# Patient Record
Sex: FEMALE | Race: WHITE | NOT HISPANIC OR LATINO | Employment: OTHER | URBAN - METROPOLITAN AREA
[De-identification: names, ages, dates, MRNs, and addresses within clinical notes are randomized per-mention and may not be internally consistent; named-entity substitution may affect disease eponyms.]

---

## 2018-05-03 PROBLEM — E03.9 ACQUIRED HYPOTHYROIDISM: Chronic | Status: ACTIVE | Noted: 2018-05-03

## 2018-05-03 PROBLEM — F51.01 PRIMARY INSOMNIA: Chronic | Status: ACTIVE | Noted: 2018-05-03

## 2018-05-03 PROBLEM — G89.4 CHRONIC PAIN SYNDROME: Chronic | Status: ACTIVE | Noted: 2018-05-03

## 2018-05-16 PROBLEM — R79.89 ELEVATED PTHRP LEVEL: Chronic | Status: ACTIVE | Noted: 2018-05-16

## 2018-05-16 PROBLEM — E55.9 VITAMIN D DEFICIENCY: Chronic | Status: ACTIVE | Noted: 2018-05-16

## 2018-05-16 PROBLEM — E78.00 HYPERCHOLESTEREMIA: Chronic | Status: ACTIVE | Noted: 2018-05-16

## 2018-05-16 PROBLEM — R73.03 PRE-DIABETES: Chronic | Status: ACTIVE | Noted: 2018-05-16

## 2019-12-03 ENCOUNTER — OFFICE VISIT (OUTPATIENT)
Dept: RHEUMATOLOGY | Facility: CLINIC | Age: 74
End: 2019-12-03
Payer: MEDICARE

## 2019-12-03 VITALS
BODY MASS INDEX: 32.72 KG/M2 | DIASTOLIC BLOOD PRESSURE: 72 MMHG | WEIGHT: 178.88 LBS | SYSTOLIC BLOOD PRESSURE: 163 MMHG

## 2019-12-03 DIAGNOSIS — R76.8 POSITIVE ANA (ANTINUCLEAR ANTIBODY): Primary | ICD-10-CM

## 2019-12-03 DIAGNOSIS — M35.9 UNDIFFERENTIATED CONNECTIVE TISSUE DISEASE: ICD-10-CM

## 2019-12-03 PROCEDURE — 99205 PR OFFICE/OUTPT VISIT, NEW, LEVL V, 60-74 MIN: ICD-10-PCS | Mod: S$GLB,,, | Performed by: INTERNAL MEDICINE

## 2019-12-03 PROCEDURE — 1159F PR MEDICATION LIST DOCUMENTED IN MEDICAL RECORD: ICD-10-PCS | Mod: S$GLB,,, | Performed by: INTERNAL MEDICINE

## 2019-12-03 PROCEDURE — 1101F PT FALLS ASSESS-DOCD LE1/YR: CPT | Mod: S$GLB,,, | Performed by: INTERNAL MEDICINE

## 2019-12-03 PROCEDURE — 1125F PR PAIN SEVERITY QUANTIFIED, PAIN PRESENT: ICD-10-PCS | Mod: S$GLB,,, | Performed by: INTERNAL MEDICINE

## 2019-12-03 PROCEDURE — 1101F PR PT FALLS ASSESS DOC 0-1 FALLS W/OUT INJ PAST YR: ICD-10-PCS | Mod: S$GLB,,, | Performed by: INTERNAL MEDICINE

## 2019-12-03 PROCEDURE — 99205 OFFICE O/P NEW HI 60 MIN: CPT | Mod: S$GLB,,, | Performed by: INTERNAL MEDICINE

## 2019-12-03 PROCEDURE — 1125F AMNT PAIN NOTED PAIN PRSNT: CPT | Mod: S$GLB,,, | Performed by: INTERNAL MEDICINE

## 2019-12-03 PROCEDURE — 1159F MED LIST DOCD IN RCRD: CPT | Mod: S$GLB,,, | Performed by: INTERNAL MEDICINE

## 2019-12-03 RX ORDER — HYDROXYCHLOROQUINE SULFATE 200 MG/1
200 TABLET, FILM COATED ORAL 2 TIMES DAILY
Qty: 180 TABLET | Refills: 1 | Status: SHIPPED | OUTPATIENT
Start: 2019-12-03 | End: 2020-03-25

## 2019-12-03 RX ORDER — PREDNISONE 10 MG/1
10 TABLET ORAL EVERY OTHER DAY
Qty: 45 TABLET | Refills: 1 | Status: SHIPPED | OUTPATIENT
Start: 2019-12-03 | End: 2020-04-28 | Stop reason: SDUPTHER

## 2019-12-03 RX ORDER — LEVOTHYROXINE SODIUM 50 UG/1
50 TABLET ORAL
COMMUNITY
Start: 2019-11-18 | End: 2022-08-15 | Stop reason: SDUPTHER

## 2019-12-03 RX ORDER — PREDNISONE 10 MG/1
10 TABLET ORAL 2 TIMES DAILY
COMMUNITY
End: 2019-12-03 | Stop reason: SDUPTHER

## 2019-12-03 NOTE — PATIENT INSTRUCTIONS
Hydroxychloroquine tablets  What is this medicine?  HYDROXYCHLOROQUINE (jaimie drox ee KLOR oh kwin) is used to treat rheumatoid arthritis and systemic lupus erythematosus. It is also used to treat malaria.  How should I use this medicine?  Take this medicine by mouth with a glass of water. Follow the directions on the prescription label. If this medicine upsets your stomach take it with food or milk. Take your doses at regular intervals. Do not take your medicine more often than directed.  Talk to your pediatrician regarding the use of this medicine in children. Special care may be needed.  What side effects may I notice from receiving this medicine?  Side effects that you should report to your doctor or health care professional as soon as possible:  · allergic reactions like skin rash, itching or hives, swelling of the face, lips, or tongue  · change in vision  · fever, infection  · hearing loss or ringing  · muscle weakness, tremor, or numbness  · redness, blistering, peeling or loosening of the skin, including inside the mouth  · seizures  · unusual bleeding or bruising  · unusually weak or tired  Side effects that usually do not require medical attention (report to your doctor or health care professional if they continue or are bothersome):  · change in coloration of the mouth or skin  · dizziness  · hair loss, lightening  · headache  · irritability, nervousness, nightmares  · loss of appetite  · stomach upset, diarrhea  What may interact with this medicine?  · antacids  · botulinum toxins  · digoxin  · kaolin  · penicillamine  What if I miss a dose?  If you miss a dose, take it as soon as you can. If it is almost time for your next dose, take only that dose. Do not take double or extra doses.  Where should I keep my medicine?  Keep out of the reach of children. In children, this medicine can cause overdose with small doses.  Store at room temperature between 15 and 30 degrees C (59 and 86 degrees F). Protect  from moisture and light. Throw away any unused medicine after the expiration date.  What should I tell my health care provider before I take this medicine?  They need to know if you have any of these conditions:  · alcoholism  · anemia or other blood disorder  · eye disease  · glucose 6-phosphate dehydrogenase (G6PD) deficiency  · liver disease  · porphyria  · psoriasis  · an unusual or allergic reaction to chloroquine, hydroxychloroquine, other medicines, foods, dyes, or preservatives  · pregnant or trying to get pregnant  · breast-feeding  What should I watch for while using this medicine?  Visit your doctor or health care professional for regular check ups. Tell your doctor if your symptoms do not improve. Arthritis symptoms may take several weeks to improve. If you are taking this medicine for a long time, you will need important blood work done. You will also need to have your eyes checked as directed.  This medicine can make you more sensitive to the sun. Keep out of the sun. If you cannot avoid being in the sun, wear protective clothing and use sunscreen. Do not use sun lamps or tanning beds/booths.  Avoid antacids and kaolin containing products for 2 hours before and after taking a dose of this medicine.  NOTE:This sheet is a summary. It may not cover all possible information. If you have questions about this medicine, talk to your doctor, pharmacist, or health care provider. Copyright© 2017 [employer]        Lupus (Systemic Lupus Erythematosus, SLE)  Lupus is a chronic (long-term) disease. It causes inflammation in the body. It mainly affects the joints, skin, and muscles. Lupus can affect almost any part of the body, and other common sites affected by lupus include the kidneys, blood cells, lungs, brain, nerves, intestines, eyes, mouth, and heart. Lupus is an autoimmune disease. This means that immune cells in the body begin attacking normal body cells. The cause of this is not known.  Common symptoms  include:  · A butterfly-shaped rash across the bridge of the nose and cheeks or a disk-shaped rash on the face, neck, or chest  · Sun sensitivity (a short time in the sun may lead to severe sunburn or rash)  · Stiff, painful, or swollen joints (arthritis)  · Fatigue or depression  · Fever  Your healthcare provider may prescribe medicines such as oral steroids or medicines to suppress the immune system. Some people benefit from anti-malarial medicines as well. People with lupus are more likely to have heart disease. So, it is vital to manage other risk factors for heart disease. These include high blood pressure, smoking, and unhealthy cholesterol.   There is no cure for lupus. With good care, though, most people with the condition lead normal, active lives.   Home care  · If you were prescribed a medicine, take it as directed.  · Unless another pain medicine was prescribed, take an over-the-counter pain medicine such as acetaminophen or ibuprofen for pain. Do not take ibuprofen or other NSAID (non-steroidal anti-inflammatory) medicine if you were prescribed prednisone.  · Avoid sun exposure. Cover up with clothing. Wear sunglasses. Use sun screen (at least SPF 15).  · Get enough rest and reduce stress to help your immune system.  · Get some physical activity every day. This will help you feel your best.  · If you have high blood pressure, consider buying an automatic blood pressure machine (available at most pharmacies). Use this to monitor your blood pressure and report to your doctor.  · Limit alcohol intake. Eat a healthy, balanced diet low in fat and cholesterol.  · If you smoke, quit. Smoking increases the risk of lupus-related complications.  Follow-up care  Follow up with your healthcare provider or as advised by our staff.  For more information contact the Lupus Foundation at [phone]  www.lupus.org  When to seek medical advice  Call your healthcare provider for any of the following:  · Increasing  weakness, fainting  · Chest pain or shortness of breath or pain with breathing  · Severe headache with fever  · Seizures  · Leg swelling, redness or tenderness (sign of blood clot)  · Unusual bruising or bleeding anywhere on your body  · Blood in your stool (black or red color)  · Abdominal pain, repeated vomiting  · Blood or development of significant bubbles in the urine  · Swelling in the legs and arms  · Development of ulcers in the mouth  · New rash  · Rashes, discoloration or ulcerations on the finger tips or toes  · You become pregnant or are planning to become pregnant  Date Last Reviewed: 3/1/2017  © 0133-6431 [employer]. [address] [place] [address], [place] [address]. All rights reserved. This information is not intended as a substitute for professional medical care. Always follow your healthcare professional's instructions.      Low-Salt Choices  Eating salt (sodium) can make your body retain too much water. Excess water makes your heart work harder. Canned, packaged, and frozen foods are easy to prepare, but they are often high in sodium. Here are some ideas for low-salt foods you can easily prepare yourself.    For breakfast  · Fruit or 100% fruit juice  · Whole-wheat bread or an English muffin. Compare sodium content on labels.  · Low-fat milk or yogurt  · Unsalted eggs  · Shredded wheat  · Corn tortillas  · Unsalted steamed rice  · Regular (not instant) hot cereal, made without salt  Stay away from:  · Sausage, woodruff, and ham  · Flour tortillas  · Packaged muffins, pancakes, and biscuits  · Instant hot cereals  · Cottage cheese  For lunch and dinner  · Fresh fish, chicken, turkey, or meat--baked, broiled, or roasted without salt  · Dry beans, cooked without salt  · Tofu, stir-fried without salt  · Unsalted fresh fruit and vegetables, or frozen or canned fruit and vegetables with no added salt  Stay away from:  · Lunch or deli meat that is cured or smoked  · Cheese  · Tomato juice and  catsup  · Canned vegetables, soups, and fish not labeled as no-salt-added or reduced sodium  · Packaged gravies and sauces  · Olives, pickles, and relish  · Bottled salad dressings  For snacks and desserts  · Yogurt  · Unsalted, air popped popcorn  · Unsalted nuts or seeds  Stay away from:  · Pies and cakes  · Packaged dessert mixes  · Pizza  · Canned and packaged puddings  · Pretzels, chips, crackers, and nuts--unless the label says unsalted  Date Last Reviewed: 6/17/2015  © 4587-7384 [employer]. [address] [place] [address], [place] [address]. All rights reserved. This information is not intended as a substitute for professional medical care. Always follow your healthcare professional's instructions.

## 2019-12-03 NOTE — PROGRESS NOTES
[facility] RHEUMATOLOGY           New patient visit    Notes dictated to M*Modal. Please forgive any unintentional errors.  Subjective:       Patient ID:   NAME: Jacqueline Goldberg : 1945     73 y.o. female    Referring Doc: No ref. provider found  Other Physicians:    Chief Complaint:  Positive OBINNA    HPI:  This very pleasant lady is referred for the evaluation of a positive OBINNA.  The patient was in her usual state of excellent health until approximately 6 weeks ago when she developed an acute respiratory syndrome that involved shortness of breath, dyspnea on exertion, and swelling of her lower extremities.  She was taken to the local emergency room where it was found that she had pneumonia.  She was admitted to the hospital and treated successfully with antibiotics. [Hospital Course: Patient presented to the hospital with complaints of progressive shortness of breath, chest discomfort, and dyspnea with exertion. She reports that the symptoms had become progressively worse over the past 2 weeks prior to admission. She admitted to having some subjective fevers and chills at home but denied having any sick contacts. Patient was found to be hypoxic and was started on oxygen therapy. She was also found to have pneumonia and was started on antibiotics of azithromycin and Rocephin. During the initial days of her hospitalization the patient adamantly refused to have steroid therapy to help with her symptoms and diagnosis. Myself, the nocturnist, and the pulmonologist all went over the pros and cons of therapy with this patient. She also initially did not want to have IV access for IV antibiotics for therapy. We again explained the pros and cons of this treatment. Patient finally decided to agree with medical advice and steroid therapy was initiated by pulmonology. Patient was also evaluated by Cardiology. The cardiology workup did not show any acute issues that would justify the patient's symptoms. The patient  will be sent home in stable condition on home oxygen. She has failed a 6 min walk test. She will follow with pulmonology, Dr. Brower. She will also follow up with her primary care physician].    That evaluation included extensive blood testing which demonstrated a positive OBINNA (1:640 speckled), a negative OBINNA profile, elevated C3/C4, and an elevated rheumatoid factor of 156 IU/mL.     The rheumatologic review of systems is negative for skin rash, photosensitivity, sicca symptoms, oral ulcerations, dysphagia, reflux, GI bleeding, renal disease or hematuria, significant anemia, Raynaud's, seizures, or arthritis.      ROS:   GEN:      no fever, night sweats or weight loss  SKIN:     no rashes, erythema, bruising, or swelling, no Raynauds, no photosensitivity  HEENT:  no acute changes in vision, no mouth ulcers, no sicca symptoms, no scalp tenderness, jaw claudication.  CV:        no CP, PND, + COTA, + orthopnea, no palpitations  PULM:    + SOB, + cough, - hemoptysis, - sputum, + pleuritic pain  GI:          No dysphagia, GERD, hematemesis; no abdominal pain, nausea, vomiting, constipation, diarrhea, melanotic stools, BRBPR.  :        no hematuria, dysuria  NEURO: no paresthesias, headaches, visual disturbances  MUSCULOSKELETAL:  No red, hot, and/or swollen joints.  No muscle pain or weakness    PSYCH: No insomnia, no anxiety, no depression    Past Medical/Surgical History:  Past Medical History:   Diagnosis Date    Chronic pain     Insomnia     Insomnia     Thyroid disease      Past Surgical History:   Procedure Laterality Date    ANKLE SURGERY      CHOLECYSTECTOMY      EYE SURGERY      cataract surgery; both eyes       Allergies:  Review of patient's allergies indicates:  No Known Allergies    Social/Family History:  Social History     Socioeconomic History    Marital status: Single     Spouse name: Not on file    Number of children: Not on file    Years of education: Not on file    Highest education level:  Not on file   Occupational History    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   Social Needs    Financial resource strain: Not on file    Food insecurity:     Worry: Not on file     Inability: Not on file    Transportation needs:     Medical: Not on file     Non-medical: Not on file   Tobacco Use    Smoking status: Never Smoker    Smokeless tobacco: Never Used   Substance and Sexual Activity    Alcohol use: No    Drug use: No    Sexual activity: Yes     Partners: Male     Birth control/protection: None   Lifestyle    Physical activity:     Days per week: Not on file     Minutes per session: Not on file    Stress: Not on file   Relationships    Social connections:     Talks on phone: Not on file     Gets together: Not on file     Attends Congregation service: Not on file     Active member of club or organization: Not on file     Attends meetings of clubs or organizations: Not on file     Relationship status: Not on file   Other Topics Concern    Not on file   Social History Narrative    Not on file     Family History   Problem Relation Age of Onset    Diabetes Mother     Heart disease Father     Arthritis Sister      FAMILY HISTORY: negative for Connective Tissue Disease      Medications:    Current Outpatient Medications:     levothyroxine (SYNTHROID) 50 MCG tablet, Take 50 mcg by mouth., Disp: , Rfl:     predniSONE (DELTASONE) 10 MG tablet, Take 1 tablet (10 mg total) by mouth every other day., Disp: 45 tablet, Rfl: 1    hydroxychloroquine (PLAQUENIL) 200 mg tablet, Take 1 tablet (200 mg total) by mouth 2 (two) times daily., Disp: 180 tablet, Rfl: 1    simvastatin (ZOCOR) 10 MG tablet, Take 1 tablet (10 mg total) by mouth every evening. (Patient not taking: Reported on 12/3/2019), Disp: 90 tablet, Rfl: 0    traZODone (DESYREL) 50 MG tablet, Take 1 tablet (50 mg total) by mouth nightly. (Patient not taking: Reported on 12/3/2019), Disp: 90 tablet, Rfl: 0  No current  facility-administered medications for this visit.     Objective:     Vitals:  Blood pressure (!) 163/72, weight 81.1 kg (178 lb 14.4 oz).  RR=16, P=76  Physical Examination:   GEN:     wn/wd female in no apparent distress  SKIN:    no rashes, no sclerodactyly, no Raynaud's, no periungual erythema, no digital tip tapering, no nailbed pitting  HEAD:   no alopecia, no scalp tenderness, no temporal artery tenderness or induration.  EYES:   + conjunctival pallor, no icterus  ENT:     no thrush, no mucosal dryness or ulcerations, adequate oral hygiene & dentition.  NECK:  supple x 6, no masses, no thyromegaly, no lymphadenopathy.  CV:        S1 and S2, RRR, II/VI diastolic murmur RUSB, no gallop or rubs  CHEST: Normal respiratory effort; decreased breath sounds at both bases. No signs of consolidation.  ABD:      non-tender and non-distended; soft; normal bowel sounds; no rebound, guarding, or tenderness. No hepatosplenomegaly.  Musculoskeletal:  No evidence of inflammatory arthritis involving the small joints of the hands or feet.  No inflammation, deformity, or instability of the large joints.  Degenerative changes are noted in the shoulders, hips, and knees. Strength is 5-/5 x 4.  Posture is adequate. Range of motion of the back is diminished due to hamstring tightness and lower back pain. Gait is broad-based, somewhat slow but stable  EXTREM: no clubbing, cyanosis, 2+ edema, normal pulses. Torito's - x2  NEURO:  grossly intact; motor/sensory WNL; no tremors  PSYCH:  normal mood, affect and behavior    Labs:   Lab Results   Component Value Date    WBC 10.40 05/03/2018    HGB 12.8 05/03/2018    HCT 40.1 05/03/2018    MCV 73 (L) 05/03/2018     05/03/2018   CMP@  Sodium   Date Value Ref Range Status   05/03/2018 139 136 - 145 mmol/L Final     Potassium   Date Value Ref Range Status   05/03/2018 4.3 3.5 - 5.1 mmol/L Final     Chloride   Date Value Ref Range Status   05/03/2018 101 101 - 111 mmol/L Final     CO2    Date Value Ref Range Status   05/03/2018 29 23 - 29 mmol/L Final     Glucose   Date Value Ref Range Status   05/03/2018 103 74 - 118 mg/dL Final     BUN, Bld   Date Value Ref Range Status   05/03/2018 24 (H) 8 - 23 mg/dL Final     Creatinine   Date Value Ref Range Status   05/03/2018 0.7 0.5 - 1.4 mg/dL Final     Calcium   Date Value Ref Range Status   05/03/2018 9.4 8.6 - 10.0 mg/dL Final     Total Protein   Date Value Ref Range Status   05/03/2018 8.2 6.0 - 8.4 g/dL Final     Albumin   Date Value Ref Range Status   05/03/2018 4.4 3.5 - 5.2 g/dL Final     Total Bilirubin   Date Value Ref Range Status   05/03/2018 0.6 0.3 - 1.2 mg/dL Final     Comment:     For infants and newborns, interpretation of results should be based  on gestational age, weight and in agreement with clinical  observations.  Premature Infant recommended reference ranges:  Up to 24 hours.............<8.0 mg/dL  Up to 48 hours............<12.0 mg/dL  3-5 days..................<15.0 mg/dL  6-29 days.................<15.0 mg/dL       Alkaline Phosphatase   Date Value Ref Range Status   05/03/2018 102 38 - 126 U/L Final     AST   Date Value Ref Range Status   05/03/2018 20 15 - 41 U/L Final     ALT   Date Value Ref Range Status   05/03/2018 16 14 - 54 U/L Final       Radiology/Diagnostic Studies:    None    Assessment/Discussion/Plan:   73 y.o. female with a high titer positive OBINNA, acute onset of pneumonia with an apparent degree of pneumonitis, and anemia    PLAN:  For the time being, it would be safe to call this as undifferentiated connective tissue disease (UCTD), though I think that this is probably lupus. She has done well since discharge on 10-20 mg of prednisone daily, though I suspect her compliance has been imperfect. The problem is how to go forward from here. Progressively tapering her prednisone will very possibly end in a full-blown recurrence.    To make matters more complicated, this rather strong-willed lady is bound determined  to [place] from [place] on the [place] [id number] in January 2020 on a 4-1/2 month, around the world cruise.    I have discussed possible approaches to this with the patient and her sister.  I have suggested and they have accepted starting her on hydroxychloroquine 400 mg daily immediately. She was advised to have a baseline retinal examination within the next month.  Because HCQ takes at least 2 months and often more than 4 months to become effective, she will also have to remain on prednisone. I have adjusted her dose to 10 mg every other day effective immediately. We need to determine right now, before she embarks, whether she can tolerate that sort of dose without relapsing. The risks of the drugs and of a potential relapse were discussed in detail.    I have also noted that she appears to be in a bit of CHF.  She was given a prescription for Lasix at the time of her discharge from the hospital but she admits she has never taken it. She states she was to take it as needed but admits that she has no idea what it might be needed for.  I have therefore instructed her to take 20 mg every other day until her next appointment.    I have ordered some additional blood and urine testing.  This will be performed this week at [facility].    I have provided her with literature on lupus and on steroids.  I also provided her with literature on a low-sodium diet.    RTC:  I will see her back in 2 weeks.            Electronically signed by Refugio Owens MD

## 2019-12-05 LAB — SERVICE CMNT-IMP: NORMAL

## 2019-12-09 NOTE — PROGRESS NOTES
Check on the patient and see how she is doing.  Let's get a repeat CBC, urine, and a urine culture and sensitivity this week.  Thank you

## 2019-12-11 LAB
ALBUMIN SERPL ELPH-MCNC: 3.4 G/DL (ref 3.8–4.8)
ALBUMIN SERPL-MCNC: 3.5 G/DL (ref 3.6–5.1)
ALBUMIN/GLOB SERPL: 1.2 (CALC) (ref 1–2.5)
ALP SERPL-CCNC: 101 U/L (ref 33–130)
ALPHA1 GLOB SERPL ELPH-MCNC: 0.4 G/DL (ref 0.2–0.3)
ALPHA2 GLOB SERPL ELPH-MCNC: 1 G/DL (ref 0.5–0.9)
ALT SERPL-CCNC: 29 U/L (ref 6–29)
ANA PAT SER IF-IMP: ABNORMAL
ANA SER QL IF: POSITIVE
ANA TITR SER IF: ABNORMAL TITER
APPEARANCE UR: ABNORMAL
AST SERPL-CCNC: 16 U/L (ref 10–35)
BACTERIA #/AREA URNS HPF: ABNORMAL /HPF
BASOPHILS # BLD AUTO: 45 CELLS/UL (ref 0–200)
BASOPHILS NFR BLD AUTO: 0.2 %
BETA1 GLOB SERPL ELPH-MCNC: 0.4 G/DL (ref 0.4–0.6)
BETA2 GLOB SERPL ELPH-MCNC: 0.3 G/DL (ref 0.2–0.5)
BILIRUB SERPL-MCNC: 0.5 MG/DL (ref 0.2–1.2)
BILIRUB UR QL STRIP: NEGATIVE
BUN SERPL-MCNC: 28 MG/DL (ref 7–25)
BUN/CREAT SERPL: 29 (CALC) (ref 6–22)
CALCIUM SERPL-MCNC: 9.2 MG/DL (ref 8.6–10.4)
CCP IGG SERPL-ACNC: <16 UNITS
CHLORIDE SERPL-SCNC: 102 MMOL/L (ref 98–110)
CO2 SERPL-SCNC: 28 MMOL/L (ref 20–32)
COLOR UR: YELLOW
CREAT SERPL-MCNC: 0.95 MG/DL (ref 0.6–0.93)
CRP SERPL-MCNC: 30.1 MG/L
DSDNA AB SER-ACNC: <1 IU/ML
ENA SCL70 AB SER IA-ACNC: NORMAL AI
ENA SM AB SER IA-ACNC: NORMAL AI
ENA SM+RNP AB SER IA-ACNC: NORMAL AI
ENA SS-A AB SER IA-ACNC: NORMAL AI
ENA SS-B AB SER IA-ACNC: NORMAL AI
EOSINOPHIL # BLD AUTO: 6712 CELLS/UL (ref 15–500)
EOSINOPHIL NFR BLD AUTO: 30.1 %
ERYTHROCYTE [DISTWIDTH] IN BLOOD BY AUTOMATED COUNT: 16.4 % (ref 11–15)
GAMMA GLOB SERPL ELPH-MCNC: 1 G/DL (ref 0.8–1.7)
GFRSERPLBLD MDRD-ARVRAT: 58 ML/MIN/1.73M2
GLOBULIN SER CALC-MCNC: 3 G/DL (CALC) (ref 1.9–3.7)
GLUCOSE SERPL-MCNC: 103 MG/DL (ref 65–99)
GLUCOSE UR QL STRIP: NEGATIVE
HCT VFR BLD AUTO: 34.5 % (ref 35–45)
HGB BLD-MCNC: 10.7 G/DL (ref 11.7–15.5)
HGB UR QL STRIP: ABNORMAL
HYALINE CASTS #/AREA URNS LPF: ABNORMAL /LPF
KETONES UR QL STRIP: NEGATIVE
LEUKOCYTE ESTERASE UR QL STRIP: NEGATIVE
LYMPHOCYTES # BLD AUTO: 2743 CELLS/UL (ref 850–3900)
LYMPHOCYTES NFR BLD AUTO: 12.3 %
MCH RBC QN AUTO: 24.1 PG (ref 27–33)
MCHC RBC AUTO-ENTMCNC: 31 G/DL (ref 32–36)
MCV RBC AUTO: 77.7 FL (ref 80–100)
MONOCYTES # BLD AUTO: 1516 CELLS/UL (ref 200–950)
MONOCYTES NFR BLD AUTO: 6.8 %
NEUTROPHILS # BLD AUTO: ABNORMAL CELLS/UL (ref 1500–7800)
NEUTROPHILS NFR BLD AUTO: 50.6 %
NITRITE UR QL STRIP: NEGATIVE
PH UR STRIP: ABNORMAL [PH] (ref 5–8)
PLATELET # BLD AUTO: 454 THOUSAND/UL (ref 140–400)
PMV BLD REES-ECKER: 9.2 FL (ref 7.5–12.5)
POTASSIUM SERPL-SCNC: 4.4 MMOL/L (ref 3.5–5.3)
PROT PATTERN SERPL ELPH-IMP: ABNORMAL
PROT SERPL-MCNC: 6.5 G/DL (ref 6.1–8.1)
PROT SERPL-MCNC: 6.6 G/DL (ref 6.1–8.1)
PROT UR QL STRIP: ABNORMAL
RBC # BLD AUTO: 4.44 MILLION/UL (ref 3.8–5.1)
RBC #/AREA URNS HPF: ABNORMAL /HPF
SODIUM SERPL-SCNC: 142 MMOL/L (ref 135–146)
SP GR UR STRIP: 1.01 (ref 1–1.03)
SQUAMOUS #/AREA URNS HPF: ABNORMAL /HPF
WBC # BLD AUTO: 22.3 THOUSAND/UL (ref 3.8–10.8)
WBC #/AREA URNS HPF: ABNORMAL /HPF

## 2019-12-13 LAB
APPEARANCE UR: CLEAR
BACTERIA #/AREA URNS HPF: ABNORMAL /HPF
BACTERIA UR CULT: NORMAL
BACTERIA UR CULT: NORMAL
BASOPHILS # BLD AUTO: 78 CELLS/UL (ref 0–200)
BASOPHILS NFR BLD AUTO: 0.4 %
BILIRUB UR QL STRIP: NEGATIVE
CAOX CRY #/AREA URNS HPF: ABNORMAL /HPF
CENTROMERE B AB SER-ACNC: NORMAL AI
COLOR UR: YELLOW
EOSINOPHIL # BLD AUTO: ABNORMAL CELLS/UL (ref 15–500)
EOSINOPHIL NFR BLD AUTO: 51.5 %
ERYTHROCYTE [DISTWIDTH] IN BLOOD BY AUTOMATED COUNT: 16.5 % (ref 11–15)
GLUCOSE UR QL STRIP: NEGATIVE
HCT VFR BLD AUTO: 35.3 % (ref 35–45)
HGB BLD-MCNC: 10.8 G/DL (ref 11.7–15.5)
HGB UR QL STRIP: ABNORMAL
HYALINE CASTS #/AREA URNS LPF: ABNORMAL /LPF
KETONES UR QL STRIP: NEGATIVE
LEUKOCYTE ESTERASE UR QL STRIP: NEGATIVE
LYMPHOCYTES # BLD AUTO: 1950 CELLS/UL (ref 850–3900)
LYMPHOCYTES NFR BLD AUTO: 10 %
MCH RBC QN AUTO: 23.7 PG (ref 27–33)
MCHC RBC AUTO-ENTMCNC: 30.6 G/DL (ref 32–36)
MCV RBC AUTO: 77.6 FL (ref 80–100)
MONOCYTES # BLD AUTO: 819 CELLS/UL (ref 200–950)
MONOCYTES NFR BLD AUTO: 4.2 %
NEUTROPHILS # BLD AUTO: 6611 CELLS/UL (ref 1500–7800)
NEUTROPHILS NFR BLD AUTO: 33.9 %
NITRITE UR QL STRIP: NEGATIVE
PH UR STRIP: ABNORMAL [PH] (ref 5–8)
PLATELET # BLD AUTO: 436 THOUSAND/UL (ref 140–400)
PMV BLD REES-ECKER: 9.9 FL (ref 7.5–12.5)
PROT UR QL STRIP: ABNORMAL
RBC # BLD AUTO: 4.55 MILLION/UL (ref 3.8–5.1)
RBC #/AREA URNS HPF: ABNORMAL /HPF
SERVICE CMNT-IMP: ABNORMAL
SP GR UR STRIP: 1.02 (ref 1–1.03)
SQUAMOUS #/AREA URNS HPF: ABNORMAL /HPF
WBC # BLD AUTO: 19.5 THOUSAND/UL (ref 3.8–10.8)
WBC #/AREA URNS HPF: ABNORMAL /HPF

## 2019-12-17 ENCOUNTER — OFFICE VISIT (OUTPATIENT)
Dept: RHEUMATOLOGY | Facility: CLINIC | Age: 74
End: 2019-12-17
Payer: MEDICARE

## 2019-12-17 VITALS
DIASTOLIC BLOOD PRESSURE: 86 MMHG | WEIGHT: 173.88 LBS | BODY MASS INDEX: 31.81 KG/M2 | SYSTOLIC BLOOD PRESSURE: 149 MMHG

## 2019-12-17 DIAGNOSIS — D72.19 EOSINOPHILIC LEUKOCYTOSIS: Primary | ICD-10-CM

## 2019-12-17 DIAGNOSIS — M35.9 UNDIFFERENTIATED CONNECTIVE TISSUE DISEASE: ICD-10-CM

## 2019-12-17 DIAGNOSIS — F51.01 PRIMARY INSOMNIA: Chronic | ICD-10-CM

## 2019-12-17 PROCEDURE — 1159F PR MEDICATION LIST DOCUMENTED IN MEDICAL RECORD: ICD-10-PCS | Mod: S$GLB,,, | Performed by: INTERNAL MEDICINE

## 2019-12-17 PROCEDURE — 1159F MED LIST DOCD IN RCRD: CPT | Mod: S$GLB,,, | Performed by: INTERNAL MEDICINE

## 2019-12-17 PROCEDURE — 99215 OFFICE O/P EST HI 40 MIN: CPT | Mod: S$GLB,,, | Performed by: INTERNAL MEDICINE

## 2019-12-17 PROCEDURE — 1101F PR PT FALLS ASSESS DOC 0-1 FALLS W/OUT INJ PAST YR: ICD-10-PCS | Mod: S$GLB,,, | Performed by: INTERNAL MEDICINE

## 2019-12-17 PROCEDURE — 1125F PR PAIN SEVERITY QUANTIFIED, PAIN PRESENT: ICD-10-PCS | Mod: S$GLB,,, | Performed by: INTERNAL MEDICINE

## 2019-12-17 PROCEDURE — 99215 PR OFFICE/OUTPT VISIT, EST, LEVL V, 40-54 MIN: ICD-10-PCS | Mod: S$GLB,,, | Performed by: INTERNAL MEDICINE

## 2019-12-17 PROCEDURE — 1125F AMNT PAIN NOTED PAIN PRSNT: CPT | Mod: S$GLB,,, | Performed by: INTERNAL MEDICINE

## 2019-12-17 PROCEDURE — 1101F PT FALLS ASSESS-DOCD LE1/YR: CPT | Mod: S$GLB,,, | Performed by: INTERNAL MEDICINE

## 2019-12-17 RX ORDER — ALBUTEROL SULFATE 90 UG/1
AEROSOL, METERED RESPIRATORY (INHALATION)
Refills: 0 | COMMUNITY
Start: 2019-11-05 | End: 2020-05-19

## 2019-12-17 RX ORDER — TRAZODONE HYDROCHLORIDE 100 MG/1
100 TABLET ORAL NIGHTLY
Qty: 90 TABLET | Refills: 1 | Status: SHIPPED | OUTPATIENT
Start: 2019-12-17 | End: 2020-05-19

## 2019-12-17 RX ORDER — METHOCARBAMOL 500 MG/1
500 TABLET, FILM COATED ORAL CONTINUOUS PRN
Refills: 0 | COMMUNITY
Start: 2019-10-26 | End: 2020-06-09

## 2019-12-17 RX ORDER — FUROSEMIDE 40 MG/1
40 TABLET ORAL
Refills: 3 | COMMUNITY
Start: 2019-11-25 | End: 2020-06-03 | Stop reason: SDUPTHER

## 2019-12-17 RX ORDER — IBUPROFEN 800 MG/1
TABLET ORAL
Refills: 0 | COMMUNITY
Start: 2019-10-26 | End: 2022-08-15 | Stop reason: SDUPTHER

## 2019-12-17 NOTE — PROGRESS NOTES
[facility] RHEUMATOLOGY           Follow-up visit    Notes dictated to M*Modal. Please forgive any unintended errors.  Subjective:       Patient ID:   NAME: Jacqueline Goldberg : 1945     74 y.o. female    Referring Doc: No ref. provider found  Other Physicians:    Chief Complaint:  Positive OBINNA      HPI/Interval History:  The patient reports feeling better over the last 2 weeks.  She has been on hydroxychloroquine 400 mg daily and prednisone 10 mg every other day.  She has no new complaints.    ROS:   GEN:    no fever, night sweats or weight loss  SKIN:   no rashes, erythema, bruising, or swelling, no Raynauds, no photosensitivity  HEENT: no changes in vision, no mouth ulcers, no sicca symptoms, no scalp tenderness, no jaw claudication.  CV:      no CP, PND, COTA, orthopnea, no palpitations  PULM: no SOB, cough, hemoptysis, sputum or pleuritic pain  GI:       no GERD, no dysphagia, no hematemesis, no abdominal pain, nausea, vomiting, constipation, diarrhea, melanotic stools, BRBPR  :      no hematuria, dysuria  NEURO: no paresthesias, headaches, acute visual disturbances  MUSCULOSKELETAL:  No muscle or joint complaints  PSYCH:   No increased insomnia, no increased anxiety, no increased depression    Past Medical/Surgical History:  Past Medical History:   Diagnosis Date    Chronic pain     Insomnia     Insomnia     Thyroid disease      Past Surgical History:   Procedure Laterality Date    ANKLE SURGERY      CHOLECYSTECTOMY      EYE SURGERY      cataract surgery; both eyes       Allergies:  Review of patient's allergies indicates:  No Known Allergies    Social/Family History:  Social History     Socioeconomic History    Marital status: Single     Spouse name: Not on file    Number of children: Not on file    Years of education: Not on file    Highest education level: Not on file   Occupational History    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   Social Needs    Financial resource  strain: Not on file    Food insecurity:     Worry: Not on file     Inability: Not on file    Transportation needs:     Medical: Not on file     Non-medical: Not on file   Tobacco Use    Smoking status: Never Smoker    Smokeless tobacco: Never Used   Substance and Sexual Activity    Alcohol use: No    Drug use: No    Sexual activity: Yes     Partners: Male     Birth control/protection: None   Lifestyle    Physical activity:     Days per week: Not on file     Minutes per session: Not on file    Stress: Not on file   Relationships    Social connections:     Talks on phone: Not on file     Gets together: Not on file     Attends Restorationism service: Not on file     Active member of club or organization: Not on file     Attends meetings of clubs or organizations: Not on file     Relationship status: Not on file   Other Topics Concern    Not on file   Social History Narrative    Not on file     Family History   Problem Relation Age of Onset    Diabetes Mother     Heart disease Father     Arthritis Sister      FAMILY HISTORY: negative for Connective Tissue Disease      Medications:    Current Outpatient Medications:     albuterol (PROVENTIL/VENTOLIN HFA) 90 mcg/actuation inhaler, INL 2 PFS ITL Q 4 H PRF WHZ OR SOB, Disp: , Rfl: 0    furosemide (LASIX) 40 MG tablet, TK 1 T PO  DAILY PRF FLUID RETENTION, Disp: , Rfl: 3    hydroxychloroquine (PLAQUENIL) 200 mg tablet, Take 1 tablet (200 mg total) by mouth 2 (two) times daily., Disp: 180 tablet, Rfl: 1    ibuprofen (ADVIL,MOTRIN) 800 MG tablet, , Disp: , Rfl: 0    levothyroxine (SYNTHROID) 50 MCG tablet, Take 50 mcg by mouth., Disp: , Rfl:     methocarbamol (ROBAXIN) 500 MG Tab, , Disp: , Rfl: 0    predniSONE (DELTASONE) 10 MG tablet, Take 1 tablet (10 mg total) by mouth every other day., Disp: 45 tablet, Rfl: 1    simvastatin (ZOCOR) 10 MG tablet, Take 1 tablet (10 mg total) by mouth every evening., Disp: 90 tablet, Rfl: 0    traZODone (DESYREL) 100 MG  tablet, Take 1 tablet (100 mg total) by mouth nightly., Disp: 90 tablet, Rfl: 1      Objective:     Vitals:  Blood pressure (!) 149/86, weight 78.9 kg (173 lb 14.4 oz).    Physical Examination:   GEN: wn/wd female in no apparent distress  SKIN: no rashes, no sclerodactyly, no Raynaud's, no periungual erythema, no digital tip ulcerations, no nailbed pitting  HEAD: no alopecia, no scalp tenderness, no temporal artery tenderness or induration.  EYES: no pallor, no icterus, PERRLA  ENT:  no thrush, no mucosal dryness or ulcerations, adequate oral hygiene & dentition.  NECK: supple x 6, no masses, no thyromegaly, no lymphadenopathy.  CV:   S1 and S2 regular, no murmurs, gallop or rubs  CHEST: Normal respiratory effort;  normal breath sounds/no adventitious sounds. No signs of consolidation.  ABD: non-tender and non-distended; soft; normal bowel sounds; no rebound/guarding or tenderness. No hepatosplenomegaly.  Musculoskeletal:  No evidence of active inflammatory disease.  No progressive deformity  EXTREM: no clubbing, cyanosis or edema. normal pulses.  NEURO:  grossly intact; motor/sensory WNL; no tremors  PSYCH:  normal mood, affect and behavior    Labs:   Lab Results   Component Value Date    WBC 19.5 (H) 12/11/2019    HGB 10.8 (L) 12/11/2019    HCT 35.3 12/11/2019    MCV 77.6 (L) 12/11/2019     (H) 12/11/2019   CMP@  Sodium   Date Value Ref Range Status   12/04/2019 142 135 - 146 mmol/L Final     Potassium   Date Value Ref Range Status   12/04/2019 4.4 3.5 - 5.3 mmol/L Final     Chloride   Date Value Ref Range Status   12/04/2019 102 98 - 110 mmol/L Final     CO2   Date Value Ref Range Status   12/04/2019 28 20 - 32 mmol/L Final     Glucose   Date Value Ref Range Status   12/04/2019 103 (H) 65 - 99 mg/dL Final     Comment:                   Fasting reference interval     For someone without known diabetes, a glucose value  between 100 and 125 mg/dL is consistent with  prediabetes and should be confirmed with  a  follow-up test.          BUN, Bld   Date Value Ref Range Status   12/04/2019 28 (H) 7 - 25 mg/dL Final     Creatinine   Date Value Ref Range Status   12/04/2019 0.95 (H) 0.60 - 0.93 mg/dL Corrected     Comment:        => AMENDED: Report has been updated to reflect  additional patient information. Please review results   for possible flagging and/or reference range changes.  For patients >49 years of age, the reference limit  for Creatinine is approximately 13% higher for people  identified as -American.          Calcium   Date Value Ref Range Status   12/04/2019 9.2 8.6 - 10.4 mg/dL Final     Total Protein   Date Value Ref Range Status   12/04/2019 6.6 6.1 - 8.1 g/dL Final   12/04/2019 6.5 6.1 - 8.1 g/dL Final     Albumin   Date Value Ref Range Status   12/04/2019 3.5 (L) 3.6 - 5.1 g/dL Final     Total Bilirubin   Date Value Ref Range Status   12/04/2019 0.5 0.2 - 1.2 mg/dL Final     Alkaline Phosphatase   Date Value Ref Range Status   12/04/2019 101 33 - 130 U/L Final     AST   Date Value Ref Range Status   12/04/2019 16 10 - 35 U/L Final     ALT   Date Value Ref Range Status   12/04/2019 29 6 - 29 U/L Final     CRP   Date Value Ref Range Status   12/04/2019 30.1 (H) <8.0 mg/L Final     OBINNA   Date Value Ref Range Status   12/04/2019 POSITIVE (A) NEGATIVE Final     Comment:     OBINNA IFA is a first line screen for detecting the  presence of up to approximately 150 autoantibodies in  various autoimmune diseases. A positive OBINNA IFA result  is suggestive of autoimmune disease and reflexes to  titer and pattern. Further laboratory testing may be  considered if clinically indicated.     For additional information, please refer to  [email or web address]  (This link is being provided for informational/  educational purposes only.)             Radiology/Diagnostic Studies:    OBINNA 1:160    Assessment/Discussion/Plan:   74 y.o. 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  Part of this is simply having left the hospital environment.  I believe the diuretics were also helpful.  It is too early for Plaquenil to be of any help.  I am gratifying that her current prednisone dose, 10 mg every other day, has not landed her back in the hospital.  This suggests that she may be able to remain on that dose until DMARD therapy becomes effective.    I have been more concerned about her CBC than about her UCTD.  Persistent leukocytosis with predominant eosinophilia may indicate an underlying hematologic malignancy.  I have asked my Hematology colleague, Dr. Huffman, to look at this record.  He shares that concern and has graciously offered to see the patient tomorrow.  At his request, I have ordered flow cytometry.  He will discuss the case with her and most likely recommend further testing including a bone marrow biopsy.  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 At the very least, I would like her to be fully informed before she declines further treatment and takes off on her extended cruise.    RTC:  I will see her back as soon as she gets off the cruise.  She is fully aware that I do not feel that leaving on an extended cruise at this time is wise.    Electronically signed by Refugio Owens MD      I have spent several hours since her last visit researching her case, ordering appropriate follow-up blood testing, arranging a consultation with Hematology, and explaining the need for same with her today.

## 2019-12-18 ENCOUNTER — OFFICE VISIT (OUTPATIENT)
Dept: HEMATOLOGY/ONCOLOGY | Facility: CLINIC | Age: 74
End: 2019-12-18
Payer: MEDICARE

## 2019-12-18 VITALS
HEART RATE: 93 BPM | DIASTOLIC BLOOD PRESSURE: 85 MMHG | SYSTOLIC BLOOD PRESSURE: 159 MMHG | TEMPERATURE: 98 F | WEIGHT: 172.88 LBS | BODY MASS INDEX: 31.62 KG/M2 | RESPIRATION RATE: 18 BRPM

## 2019-12-18 DIAGNOSIS — D53.9 NUTRITIONAL ANEMIA, UNSPECIFIED: ICD-10-CM

## 2019-12-18 DIAGNOSIS — D50.9 MICROCYTIC ANEMIA: ICD-10-CM

## 2019-12-18 DIAGNOSIS — D63.8 ANEMIA, CHRONIC DISEASE: ICD-10-CM

## 2019-12-18 DIAGNOSIS — D64.9 ANEMIA, UNSPECIFIED TYPE: ICD-10-CM

## 2019-12-18 DIAGNOSIS — D72.829 LEUKOCYTOSIS, UNSPECIFIED TYPE: ICD-10-CM

## 2019-12-18 DIAGNOSIS — D72.10 EOSINOPHILIA: ICD-10-CM

## 2019-12-18 PROCEDURE — 1101F PT FALLS ASSESS-DOCD LE1/YR: CPT | Mod: S$GLB,,, | Performed by: INTERNAL MEDICINE

## 2019-12-18 PROCEDURE — 99203 OFFICE O/P NEW LOW 30 MIN: CPT | Mod: S$GLB,,, | Performed by: INTERNAL MEDICINE

## 2019-12-18 PROCEDURE — 99203 PR OFFICE/OUTPT VISIT, NEW, LEVL III, 30-44 MIN: ICD-10-PCS | Mod: S$GLB,,, | Performed by: INTERNAL MEDICINE

## 2019-12-18 PROCEDURE — 1159F MED LIST DOCD IN RCRD: CPT | Mod: S$GLB,,, | Performed by: INTERNAL MEDICINE

## 2019-12-18 PROCEDURE — 1159F PR MEDICATION LIST DOCUMENTED IN MEDICAL RECORD: ICD-10-PCS | Mod: S$GLB,,, | Performed by: INTERNAL MEDICINE

## 2019-12-18 PROCEDURE — 1101F PR PT FALLS ASSESS DOC 0-1 FALLS W/OUT INJ PAST YR: ICD-10-PCS | Mod: S$GLB,,, | Performed by: INTERNAL MEDICINE

## 2019-12-18 NOTE — PROGRESS NOTES
"[facility] Hematolgy/Oncology  History & Physical    Subjective:      Patient ID:   NAME: Jacqueline Goldberg : 1945     74 y.o. female    Referring Doc: MATTEO Owens  Other Physicians:  Karly Rodriguez (Pulm); Fabrice Rodriguez (Card); Quentin Brower (Pulm)        Chief Complaint: leucocytosis      HPI:  74 y.o. female with diagnosis of leucocytosis who has been referred by Dr MATTEO Owens for evaluation by medical hematology/oncology. Patient has history of positive OBINNA with an undifferentiated CTD and has been on plaquenil and daily prednisone. She is here by herself. She reports that she feels "fine". She has some chronic back issues and has scoliosis. She had bout of community acquired pneumonia in Oct 2019 and was admitted at [facility]. She was seen by pulmonary and cardiology at that time.  She had leukemia screen sent off yesterday with flow cytometry which is pending. She has a planned 4-5 month trip on the [facility]. She is adamant that she plans to go on her trip. I discussed bone marrow biopsy but she is disinclined to having any procedures done. She is aware that I can not rule out an underlying eosinophilic leukemia without additional studies. She understands the risks she is undertaking. She is breathing ok. No weight loss, fevers or night sweats. No CP or SOB. She is a .               ROS:   GEN: normal without any fever, night sweats or weight loss  HEENT: normal with no HA's, sore throat, stiff neck, changes in vision  CV: normal with no CP, SOB, PND, COTA or orthopnea  PULM: normal with no SOB, cough, hemoptysis, sputum or pleuritic pain  GI: normal with no abdominal pain, nausea, vomiting, constipation, diarrhea, melanotic stools, BRBPR, or hematemesis  : normal with no hematuria, dysuria  BREAST: normal with no mass, discharge, pain  SKIN: normal with no rash, erythema, bruising, or swelling       Past Medical/Surgical History:  Past Medical History:   Diagnosis Date    Anemia, chronic disease " 12/18/2019    Anemia, unspecified 12/18/2019    Chronic pain     Eosinophilia 12/18/2019    Insomnia     Insomnia     Leucocytosis 12/18/2019    Microcytic anemia 12/18/2019    Thyroid disease      Past Surgical History:   Procedure Laterality Date    ANKLE SURGERY      CHOLECYSTECTOMY      EYE SURGERY      cataract surgery; both eyes         Allergies:  Review of patient's allergies indicates:  No Known Allergies    Social/Family History:  Social History     Socioeconomic History    Marital status: Single     Spouse name: Not on file    Number of children: Not on file    Years of education: Not on file    Highest education level: Not on file   Occupational History    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   Social Needs    Financial resource strain: Not on file    Food insecurity:     Worry: Not on file     Inability: Not on file    Transportation needs:     Medical: Not on file     Non-medical: Not on file   Tobacco Use    Smoking status: Never Smoker    Smokeless tobacco: Never Used   Substance and Sexual Activity    Alcohol use: No    Drug use: No    Sexual activity: Yes     Partners: Male     Birth control/protection: None   Lifestyle    Physical activity:     Days per week: Not on file     Minutes per session: Not on file    Stress: Not on file   Relationships    Social connections:     Talks on phone: Not on file     Gets together: Not on file     Attends Episcopal service: Not on file     Active member of club or organization: Not on file     Attends meetings of clubs or organizations: Not on file     Relationship status: Not on file   Other Topics Concern    Not on file   Social History Narrative    Not on file     Family History   Problem Relation Age of Onset    Diabetes Mother     Heart disease Father     Arthritis Sister          Medications:    Current Outpatient Medications:     albuterol (PROVENTIL/VENTOLIN HFA) 90 mcg/actuation inhaler, INL 2 PFS ITL  Q 4 H PRF WHZ OR SOB, Disp: , Rfl: 0    furosemide (LASIX) 40 MG tablet, TK 1 T PO  DAILY PRF FLUID RETENTION, Disp: , Rfl: 3    hydroxychloroquine (PLAQUENIL) 200 mg tablet, Take 1 tablet (200 mg total) by mouth 2 (two) times daily., Disp: 180 tablet, Rfl: 1    ibuprofen (ADVIL,MOTRIN) 800 MG tablet, , Disp: , Rfl: 0    levothyroxine (SYNTHROID) 50 MCG tablet, Take 50 mcg by mouth., Disp: , Rfl:     methocarbamol (ROBAXIN) 500 MG Tab, , Disp: , Rfl: 0    predniSONE (DELTASONE) 10 MG tablet, Take 1 tablet (10 mg total) by mouth every other day., Disp: 45 tablet, Rfl: 1    traZODone (DESYREL) 100 MG tablet, Take 1 tablet (100 mg total) by mouth nightly., Disp: 90 tablet, Rfl: 1    simvastatin (ZOCOR) 10 MG tablet, Take 1 tablet (10 mg total) by mouth every evening., Disp: 90 tablet, Rfl: 0      Pathology:  Cancer Staging  No matching staging information was found for the patient.      Objective:   Vitals:  Blood pressure (!) 159/85, pulse 93, temperature 97.6 °F (36.4 °C), resp. rate 18, weight 78.4 kg (172 lb 14.4 oz).    Physical Examination:   GEN: no apparent distress, comfortable; AAOx3  HEAD: atraumatic and normocephalic  EYES: no pallor, no icterus, PERRLA  ENT: OMM, no pharyngeal erythema, external ears WNL; no nasal discharge; no thrush  NECK: no masses, thyroid normal, trachea midline, no LAD/LN's, supple  CV: RRR with no murmur; normal pulse; normal S1 and S2; no pedal edema  CHEST: Normal respiratory effort; CTAB; normal breath sounds; no wheeze or crackles  ABDOM: nontender and nondistended; soft; normal bowel sounds; no rebound/guarding  MUSC/Skeletal: ROM normal; no crepitus; joints normal; no deformities or arthropathy  EXTREM: no clubbing, cyanosis, inflammation or swelling  SKIN: no rashes, lesions, ulcers, petechiae or subcutaneous nodules  : no patino  NEURO: grossly intact; motor/sensory WNL; AAOx3; no tremors  PSYCH: normal mood, affect and behavior  LYMPH: normal cervical,  supraclavicular, axillary and groin LN's      Labs:   Lab Results   Component Value Date    WBC 19.5 (H) 12/11/2019    HGB 10.8 (L) 12/11/2019    HCT 35.3 12/11/2019    MCV 77.6 (L) 12/11/2019     (H) 12/11/2019      Neutrophils Relative % 33.9  50.6      Lymph% % 10.0  12.3  22.3 R 27.4 R   Mono% % 4.2  6.8  8.2 R 7.2 R   Eosinophil% % 51.5  30.1  2.5 R 2.2 R   Basophil% % 0.4  0.2  0.7 R 0.4 R   Differential Comment        Comment: Review of peripheral smear confirms   automated results.    Gran # (ANC)    6.9  6.5    Gran%    66.3  62.8          CMP  Sodium   Date Value Ref Range Status   12/04/2019 142 135 - 146 mmol/L Final     Potassium   Date Value Ref Range Status   12/04/2019 4.4 3.5 - 5.3 mmol/L Final     Chloride   Date Value Ref Range Status   12/04/2019 102 98 - 110 mmol/L Final     CO2   Date Value Ref Range Status   12/04/2019 28 20 - 32 mmol/L Final     Glucose   Date Value Ref Range Status   12/04/2019 103 (H) 65 - 99 mg/dL Final     Comment:                   Fasting reference interval     For someone without known diabetes, a glucose value  between 100 and 125 mg/dL is consistent with  prediabetes and should be confirmed with a  follow-up test.          BUN, Bld   Date Value Ref Range Status   12/04/2019 28 (H) 7 - 25 mg/dL Final     Creatinine   Date Value Ref Range Status   12/04/2019 0.95 (H) 0.60 - 0.93 mg/dL Corrected     Comment:        => AMENDED: Report has been updated to reflect  additional patient information. Please review results   for possible flagging and/or reference range changes.  For patients >49 years of age, the reference limit  for Creatinine is approximately 13% higher for people  identified as -American.          Calcium   Date Value Ref Range Status   12/04/2019 9.2 8.6 - 10.4 mg/dL Final     Total Protein   Date Value Ref Range Status   12/04/2019 6.6 6.1 - 8.1 g/dL Final   12/04/2019 6.5 6.1 - 8.1 g/dL Final     Albumin   Date Value Ref Range Status    12/04/2019 3.5 (L) 3.6 - 5.1 g/dL Final     Total Bilirubin   Date Value Ref Range Status   12/04/2019 0.5 0.2 - 1.2 mg/dL Final     Alkaline Phosphatase   Date Value Ref Range Status   12/04/2019 101 33 - 130 U/L Final     AST   Date Value Ref Range Status   12/04/2019 16 10 - 35 U/L Final     ALT   Date Value Ref Range Status   12/04/2019 29 6 - 29 U/L Final     Anion Gap   Date Value Ref Range Status   05/03/2018 9 8 - 16 mmol/L Final     eGFR if    Date Value Ref Range Status   12/04/2019 67 > OR = 60 mL/min/1.73m2 Corrected     Comment:     => REVISED: Change in test result(s)  PLEASE DISREGARD PREVIOUSLY REPORTED INFORMATION BELOW:  (The information below was originally reported on 12/05/2019 at 4:41 AM)  eGFR          68                         eGFR if non    Date Value Ref Range Status   12/04/2019 58 (L) > OR = 60 mL/min/1.73m2 Corrected     Comment:     => REVISED: Change in test result(s)  PLEASE DISREGARD PREVIOUSLY REPORTED INFORMATION BELOW:  (The information below was originally reported on 12/05/2019 at 4:41 AM)  eGFR NON-AFR. AMERICAN        59       L                     Radiology/Diagnostic Studies:          All lab results and imaging results have been reviewed and discussed with the patient    Assessment:   (1) 74 y.o. 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   - She has a planned 4-5 month trip on the [facility]. She is adamant that she  plans to go on her trip.  -  I discussed bone marrow biopsy but she is disinclined to having any procedures done. She is aware that I can not rule out an underlying eosinophilic leukemia without additional studies. She understands the risks she is undertaking.     (2) Mild microcytic anemia - most likely a multifactorial process with underlying anemia of chronic disorders with possibly an iron defiiency  - check iron panel, vitamin panel, stool OBS, and retic    (3) Mild thrombocytosis - possibly reactive due to suspected underlying iron deficiency    (4) Chronic pain syndrome    (5) Vit D deficiency    (6) Hypothyroidism    (7) Hypercholesterolemia    (8) Pre-diabetic    VISIT DIAGNOSES:              Leukocytosis, unspecified type    Eosinophilia    Anemia, unspecified type    Anemia, chronic disease    Microcytic anemia            Plan:     PLAN:  1. Await results of the leukemia screen with flow  2. Check iron panel, vitamin panel, stool OBS, and retic  3.  I discussed bone marrow biopsy but she is disinclined to having any procedures done. She is aware that I can not rule out an underlying eosinophilic leukemia without additional studies. She understands the risks she is undertaking.   4. RTC after her return from her cruise    RTC in 5-6 months    Fax note to Karly Rodriguez MD; Karly Rodriguez (Pulm); Fabrice Rodriguez (Card); Quentin Brower (Pulm)        I have explained and the patient understands all of  the current recommendation(s). I have answered all of their questions to the best of my ability and to their complete satisfaction.             Thank you for allowing me to participate in this patient's care. Please call with any questions or concerns.    Electronically signed Srinivasa Huffman MD

## 2019-12-19 LAB
FERRITIN SERPL-MCNC: 337 NG/ML (ref 16–288)
FOLATE SERPL-MCNC: 3.7 NG/ML
IRON SATN MFR SERPL: 16 % (CALC) (ref 16–45)
IRON SERPL-MCNC: 45 MCG/DL (ref 45–160)
RETICS #: NORMAL CELLS/UL (ref 20000–80000)
RETICS/RBC NFR AUTO: 0.9 %
TIBC SERPL-MCNC: 290 MCG/DL (CALC) (ref 250–450)
VIT B12 SERPL-MCNC: 472 PG/ML (ref 200–1100)

## 2019-12-20 ENCOUNTER — TELEPHONE (OUTPATIENT)
Dept: HEMATOLOGY/ONCOLOGY | Facility: CLINIC | Age: 74
End: 2019-12-20

## 2019-12-20 LAB
APPEARANCE BLD: NORMAL
CELL TYPE SPEC: NORMAL
CLINICAL INFO: NORMAL
EVENTS COUNTED SPEC: 22
GATING STRATEGY: NORMAL
LEUKEMIA MARKERS BLD-IMP: NORMAL
SPECIMEN SOURCE: NORMAL
VIABLE CELLS NFR SPEC: 95 %

## 2019-12-20 NOTE — TELEPHONE ENCOUNTER
Called the patient and instructed her that her Ferritin level and B12 levels are low.  Dr. Huffman ordered the patient to receive B-12 injections.  Upon talking with the patient, she instructed me that she is going out of town for 4 months.  Patient stated that she cannot give herself the injection nor does she have anybody to give her the injection.  Spoke with Dr. Huffman and he said to give it orally.  Spoke with the patient and instructed her on above.  Called into the patient's pharmacy [facility] [phone] Folic Acid 1 mg PO daily #120 with 2 refills and Vitamin B-12 1 PO daily #120 with 2 refills.

## 2019-12-27 LAB
HEMOCCULT STL QL IA: NORMAL

## 2020-03-25 DIAGNOSIS — R76.8 POSITIVE ANA (ANTINUCLEAR ANTIBODY): ICD-10-CM

## 2020-03-25 RX ORDER — HYDROXYCHLOROQUINE SULFATE 200 MG/1
TABLET, FILM COATED ORAL
Qty: 180 TABLET | Refills: 1 | Status: SHIPPED | OUTPATIENT
Start: 2020-03-25 | End: 2020-04-28 | Stop reason: SDUPTHER

## 2020-04-09 DIAGNOSIS — Z79.899 ENCOUNTER FOR LONG-TERM (CURRENT) USE OF MEDICATIONS: ICD-10-CM

## 2020-04-09 DIAGNOSIS — M35.9 UNDIFFERENTIATED CONNECTIVE TISSUE DISEASE: Primary | ICD-10-CM

## 2020-04-10 LAB
ALBUMIN SERPL-MCNC: 4.3 G/DL (ref 3.6–5.1)
ALBUMIN/GLOB SERPL: 1.3 (CALC) (ref 1–2.5)
ALP SERPL-CCNC: 76 U/L (ref 37–153)
ALT SERPL-CCNC: 8 U/L (ref 6–29)
APPEARANCE UR: ABNORMAL
AST SERPL-CCNC: 15 U/L (ref 10–35)
BACTERIA #/AREA URNS HPF: ABNORMAL /HPF
BASOPHILS # BLD AUTO: 35 CELLS/UL (ref 0–200)
BASOPHILS NFR BLD AUTO: 0.3 %
BILIRUB SERPL-MCNC: 0.6 MG/DL (ref 0.2–1.2)
BILIRUB UR QL STRIP: NEGATIVE
BUN SERPL-MCNC: 24 MG/DL (ref 7–25)
BUN/CREAT SERPL: NORMAL (CALC) (ref 6–22)
CALCIUM SERPL-MCNC: 9.4 MG/DL (ref 8.6–10.4)
CHLORIDE SERPL-SCNC: 103 MMOL/L (ref 98–110)
CO2 SERPL-SCNC: 29 MMOL/L (ref 20–32)
COLOR UR: YELLOW
CREAT SERPL-MCNC: 0.89 MG/DL (ref 0.6–0.93)
CRP SERPL-MCNC: 32.3 MG/L
EOSINOPHIL # BLD AUTO: 767 CELLS/UL (ref 15–500)
EOSINOPHIL NFR BLD AUTO: 6.5 %
ERYTHROCYTE [DISTWIDTH] IN BLOOD BY AUTOMATED COUNT: 14.6 % (ref 11–15)
GFRSERPLBLD MDRD-ARVRAT: 63 ML/MIN/1.73M2
GLOBULIN SER CALC-MCNC: 3.3 G/DL (CALC) (ref 1.9–3.7)
GLUCOSE SERPL-MCNC: 133 MG/DL (ref 65–139)
GLUCOSE UR QL STRIP: NEGATIVE
HCT VFR BLD AUTO: 38.4 % (ref 35–45)
HGB BLD-MCNC: 11.6 G/DL (ref 11.7–15.5)
HGB UR QL STRIP: NEGATIVE
HYALINE CASTS #/AREA URNS LPF: ABNORMAL /LPF
KETONES UR QL STRIP: NEGATIVE
LEUKOCYTE ESTERASE UR QL STRIP: ABNORMAL
LYMPHOCYTES # BLD AUTO: 1109 CELLS/UL (ref 850–3900)
LYMPHOCYTES NFR BLD AUTO: 9.4 %
MCH RBC QN AUTO: 24.2 PG (ref 27–33)
MCHC RBC AUTO-ENTMCNC: 30.2 G/DL (ref 32–36)
MCV RBC AUTO: 80 FL (ref 80–100)
MONOCYTES # BLD AUTO: 212 CELLS/UL (ref 200–950)
MONOCYTES NFR BLD AUTO: 1.8 %
NEUTROPHILS # BLD AUTO: 9676 CELLS/UL (ref 1500–7800)
NEUTROPHILS NFR BLD AUTO: 82 %
NITRITE UR QL STRIP: NEGATIVE
PH UR STRIP: 5.5 [PH] (ref 5–8)
PLATELET # BLD AUTO: 401 THOUSAND/UL (ref 140–400)
PMV BLD REES-ECKER: 9.6 FL (ref 7.5–12.5)
POTASSIUM SERPL-SCNC: 4.4 MMOL/L (ref 3.5–5.3)
PROT SERPL-MCNC: 7.6 G/DL (ref 6.1–8.1)
PROT UR QL STRIP: NEGATIVE
RBC # BLD AUTO: 4.8 MILLION/UL (ref 3.8–5.1)
RBC #/AREA URNS HPF: ABNORMAL /HPF
RENAL EPI CELLS #/AREA URNS HPF: ABNORMAL /HPF
SERVICE CMNT-IMP: ABNORMAL
SODIUM SERPL-SCNC: 140 MMOL/L (ref 135–146)
SP GR UR STRIP: 1.02 (ref 1–1.03)
SQUAMOUS #/AREA URNS HPF: ABNORMAL /HPF
WBC # BLD AUTO: 11.8 THOUSAND/UL (ref 3.8–10.8)
WBC #/AREA URNS HPF: ABNORMAL /HPF

## 2020-04-22 ENCOUNTER — TELEPHONE (OUTPATIENT)
Dept: HEMATOLOGY/ONCOLOGY | Facility: CLINIC | Age: 75
End: 2020-04-22

## 2020-04-22 ENCOUNTER — PATIENT MESSAGE (OUTPATIENT)
Dept: HEMATOLOGY/ONCOLOGY | Facility: CLINIC | Age: 75
End: 2020-04-22

## 2020-04-22 NOTE — TELEPHONE ENCOUNTER
Called the pt to change her 04/28 appointment @11:00. Marimar asked that I email her directions on how to go through the epre-check and get to the virtual visit. I have emailed her.

## 2020-04-27 NOTE — PROGRESS NOTES
[facility] Hematology/Oncology  PROGRESS NOTE - 2nd Follow-up   Telemedicine Visit      Subjective:       Patient ID:   NAME: Jacqueline Goldberg : 1945     74 y.o. female    Referring Doc: MATTEO Owens  Other Physicians: Karly Rodriguez (Pulm); Fabrice Rodriguez (Card); Quentin Brower (Pulm)    Chief Complaint:  Leucocytosis f/u    History of Present Illness:     Patient returns today for a 2nd regularly scheduled follow-up visit.  The patient is here today to go over the results of the recently ordered labs, tests and studies. Patient is being seen today via a telemedicine follow-up visit in lieu of a normal in-person visit due to the recent COVID19 outbreak. The patient is currently located at home. This visit type is a virtual visit with synchronous audio with video.    She had been on a month long cruise. She had stopped taking her rheumatology meds and steroids in 2020 and required steroids while onboard. She is doing ok overall. She is currently breathing ok but has bouts of COTA. No CP, HA's or N/V.    Discussed COVID19 precautions.                  ROS:   GEN: normal without any fever, night sweats or weight loss  HEENT: normal with no HA's, sore throat, stiff neck, changes in vision  CV: normal with no CP,; occasional COTA  PULM: normal with no SOB, cough, hemoptysis, sputum or pleuritic pain  GI: normal with no abdominal pain, nausea, vomiting, constipation, diarrhea, melanotic stools, BRBPR, or hematemesis  : normal with no hematuria, dysuria  BREAST: normal with no mass, discharge, pain  SKIN: normal with no rash, erythema, bruising, or swelling    Pain Scale:    Allergies:  Review of patient's allergies indicates:  No Known Allergies    Medications:    Current Outpatient Medications:     albuterol (PROVENTIL/VENTOLIN HFA) 90 mcg/actuation inhaler, INL 2 PFS ITL Q 4 H PRF WHZ OR SOB, Disp: , Rfl: 0    furosemide (LASIX) 40 MG tablet, TK 1 T PO  DAILY PRF FLUID RETENTION, Disp: , Rfl: 3     hydroxychloroquine (PLAQUENIL) 200 mg tablet, TAKE 1 TABLET(200 MG) BY MOUTH TWICE DAILY, Disp: 180 tablet, Rfl: 1    ibuprofen (ADVIL,MOTRIN) 800 MG tablet, , Disp: , Rfl: 0    levothyroxine (SYNTHROID) 50 MCG tablet, Take 50 mcg by mouth., Disp: , Rfl:     methocarbamol (ROBAXIN) 500 MG Tab, , Disp: , Rfl: 0    predniSONE (DELTASONE) 10 MG tablet, Take 1 tablet (10 mg total) by mouth every other day., Disp: 45 tablet, Rfl: 1    simvastatin (ZOCOR) 10 MG tablet, Take 1 tablet (10 mg total) by mouth every evening., Disp: 90 tablet, Rfl: 0    traZODone (DESYREL) 100 MG tablet, Take 1 tablet (100 mg total) by mouth nightly., Disp: 90 tablet, Rfl: 1    PMHx/PSHx Updates:  See patient's last visit with me on 12/18/2019.  See H&P on 12/18/2019        Pathology:  Cancer Staging  No matching staging information was found for the patient.          Objective:     Vitals:  Afebrile  169#    Physical Examination:   GEN: no apparent distress, comfortable; AAOx3  HEAD: atraumatic and normocephalic  EYES: no conjunctival pallor or muddiness, no icterus; normal pupil reaction to ambient light  ENT: OMM, no pharyngeal erythema, external bilateral ears WNL; no visible thrush or ulcers  NECK: no masses or swelling, trachea midline, no visible LAD/LN's   CV: no palpitations; no pedal edema; no noticeable JVD or neck vein distension  CHEST: Normal respiratory effort; chest wall breath movements symmetrical; no audible wheezing  ABDOM: non-distended; no bloating  MUSC/Skeletal: ROM normal; joints visibly normal; no deformities or arthropathy  EXTREM: no clubbing, cyanosis, inflammation or swelling  SKIN: no rashes, lesions, ulcers, petechiae or subcutaneous nodules  : no patino  NEURO: moving all 4 extremities; AAOx3; no tremors  PSYCH: normal mood, affect and behavior  LYMPH: no visible LN's or LAD              Labs:   12/17/2019  Flow Cytometry Analysis (Peripheral Blood)   Order: [id number]   Status:  Final result   Visible to  patient:  Yes (Patient Portal) Next appt:  Today at 01:00 PM in Rheumatology (Refugio Owens MD)    Ref Range & Units 4mo ago  (12/17/19) 4mo ago  (12/4/19)   Clinical Information  D721     Specimen Type  WB     Viability % 95     Interpretation  SEE NOTE   CM   Comment:      MILD INCREASED EOSINOPHILS WITH OTHERWISE NO DEFINITIVE   IMMUNOPHENOTYPIC ABNORMALITIES DETECTED             Flow cytometry demonstrates mild increased   eosinophils (8%) and mixed populations of mature myeloid   cells, monocytes, and lymphocytes. Lymphocytes include a   mixed population of B-cells, T-cells, and NK-cells. T-cells   show normal CD4/CD8 ratio without overt aberrant loss of   T-cell markers.    4/9/2020  Lab Results   Component Value Date    WBC 11.8 (H) 04/09/2020    HGB 11.6 (L) 04/09/2020    HCT 38.4 04/09/2020    MCV 80.0 04/09/2020     (H) 04/09/2020       CMP  Sodium   Date Value Ref Range Status   04/09/2020 140 135 - 146 mmol/L Final     Potassium   Date Value Ref Range Status   04/09/2020 4.4 3.5 - 5.3 mmol/L Final     Chloride   Date Value Ref Range Status   04/09/2020 103 98 - 110 mmol/L Final     CO2   Date Value Ref Range Status   04/09/2020 29 20 - 32 mmol/L Final     Glucose   Date Value Ref Range Status   04/09/2020 133 65 - 139 mg/dL Final     Comment:               Non-fasting reference interval          BUN, Bld   Date Value Ref Range Status   04/09/2020 24 7 - 25 mg/dL Final     Creatinine   Date Value Ref Range Status   04/09/2020 0.89 0.60 - 0.93 mg/dL Final     Comment:     For patients >49 years of age, the reference limit  for Creatinine is approximately 13% higher for people  identified as -American.          Calcium   Date Value Ref Range Status   04/09/2020 9.4 8.6 - 10.4 mg/dL Final     Total Protein   Date Value Ref Range Status   04/09/2020 7.6 6.1 - 8.1 g/dL Final     Albumin   Date Value Ref Range Status   04/09/2020 4.3 3.6 - 5.1 g/dL Final     Total Bilirubin   Date Value Ref  Range Status   04/09/2020 0.6 0.2 - 1.2 mg/dL Final     Alkaline Phosphatase   Date Value Ref Range Status   04/09/2020 76 37 - 153 U/L Final     AST   Date Value Ref Range Status   04/09/2020 15 10 - 35 U/L Final     ALT   Date Value Ref Range Status   04/09/2020 8 6 - 29 U/L Final     Anion Gap   Date Value Ref Range Status   05/03/2018 9 8 - 16 mmol/L Final     eGFR if    Date Value Ref Range Status   04/09/2020 73 > OR = 60 mL/min/1.73m2 Final     eGFR if non    Date Value Ref Range Status   04/09/2020 63 > OR = 60 mL/min/1.73m2 Final     Lab Results   Component Value Date    IRON 45 12/18/2019    TIBC 290 12/18/2019    FERRITIN 337 (H) 12/18/2019     Lab Results   Component Value Date    YKYLVEJW12 472 12/18/2019       Lab Results   Component Value Date    FOLATE 3.7 (L) 12/18/2019           Radiology/Diagnostic Studies:    No results found.    I have reviewed all available lab results and radiology reports.    Assessment/Plan:   (1) 74 y.o. 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   - She has a planned 4-5 month trip on the [facility]. She is adamant that she plans to go on her trip.  -  I discussed bone marrow biopsy but she is disinclined to having any procedures done. She is aware that I can not rule out an underlying eosinophilic leukemia without additional studies. She understands the risks she is undertaking.   - latest WBC better 11.8  - prior leukemia screen did  not show any abnormal cell populations     (2) Mild microcytic anemia - most likely a multifactorial process with underlying anemia of chronic disorders with possibly an iron defiiency  - hgb is much better at 11.6  - iron panel was adequate  - b12 adequate buy folate was a little low     (3) Mild thrombocytosis - possibly reactive due to suspected underlying iron deficiency     (4) Chronic pain syndrome     (5) Vit D deficiency     (6) Hypothyroidism     (7) Hypercholesterolemia     (8) Pre-diabetic        VISIT DIAGNOSES:      Leukocytosis, unspecified type    Microcytic anemia    Eosinophilia    Anemia, unspecified type    Anemia, chronic disease          PLAN:  1. Add B12 nasal spray and folic acid daily  2. Check labs every 3 months  3. F/u with PCP, Rheum, pulm and card etc  4. RTC in 3-4 months  Fax note to , Karly Rodriguez MD, Karly Rodriguez MD; Karly Rodriguez (Pulm); Fabrice Rodriguez (Card); Quentin Brower (Pulm); Roman    Discussion:     Total Time spent on patient:    I spent over 25 mins of time with the patient. Reviewed results of the recently ordered labs, tests, reports and studies; made directives with regards to the results. Over half of this time was spent couseling and coordinating care, making treatment and analytical decisions; ordering necessary labs, tests and studies; and discussing treatment options and setting up treatment plan(s) if indicated.        COVID-19 Discussion:    I had long discussion with patient and any applicable family about the COVID-19 coronavirus epidemic and the recommended precautions with regard to cancer and/or hematology patients. I have re-iterated the CDC recommendations for adequate hand washing, use of hand -like products, and coughing into elbow, etc. In addition, especially for our patients who are on chemotherapy and/or our otherwise immunocompromised patients, I have recommended avoidance of crowds, including movie theaters, restaurants, churches,  etc. I have recommended avoidance of any sick or symptomatic family members and/or friends. I have also recommended avoidance of any raw and unwashed food products, and general avoidance of food items that have not been prepared by themselves. The patient has been asked to call us immediately with any symptom developments, issues, questions or other general concerns.       Telemedicine Statement:    The patient acknowledged and agreed to the audio/video encounter and the patient who is being provided medical services by telemedicine is:  (1) informed of the relationship between the physician and patient and the respective role of any other health care provider with respect to management of the patient; and (2) notified that he or she may decline to receive medical services by telemedicine and may withdraw from such care at any time.      I have explained all of the above in detail and the patient understands all of the current recommendation(s). I have answered all of their questions to the best of my ability and to their complete satisfaction.   The patient is to continue with the current management plan.            Electronically signed by Srinivasa Huffman MD        Answers for HPI/ROS submitted by the patient on 4/26/2020   appetite change : No  unexpected weight change: No  visual disturbance: No  cough: No  shortness of breath: Yes  chest pain: Yes  abdominal pain: No  diarrhea: No  frequency: No  back pain: Yes  rash: No  headaches: No  adenopathy: No  nervous/ anxious: No

## 2020-04-28 ENCOUNTER — OFFICE VISIT (OUTPATIENT)
Dept: RHEUMATOLOGY | Facility: CLINIC | Age: 75
End: 2020-04-28
Payer: MEDICARE

## 2020-04-28 ENCOUNTER — OFFICE VISIT (OUTPATIENT)
Dept: HEMATOLOGY/ONCOLOGY | Facility: CLINIC | Age: 75
End: 2020-04-28
Payer: MEDICARE

## 2020-04-28 DIAGNOSIS — D63.8 ANEMIA, CHRONIC DISEASE: ICD-10-CM

## 2020-04-28 DIAGNOSIS — R76.8 POSITIVE ANA (ANTINUCLEAR ANTIBODY): ICD-10-CM

## 2020-04-28 DIAGNOSIS — D50.9 MICROCYTIC ANEMIA: ICD-10-CM

## 2020-04-28 DIAGNOSIS — M32.9 SYSTEMIC LUPUS ERYTHEMATOSUS, UNSPECIFIED SLE TYPE, UNSPECIFIED ORGAN INVOLVEMENT STATUS: Primary | ICD-10-CM

## 2020-04-28 DIAGNOSIS — D72.829 LEUKOCYTOSIS, UNSPECIFIED TYPE: Primary | ICD-10-CM

## 2020-04-28 DIAGNOSIS — D72.10 EOSINOPHILIA: ICD-10-CM

## 2020-04-28 DIAGNOSIS — D64.9 ANEMIA, UNSPECIFIED TYPE: ICD-10-CM

## 2020-04-28 DIAGNOSIS — E53.8 FOLATE DEFICIENCY: ICD-10-CM

## 2020-04-28 DIAGNOSIS — Z79.899 ENCOUNTER FOR LONG-TERM (CURRENT) DRUG USE: ICD-10-CM

## 2020-04-28 PROCEDURE — 1101F PT FALLS ASSESS-DOCD LE1/YR: CPT | Mod: 95,,, | Performed by: INTERNAL MEDICINE

## 2020-04-28 PROCEDURE — 99214 OFFICE O/P EST MOD 30 MIN: CPT | Mod: 95,,, | Performed by: INTERNAL MEDICINE

## 2020-04-28 PROCEDURE — 1159F MED LIST DOCD IN RCRD: CPT | Mod: 95,,, | Performed by: INTERNAL MEDICINE

## 2020-04-28 PROCEDURE — 1101F PR PT FALLS ASSESS DOC 0-1 FALLS W/OUT INJ PAST YR: ICD-10-PCS | Mod: 95,,, | Performed by: INTERNAL MEDICINE

## 2020-04-28 PROCEDURE — 99214 PR OFFICE/OUTPT VISIT, EST, LEVL IV, 30-39 MIN: ICD-10-PCS | Mod: 95,,, | Performed by: INTERNAL MEDICINE

## 2020-04-28 PROCEDURE — 1159F PR MEDICATION LIST DOCUMENTED IN MEDICAL RECORD: ICD-10-PCS | Mod: 95,,, | Performed by: INTERNAL MEDICINE

## 2020-04-28 PROCEDURE — 99215 OFFICE O/P EST HI 40 MIN: CPT | Mod: 95,,, | Performed by: INTERNAL MEDICINE

## 2020-04-28 PROCEDURE — 99215 PR OFFICE/OUTPT VISIT, EST, LEVL V, 40-54 MIN: ICD-10-PCS | Mod: 95,,, | Performed by: INTERNAL MEDICINE

## 2020-04-28 RX ORDER — HYDROXYCHLOROQUINE SULFATE 200 MG/1
200 TABLET, FILM COATED ORAL 2 TIMES DAILY
Qty: 60 TABLET | Refills: 5 | Status: SHIPPED | OUTPATIENT
Start: 2020-04-28 | End: 2020-10-25

## 2020-04-28 RX ORDER — PREDNISONE 10 MG/1
10 TABLET ORAL DAILY
Qty: 30 TABLET | Refills: 2 | Status: SHIPPED | OUTPATIENT
Start: 2020-04-28 | End: 2020-06-08 | Stop reason: SDUPTHER

## 2020-04-28 RX ORDER — FOLIC ACID 1 MG/1
1 TABLET ORAL DAILY
Qty: 90 TABLET | Refills: 2 | Status: SHIPPED | OUTPATIENT
Start: 2020-04-28 | End: 2021-02-22

## 2020-04-28 NOTE — PROGRESS NOTES
The patient was seen by video conference made necessary by the COVID-19 pandemic.    She has just returned from her and extended [place] cruise. When she left, I had placed her on hydroxychloroquine 400 mg daily and prednisone 10 mg daily which I advised her to decrease to 10 mg every other day for the duration of the cruise. It was my hope that the hydroxychloroquine might have become effective by then and that she would be better off on the QOD dosing of steroids. In fact, she did extremely well and felt like myself again. Sometime in mid February, she developed shortness of breath and was seen by the ships doctor. According to the patient, after treating her with breathing treatments and higher dose steroids, he advised her that she did not require hydroxychloroquine. She stopped it all together and resumed prednisone 10 mg every other day.  At this point, she has chest discomfort, shortness of breath and extreme dyspnea on exertion. She states she is unable to finish a shower without being exhausted and she is unable to walk to the curb to get her mail without being winded.  And I cautioned her that these are worrisome symptoms and should be evaluated immediately. She stated that she had a clean bill of health from her cardiologist and her pulmonologist a few months ago. In fact, that was when she was last hospitalized six months ago. She is unwilling to go to an urgent care setting today but is willing to schedule an appointment today with both her cardiologist and her pulmonologist.    I have instructed her today to increase prednisone to 10 mg daily and to restart hydroxychloroquine 400 mg daily. I have implored her to dial 911 if she develops crushing chest pain, nausea, diaphoresis, or increased shortness of breath.    I will order blood testing today to be done in six weeks. I would like to see her back in two months. She has my phone number and my email address and was reminded that she can  contact me at anytime she needs me. Furthermore, she was advised not to stop any medications without discussing it with the original prescriber.    Refugio Owens M.D.     This is it involved significant and complex counseling about handling multi system disease and how her lupus disease may in this case because in pulmonary and/or cardio issues.  Answers for HPI/ROS submitted by the patient on 4/28/2020   fever: No  eye redness: No  headaches: No  shortness of breath: Yes  chest pain: Yes  trouble swallowing: No  diarrhea: No  constipation: No  unexpected weight change: No  genital sore: No  dysuria: No  During the last 3 days, have you had a skin rash?: No  Bruises or bleeds easily: Yes  cough: No

## 2020-04-28 NOTE — PROGRESS NOTES
Subjective:        The chief complaint leading to consultation is: UCTD  The patient location is:  Work  Visit type: Virtual visit with synchronous audio/video or audio only  This was a video visit in lieu of in-person visit due to the coronavirus emergency. Patient acknowledged and consented to the video visit encounter.     HPI    Past Surgical History:   Procedure Laterality Date    ANKLE SURGERY      CHOLECYSTECTOMY      EYE SURGERY      cataract surgery; both eyes     Past Medical History:   Diagnosis Date    Anemia, chronic disease 12/18/2019    Anemia, unspecified 12/18/2019    Chronic pain     Eosinophilia 12/18/2019    Insomnia     Insomnia     Leucocytosis 12/18/2019    Microcytic anemia 12/18/2019    Thyroid disease      Family History   Problem Relation Age of Onset    Diabetes Mother     Heart disease Father     Arthritis Sister           Social History:   Marital Status: Single  Alcohol History:  reports that she does not drink alcohol.  Tobacco History:  reports that she has never smoked. She has never used smokeless tobacco.  Drug History:  reports that she does not use drugs.    Review of patient's allergies indicates:  No Known Allergies      Current Outpatient Medications   Medication Sig Dispense Refill    albuterol (PROVENTIL/VENTOLIN HFA) 90 mcg/actuation inhaler INL 2 PFS ITL Q 4 H PRF WHZ OR SOB  0    cyanocobalamin, vitamin B-12, 200 mcg/spray SpSn Place 200 mcg under the tongue every 14 (fourteen) days. 12 mL 3    folic acid (FOLVITE) 1 MG tablet Take 1 tablet (1 mg total) by mouth once daily. 90 tablet 2    furosemide (LASIX) 40 MG tablet TK 1 T PO  DAILY PRF FLUID RETENTION  3    hydroxychloroquine (PLAQUENIL) 200 mg tablet TAKE 1 TABLET(200 MG) BY MOUTH TWICE DAILY 180 tablet 1    ibuprofen (ADVIL,MOTRIN) 800 MG tablet   0    levothyroxine (SYNTHROID) 50 MCG tablet Take 50 mcg by mouth.      methocarbamol (ROBAXIN) 500 MG Tab   0    predniSONE (DELTASONE) 10  MG tablet Take 1 tablet (10 mg total) by mouth every other day. 45 tablet 1    simvastatin (ZOCOR) 10 MG tablet Take 1 tablet (10 mg total) by mouth every evening. 90 tablet 0    traZODone (DESYREL) 100 MG tablet Take 1 tablet (100 mg total) by mouth nightly. 90 tablet 1     No current facility-administered medications for this visit.        Review of Systems          Objective:        Physical Exam:   Physical Exam           Assessment:         No diagnosis found.  Plan:   There are no diagnoses linked to this encounter.  No follow-ups on file.    Total time spent with patient: 20    Each patient to whom he or she provides medical services by telemedicine is:  (1) informed of the relationship between the physician and patient and the respective role of any other health care provider with respect to management of the patient; and (2) notified that he or she may decline to receive medical services by telemedicine and may withdraw from such care at any time.    This note was created using [employer] voice recognition software that occasionally misinterprets phrases or words.

## 2020-04-30 ENCOUNTER — TELEPHONE (OUTPATIENT)
Dept: HEMATOLOGY/ONCOLOGY | Facility: CLINIC | Age: 75
End: 2020-04-30

## 2020-04-30 NOTE — TELEPHONE ENCOUNTER
Called the patient and instructed her that the pharmacy called and her B-12 nasal spray will cost over 700.00.  I asked her if she wants to give herself an injection or can someone give her an injection.  She stated no.  I instructed her that she can get the pills over the counter.

## 2020-04-30 NOTE — TELEPHONE ENCOUNTER
Joann called the office and instructed me that Dr. Huffman ordered B-12 nasal spray 200 mcg.  The pharmacist instructed me that it does not come in this strength.  He gave me the options.  Gave Dr. Huffman the options. Called B-12 nasal spray 5000 mcg 1 spray monthly to [facility] [phone].

## 2020-05-07 ENCOUNTER — TELEPHONE (OUTPATIENT)
Dept: HEMATOLOGY/ONCOLOGY | Facility: CLINIC | Age: 75
End: 2020-05-07

## 2020-05-07 NOTE — TELEPHONE ENCOUNTER
----- Message from Elisa Suggs, Patient Care Assistant sent at 4/30/2020 10:58 AM CDT -----  Varsha Vanessa from [facility] would like you to give her a call back regarding this patient, she need  to verify the instructions of the medication. She can be reached at [phone]

## 2020-05-12 ENCOUNTER — TELEPHONE (OUTPATIENT)
Dept: HEMATOLOGY/ONCOLOGY | Facility: CLINIC | Age: 75
End: 2020-05-12

## 2020-05-12 NOTE — TELEPHONE ENCOUNTER
----- Message from LIZBET Box sent at 5/11/2020  4:51 PM CDT -----  Can you give her instructions  ----- Message -----  From: Elisa Suggs, Patient Care Assistant  Sent: 5/11/2020   2:32 PM CDT  To: LIZBET Box    Patient called in stating she need to know how much B-12 and Folic acid she is supposed to take a day. She can be reached at [phone]

## 2020-05-12 NOTE — TELEPHONE ENCOUNTER
Called the patient back and instructed her that she should be taking folic acid 1 mg a day.  She has requested can she come in and get her B-12 injection.  I instructed her that she could and I told her that Linnette will call her back today to set up a date and time.

## 2020-05-12 NOTE — TELEPHONE ENCOUNTER
----- Message from LIZBET Box sent at 5/11/2020  4:51 PM CDT -----  Can you give her instructions  ----- Message -----  From: Elisa Suggs, Patient Care Assistant  Sent: 5/11/2020   2:32 PM CDT  To: LIZBET Box    Patient called in stating she need to know how much B-12 and Folic acid she is supposed to take a day. She can be reached at [phone]

## 2020-05-13 ENCOUNTER — CLINICAL SUPPORT (OUTPATIENT)
Dept: HEMATOLOGY/ONCOLOGY | Facility: CLINIC | Age: 75
End: 2020-05-13
Payer: MEDICARE

## 2020-05-13 DIAGNOSIS — D63.8 ANEMIA, CHRONIC DISEASE: Primary | ICD-10-CM

## 2020-05-13 DIAGNOSIS — D51.8 OTHER VITAMIN B12 DEFICIENCY ANEMIA: ICD-10-CM

## 2020-05-13 PROCEDURE — 96372 PR INJECTION,THERAP/PROPH/DIAG2ST, IM OR SUBCUT: ICD-10-PCS | Mod: S$GLB,,, | Performed by: INTERNAL MEDICINE

## 2020-05-13 PROCEDURE — 96372 THER/PROPH/DIAG INJ SC/IM: CPT | Mod: S$GLB,,, | Performed by: INTERNAL MEDICINE

## 2020-05-13 RX ORDER — CYANOCOBALAMIN 1000 UG/ML
1000 INJECTION, SOLUTION INTRAMUSCULAR; SUBCUTANEOUS
Status: DISCONTINUED | OUTPATIENT
Start: 2020-05-13 | End: 2022-11-18

## 2020-05-13 RX ADMIN — CYANOCOBALAMIN 1000 MCG: 1000 INJECTION, SOLUTION INTRAMUSCULAR; SUBCUTANEOUS at 11:05

## 2020-05-15 ENCOUNTER — TELEPHONE (OUTPATIENT)
Dept: CARDIOLOGY | Facility: CLINIC | Age: 75
End: 2020-05-15

## 2020-05-19 ENCOUNTER — OFFICE VISIT (OUTPATIENT)
Dept: RHEUMATOLOGY | Facility: CLINIC | Age: 75
End: 2020-05-19
Payer: MEDICARE

## 2020-05-19 VITALS
WEIGHT: 165.81 LBS | SYSTOLIC BLOOD PRESSURE: 154 MMHG | TEMPERATURE: 98 F | BODY MASS INDEX: 30.33 KG/M2 | DIASTOLIC BLOOD PRESSURE: 78 MMHG

## 2020-05-19 DIAGNOSIS — M35.9 UNDIFFERENTIATED CONNECTIVE TISSUE DISEASE: Primary | ICD-10-CM

## 2020-05-19 DIAGNOSIS — H54.40 BLINDNESS OF LEFT EYE, UNSPECIFIED RIGHT EYE VISUAL IMPAIRMENT CATEGORY: ICD-10-CM

## 2020-05-19 DIAGNOSIS — Z79.899 ENCOUNTER FOR LONG-TERM (CURRENT) DRUG USE: ICD-10-CM

## 2020-05-19 PROCEDURE — 1101F PR PT FALLS ASSESS DOC 0-1 FALLS W/OUT INJ PAST YR: ICD-10-PCS | Mod: S$GLB,,, | Performed by: INTERNAL MEDICINE

## 2020-05-19 PROCEDURE — 99215 OFFICE O/P EST HI 40 MIN: CPT | Mod: S$GLB,,, | Performed by: INTERNAL MEDICINE

## 2020-05-19 PROCEDURE — 1159F MED LIST DOCD IN RCRD: CPT | Mod: S$GLB,,, | Performed by: INTERNAL MEDICINE

## 2020-05-19 PROCEDURE — 1159F PR MEDICATION LIST DOCUMENTED IN MEDICAL RECORD: ICD-10-PCS | Mod: S$GLB,,, | Performed by: INTERNAL MEDICINE

## 2020-05-19 PROCEDURE — 1125F AMNT PAIN NOTED PAIN PRSNT: CPT | Mod: S$GLB,,, | Performed by: INTERNAL MEDICINE

## 2020-05-19 PROCEDURE — 1101F PT FALLS ASSESS-DOCD LE1/YR: CPT | Mod: S$GLB,,, | Performed by: INTERNAL MEDICINE

## 2020-05-19 PROCEDURE — 1125F PR PAIN SEVERITY QUANTIFIED, PAIN PRESENT: ICD-10-PCS | Mod: S$GLB,,, | Performed by: INTERNAL MEDICINE

## 2020-05-19 PROCEDURE — 99215 PR OFFICE/OUTPT VISIT, EST, LEVL V, 40-54 MIN: ICD-10-PCS | Mod: S$GLB,,, | Performed by: INTERNAL MEDICINE

## 2020-05-19 NOTE — PATIENT INSTRUCTIONS
Temporal Arteritis  You have temporal arteritis (also called giant cell arteritis). This is an inflammation of the blood vessels that supply the head, neck, upper body, and arms. It can be diagnosed by examining a small piece of temporal artery, which is easily accessible. This artery is located in the scalp area just above each ear. When the arteries are inflamed, the vessel narrows, and blood flow slows down. A clot may also form inside the artery, stopping all blood flow. Reduced or blocked blood flow in the arteries that supply vital structures in the eye can cause blindness in the affected eye. In rare cases, stroke may occur.  The cause of temporal arteritis is not known. Symptoms of temporal arteritis include headache, tenderness of the temples or scalp, jaw pain when chewing, muscle aches, fatigue, fever, and unintentional weight loss.  Steroids, such as prednisone, are used to treat this condition. These reduce inflammation in the arteries. Most people begin to feel better a few days after treatment starts. Most people do recover from this condition, but it may require continued treatment for 1 or 2 years. Long-term steroid treatment can cause various serious side effects. These include osteoporosis, high blood pressure, and diabetes. Talk to your healthcare provider about side effects and ways to minimize them.  Home care  Take medicines as directed by your healthcare provider. The following suggestions will help reduce side effects from long-term steroid use.  Nutrition  · Eat plenty of fresh fruits, vegetables, and whole grains.  · Choose mostly lean sources of protein.  · Limit the use of salt, sugar, and alcohol.  · Be sure to get enough calcium and vitamin D. Low-fat dairy foods are an excellent source of these nutrients. If you are not able to eat dairy, you can choose lactose-free products instead. Here are some other foods that contain calcium:  ¨ Kale and broccoli  ¨ White beans, green beans,  and lima beans  ¨ Lohman  ¨ Soybeans or tofu  ¨ Fortified juices  ¨ You can also take daily calcium supplements. Ask your healthcare provider how much calcium you should take each day.  Exercise  Get regular aerobic exercise, up to 30 minutes on most days. Exercise can help prevent bone loss, heart disease, and diabetes. It also relieves stress and can improve your mood and your overall quality of life. If you dont already exercise regularly, ask your healthcare provider for help setting up a program.  Follow-up care  Follow up with your healthcare provider, or as advised.  For more information about temporal arteritis, see:  · National [place] of Arthritis and Musculoskeletal and Skin Diseases, www.niams.nih.gov  · The Arthritis Foundation, www.arthritis.org  When to seek medical advice  Call your healthcare provider right away if any of these occur:  · Worsening symptoms  · Numbness or weakness of the face, one arm, or one leg  · Slurred speech, confusion, or trouble speaking or walking  · Severe headache  · Fainting spell, dizziness, or seizure  · Pain in the chest, arm, neck, or upper back  · Sudden loss or change in vision  Date Last Reviewed: 6/14/2015  © 4719-5244 [employer].  In and the retinologist and this retinal vein occlusion she has an appointment to go back next week a.m. done anything his she in various wife facet yes and do anything and I least of aspirin she Cardiolite in October now no headaches no temporal pain no induration of tenderness no lacrimation no jaw claudication plus the day if [address] [place] [address]. All rights reserved. This information is not intended as a substitute for professional medical care. Always follow your healthcare professional's instructions.      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

## 2020-05-19 NOTE — PROGRESS NOTES
[facility] RHEUMATOLOGY           Follow-up visit    Notes dictated to M*Modal. Please forgive any unintended errors.  Subjective:       Patient ID:   NAME: Jacqueline Goldberg : 1945     74 y.o. female    Referring Doc: No ref. provider found  Other Physicians:    Chief Complaint:  Lupus      HPI/Interval History:  The patient noted some haziness in her vision about 2 weeks ago, initially involving the upper outer quadrant of the left eye.  This progressed over the next several days.  She was seen by Ophthalmology, Dr. Tristan Christianson IV, who diagnosed her as having a CRVO.  He referred her to the retinologist.  She now has no vision in the left eye and barely has light perception.  She is scheduled to follow-up with retinal next week.  She was not placed on any medications.  She denies having had any temporal headaches or pain.  She has not had any sensation of pulsatility in the temporal areas.  She denies excessive lacrimation.  She has had no jaw claudication.  She has not experienced unusual weakness of the upper extremities or proximal lower extremities.  She continues to have dyspnea on exertion.  She has called the cardiologist's office to make an appointment as suggested at our most recent visit.  She has not yet been seen.    ROS:   GEN:    no fever, night sweats or weight loss  SKIN:   no rashes, erythema, bruising, or swelling, no Raynauds, no photosensitivity  HEENT: + loss of vision os, no mouth ulcers, no sicca symptoms, no scalp tenderness, no jaw claudication.  CV:      no CP, no PND, + COTA, no orthopnea, no palpitations  PULM: no SOB, cough, hemoptysis, sputum or pleuritic pain  GI:       + GERD, no dysphagia, no hematemesis, no abdominal pain, nausea, vomiting, constipation, diarrhea, melanotic stools, BRBPR  :      no hematuria, dysuria  NEURO: no paresthesias, headaches, acute visual disturbances  MUSCULOSKELETAL:  No red, hot, and/or swollen joints   PSYCH:   No increased insomnia, no  increased anxiety, no increased depression    Past Medical/Surgical History:  Past Medical History:   Diagnosis Date    Anemia, chronic disease 12/18/2019    Anemia, unspecified 12/18/2019    Chronic pain     Eosinophilia 12/18/2019    Insomnia     Insomnia     Leucocytosis 12/18/2019    Microcytic anemia 12/18/2019    Thyroid disease      Past Surgical History:   Procedure Laterality Date    ANKLE SURGERY      CHOLECYSTECTOMY      EYE SURGERY      cataract surgery; both eyes       Allergies:  Review of patient's allergies indicates:  No Known Allergies    Social/Family History:  Social History     Socioeconomic History    Marital status: Single     Spouse name: Not on file    Number of children: Not on file    Years of education: Not on file    Highest education level: Not on file   Occupational History    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   Social Needs    Financial resource strain: Not on file    Food insecurity:     Worry: Not on file     Inability: Not on file    Transportation needs:     Medical: Not on file     Non-medical: Not on file   Tobacco Use    Smoking status: Never Smoker    Smokeless tobacco: Never Used   Substance and Sexual Activity    Alcohol use: No    Drug use: No    Sexual activity: Yes     Partners: Male     Birth control/protection: None   Lifestyle    Physical activity:     Days per week: Not on file     Minutes per session: Not on file    Stress: Not on file   Relationships    Social connections:     Talks on phone: Not on file     Gets together: Not on file     Attends [employer] service: Not on file     Active member of club or organization: Not on file     Attends meetings of clubs or organizations: Not on file     Relationship status: Not on file   Other Topics Concern    Not on file   Social History Narrative    Not on file     Family History   Problem Relation Age of Onset    Diabetes Mother     Heart disease Father     Arthritis  Sister      FAMILY HISTORY: negative for Connective Tissue Disease      Medications:    Current Outpatient Medications:     cyanocobalamin, vitamin B-12, 200 mcg/spray SpSn, Place 200 mcg under the tongue every 14 (fourteen) days., Disp: 12 mL, Rfl: 3    folic acid (FOLVITE) 1 MG tablet, Take 1 tablet (1 mg total) by mouth once daily., Disp: 90 tablet, Rfl: 2    furosemide (LASIX) 40 MG tablet, TK 1 T PO  DAILY PRF FLUID RETENTION, Disp: , Rfl: 3    hydroxychloroquine (PLAQUENIL) 200 mg tablet, Take 1 tablet (200 mg total) by mouth 2 (two) times daily., Disp: 60 tablet, Rfl: 5    ibuprofen (ADVIL,MOTRIN) 800 MG tablet, , Disp: , Rfl: 0    levothyroxine (SYNTHROID) 50 MCG tablet, Take 50 mcg by mouth., Disp: , Rfl:     methocarbamol (ROBAXIN) 500 MG Tab, , Disp: , Rfl: 0    predniSONE (DELTASONE) 10 MG tablet, Take 1 tablet (10 mg total) by mouth once daily. Take one tablet every day until advised otherwise by this prescriber., Disp: 30 tablet, Rfl: 2    Current Facility-Administered Medications:     cyanocobalamin injection 1,000 mcg, 1,000 mcg, Subcutaneous, Q30 Days, Srinivasa Huffman MD, 1,000 mcg at 05/13/20 1110      Objective:     Vitals:  Blood pressure (!) 154/78, temperature 97.9 °F (36.6 °C), weight 75.2 kg (165 lb 12.8 oz).    Physical Examination:   GEN: wn/wd female in no apparent distress  SKIN: no rashes, no sclerodactyly, no Raynaud's, no periungual erythema, no digital tip ulcerations, no nailbed pitting  HEAD: no alopecia, no scalp tenderness, no temporal artery tenderness or induration.  EYES: no pallor, no icterus, PERRLA  ENT:  no thrush, no mucosal dryness or ulcerations, adequate oral hygiene & dentition.  NECK: supple x 6, no masses, no thyromegaly, no lymphadenopathy.  CV:   S1 and S2 regular, no murmurs, gallop or rubs  CHEST: Normal respiratory effort; decreased breath sounds at both bases. No signs of consolidation.  ABD: non-tender and non-distended; soft; normal bowel sounds;  no rebound/guarding or tenderness. No hepatosplenomegaly.  Musculoskeletal:  No evidence of active synovitis.  Strength 5-/5 x 4.  No tenderness.    EXTREM: no clubbing, cyanosis or edema. normal pulses.  NEURO:  grossly intact; motor/sensory WNL; no tremors  PSYCH:  normal mood, affect and behavior    Labs:   Lab Results   Component Value Date    WBC 11.8 (H) 04/09/2020    HGB 11.6 (L) 04/09/2020    HCT 38.4 04/09/2020    MCV 80.0 04/09/2020     (H) 04/09/2020   CMP@  Sodium   Date Value Ref Range Status   04/09/2020 140 135 - 146 mmol/L Final     Potassium   Date Value Ref Range Status   04/09/2020 4.4 3.5 - 5.3 mmol/L Final     Chloride   Date Value Ref Range Status   04/09/2020 103 98 - 110 mmol/L Final     CO2   Date Value Ref Range Status   04/09/2020 29 20 - 32 mmol/L Final     Glucose   Date Value Ref Range Status   04/09/2020 133 65 - 139 mg/dL Final     Comment:               Non-fasting reference interval          BUN, Bld   Date Value Ref Range Status   04/09/2020 24 7 - 25 mg/dL Final     Creatinine   Date Value Ref Range Status   04/09/2020 0.89 0.60 - 0.93 mg/dL Final     Comment:     For patients >49 years of age, the reference limit  for Creatinine is approximately 13% higher for people  identified as -American.          Calcium   Date Value Ref Range Status   04/09/2020 9.4 8.6 - 10.4 mg/dL Final     Total Protein   Date Value Ref Range Status   04/09/2020 7.6 6.1 - 8.1 g/dL Final     Albumin   Date Value Ref Range Status   04/09/2020 4.3 3.6 - 5.1 g/dL Final     Total Bilirubin   Date Value Ref Range Status   04/09/2020 0.6 0.2 - 1.2 mg/dL Final     Alkaline Phosphatase   Date Value Ref Range Status   04/09/2020 76 37 - 153 U/L Final     AST   Date Value Ref Range Status   04/09/2020 15 10 - 35 U/L Final     ALT   Date Value Ref Range Status   04/09/2020 8 6 - 29 U/L Final     CRP   Date Value Ref Range Status   04/09/2020 32.3 (H) <8.0 mg/L Final     OBINNA   Date Value Ref Range  Status   12/04/2019 POSITIVE (A) NEGATIVE Final     Comment:     OBINNA IFA is a first line screen for detecting the  presence of up to approximately 150 autoantibodies in  various autoimmune diseases. A positive OBINNA IFA result  is suggestive of autoimmune disease and reflexes to  titer and pattern. Further laboratory testing may be  considered if clinically indicated.     For additional information, please refer to  http://education.[employer]/faq/KJS315  (This link is being provided for informational/  educational purposes only.)             Radiology/Diagnostic Studies:    None    Assessment/Discussion/Plan:   74 y.o. female with undifferentiated connective tissue disease-stable on hydroxychloroquine 400 mg daily and prednisone 10 mg daily  2) ACUTE LOSS OF VISION LEFT EYE- diagnosed with central retinal vessel occlusion    PLAN:  She has been back on her prednisone and Plaquenil for just under a month.  I do not find any compelling evidence of giant cell arteritis to explain her sudden visual loss.  I am concerned though about the potential for other etiology is including intermittent atrial fibrillation and vasculitis.  I have ordered appropriate blood testing including acute phase reactants, ANCA, and anticardiolipin/lupus anticoagulant panel.  I have also ordered an EKG to be done today.  I have asked the patient to start on an aspirin tablet today and every day until advised otherwise.  She will follow-up with the retinologist next week.  I have asked her to schedule an appointment with her cardiologist for evaluation of her persistent dyspnea on exertion.    RTC:  I will see her back in 1 month or sooner if needed    Electronically signed by Refugio Owens MD      Over an hour was spent reviewing her old records, particularly looking for an old rhythm strips, taking an extensive ROS, examining the patient, and composing this note

## 2020-05-20 ENCOUNTER — TELEPHONE (OUTPATIENT)
Dept: CARDIOLOGY | Facility: CLINIC | Age: 75
End: 2020-05-20

## 2020-05-22 ENCOUNTER — LAB VISIT (OUTPATIENT)
Dept: LAB | Facility: OTHER | Age: 75
End: 2020-05-22
Attending: INTERNAL MEDICINE
Payer: MEDICARE

## 2020-05-22 ENCOUNTER — OFFICE VISIT (OUTPATIENT)
Dept: CARDIOLOGY | Facility: CLINIC | Age: 75
End: 2020-05-22
Payer: MEDICARE

## 2020-05-22 VITALS
BODY MASS INDEX: 30.18 KG/M2 | DIASTOLIC BLOOD PRESSURE: 80 MMHG | SYSTOLIC BLOOD PRESSURE: 126 MMHG | WEIGHT: 165 LBS | HEART RATE: 90 BPM

## 2020-05-22 DIAGNOSIS — I67.848 OTHER CEREBROVASCULAR VASOSPASM AND VASOCONSTRICTION: ICD-10-CM

## 2020-05-22 DIAGNOSIS — I67.2 CEREBRAL ATHEROSCLEROSIS: ICD-10-CM

## 2020-05-22 DIAGNOSIS — I63.9 CEREBROVASCULAR ACCIDENT (CVA), UNSPECIFIED MECHANISM: ICD-10-CM

## 2020-05-22 DIAGNOSIS — R06.09 DYSPNEA ON EXERTION: ICD-10-CM

## 2020-05-22 DIAGNOSIS — H34.9 RETINAL ARTERY OCCLUSION: Primary | ICD-10-CM

## 2020-05-22 LAB
B2 MICROGLOB SERPL-MCNC: NORMAL MG/L
BNP SERPL-MCNC: 876 PG/ML (ref 0–99)
CARDIOLIPIN IGA SER IA-ACNC: <11 APL
CARDIOLIPIN IGG SER IA-ACNC: <14 GPL
CARDIOLIPIN IGM SER IA-ACNC: 23 MPL
CONFIRM APTT STACLOT: NEGATIVE
LA 2 SCREEN W REFLEX-IMP: ABNORMAL
SCREEN APTT: 42 SECONDS
SCREEN DRVVT: 40 SECONDS

## 2020-05-22 PROCEDURE — 1159F PR MEDICATION LIST DOCUMENTED IN MEDICAL RECORD: ICD-10-PCS | Mod: S$GLB,,, | Performed by: INTERNAL MEDICINE

## 2020-05-22 PROCEDURE — 99215 PR OFFICE/OUTPT VISIT, EST, LEVL V, 40-54 MIN: ICD-10-PCS | Mod: S$GLB,,, | Performed by: INTERNAL MEDICINE

## 2020-05-22 PROCEDURE — 1126F PR PAIN SEVERITY QUANTIFIED, NO PAIN PRESENT: ICD-10-PCS | Mod: S$GLB,,, | Performed by: INTERNAL MEDICINE

## 2020-05-22 PROCEDURE — 36415 COLL VENOUS BLD VENIPUNCTURE: CPT

## 2020-05-22 PROCEDURE — 99999 PR PBB SHADOW E&M-EST. PATIENT-LVL III: CPT | Mod: PBBFAC,,, | Performed by: INTERNAL MEDICINE

## 2020-05-22 PROCEDURE — 1126F AMNT PAIN NOTED NONE PRSNT: CPT | Mod: S$GLB,,, | Performed by: INTERNAL MEDICINE

## 2020-05-22 PROCEDURE — 99215 OFFICE O/P EST HI 40 MIN: CPT | Mod: S$GLB,,, | Performed by: INTERNAL MEDICINE

## 2020-05-22 PROCEDURE — 1101F PT FALLS ASSESS-DOCD LE1/YR: CPT | Mod: CPTII,S$GLB,, | Performed by: INTERNAL MEDICINE

## 2020-05-22 PROCEDURE — 1159F MED LIST DOCD IN RCRD: CPT | Mod: S$GLB,,, | Performed by: INTERNAL MEDICINE

## 2020-05-22 PROCEDURE — 99999 PR PBB SHADOW E&M-EST. PATIENT-LVL III: ICD-10-PCS | Mod: PBBFAC,,, | Performed by: INTERNAL MEDICINE

## 2020-05-22 PROCEDURE — 1101F PR PT FALLS ASSESS DOC 0-1 FALLS W/OUT INJ PAST YR: ICD-10-PCS | Mod: CPTII,S$GLB,, | Performed by: INTERNAL MEDICINE

## 2020-05-22 PROCEDURE — 83880 ASSAY OF NATRIURETIC PEPTIDE: CPT

## 2020-05-22 NOTE — PROGRESS NOTES
[facility]    Chief Complaint  Chief Complaint   Patient presents with    Visual Field Change    Shortness of Breath       HPI:    The patient was last seen in November when she was admitted to  with dyspnea.  Cardiac workup at that time was unrevealing.  She was sent home on a course of steroids.  She reports subsequently undergoing a rheumatologic workup.  She has been treated with Plaquenil and prednisone.  This for her diagnosis is undifferentiated connective tissue disease.  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  She did well on the combination of Plaquenil and prednisone.  Then she tried to taper these medications in became more short of breath.  They were increased again and her symptoms resolved.  Now she is having progressively worsening exertional dyspnea in spite of those medications.      About 10 days ago, she has had sudden loss of her vision in her left eye.  She saw Dr. Higgins, a retinal specialist, who diagnosed her with retinal vessel occlusion.  I discussed with him.  He was concerned about embolic event.  Hence, she was referred here for further evaluation.    Medications  Current Outpatient Medications   Medication Sig Dispense Refill    cyanocobalamin, vitamin B-12, 200 mcg/spray SpSn Place 200 mcg under the tongue every 14 (fourteen) days. 12 mL 3    folic acid (FOLVITE) 1 MG tablet Take 1 tablet (1 mg total) by mouth once daily. 90 tablet 2    furosemide (LASIX) 40 MG tablet Take 40 mg by mouth as needed.   3    hydroxychloroquine (PLAQUENIL) 200 mg tablet Take 1 tablet (200 mg total) by mouth 2 (two) times daily. 60 tablet 5    levothyroxine (SYNTHROID) 50 MCG tablet Take 50 mcg by mouth.      predniSONE (DELTASONE) 10 MG tablet Take 1 tablet (10 mg total) by mouth once daily. Take one tablet every day until advised otherwise by this prescriber. 30 tablet 2    ibuprofen (ADVIL,MOTRIN) 800 MG tablet   0     methocarbamol (ROBAXIN) 500 MG Tab Take 500 mg by mouth continuous prn.   0     Current Facility-Administered Medications   Medication Dose Route Frequency Provider Last Rate Last Dose    cyanocobalamin injection 1,000 mcg  1,000 mcg Subcutaneous Q30 Days Srinivasa Huffman MD   1,000 mcg at 05/13/20 1110      Prior to Admission medications    Medication Sig Start Date End Date Taking? Authorizing Provider   cyanocobalamin, vitamin B-12, 200 mcg/spray SpSn Place 200 mcg under the tongue every 14 (fourteen) days. 4/28/20  Yes Srinivasa Huffman MD   folic acid (FOLVITE) 1 MG tablet Take 1 tablet (1 mg total) by mouth once daily. 4/28/20 4/28/21 Yes Srinivasa Huffman MD   furosemide (LASIX) 40 MG tablet Take 40 mg by mouth as needed.  11/25/19  Yes Historical Provider, MD   hydroxychloroquine (PLAQUENIL) 200 mg tablet Take 1 tablet (200 mg total) by mouth 2 (two) times daily. 4/28/20 10/25/20 Yes Refugio Owens MD   levothyroxine (SYNTHROID) 50 MCG tablet Take 50 mcg by mouth. 11/18/19  Yes Historical Provider, MD   predniSONE (DELTASONE) 10 MG tablet Take 1 tablet (10 mg total) by mouth once daily. Take one tablet every day until advised otherwise by this prescriber. 4/28/20 7/27/20 Yes Refugio Owens MD   ibuprofen (ADVIL,MOTRIN) 800 MG tablet  10/26/19   Historical Provider, MD   methocarbamol (ROBAXIN) 500 MG Tab Take 500 mg by mouth continuous prn.  10/26/19   Historical Provider, MD       History  Past Medical History:   Diagnosis Date    Anemia, chronic disease 12/18/2019    Chronic pain     Eosinophilia 12/18/2019    Hypothyroidism     Insomnia     Leucocytosis 12/18/2019     Past Surgical History:   Procedure Laterality Date    ANKLE LIGAMENT RECONSTRUCTION Right     CATARACT EXTRACTION Bilateral     CHOLECYSTECTOMY       Social History     Socioeconomic History    Marital status: Single     Spouse name: Not on file    Number of children: Not on file    Years of education: Not on  file    Highest education level: Not on file   Occupational History    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   Social Needs    Financial resource strain: Not on file    Food insecurity:     Worry: Not on file     Inability: Not on file    Transportation needs:     Medical: Not on file     Non-medical: Not on file   Tobacco Use    Smoking status: Former Smoker    Smokeless tobacco: Never Used   Substance and Sexual Activity    Alcohol use: No    Drug use: No    Sexual activity: Yes     Partners: Male     Birth control/protection: None   Lifestyle    Physical activity:     Days per week: Not on file     Minutes per session: Not on file    Stress: Not on file   Relationships    Social connections:     Talks on phone: Not on file     Gets together: Not on file     Attends Muslim service: Not on file     Active member of club or organization: Not on file     Attends meetings of clubs or organizations: Not on file     Relationship status: Not on file   Other Topics Concern    Not on file   Social History Narrative    Not on file       Allergies  Review of patient's allergies indicates:  No Known Allergies    Review of Systems   Review of Systems   Constitution: Negative for malaise/fatigue, weight gain and weight loss.   Eyes: Positive for vision loss in left eye. Negative for visual disturbance.   Cardiovascular: Negative for chest pain, claudication, cyanosis, irregular heartbeat, leg swelling, near-syncope, orthopnea, palpitations, paroxysmal nocturnal dyspnea and syncope.   Respiratory: Negative for cough, hemoptysis, sleep disturbances due to breathing and wheezing.    Hematologic/Lymphatic: Negative for bleeding problem. Does not bruise/bleed easily.   Skin: Negative for poor wound healing.   Musculoskeletal: Negative for muscle cramps and myalgias.   Gastrointestinal: Negative for abdominal pain, anorexia, diarrhea, heartburn, hematemesis, hematochezia, melena, nausea and  vomiting.   Genitourinary: Negative for hematuria and nocturia.   Neurological: Negative for excessive daytime sleepiness, dizziness, focal weakness, light-headedness and weakness.       Physical Exam  Vitals:    05/22/20 1114   BP: 126/80   Pulse: 90     Wt Readings from Last 1 Encounters:   05/22/20 74.8 kg (165 lb)     Physical Exam   Constitutional: She is oriented to person, place, and time. She is cooperative.  Non-toxic appearance. No distress.   HENT:   Head: Normocephalic and atraumatic.   Eyes: Conjunctivae are normal. No scleral icterus.   Neck: Neck supple. No hepatojugular reflux and no JVD present. Carotid bruit is not present. No tracheal deviation and no edema present. No thyromegaly present.   Cardiovascular: Normal rate, regular rhythm and S1 normal.  No extrasystoles are present. PMI is not displaced. Exam reveals no S3 and no S4.   No murmur heard.  Pulses:       Carotid pulses are 2+ on the right side, and 2+ on the left side.       Radial pulses are 2+ on the right side, and 2+ on the left side.        Dorsalis pedis pulses are 2+ on the right side, and 2+ on the left side.        Posterior tibial pulses are 2+ on the right side, and 2+ on the left side.   Split S2 with loud P2 component.   Pulmonary/Chest: No accessory muscle usage. No respiratory distress. She has no decreased breath sounds. She has no wheezes. She has no rhonchi. She has rales (diffuse).   Abdominal: Soft. Bowel sounds are normal. She exhibits no pulsatile liver, no abdominal bruit and no pulsatile midline mass. There is hepatomegaly (Liver is palpable 2 fingerbreadths below the right costal margin.). There is no splenomegaly. There is no tenderness.   Musculoskeletal: She exhibits no edema, tenderness or deformity.   Neurological: She is alert and oriented to person, place, and time. She has normal strength. No cranial nerve deficit or sensory deficit.   Skin: Skin is warm, dry and intact. She is not diaphoretic. No  cyanosis. No pallor. Nails show no clubbing.   Psychiatric: She has a normal mood and affect. Her speech is normal and behavior is normal.       Labs  Lab Visit on 05/22/2020   Component Date Value Ref Range Status    BNP 05/22/2020 876* 0 - 99 pg/mL Final    Values of less than 100 pg/ml are consistent with non-CHF populations.   Orders Only on 05/21/2020   Component Date Value Ref Range Status    Total Protein 05/21/2020 7.5  6.1 - 8.1 g/dL Final    Sed Rate 05/21/2020 33* < OR = 30 mm/h Final   Office Visit on 05/19/2020   Component Date Value Ref Range Status    Cardiolipin Ab IgG 05/19/2020 <14  GPL Final    Comment:     Value       Interpretation      -----       --------------      < or = 14   Negative      15 - 20     Indeterminate      21 - 80     Low to Medium Positive      >80         High Positive     The antiphospholipid antibody syndrome (APS) is a   clinical-pathologic correlation that includes a   clinical event (e.g. thrombosis, pregnancy loss,   thrombocytopenia) and persistent positive   antiphospholipid antibodies   (IgM or IgG GARIMA >40 MPL/GPL, IgM or IgG anti-b2GPI  antibodies or a lupus anticoagulant). International   consensus guidelines for APS suggest waiting at least   12 weeks before retesting to confirm antibody   persistence.  The [employer]   Collaborating Clinics immunological classification   criteria for systemic lupus erythematosus (SLE)   include testing for isotype IgA, which has yet to be   incorporated into APS criteria. Low level   antiphospholipid antibodies may sometimes be detected   in the setting of infection, drug therapy or aging.      Cardiolipin Ab IgA 05/19/2020 <11  APL Final    Comment:     Value       Interpretation      -----       --------------      < or = 11   Negative      12 - 20     Indeterminate      21 - 80     Low to Medium Positive      >80         High Positive       The antiphospholipid antibody syndrome (APS) is a    clinical-pathologic correlation that includes a   clinical event (e.g. thrombosis, pregnancy loss,   thrombocytopenia) and persistent positive   antiphospholipid antibodies   (IgM or IgG GARIMA >40 MPL/GPL, IgM or IgG anti-b2GPI  antibodies or a lupus anticoagulant). International   consensus guidelines for APS suggest waiting at least   12 weeks before retesting to confirm antibody   persistence.  The [employer]   Collaborating Clinics immunological classification   criteria for systemic lupus erythematosus (SLE)   include testing for isotype IgA, which has yet to be   incorporated into APS criteria. Low level   antiphospholipid antibodies may sometimes be detected   in the setting of infection, drug therapy or aging.      Cardiolipin Ab IgM 05/19/2020 23* MPL Final    Comment:     Value       Interpretation      -----       --------------      < or = 12   Negative      13 - 20     Indeterminate      21 - 80     Low to Medium Positive      >80         High Positive     The antiphospholipid antibody syndrome (APS) is a   clinical-pathologic correlation that includes a   clinical event (e.g. thrombosis, pregnancy loss,   thrombocytopenia) and persistent positive   antiphospholipid antibodies   (IgM or IgG GARIMA >40 MPL/GPL, IgM or IgG anti-b2GPI  antibodies or a lupus anticoagulant). International   consensus guidelines for APS suggest waiting at least   12 weeks before retesting to confirm antibody   persistence.  The [facility] [employer]   Collaborating Clinics immunological classification   criteria for systemic lupus erythematosus (SLE)   include testing for isotype IgA, which has yet to be   incorporated into APS criteria. Low level   antiphospholipid antibodies may sometimes be detected   in the setting of infection, drug therapy or aging.      Beta 2 Microglobulin, Serum 05/19/2020 TNP  mg/L Final    Comment: TEST NOT PERFORMED     No suitable specimen received.     Orders Only on  04/09/2020   Component Date Value Ref Range Status    WBC 04/09/2020 11.8* 3.8 - 10.8 Thousand/uL Final    RBC 04/09/2020 4.80  3.80 - 5.10 Million/uL Final    Hemoglobin 04/09/2020 11.6* 11.7 - 15.5 g/dL Final    Hematocrit 04/09/2020 38.4  35.0 - 45.0 % Final    Mean Corpuscular Volume 04/09/2020 80.0  80.0 - 100.0 fL Final    Mean Corpuscular Hemoglobin 04/09/2020 24.2* 27.0 - 33.0 pg Final    Mean Corpuscular Hemoglobin Conc 04/09/2020 30.2* 32.0 - 36.0 g/dL Final    RDW 04/09/2020 14.6  11.0 - 15.0 % Final    Platelets 04/09/2020 401* 140 - 400 Thousand/uL Final    MPV 04/09/2020 9.6  7.5 - 12.5 fL Final    Neutrophils Absolute 04/09/2020 9,676* 1,500 - 7,800 cells/uL Final    Lymph # 04/09/2020 1,109  850 - 3,900 cells/uL Final    Mono # 04/09/2020 212  200 - 950 cells/uL Final    Eos # 04/09/2020 767* 15 - 500 cells/uL Final    Baso # 04/09/2020 35  0 - 200 cells/uL Final    Neutrophils Relative 04/09/2020 82  % Final    Lymph% 04/09/2020 9.4  % Final    Mono% 04/09/2020 1.8  % Final    Eosinophil% 04/09/2020 6.5  % Final    Basophil% 04/09/2020 0.3  % Final    Glucose 04/09/2020 133  65 - 139 mg/dL Final    Comment:           Non-fasting reference interval         BUN, Bld 04/09/2020 24  7 - 25 mg/dL Final    Creatinine 04/09/2020 0.89  0.60 - 0.93 mg/dL Final    Comment: For patients >49 years of age, the reference limit  for Creatinine is approximately 13% higher for people  identified as -American.         eGFR if non African American 04/09/2020 63  > OR = 60 mL/min/1.73m2 Final    eGFR if African American 04/09/2020 73  > OR = 60 mL/min/1.73m2 Final    BUN/Creatinine Ratio 04/09/2020 NOT APPLICABLE  6 - 22 (calc) Final    Sodium 04/09/2020 140  135 - 146 mmol/L Final    Potassium 04/09/2020 4.4  3.5 - 5.3 mmol/L Final    Chloride 04/09/2020 103  98 - 110 mmol/L Final    CO2 04/09/2020 29  20 - 32 mmol/L Final    Calcium 04/09/2020 9.4  8.6 - 10.4 mg/dL Final     Total Protein 04/09/2020 7.6  6.1 - 8.1 g/dL Final    Albumin 04/09/2020 4.3  3.6 - 5.1 g/dL Final    Globulin, Total 04/09/2020 3.3  1.9 - 3.7 g/dL (calc) Final    Albumin/Globulin Ratio 04/09/2020 1.3  1.0 - 2.5 (calc) Final    Total Bilirubin 04/09/2020 0.6  0.2 - 1.2 mg/dL Final    Alkaline Phosphatase 04/09/2020 76  37 - 153 U/L Final    AST 04/09/2020 15  10 - 35 U/L Final    ALT 04/09/2020 8  6 - 29 U/L Final    CRP 04/09/2020 32.3* <8.0 mg/L Final    Color, UA 04/09/2020 YELLOW  YELLOW Final    Appearance, UA 04/09/2020 CLOUDY* CLEAR Final    Specific [place], UA 04/09/2020 1.021  1.001 - 1.035 Final    pH, UA 04/09/2020 5.5  5.0 - 8.0 Final    Glucose, UA 04/09/2020 NEGATIVE  NEGATIVE Final    Bilirubin, UA 04/09/2020 NEGATIVE  NEGATIVE Final    Ketones, UA 04/09/2020 NEGATIVE  NEGATIVE Final    Occult Blood UA 04/09/2020 NEGATIVE  NEGATIVE Final    Protein, UA 04/09/2020 NEGATIVE  NEGATIVE Final    Nitrite, UA 04/09/2020 NEGATIVE  NEGATIVE Final    Leukocytes, UA 04/09/2020 2+* NEGATIVE Final    WBC Casts, UA 04/09/2020 20-40* < OR = 5 /HPF Final    RBC Casts, UA 04/09/2020 0-2  < OR = 2 /HPF Final    Squam Epithel, UA 04/09/2020 6-10* < OR = 5 /HPF Final    Renal Epithel, UA 04/09/2020 1-3  < OR = 3 /HPF Final    Bacteria, UA 04/09/2020 FEW* NONE SEEN /HPF Final    Hyaline Casts, UA 04/09/2020 NONE SEEN  NONE SEEN /LPF Final    Comments: 04/09/2020 FEW MUCOUS THREADS   Final   Office Visit on 12/18/2019   Component Date Value Ref Range Status    Iron 12/18/2019 45  45 - 160 mcg/dL Final    TIBC 12/18/2019 290  250 - 450 mcg/dL (calc) Final    Iron Saturation 12/18/2019 16  16 - 45 % (calc) Final    Ferritin 12/18/2019 337* 16 - 288 ng/mL Final    Vitamin B-12 12/18/2019 472  200 - 1,100 pg/mL Final    Retic 12/18/2019 0.9  % Final    Retic Ct Abs 12/18/2019 41,760  20,000 - 80,000 cells/uL Final    Folate 12/18/2019 3.7* ng/mL Final    Comment:                             Reference Range                             Low:           <3.4                             Borderline:    3.4-5.4                             Normal:        >5.4         Fecal Globin, Insure 12/22/2019    Final    Comment:   FECAL GLOBIN BY IMMUNOCHEMISTRY         Micro Number:      [id number]    Test Status:       Final    Specimen Source:   INSURE (TM) FOBT TEST CARD    Specimen Quality:  Adequate    Fecal Globin:      Not Detected      Fecal Globin, Insure 12/20/2019    Final    Comment:   FECAL GLOBIN BY IMMUNOCHEMISTRY         Micro Number:      [id number]    Test Status:       Final    Specimen Source:   INSURE (TM) FOBT TEST CARD    Specimen Quality:  Adequate    Fecal Globin:      Not Detected      Fecal Globin, Insure 12/19/2019    Final    Comment:   FECAL GLOBIN BY IMMUNOCHEMISTRY         Micro Number:      [id number]    Test Status:       Final    Specimen Source:   INSURE (TM) FOBT TEST CARD    Specimen Quality:  Adequate    Fecal Globin:      Not Detected     Orders Only on 12/17/2019   Component Date Value Ref Range Status    Clinical Information 12/17/2019 D721   Final    Specimen Type 12/17/2019 WB   Final    Viability 12/17/2019 95  % Final    Interpretation 12/17/2019 SEE NOTE   Final    Comment:     MILD INCREASED EOSINOPHILS WITH OTHERWISE NO DEFINITIVE  IMMUNOPHENOTYPIC ABNORMALITIES DETECTED (SEE COMMENT).     COMMENT: Flow cytometry demonstrates mild increased  eosinophils (8%) and mixed populations of mature myeloid  cells, monocytes, and lymphocytes. Lymphocytes include a  mixed population of B-cells, T-cells, and NK-cells. T-cells  show normal CD4/CD8 ratio without overt aberrant loss of  T-cell markers. B-cells are polyclonal in respect to surface  light chains. Myelomonocytic cells show a mature  unremarkable immunophenotype. No significant increase of  blasts is detected. No overt myeloid-associated antigenic  aberrancy, or abnormal subset of lymphoid cells are seen.  Recommend  correlation with clinical findings (i.e.  persistence of eosinophilia for more than 3-6 months) and if  clinical suspicion warrants bone marrow morphology,  cytogenetics, and FISH/molecular studies (i.e. PDGFRA,  PDGFRB, FGFR1). A negative immunophenotype does not rule out  neoplastic process.                            Correlation with clinical findings,  morphology or other diagnostic information is recommended.        Results reviewed by Rodney Denis M.D.      Sample Description 12/17/2019 SEE NOTE   Final    Comment:     The sample consists of 85% granulocytic cells, 11%  lymphocytoid cells, and 3% monocytoid cells.         Gating Strategy 12/17/2019 SEE NOTE   Final    Comment:     Gating in the specimen was performed on the cell population  that resembles lymphocytes and was based on CD45 (pan  leukocyte fluorescent intensity) vs. side scatter (internal  complexity).      Markers 12/17/2019 SEE NOTE   Final    Comment:     Region B                         CD Marker        %Positive       ------------     ---------       CD2                     85       CD3                     63       CD4                     44       CD5                     71       CD7                     86       CD8                     19       CD10                     0       CD11c                   24       CD13                     1       CD19                     6       CD19+CD5+                0       CD20                     6       CD23                     3       CD33                     1       CD34                     0       CD38                     5       CD45                   100       CD56+CD3-               27       CD64                     2                           0       HLA_DR                  11       Kappa CD19+             49       Lambda CD19+            47       K/L Ratio              1.0          This test was developed and its analytical performance  characteristics have been determined by [employer]                             [facility]  [facility]. It has not been  cleared or approved by FDA. This assay has been validated  pursuant to the CLIA regulations and is used for clinical  purposes.      Number of Markers 12/17/2019 22   Final   Orders Only on 12/11/2019   Component Date Value Ref Range Status    Color, UA 12/11/2019 YELLOW  YELLOW Final    Appearance, UA 12/11/2019 CLEAR  CLEAR Final    Specific [place], UA 12/11/2019 1.017  1.001 - 1.035 Final    pH, UA 12/11/2019 < OR = 5.0  5.0 - 8.0 Final    Glucose, UA 12/11/2019 NEGATIVE  NEGATIVE Final    Bilirubin, UA 12/11/2019 NEGATIVE  NEGATIVE Final    Ketones, UA 12/11/2019 NEGATIVE  NEGATIVE Final    Occult Blood UA 12/11/2019 1+* NEGATIVE Final    Protein, UA 12/11/2019 TRACE* NEGATIVE Final    Nitrite, UA 12/11/2019 NEGATIVE  NEGATIVE Final    Leukocytes, UA 12/11/2019 NEGATIVE  NEGATIVE Final    WBC Casts, UA 12/11/2019 6-10* < OR = 5 /HPF Final    RBC Casts, UA 12/11/2019 3-10* < OR = 2 /HPF Final    Squam Epithel, UA 12/11/2019 0-5  < OR = 5 /HPF Final    Bacteria, UA 12/11/2019 FEW* NONE SEEN /HPF Final    Ca Oxalate [place], UA 12/11/2019 MANY* NONE OR FEW /HPF Final    Hyaline Casts, UA 12/11/2019 NONE SEEN  NONE SEEN /LPF Final    WBC 12/11/2019 19.5* 3.8 - 10.8 Thousand/uL Final    RBC 12/11/2019 4.55  3.80 - 5.10 Million/uL Final    Hemoglobin 12/11/2019 10.8* 11.7 - 15.5 g/dL Final    Hematocrit 12/11/2019 35.3  35.0 - 45.0 % Final    Mean Corpuscular Volume 12/11/2019 77.6* 80.0 - 100.0 fL Final    Mean Corpuscular Hemoglobin 12/11/2019 23.7* 27.0 - 33.0 pg Final    Mean Corpuscular Hemoglobin Conc 12/11/2019 30.6* 32.0 - 36.0 g/dL Final    RDW 12/11/2019 16.5* 11.0 - 15.0 % Final    Platelets 12/11/2019 436* 140 - 400 Thousand/uL Final    MPV 12/11/2019 9.9  7.5 - 12.5 fL Final    Neutrophils Absolute 12/11/2019 6,611  1,500 - 7,800 cells/uL Final    Lymph # 12/11/2019 1,950  850 - 3,900  cells/uL Final    Mono # 12/11/2019 819  200 - 950 cells/uL Final    Eos # 12/11/2019 10,043* 15 - 500 cells/uL Final    Baso # 12/11/2019 78  0 - 200 cells/uL Final    Neutrophils Relative 12/11/2019 33.9  % Final    Lymph% 12/11/2019 10.0  % Final    Mono% 12/11/2019 4.2  % Final    Eosinophil% 12/11/2019 51.5  % Final    Basophil% 12/11/2019 0.4  % Final    Differential Comment 12/11/2019    Final    Comment: Review of peripheral smear confirms  automated results.      Centromere B Antibody 12/11/2019 <1.0 NEG  <1.0 NEG AI Final    Reflexive Urine Culture 12/11/2019 CULTURE INDICATED - RESULTS TO FOLLOW   Final    Urine Culture, Routine 12/11/2019    Final    Comment:   CULTURE, URINE, ROUTINE         Micro Number:      [id number]    Test Status:       Final    Specimen Source:   URINE    Specimen Quality:  Adequate    Result:            Multiple organisms present, each less than 10,000                       CFU/mL. These organisms, commonly found on                       external and internal genitalia, are considered                       to be colonizers. No further testing performed.     Orders Only on 12/04/2019   Component Date Value Ref Range Status    Total Protein 12/04/2019 6.6  6.1 - 8.1 g/dL Final    Albumin 12/04/2019 3.4* 3.8 - 4.8 g/dL Final    Alpha-1-Globulins 12/04/2019 0.4* 0.2 - 0.3 g/dL Final    Alpha-2-Globulins 12/04/2019 1.0* 0.5 - 0.9 g/dL Final    Beta Globulin 12/04/2019 0.4  0.4 - 0.6 g/dL Final    Beta-2 Microglobulin 12/04/2019 0.3  0.2 - 0.5 g/dL Final    Gamma Globulin 12/04/2019 1.0  0.8 - 1.7 g/dL Final    Interpretation 12/04/2019    Final    Comment:    Evaluation is consistent with an acute inflammatory   pattern.        Possible monoclonal protein (M-protein) present.  Suggest serum immunofixation.         Glucose 12/04/2019 103* 65 - 99 mg/dL Final    Comment:               Fasting reference interval     For someone without known diabetes, a glucose  value  between 100 and 125 mg/dL is consistent with  prediabetes and should be confirmed with a  follow-up test.         BUN, Bld 12/04/2019 28* 7 - 25 mg/dL Final    Creatinine 12/04/2019 0.95* 0.60 - 0.93 mg/dL Corrected    Comment:    => AMENDED: Report has been updated to reflect  additional patient information. Please review results   for possible flagging and/or reference range changes.  For patients >49 years of age, the reference limit  for Creatinine is approximately 13% higher for people  identified as -American.         eGFR if non African American 12/04/2019 58* > OR = 60 mL/min/1.73m2 Corrected    Comment: => REVISED: Change in test result(s)  PLEASE DISREGARD PREVIOUSLY REPORTED INFORMATION BELOW:  (The information below was originally reported on 12/05/2019 at 4:41 AM)  eGFR NON-AFR. AMERICAN        59       L                eGFR if  12/04/2019 67  > OR = 60 mL/min/1.73m2 Corrected    Comment: => REVISED: Change in test result(s)  PLEASE DISREGARD PREVIOUSLY REPORTED INFORMATION BELOW:  (The information below was originally reported on 12/05/2019 at 4:41 AM)  eGFR          68                        BUN/Creatinine Ratio 12/04/2019 29* 6 - 22 (calc) Final    Sodium 12/04/2019 142  135 - 146 mmol/L Final    Potassium 12/04/2019 4.4  3.5 - 5.3 mmol/L Final    Chloride 12/04/2019 102  98 - 110 mmol/L Final    CO2 12/04/2019 28  20 - 32 mmol/L Final    Calcium 12/04/2019 9.2  8.6 - 10.4 mg/dL Final    Total Protein 12/04/2019 6.5  6.1 - 8.1 g/dL Final    Albumin 12/04/2019 3.5* 3.6 - 5.1 g/dL Final    Globulin, Total 12/04/2019 3.0  1.9 - 3.7 g/dL (calc) Final    Albumin/Globulin Ratio 12/04/2019 1.2  1.0 - 2.5 (calc) Final    Total Bilirubin 12/04/2019 0.5  0.2 - 1.2 mg/dL Final    Alkaline Phosphatase 12/04/2019 101  33 - 130 U/L Final    AST 12/04/2019 16  10 - 35 U/L Final    ALT 12/04/2019 29  6 - 29 U/L Final    WBC 12/04/2019 22.3* 3.8 - 10.8  Thousand/uL Final    RBC 12/04/2019 4.44  3.80 - 5.10 Million/uL Final    Hemoglobin 12/04/2019 10.7* 11.7 - 15.5 g/dL Final    Hematocrit 12/04/2019 34.5* 35.0 - 45.0 % Final    Mean Corpuscular Volume 12/04/2019 77.7* 80.0 - 100.0 fL Final    Mean Corpuscular Hemoglobin 12/04/2019 24.1* 27.0 - 33.0 pg Final    Mean Corpuscular Hemoglobin Conc 12/04/2019 31.0* 32.0 - 36.0 g/dL Final    RDW 12/04/2019 16.4* 11.0 - 15.0 % Final    Platelets 12/04/2019 454* 140 - 400 Thousand/uL Final    MPV 12/04/2019 9.2  7.5 - 12.5 fL Final    Neutrophils Absolute 12/04/2019 11,284* 1,500 - 7,800 cells/uL Final    Lymph # 12/04/2019 2,743  850 - 3,900 cells/uL Final    Mono # 12/04/2019 1,516* 200 - 950 cells/uL Final    Eos # 12/04/2019 6,712* 15 - 500 cells/uL Final    Baso # 12/04/2019 45  0 - 200 cells/uL Final    Neutrophils Relative 12/04/2019 50.6  % Final    Lymph% 12/04/2019 12.3  % Final    Mono% 12/04/2019 6.8  % Final    Eosinophil% 12/04/2019 30.1  % Final    Basophil% 12/04/2019 0.2  % Final    OBINNA 12/04/2019 POSITIVE* NEGATIVE Final    Comment: OBINNA IFA is a first line screen for detecting the  presence of up to approximately 150 autoantibodies in  various autoimmune diseases. A positive OBINNA IFA result  is suggestive of autoimmune disease and reflexes to  titer and pattern. Further laboratory testing may be  considered if clinically indicated.     For additional information, please refer to  http://education.[employer]/faq/CWW859  (This link is being provided for informational/  educational purposes only.)          Cyclic Citrullinated Peptide (CCP)* 12/04/2019 <16  UNITS Final    Comment: Reference Range  Negative:            <20  Weak Positive:       20-39  Moderate Positive:   40-59  Strong Positive:     >59         CRP 12/04/2019 30.1* <8.0 mg/L Final    Color, UA 12/04/2019 YELLOW  YELLOW Final    Appearance, UA 12/04/2019 SLIGHTLY CLOUDY* CLEAR Final    Specific [place], UA  12/04/2019 1.014  1.001 - 1.035 Final    pH, UA 12/04/2019 < OR = 5.0  5.0 - 8.0 Final    Glucose, UA 12/04/2019 NEGATIVE  NEGATIVE Final    Bilirubin, UA 12/04/2019 NEGATIVE  NEGATIVE Final    Ketones, UA 12/04/2019 NEGATIVE  NEGATIVE Final    Occult Blood UA 12/04/2019 1+* NEGATIVE Final    Protein, UA 12/04/2019 2+* NEGATIVE Final    Nitrite, UA 12/04/2019 NEGATIVE  NEGATIVE Final    Leukocytes, UA 12/04/2019 NEGATIVE  NEGATIVE Final    WBC Casts, UA 12/04/2019 0-5  < OR = 5 /HPF Final    RBC Casts, UA 12/04/2019 10-20* < OR = 2 /HPF Final    Squam Epithel, UA 12/04/2019 0-5  < OR = 5 /HPF Final    Bacteria, UA 12/04/2019 FEW* NONE SEEN /HPF Final    Hyaline Casts, UA 12/04/2019 NONE SEEN  NONE SEEN /LPF Final    OBINNA Titer 12/04/2019 1:160* titer Final    Comment:                 Reference Range                  <1:40        Negative                  1:40-1:80    Low Antibody Level                  >1:80        Elevated Antibody Level         OBINNA Pattern 12/04/2019 Nuclear, Speckled*  Final    Comment: Speckled pattern is associated with mixed connective  tissue disease (MCTD), systemic lupus erythematosus  (SLE), Sjogren's syndrome, dermatomyositis, and   systemic sclerosis/polymyositis overlap.     AC-2,4,5,29: Speckled     International Consensus on OBINNA Patterns  (https://doi.org/10.1515/luvl-9279-0934)      ds DNA Ab 12/04/2019 <1  IU/mL Final    Comment:                            IU/mL       Interpretation                             < or = 4    Negative                             5-9         Indeterminate                             > or = 10   Positive         SCL-70 Antibody 12/04/2019 <1.0 NEG  <1.0 NEG AI Final    Anti Sm Antibody 12/04/2019 <1.0 NEG  <1.0 NEG AI Final    SM/RNP Antibody 12/04/2019 <1.0 NEG  <1.0 NEG AI Final    Anti-SSA Antibody 12/04/2019 <1.0 NEG  <1.0 NEG AI Final    Anti-SSB Antibody 12/04/2019 <1.0 NEG  <1.0 NEG AI Final       Imaging  No results  found.    Assessment  1. Retinal artery occlusion  Will assess for a cardioembolic source.  But, I suspected vasculitis related to her connective tissue disease is the more likely etiology  - Echo Color Flow Doppler? Yes; Future  - Cardiology Lab Carotid US Bilateral; Future  - Cardiac event monitor (30 day); Future  - EKG 12-lead    2. Dyspnea on exertion  Need to assess for pulmonary involvement and right heart failure.  - B-TYPE NATRIURETIC PEPTIDE; Future      Plan and Discussion    Workup for cardioembolic source.  Will get a BNP to see if volume overload or right heart failure is playing a role in her dyspnea.  She needs a chest x-ray as well.    Follow Up  Follow up in about 4 weeks (around 6/19/2020).      Fabrice Rodriguez MD

## 2020-05-25 ENCOUNTER — TELEPHONE (OUTPATIENT)
Dept: CARDIOLOGY | Facility: CLINIC | Age: 75
End: 2020-05-25

## 2020-05-25 DIAGNOSIS — I63.9 CEREBROVASCULAR ACCIDENT (CVA), UNSPECIFIED MECHANISM: Primary | ICD-10-CM

## 2020-05-25 DIAGNOSIS — I67.848 OTHER CEREBROVASCULAR VASOSPASM AND VASOCONSTRICTION: ICD-10-CM

## 2020-05-25 DIAGNOSIS — H34.9 RETINAL ARTERY OCCLUSION: Primary | ICD-10-CM

## 2020-05-25 DIAGNOSIS — I74.9 EMBOLISM AND THROMBOSIS OF UNSPECIFIED ARTERY: ICD-10-CM

## 2020-05-25 NOTE — TELEPHONE ENCOUNTER
Pt instructed to start taking lasix 40mg daily instead of as needed due to recent BNP; she verbalized understanding.

## 2020-05-25 NOTE — PROGRESS NOTES
Pt informed to report for outpatient testing tomorrow for 2:00pm instead of 2:30 so that she can have all in oneday.  Further informed a heart monitor will be shipped to her house to wear for 30 days. She verbalized understanding. She requests that monitor be shipped to a another adress than listed in chart; added to temp address. Will ask that monitor be shipped to : [address] [place] [address]. Also scheduled f/u appt 6/22/20 @1000.

## 2020-05-25 NOTE — TELEPHONE ENCOUNTER
----- Message from Fabrice Rodriguez MD sent at 5/22/2020  3:17 PM CDT -----  Please call the patient regarding her abnormal result.    Needs to start taking her Lasix 40 mg every day.

## 2020-05-26 ENCOUNTER — HOSPITAL ENCOUNTER (OUTPATIENT)
Dept: CARDIOLOGY | Facility: OTHER | Age: 75
Discharge: HOME | End: 2020-05-26
Attending: INTERNAL MEDICINE
Payer: MEDICARE

## 2020-05-26 ENCOUNTER — HOSPITAL ENCOUNTER (OUTPATIENT)
Dept: RADIOLOGY | Facility: OTHER | Age: 75
Discharge: HOME | End: 2020-05-26
Attending: INTERNAL MEDICINE
Payer: MEDICARE

## 2020-05-26 VITALS
BODY MASS INDEX: 30.36 KG/M2 | DIASTOLIC BLOOD PRESSURE: 80 MMHG | SYSTOLIC BLOOD PRESSURE: 126 MMHG | WEIGHT: 165 LBS | HEIGHT: 62 IN

## 2020-05-26 DIAGNOSIS — I74.9 EMBOLISM AND THROMBOSIS OF UNSPECIFIED ARTERY: ICD-10-CM

## 2020-05-26 DIAGNOSIS — H34.9 RETINAL ARTERY OCCLUSION: ICD-10-CM

## 2020-05-26 DIAGNOSIS — R06.09 DYSPNEA ON EXERTION: ICD-10-CM

## 2020-05-26 PROCEDURE — 93010 EKG 12-LEAD: ICD-10-PCS | Mod: ,,, | Performed by: INTERNAL MEDICINE

## 2020-05-26 PROCEDURE — 93880 CV US DOPPLER CAROTID (CUPID ONLY): ICD-10-PCS | Mod: 26,,, | Performed by: INTERNAL MEDICINE

## 2020-05-26 PROCEDURE — 93306 ECHO (CUPID ONLY): ICD-10-PCS | Mod: 26,,, | Performed by: INTERNAL MEDICINE

## 2020-05-26 PROCEDURE — 93880 EXTRACRANIAL BILAT STUDY: CPT | Mod: 26,,, | Performed by: INTERNAL MEDICINE

## 2020-05-26 PROCEDURE — 93010 ELECTROCARDIOGRAM REPORT: CPT | Mod: ,,, | Performed by: INTERNAL MEDICINE

## 2020-05-26 PROCEDURE — 93306 TTE W/DOPPLER COMPLETE: CPT | Mod: 26,,, | Performed by: INTERNAL MEDICINE

## 2020-05-26 PROCEDURE — 93306 TTE W/DOPPLER COMPLETE: CPT

## 2020-05-26 PROCEDURE — 71046 X-RAY EXAM CHEST 2 VIEWS: CPT | Mod: TC,FY

## 2020-05-26 PROCEDURE — 93880 EXTRACRANIAL BILAT STUDY: CPT

## 2020-05-26 PROCEDURE — 71046 XR CHEST PA AND LATERAL: ICD-10-PCS | Mod: 26,,, | Performed by: RADIOLOGY

## 2020-05-26 PROCEDURE — 93005 ELECTROCARDIOGRAM TRACING: CPT

## 2020-05-26 PROCEDURE — 71046 X-RAY EXAM CHEST 2 VIEWS: CPT | Mod: 26,,, | Performed by: RADIOLOGY

## 2020-05-27 DIAGNOSIS — R77.8 ABNORMAL SPEP: Primary | ICD-10-CM

## 2020-05-27 DIAGNOSIS — Z79.899 ENCOUNTER FOR LONG-TERM (CURRENT) DRUG USE: ICD-10-CM

## 2020-05-27 DIAGNOSIS — R76.8 POSITIVE ANA (ANTINUCLEAR ANTIBODY): ICD-10-CM

## 2020-05-27 LAB
ALBUMIN SERPL ELPH-MCNC: 3.7 G/DL (ref 3.8–4.8)
ALPHA1 GLOB SERPL ELPH-MCNC: 0.4 G/DL (ref 0.2–0.3)
ALPHA2 GLOB SERPL ELPH-MCNC: 0.8 G/DL (ref 0.5–0.9)
ANCA AB SER QL: NEGATIVE
ASCENDING AORTA: 2.8 CM
AV INDEX (PROSTH): 1.08
AV MEAN GRADIENT: 2 MMHG
AV PEAK GRADIENT: 4 MMHG
AV VALVE AREA: 2.94 CM2
AV VELOCITY RATIO: 0.91
BETA1 GLOB SERPL ELPH-MCNC: 0.5 G/DL (ref 0.4–0.6)
BETA2 GLOB SERPL ELPH-MCNC: 0.5 G/DL (ref 0.2–0.5)
BSA FOR ECHO PROCEDURE: 1.81 M2
CRP SERPL-MCNC: 27.8 MG/L
CV ECHO LV RWT: 0.39 CM
DOP CALC AO PEAK VEL: 0.99 M/S
DOP CALC AO VTI: 17.16 CM
DOP CALC LVOT AREA: 2.7 CM2
DOP CALC LVOT DIAMETER: 1.86 CM
DOP CALC LVOT PEAK VEL: 0.9 M/S
DOP CALC LVOT STROKE VOLUME: 50.46 CM3
DOP CALCLVOT PEAK VEL VTI: 18.58 CM
E WAVE DECELERATION TIME: 121.69 MSEC
E/A RATIO: 0.86
E/E' RATIO: 16 M/S
ECHO LV POSTERIOR WALL: 0.94 CM (ref 0.6–1.1)
ERYTHROCYTE [SEDIMENTATION RATE] IN BLOOD BY WESTERGREN METHOD: 33 MM/H
FRACTIONAL SHORTENING: 20 % (ref 28–44)
GAMMA GLOB SERPL ELPH-MCNC: 1.5 G/DL (ref 0.8–1.7)
INTERVENTRICULAR SEPTUM: 0.95 CM (ref 0.6–1.1)
LA MAJOR: 5.39 CM
LA MINOR: 5.24 CM
LA WIDTH: 4.99 CM
LEFT ATRIUM SIZE: 4.29 CM
LEFT ATRIUM VOLUME INDEX MOD: 32.9 ML/M2
LEFT ATRIUM VOLUME INDEX: 54.9 ML/M2
LEFT ATRIUM VOLUME MOD: 58 CM3
LEFT ATRIUM VOLUME: 96.69 CM3
LEFT CCA DIST DIAS: 12 CM/S
LEFT CCA DIST SYS: 64 CM/S
LEFT CCA PROX DIAS: 10 CM/S
LEFT CCA PROX SYS: 74 CM/S
LEFT ECA SYS: 83 CM/S
LEFT ICA DIST DIAS: 16 CM/S
LEFT ICA DIST SYS: 66 CM/S
LEFT ICA MID DIAS: 11 CM/S
LEFT ICA MID SYS: 47 CM/S
LEFT ICA PROX DIAS: 14 CM/S
LEFT ICA PROX SYS: 55 CM/S
LEFT INTERNAL DIMENSION IN SYSTOLE: 3.83 CM (ref 2.1–4)
LEFT VENTRICLE DIASTOLIC VOLUME INDEX: 60.5 ML/M2
LEFT VENTRICLE DIASTOLIC VOLUME: 106.56 ML
LEFT VENTRICLE MASS INDEX: 89 G/M2
LEFT VENTRICLE SYSTOLIC VOLUME INDEX: 35.9 ML/M2
LEFT VENTRICLE SYSTOLIC VOLUME: 63.24 ML
LEFT VENTRICULAR INTERNAL DIMENSION IN DIASTOLE: 4.78 CM (ref 3.5–6)
LEFT VENTRICULAR MASS: 156.62 G
LEFT VERTEBRAL DIAS: 9 CM/S
LEFT VERTEBRAL SYS: 36 CM/S
LV LATERAL E/E' RATIO: 17.6 M/S
LV SEPTAL E/E' RATIO: 14.67 M/S
MV PEAK A VEL: 1.02 M/S
MV PEAK E VEL: 0.88 M/S
MV STENOSIS PRESSURE HALF TIME: 35 MS
MV VALVE AREA P 1/2 METHOD: 6.29 CM2
MYELOPEROXIDASE AB SER IA-ACNC: <1 AI
OHS CV CAROTID RIGHT ICA EDV HIGHEST: 22
OHS CV CAROTID ULTRASOUND LEFT ICA/CCA RATIO: 0.89
OHS CV CAROTID ULTRASOUND RIGHT ICA/CCA RATIO: 1.33
OHS CV PV CAROTID LEFT HIGHEST CCA: 74
OHS CV PV CAROTID LEFT HIGHEST ICA: 66
OHS CV PV CAROTID RIGHT HIGHEST CCA: 54
OHS CV PV CAROTID RIGHT HIGHEST ICA: 72
OHS CV US CAROTID LEFT HIGHEST EDV: 16
PISA TR MAX VEL: 1.99 M/S
PROT PATTERN SERPL ELPH-IMP: ABNORMAL
PROT SERPL-MCNC: 7.5 G/DL (ref 6.1–8.1)
PROTEINASE3 AB SER IA-ACNC: <1 AI
PULM VEIN S/D RATIO: 1.08
PV PEAK D VEL: 0.59 M/S
PV PEAK S VEL: 0.64 M/S
PV PEAK VELOCITY: 1.26 CM/S
RA MAJOR: 4.83 CM
RA PRESSURE: 8 MMHG
RIGHT CCA DIST DIAS: 9 CM/S
RIGHT CCA DIST SYS: 54 CM/S
RIGHT CCA PROX DIAS: 9 CM/S
RIGHT CCA PROX SYS: 54 CM/S
RIGHT ECA SYS: 100 CM/S
RIGHT ICA DIST DIAS: 17 CM/S
RIGHT ICA DIST SYS: 65 CM/S
RIGHT ICA MID DIAS: 22 CM/S
RIGHT ICA MID SYS: 72 CM/S
RIGHT ICA PROX DIAS: 16 CM/S
RIGHT ICA PROX SYS: 64 CM/S
RIGHT VERTEBRAL DIAS: 11 CM/S
RIGHT VERTEBRAL SYS: 42 CM/S
SINUS: 2.93 CM
STJ: 2.67 CM
TDI LATERAL: 0.05 M/S
TDI SEPTAL: 0.06 M/S
TDI: 0.06 M/S
TR MAX PG: 16 MMHG
TRICUSPID ANNULAR PLANE SYSTOLIC EXCURSION: 2.81 CM
TV REST PULMONARY ARTERY PRESSURE: 24 MMHG

## 2020-05-27 NOTE — PROGRESS NOTES
Patient notified of need for additional tests t be done 2 weeks prior to her next office visit. Lab orders mailed to patient

## 2020-05-27 NOTE — PROGRESS NOTES
She will need both a serum and a urine immunoelectrophoresis at least 2 weeks before her next appointment.  Thanks

## 2020-05-28 ENCOUNTER — LAB VISIT (OUTPATIENT)
Dept: PRIMARY CARE CLINIC | Facility: CLINIC | Age: 75
End: 2020-05-28
Payer: MEDICARE

## 2020-05-28 ENCOUNTER — CLINICAL SUPPORT (OUTPATIENT)
Dept: CARDIOLOGY | Facility: HOSPITAL | Age: 75
End: 2020-05-28
Attending: INTERNAL MEDICINE
Payer: MEDICARE

## 2020-05-28 ENCOUNTER — TELEPHONE (OUTPATIENT)
Dept: CARDIOLOGY | Facility: CLINIC | Age: 75
End: 2020-05-28

## 2020-05-28 DIAGNOSIS — I63.9 CEREBROVASCULAR ACCIDENT (CVA), UNSPECIFIED MECHANISM: ICD-10-CM

## 2020-05-28 DIAGNOSIS — R05.9 COUGH: Primary | ICD-10-CM

## 2020-05-28 DIAGNOSIS — I67.848 OTHER CEREBROVASCULAR VASOSPASM AND VASOCONSTRICTION: ICD-10-CM

## 2020-05-28 PROCEDURE — U0003 INFECTIOUS AGENT DETECTION BY NUCLEIC ACID (DNA OR RNA); SEVERE ACUTE RESPIRATORY SYNDROME CORONAVIRUS 2 (SARS-COV-2) (CORONAVIRUS DISEASE [COVID-19]), AMPLIFIED PROBE TECHNIQUE, MAKING USE OF HIGH THROUGHPUT TECHNOLOGIES AS DESCRIBED BY CMS-2020-01-R: HCPCS

## 2020-05-28 NOTE — TELEPHONE ENCOUNTER
----- Message from Fabrice Rodriguez MD sent at 5/27/2020  3:53 PM CDT -----  Please call the patient regarding her abnormal result.  Need to see sooner

## 2020-05-29 LAB — SARS-COV-2 RNA RESP QL NAA+PROBE: NOT DETECTED

## 2020-06-03 ENCOUNTER — OFFICE VISIT (OUTPATIENT)
Dept: CARDIOLOGY | Facility: CLINIC | Age: 75
End: 2020-06-03
Payer: MEDICARE

## 2020-06-03 VITALS
OXYGEN SATURATION: 96 % | WEIGHT: 156.5 LBS | BODY MASS INDEX: 28.62 KG/M2 | HEART RATE: 84 BPM | DIASTOLIC BLOOD PRESSURE: 70 MMHG | SYSTOLIC BLOOD PRESSURE: 118 MMHG

## 2020-06-03 DIAGNOSIS — I50.21 ACUTE SYSTOLIC HEART FAILURE: Primary | ICD-10-CM

## 2020-06-03 PROCEDURE — 1126F AMNT PAIN NOTED NONE PRSNT: CPT | Mod: S$GLB,,, | Performed by: INTERNAL MEDICINE

## 2020-06-03 PROCEDURE — 1159F MED LIST DOCD IN RCRD: CPT | Mod: S$GLB,,, | Performed by: INTERNAL MEDICINE

## 2020-06-03 PROCEDURE — 1126F PR PAIN SEVERITY QUANTIFIED, NO PAIN PRESENT: ICD-10-PCS | Mod: S$GLB,,, | Performed by: INTERNAL MEDICINE

## 2020-06-03 PROCEDURE — 1101F PT FALLS ASSESS-DOCD LE1/YR: CPT | Mod: CPTII,S$GLB,, | Performed by: INTERNAL MEDICINE

## 2020-06-03 PROCEDURE — 1101F PR PT FALLS ASSESS DOC 0-1 FALLS W/OUT INJ PAST YR: ICD-10-PCS | Mod: CPTII,S$GLB,, | Performed by: INTERNAL MEDICINE

## 2020-06-03 PROCEDURE — 99213 OFFICE O/P EST LOW 20 MIN: CPT | Mod: S$GLB,,, | Performed by: INTERNAL MEDICINE

## 2020-06-03 PROCEDURE — 99213 PR OFFICE/OUTPT VISIT, EST, LEVL III, 20-29 MIN: ICD-10-PCS | Mod: S$GLB,,, | Performed by: INTERNAL MEDICINE

## 2020-06-03 PROCEDURE — 99999 PR PBB SHADOW E&M-EST. PATIENT-LVL III: ICD-10-PCS | Mod: PBBFAC,,, | Performed by: INTERNAL MEDICINE

## 2020-06-03 PROCEDURE — 99999 PR PBB SHADOW E&M-EST. PATIENT-LVL III: CPT | Mod: PBBFAC,,, | Performed by: INTERNAL MEDICINE

## 2020-06-03 PROCEDURE — 1159F PR MEDICATION LIST DOCUMENTED IN MEDICAL RECORD: ICD-10-PCS | Mod: S$GLB,,, | Performed by: INTERNAL MEDICINE

## 2020-06-03 RX ORDER — ASPIRIN 81 MG/1
81 TABLET ORAL DAILY
COMMUNITY

## 2020-06-03 RX ORDER — DIAZEPAM 2 MG/1
5 TABLET ORAL
Status: CANCELLED | OUTPATIENT
Start: 2020-06-03

## 2020-06-03 RX ORDER — SODIUM CHLORIDE 0.9 % (FLUSH) 0.9 %
10 SYRINGE (ML) INJECTION
Status: CANCELLED | OUTPATIENT
Start: 2020-06-03

## 2020-06-03 RX ORDER — DIPHENHYDRAMINE HCL 25 MG
25 CAPSULE ORAL
Status: CANCELLED | OUTPATIENT
Start: 2020-06-03

## 2020-06-03 RX ORDER — FUROSEMIDE 40 MG/1
40 TABLET ORAL DAILY PRN
Qty: 90 TABLET | Refills: 1 | Status: SHIPPED | OUTPATIENT
Start: 2020-06-03 | End: 2020-08-12 | Stop reason: SDUPTHER

## 2020-06-03 RX ORDER — SODIUM CHLORIDE 9 MG/ML
INJECTION, SOLUTION INTRAVENOUS ONCE
Status: CANCELLED | OUTPATIENT
Start: 2020-06-03 | End: 2020-06-03

## 2020-06-03 NOTE — PATIENT INSTRUCTIONS
Understanding Transradial Cardiac Catheterization    Cardiac catheterization (cardiac cath) is a common, non-surgical procedure. During the procedure, your doctor will insert a long, thin tube (catheter) into an artery and move it up into your heart. Transradial means the catheter is inserted into an artery in the wrist (the radial artery). This procedure can be used to diagnose and treat certain heart problems.  Why do I need a transradial cardiac cath?  You may need a cardiac cath if signs indicate a problem with your heart. These may include:  · Symptoms of chest pain, tightness, or heaviness (known as angina). This is a common symptom of blocked heart arteries, known as coronary artery disease.  · Symptoms of weakness, dizziness, trouble breathing, or swollen legs or feet. These may be symptoms of a problem with a heart valve or the heart muscle.  · Other test results show heart problems. Tests may include stress tests, heart scans, and echocardiography.  During a cardiac cath, your doctor can see the condition of the coronary arteries and heart valves. He or she can also check how well the heart pumps and the flow of blood through the heart. Your doctor can also measure pressures and take blood samples. And, if needed, he or she can open blocked arteries. This can help reduce symptoms of angina.  Cardiac cath is often done using a catheter inserted into an artery in the groin. During transradial cardiac cath, the catheter is inserted into an artery in the wrist. This can mean less bleeding and a faster recovery. Some people may have blockages in the groin arteries as well as in the heart arteries, making it difficult to reach the heart. The transradial approach can be used to get around this problem.   What happens during a transradial cardiac cath?  The procedure is done in the hospital or a surgery center. First, an IV line is put in your arm or hand to deliver fluids and medicines. You will likely be given  medicine to relax you and make you drowsy. When the procedure begins:  · You lie on an X-ray table.  · The skin over the insertion site in your wrist is numbed.  · The doctor makes a tiny puncture or incision into the artery in the wrist. He or she then inserts a catheter and threads it through the blood vessel into your heart.    · The doctor may inject a contrast fluid through the catheter into the arteries. This fluid makes the arteries show up better on X-rays.  · Tests may be done to check the condition of your heart and arteries. If needed, the doctor can clear blockages in the arteries or do other repairs.  · When the doctor is finished, he or she will remove the catheter and put direct pressure on the site to prevent bleeding.  · You will stay for a time to recover, and then go home.  What are the risks of transradial cardiac cath?  These include:  · Bleeding, bruising, infection, or blood clots  · Damage to the radial artery that may cause injury to the hand  · Allergic reaction to the contrast fluid  · Abnormal heartbeat (arrhythmia)  · Damage to blood vessels or tissues  · Kidney damage or failure  · The need for emergency heart surgery  · Heart attack, stroke, or death  Date Last Reviewed: 5/1/2016  © 7136-4805 [employer]. [address] [place] [address], [place] [address]. All rights reserved. This information is not intended as a substitute for professional medical care. Always follow your healthcare professional's instructions.      Eating Heart-Healthy Foods  Eating has a big impact on your heart health. In fact, eating healthier can improve several of your heart risks at once. For instance, it helps you manage weight, cholesterol, and blood pressure. Here are ideas to help you make heart-healthy changes without giving up all the foods and flavors you love.  Getting started  · Talk with your health care provider about eating plans, such as the DASH or Mediterranean diet. You may also be  referred to a dietitian.  · Change a few things at a time. Give yourself time to get used to a few eating changes before adding more.  · Work to create a tasty, healthy eating plan that you can stick to for the rest of your life.    Goals for healthy eating  Below are some tips to improve your eating habits:  · Limit saturated fats and trans fats. Saturated fats raise your levels of cholesterol, so keep these fats to a minimum. They are found in foods such as fatty meats, whole milk, cheese, and palm and coconut oils. Avoid trans fats because they lower good cholesterol as well as raise bad cholesterol. Trans fats are most often found in processed foods.  · Reduce sodium (salt) intake. Eating too much salt may increase your blood pressure. Limit your sodium intake to 2,300 milligrams (mg) per day, or less if your health care provider recommends it. Dining out less often and eating fewer processed foods are two great ways to decrease the amount of salt you consume.  · Managing calories. A calorie is a unit of energy. Your body burns calories for fuel, but if you eat more calories than your body burns, the extras are stored as fat. Your health care provider can help you create a diet plan to manage your calories. This will likely include eating healthier foods as well as exercising regularly. To help you track your progress, keep a diary to record what you eat and how often you exercise.  Choose the right foods  Aim to make these foods staples of your diet. If you have diabetes, you may have different recommendations than what is listed here:  · Fruits and vegetable provide plenty of nutrients without a lot of calories. At meals, fill half your plate with these foods. Split the other half of your plate between whole grains and lean protein.  · Whole grains are high in fiber and rich in vitamins and nutrients. Good choices include whole-wheat bread, pasta, and brown rice.  · Lean proteins give you nutrition with less  fat. Good choices include fish, skinless chicken, and beans.  · Low-fat or nonfat dairy provides nutrients without a lot of fat. Try low-fat or nonfat milk, cheese, or yogurt.  · Healthy fats can be good for you in small amounts. These are unsaturated fats, such as olive oil, nuts, and fish. Try to have at least 2 servings per week of fatty fish such as salmon, sardines, mackerel, rainbow trout, and albacore tuna. These contain omega-3 fatty acids, which are good for your heart. Flaxseed is another source of a heart-healthy fat.  More on heart healthy eating    Read food labels  Healthy eating starts at the grocery store. Be sure to pay attention to food labels on packaged foods. Look for products that are high in fiber and protein, and low in saturated fat, cholesterol, and sodium. Avoid products that contain trans fat. And pay close attention to serving size. For instance, if you plan to eat two servings, double all the numbers on the label.  Prepare food right  A key part of healthy cooking is cutting down on added fat and salt. Look on the internet for lower-fat, lower-sodium recipes. Also, try these tips:  · Remove fat from meat and skin from poultry before cooking.  · Skim fat from the surface of soups and sauces.  · Broil, boil, bake, steam, grill, and microwave food without added fats.  · Choose ingredients that spice up your food without adding calories, fat, or sodium. Try these items: horseradish, hot sauce, lemon, mustard, nonfat salad dressings, and vinegar. For salt-free herbs and spices, try basil, cilantro, cinnamon, pepper, and rosemary.  Date Last Reviewed: 6/25/2015  © 6429-1560 [employer]. [address] [place] [address], [place] [address]. All rights reserved. This information is not intended as a substitute for professional medical care. Always follow your healthcare professional's instructions.      Low-Salt Diet  This diet removes foods that are high in salt. It also limits the amount  of salt you use when cooking. It is most often used for people with high blood pressure, edema (fluid retention), and kidney, liver, or heart disease.  Table salt contains the mineral sodium. Your body needs sodium to work normally. But too much sodium can make your health problems worse. Your healthcare provider is recommending a low-salt (also called low-sodium) diet for you. Your total daily allowance of salt is 1,500 to 2,300 milligrams (mg). It is less than 1 teaspoon of table salt. This means you can have only about 500 to 700 mg of sodium at each meal. People with certain health problems should limit salt intake to the lower end of the recommended range.    When you cook, dont add much salt. If you can cook without using salt, even better. Dont add salt to your food at the table.  When shopping, read food labels. Salt is often called sodium on the label. Choose foods that are salt-free, low salt, or very low salt. Note that foods with reduced salt may not lower your salt intake enough.    Beans, potatoes, and pasta  Ok: Dry beans, split peas, lentils, potatoes, rice, macaroni, pasta, spaghetti without added salt  Avoid: Potato chips, tortilla chips, and similar products  Breads and cereals  Ok: Low-sodium breads, rolls, cereals, and cakes; low-salt crackers, matzo crackers  Avoid: Salted crackers, pretzels, popcorn, Slovak toast, pancakes, muffins  Dairy  Ok: Milk, chocolate milk, hot chocolate mix, low-salt cheeses, and yogurt  Avoid: Processed cheese and cheese spreads; Roquefort, Camembert, and cottage cheese; buttermilk, instant breakfast drink  Desserts  Ok: Ice cream, frozen yogurt, juice bars, gelatin, cookies and pies, sugar, honey, jelly, hard candy  Avoid: Most pies, cakes and cookies prepared or processed with salt; instant pudding  Drinks  Ok: Tea, coffee, fizzy (carbonated) drinks, juices  Avoid: Flavored coffees, electrolyte replacement drinks, sports drinks  Meats  Ok: All fresh meat, fish,  poultry, low-salt tuna, eggs, egg substitute  Avoid: Smoked, pickled, brine-cured, or salted meats and fish. This includes woodruff, chipped beef, corned beef, hot dogs, deli meats, ham, kosher meats, salt pork, sausage, canned tuna, salted codfish, smoked salmon, herring, sardines, or anchovies.  Seasonings and spices  Ok: Most seasonings are okay. Good substitutes for salt include: fresh herb blends, hot sauce, lemon, garlic, gao, vinegar, dry mustard, parsley, cilantro, horseradish, tomato paste, regular margarine, mayonnaise, unsalted butter, cream cheese, vegetable oil, cream, low-salt salad dressing and gravy.  Avoid: Regular ketchup, relishes, pickles, soy sauce, teriyaki sauce, Worcestershire sauce, BBQ sauce, tartar sauce, meat tenderizer, chili sauce, regular gravy, regular salad dressing, salted butter  Soups  Ok: Low-salt soups and broths made with allowed foods  Avoid: Bouillon cubes, soups with smoked or salted meats, regular soup and broth  Vegetables  Ok: Most vegetables are okay; also low-salt tomato and vegetable juices  Avoid: Sauerkraut and other brine-soaked vegetables; pickles and other pickled vegetables; tomato juice, olives  Date Last Reviewed: 8/1/2016  © 6462-9138 [employer]. [address] [place] [address]. All rights reserved. This information is not intended as a substitute for professional medical care. Always follow your healthcare professional's instructions.

## 2020-06-03 NOTE — H&P (VIEW-ONLY)
[facility]    Chief Complaint  Chief Complaint   Patient presents with    Results    Congestive Heart Failure       HPI:    She feels better.  No exertional dyspnea.  She has diuresed over 10 lb.    Medications  Current Outpatient Medications   Medication Sig Dispense Refill    aspirin (LO-DOSE ASPIRIN) 81 MG EC tablet Take 81 mg by mouth once daily.      cyanocobalamin, vitamin B-12, 200 mcg/spray SpSn Place 200 mcg under the tongue every 14 (fourteen) days. 12 mL 3    folic acid (FOLVITE) 1 MG tablet Take 1 tablet (1 mg total) by mouth once daily. 90 tablet 2    furosemide (LASIX) 40 MG tablet Take 1 tablet (40 mg total) by mouth daily as needed (fluid). 90 tablet 1    hydroxychloroquine (PLAQUENIL) 200 mg tablet Take 1 tablet (200 mg total) by mouth 2 (two) times daily. 60 tablet 5    ibuprofen (ADVIL,MOTRIN) 800 MG tablet   0    levothyroxine (SYNTHROID) 50 MCG tablet Take 50 mcg by mouth.      methocarbamol (ROBAXIN) 500 MG Tab Take 500 mg by mouth continuous prn.   0    predniSONE (DELTASONE) 10 MG tablet Take 1 tablet (10 mg total) by mouth once daily. Take one tablet every day until advised otherwise by this prescriber. 30 tablet 2     Current Facility-Administered Medications   Medication Dose Route Frequency Provider Last Rate Last Dose    cyanocobalamin injection 1,000 mcg  1,000 mcg Subcutaneous Q30 Days Srinivasa Huffman MD   1,000 mcg at 05/13/20 1110      Prior to Admission medications    Medication Sig Start Date End Date Taking? Authorizing Provider   aspirin (LO-DOSE ASPIRIN) 81 MG EC tablet Take 81 mg by mouth once daily.   Yes Historical Provider, MD   cyanocobalamin, vitamin B-12, 200 mcg/spray SpSn Place 200 mcg under the tongue every 14 (fourteen) days. 4/28/20  Yes Srinivasa Huffman MD   folic acid (FOLVITE) 1 MG tablet Take 1 tablet (1 mg total) by mouth once daily. 4/28/20 4/28/21 Yes Srinivasa Huffman MD   furosemide (LASIX) 40 MG tablet Take 1 tablet (40  mg total) by mouth daily as needed (fluid). 6/3/20  Yes Fabrice Rodriguez MD   hydroxychloroquine (PLAQUENIL) 200 mg tablet Take 1 tablet (200 mg total) by mouth 2 (two) times daily. 4/28/20 10/25/20 Yes Refugio Owens MD   ibuprofen (ADVIL,MOTRIN) 800 MG tablet  10/26/19  Yes Historical Provider, MD   levothyroxine (SYNTHROID) 50 MCG tablet Take 50 mcg by mouth. 11/18/19  Yes Historical Provider, MD   methocarbamol (ROBAXIN) 500 MG Tab Take 500 mg by mouth continuous prn.  10/26/19  Yes Historical Provider, MD   predniSONE (DELTASONE) 10 MG tablet Take 1 tablet (10 mg total) by mouth once daily. Take one tablet every day until advised otherwise by this prescriber. 4/28/20 7/27/20 Yes Refugio Owens MD   furosemide (LASIX) 40 MG tablet Take 40 mg by mouth as needed.  11/25/19 6/3/20 Yes Historical Provider, MD       History  Past Medical History:   Diagnosis Date    Anemia, chronic disease 12/18/2019    Chronic pain     Eosinophilia 12/18/2019    Hypothyroidism     Insomnia     Leucocytosis 12/18/2019     Past Surgical History:   Procedure Laterality Date    ANKLE LIGAMENT RECONSTRUCTION Right     CATARACT EXTRACTION Bilateral     CHOLECYSTECTOMY       Social History     Socioeconomic History    Marital status: Single     Spouse name: Not on file    Number of children: Not on file    Years of education: Not on file    Highest education level: Not on file   Occupational History    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   Social Needs    Financial resource strain: Not on file    Food insecurity:     Worry: Not on file     Inability: Not on file    Transportation needs:     Medical: Not on file     Non-medical: Not on file   Tobacco Use    Smoking status: Former Smoker    Smokeless tobacco: Never Used   Substance and Sexual Activity    Alcohol use: No    Drug use: No    Sexual activity: Yes     Partners: Male     Birth control/protection: None   Lifestyle    Physical  activity:     Days per week: Not on file     Minutes per session: Not on file    Stress: Not on file   Relationships    Social connections:     Talks on phone: Not on file     Gets together: Not on file     Attends Latter day service: Not on file     Active member of club or organization: Not on file     Attends meetings of clubs or organizations: Not on file     Relationship status: Not on file   Other Topics Concern    Not on file   Social History Narrative    Not on file       Allergies  Review of patient's allergies indicates:  No Known Allergies    Review of Systems   Review of Systems   Constitution: Negative for malaise/fatigue, weight gain and weight loss.   Eyes: Positive for vision loss in left eye. Negative for visual disturbance.   Cardiovascular: Negative for chest pain, claudication, cyanosis, irregular heartbeat, leg swelling, near-syncope, orthopnea, palpitations, paroxysmal nocturnal dyspnea and syncope.   Respiratory: Negative for cough, hemoptysis, sleep disturbances due to breathing and wheezing.    Hematologic/Lymphatic: Negative for bleeding problem. Does not bruise/bleed easily.   Skin: Negative for poor wound healing.   Musculoskeletal: Negative for muscle cramps and myalgias.   Gastrointestinal: Negative for abdominal pain, anorexia, diarrhea, heartburn, hematemesis, hematochezia, melena, nausea and vomiting.   Genitourinary: Negative for hematuria and nocturia.   Neurological: Negative for excessive daytime sleepiness, dizziness, focal weakness, light-headedness and weakness.       Physical Exam  Vitals:    06/03/20 1132   BP: 118/70   Pulse: 84     Wt Readings from Last 1 Encounters:   06/03/20 71 kg (156 lb 8 oz)     Physical Exam   Constitutional: She is oriented to person, place, and time. She is cooperative.  Non-toxic appearance. No distress.   HENT:   Head: Normocephalic and atraumatic.   Eyes: Conjunctivae are normal. No scleral icterus.   Neck: Neck supple. No hepatojugular  reflux and no JVD present. Carotid bruit is not present. No tracheal deviation and no edema present. No thyromegaly present.   Cardiovascular: Normal rate, regular rhythm, S1 normal and S2 normal.  No extrasystoles are present. PMI is not displaced. Exam reveals no S3 and no S4.   No murmur heard.  Pulses:       Carotid pulses are 2+ on the right side, and 2+ on the left side.       Radial pulses are 2+ on the right side, and 2+ on the left side.        Dorsalis pedis pulses are 2+ on the right side, and 2+ on the left side.        Posterior tibial pulses are 2+ on the right side, and 2+ on the left side.   Pulmonary/Chest: No accessory muscle usage. No respiratory distress. She has no decreased breath sounds. She has no wheezes. She has no rhonchi. She has no rales.   Abdominal: Soft. Bowel sounds are normal. She exhibits no pulsatile liver, no abdominal bruit and no pulsatile midline mass. There is no splenomegaly or hepatomegaly. There is no tenderness.   Musculoskeletal: She exhibits no edema, tenderness or deformity.   Neurological: She is alert and oriented to person, place, and time. She has normal strength. No cranial nerve deficit or sensory deficit.   Skin: Skin is warm, dry and intact. She is not diaphoretic. No cyanosis. No pallor. Nails show no clubbing.   Psychiatric: She has a normal mood and affect. Her speech is normal and behavior is normal.       Labs  Lab Visit on 05/28/2020   Component Date Value Ref Range Status    SARS-CoV2 (COVID-19) Qualitative P* 05/28/2020 Not Detected  Not Detected Final    Comment: This test utilizes a real-time reverse transcription  polymerase chain reaction procedure to amplify and   detect the SARS-CoV-2 RdRp and N genes.    The analytical sensitivity (limit of detection) of   this assay is 100 copies/mL.   A Detected result is considered positive for COVID-19.  This patient is considered infected with the   SARS-CoV-2 virus and is presumed to be contagious.    A  Not Detected result means that SARS-CoV-2 RNA is not  present above the limit of detection. It does not rule  out the possibility of COVID-19 and should not be the  sole basis for treatment decisions.  If COVID-19 is   strongly suspected based on clinical and exposure   history,re-testing should be considered.    This test is only for use under Food and Drug   Administration s Emergency Use Authorization (EUA).   Commercial reagents are provided by [employer]  Performance characteristics of the EUA have been   independently verified by [facility]   Department of Pathology and L                           aboratory Medicine.       Hospital Outpatient Visit on 05/26/2020   Component Date Value Ref Range Status    Ascending aorta 05/26/2020 2.80  cm Final    STJ 05/26/2020 2.67  cm Final    AV mean gradient 05/26/2020 2  mmHg Final    Ao peak dino 05/26/2020 0.99  m/s Final    Ao VTI 05/26/2020 17.16  cm Final    IVS 05/26/2020 0.95  0.6 - 1.1 cm Final    LA size 05/26/2020 4.29  cm Final    Left Atrium Major Axis 05/26/2020 5.39  cm Final    Left Atrium Minor Axis 05/26/2020 5.24  cm Final    LVIDD 05/26/2020 4.78  3.5 - 6.0 cm Final    LVIDS 05/26/2020 3.83  2.1 - 4.0 cm Final    LVOT diameter 05/26/2020 1.86  cm Final    LVOT peak VTI 05/26/2020 18.58  cm Final    PW 05/26/2020 0.94  0.6 - 1.1 cm Final    MV Peak A Dino 05/26/2020 1.02  m/s Final    E wave decelartion time 05/26/2020 121.69  msec Final    MV Peak E Dino 05/26/2020 0.88  m/s Final    PV Peak D Dino 05/26/2020 0.59  m/s Final    PV Peak S Dino 05/26/2020 0.64  m/s Final    RA Major Axis 05/26/2020 4.83  cm Final    Sinus 05/26/2020 2.93  cm Final    TAPSE 05/26/2020 2.81  cm Final    TR Max Dino 05/26/2020 1.99  m/s Final    LA WIDTH 05/26/2020 4.99  cm Final    PV PEAK VELOCITY 05/26/2020 1.26  cm/s Final    LV Diastolic Volume 05/26/2020 106.56  mL Final    LV Systolic Volume 05/26/2020 63.24  mL Final     LVOT peak reema 05/26/2020 0.90  m/s Final    FS 05/26/2020 20  % Final    LA volume 05/26/2020 96.69  cm3 Final    LV mass 05/26/2020 156.62  g Final    Left Ventricle Relative Wall Thick* 05/26/2020 0.39  cm Final    AV valve area 05/26/2020 2.94  cm2 Final    AV Velocity Ratio 05/26/2020 0.91   Final    AV index (prosthetic) 05/26/2020 1.08   Final    E/A ratio 05/26/2020 0.86   Final    Pulm vein S/D ratio 05/26/2020 1.08   Final    LVOT area 05/26/2020 2.7  cm2 Final    LVOT stroke volume 05/26/2020 50.46  cm3 Final    AV peak gradient 05/26/2020 4  mmHg Final    Triscuspid Valve Regurgitation Pea* 05/26/2020 16  mmHg Final    TDI SEPTAL 05/26/2020 0.06  m/s Final    LV LATERAL E/E' RATIO 05/26/2020 17.60  m/s Final    LV SEPTAL E/E' RATIO 05/26/2020 14.67  m/s Final    TDI LATERAL 05/26/2020 0.05  m/s Final    Mean e' 05/26/2020 0.06  m/s Final    E/E' ratio 05/26/2020 16.00  m/s Final    LA volume (mod) 05/26/2020 58.00  cm3 Final    BSA 05/26/2020 1.81  m2 Final    Right Atrial Pressure (from IVC) 05/26/2020 8  mmHg Final    MV valve area p 1/2 method 05/26/2020 6.29  cm2 Final    TV rest pulmonary artery pressure 05/26/2020 24  mmHg Final    MV stenosis pressure 1/2 time 05/26/2020 35  ms Final    LV Systolic Volume Index 05/26/2020 35.9  mL/m2 Final    LV Diastolic Volume Index 05/26/2020 60.50  mL/m2 Final    LA Volume Index 05/26/2020 54.9  mL/m2 Final    LV Mass Index 05/26/2020 89  g/m2 Final    LA Volume Index (Mod) 05/26/2020 32.9  mL/m2 Final   Hospital Outpatient Visit on 05/26/2020   Component Date Value Ref Range Status    [facility] CV CAROTID ULTRASOUND LEFT ICA* 05/26/2020 0.89   Final    [facility] CV CAROTID ULTRASOUND RIGHT IC* 05/26/2020 1.33   Final    [facility] CV PV CAROTID LEFT HIGHEST ICA 05/26/2020 66.00   Final    [facility] CV PV CAROTID LEFT HIGHEST CCA 05/26/2020 74   Final    [facility] CV PV CAROTID RIGHT HIGHEST ICA 05/26/2020 72.00   Final    [facility] CV PV CAROTID RIGHT HIGHEST  CCA 05/26/2020 54   Final    [facility] CV CAROTID RIGHT ICA EDV HIGHE* 05/26/2020 22.00   Final    LT Highest EDV 05/26/2020 16.00   Final    Right CCA prox sys 05/26/2020 54  cm/s Final    Right CCA prox adamson 05/26/2020 9  cm/s Final    Right cca dist sys 05/26/2020 54  cm/s Final    Right CCA dist adamson 05/26/2020 9  cm/s Final    Right ICA prox sys 05/26/2020 64  cm/s Final    Right ICA prox adamson 05/26/2020 16  cm/s Final    Right ICA mid sys 05/26/2020 72  cm/s Final    Right ICA mid adamson 05/26/2020 22  cm/s Final    Right ICA dist sys 05/26/2020 65  cm/s Final    Right ICA dist adamson 05/26/2020 17  cm/s Final    Right eca sys 05/26/2020 100  cm/s Final    Right vertebral sys 05/26/2020 42  cm/s Final    Left CCA prox sys 05/26/2020 74  cm/s Final    Left CCA prox adamson 05/26/2020 10  cm/s Final    Left CCA dist sys 05/26/2020 64  cm/s Final    Left CCA dist adamson 05/26/2020 12  cm/s Final    Left ICA prox sys 05/26/2020 55  cm/s Final    Left ICA prox adamson 05/26/2020 14  cm/s Final    Left ICA mid sys 05/26/2020 47  cm/s Final    Left ICA mid adamson 05/26/2020 11  cm/s Final    Left ICA dist sys 05/26/2020 66  cm/s Final    Left ICA dist adamson 05/26/2020 16  cm/s Final    Left ECA sys 05/26/2020 83  cm/s Final    Left vertebral sys 05/26/2020 36  cm/s Final    Right vertebral adamson 05/26/2020 11  cm/s Final    Left vertebral adamson 05/26/2020 9  cm/s Final   Lab Visit on 05/22/2020   Component Date Value Ref Range Status    BNP 05/22/2020 876* 0 - 99 pg/mL Final    Values of less than 100 pg/ml are consistent with non-CHF populations.   Orders Only on 05/21/2020   Component Date Value Ref Range Status    Total Protein 05/21/2020 7.5  6.1 - 8.1 g/dL Final    Albumin 05/21/2020 3.7* 3.8 - 4.8 g/dL Final    Alpha-1-Globulins 05/21/2020 0.4* 0.2 - 0.3 g/dL Final    Alpha-2-Globulins 05/21/2020 0.8  0.5 - 0.9 g/dL Final    Beta Globulin 05/21/2020 0.5  0.4 - 0.6 g/dL Final    Beta-2 Microglobulin  05/21/2020 0.5  0.2 - 0.5 g/dL Final    Gamma Globulin 05/21/2020 1.5  0.8 - 1.7 g/dL Final    Interpretation 05/21/2020    Final    Comment:    Pattern consistent with an acute phase reaction        Possible monoclonal protein (M-protein) present.  Suggest serum immunofixation.         ANCA Screen 05/21/2020 NEGATIVE  NEGATIVE Final    Comment:    ANCA Screen includes evaluation for p-ANCA, c-ANCA and  atypical p-ANCA. A positive ANCA screen reflexes to titer  and pattern(s), e.g., cytoplasmic pattern (c-ANCA),  perinuclear pattern (p-ANCA), or atypical p-ANCA pattern.  c-ANCA and p-ANCA are observed in vasculitis, whereas  atypical p-ANCA is observed in IBD (Inflammatory Bowel  Disease). Atypical p-ANCA is detected in about 55% to 80% of  patients with ulcerative colitis but only 5% to 25% of  patients with Crohn's disease.      Myeloperoxidase Ab 05/21/2020 <1.0  <1.0 AI Final    Comment:          <1.0 AI No Antibody Detected  > or = 1.0 AI Antibody Detected     Autoantibodies to myeloperoxidase (MPO) are commonly  associated with the following small-vessel vasculitides:  microscopic polyangiitis, polyarteritis nodosa,  Churg-Candy syndrome, necrotizing and crescentic  glomerulonephritis and occasionally granulomatosis with  polyangiitis (GPA, Wegener's). The perinuclear IFA pattern,  (p-ANCA) is based largely on autoantibody to myeloperoxidase  which serves as the primary antigen. These autoantibodies  are present in active disease.      Proteinase 3 Antibody 05/21/2020 <1.0  <1.0 AI Final    Comment:          <1.0 AI No Antibody Detected  > or = 1.0 AI Antibody Detected     Autoantibodies to proteinase-3 (IL-3) are accepted as  characteristic for granulomatosis with polyangiitis (GPA,  Wegener's), and are detectable in 95% of the histologically  proven cases. The cytoplasmic IFA pattern, (c-ANCA), is  based largely on autoantibody to IL-3 which serves as the  primary antigen. These autoantibodies are  present in active  disease.      Sed Rate 05/21/2020 33* < OR = 30 mm/h Final    CRP 05/21/2020 27.8* <8.0 mg/L Final   Office Visit on 05/19/2020   Component Date Value Ref Range Status    Cardiolipin Ab IgG 05/19/2020 <14  GPL Final    Comment:     Value       Interpretation      -----       --------------      < or = 14   Negative      15 - 20     Indeterminate      21 - 80     Low to Medium Positive      >80         High Positive     The antiphospholipid antibody syndrome (APS) is a   clinical-pathologic correlation that includes a   clinical event (e.g. thrombosis, pregnancy loss,   thrombocytopenia) and persistent positive   antiphospholipid antibodies   (IgM or IgG GARIMA >40 MPL/GPL, IgM or IgG anti-b2GPI  antibodies or a lupus anticoagulant). International   consensus guidelines for APS suggest waiting at least   12 weeks before retesting to confirm antibody   persistence.  The [employer]   Collaborating Clinics immunological classification   criteria for systemic lupus erythematosus (SLE)   include testing for isotype IgA, which has yet to be   incorporated into APS criteria. Low level   antiphospholipid antibodies may sometimes be detected   in the setting of infection, drug therapy or aging.      Cardiolipin Ab IgA 05/19/2020 <11  APL Final    Comment:     Value       Interpretation      -----       --------------      < or = 11   Negative      12 - 20     Indeterminate      21 - 80     Low to Medium Positive      >80         High Positive       The antiphospholipid antibody syndrome (APS) is a   clinical-pathologic correlation that includes a   clinical event (e.g. thrombosis, pregnancy loss,   thrombocytopenia) and persistent positive   antiphospholipid antibodies   (IgM or IgG GARIMA >40 MPL/GPL, IgM or IgG anti-b2GPI  antibodies or a lupus anticoagulant). International   consensus guidelines for APS suggest waiting at least   12 weeks before retesting to confirm antibody    persistence.  The [employer]   Collaborating Clinics immunological classification   criteria for systemic lupus erythematosus (SLE)   include testing for isotype IgA, which has yet to be   incorporated into APS criteria. Low level   antiphospholipid antibodies may sometimes be detected   in the setting of infection, drug therapy or aging.      Cardiolipin Ab IgM 05/19/2020 23* MPL Final    Comment:     Value       Interpretation      -----       --------------      < or = 12   Negative      13 - 20     Indeterminate      21 - 80     Low to Medium Positive      >80         High Positive     The antiphospholipid antibody syndrome (APS) is a   clinical-pathologic correlation that includes a   clinical event (e.g. thrombosis, pregnancy loss,   thrombocytopenia) and persistent positive   antiphospholipid antibodies   (IgM or IgG GARIMA >40 MPL/GPL, IgM or IgG anti-b2GPI  antibodies or a lupus anticoagulant). International   consensus guidelines for APS suggest waiting at least   12 weeks before retesting to confirm antibody   persistence.  The [facility] [employer]   Collaborating Clinics immunological classification   criteria for systemic lupus erythematosus (SLE)   include testing for isotype IgA, which has yet to be   incorporated into APS criteria. Low level   antiphospholipid antibodies may sometimes be detected   in the setting of infection, drug therapy or aging.      Lupus Anticoagulant 05/19/2020 SEE NOTE  NOT DETECTED Final    Comment: A Lupus Anticoagulant is not detected.     Common causes for a prolonged screen and negative  confirmatory test include factor deficiencies or  anticoagulant therapy.     This interpretation is based  on the following test results:      PTT LA Screen 05/19/2020 42* < OR = 40 seconds Final    Comment:    For more information on this test, go to:  http://education.[employer]/faq/TSZ07x8  (This link is being provided for  informational/educational  purposes only.)      DRVVT Screen 05/19/2020 40  < OR = 45 seconds Final    Beta 2 Microglobulin, Serum 05/19/2020 TNP  mg/L Final    Comment: TEST NOT PERFORMED     No suitable specimen received.      Hexagonal Phase Confirm 05/19/2020 NEGATIVE  NEGATIVE Final   Orders Only on 04/09/2020   Component Date Value Ref Range Status    WBC 04/09/2020 11.8* 3.8 - 10.8 Thousand/uL Final    RBC 04/09/2020 4.80  3.80 - 5.10 Million/uL Final    Hemoglobin 04/09/2020 11.6* 11.7 - 15.5 g/dL Final    Hematocrit 04/09/2020 38.4  35.0 - 45.0 % Final    Mean Corpuscular Volume 04/09/2020 80.0  80.0 - 100.0 fL Final    Mean Corpuscular Hemoglobin 04/09/2020 24.2* 27.0 - 33.0 pg Final    Mean Corpuscular Hemoglobin Conc 04/09/2020 30.2* 32.0 - 36.0 g/dL Final    RDW 04/09/2020 14.6  11.0 - 15.0 % Final    Platelets 04/09/2020 401* 140 - 400 Thousand/uL Final    MPV 04/09/2020 9.6  7.5 - 12.5 fL Final    Neutrophils Absolute 04/09/2020 9,676* 1,500 - 7,800 cells/uL Final    Lymph # 04/09/2020 1,109  850 - 3,900 cells/uL Final    Mono # 04/09/2020 212  200 - 950 cells/uL Final    Eos # 04/09/2020 767* 15 - 500 cells/uL Final    Baso # 04/09/2020 35  0 - 200 cells/uL Final    Neutrophils Relative 04/09/2020 82  % Final    Lymph% 04/09/2020 9.4  % Final    Mono% 04/09/2020 1.8  % Final    Eosinophil% 04/09/2020 6.5  % Final    Basophil% 04/09/2020 0.3  % Final    Glucose 04/09/2020 133  65 - 139 mg/dL Final    Comment:           Non-fasting reference interval         BUN, Bld 04/09/2020 24  7 - 25 mg/dL Final    Creatinine 04/09/2020 0.89  0.60 - 0.93 mg/dL Final    Comment: For patients >49 years of age, the reference limit  for Creatinine is approximately 13% higher for people  identified as -American.         eGFR if non African American 04/09/2020 63  > OR = 60 mL/min/1.73m2 Final    eGFR if African American 04/09/2020 73  > OR = 60 mL/min/1.73m2 Final    BUN/Creatinine  Ratio 04/09/2020 NOT APPLICABLE  6 - 22 (calc) Final    Sodium 04/09/2020 140  135 - 146 mmol/L Final    Potassium 04/09/2020 4.4  3.5 - 5.3 mmol/L Final    Chloride 04/09/2020 103  98 - 110 mmol/L Final    CO2 04/09/2020 29  20 - 32 mmol/L Final    Calcium 04/09/2020 9.4  8.6 - 10.4 mg/dL Final    Total Protein 04/09/2020 7.6  6.1 - 8.1 g/dL Final    Albumin 04/09/2020 4.3  3.6 - 5.1 g/dL Final    Globulin, Total 04/09/2020 3.3  1.9 - 3.7 g/dL (calc) Final    Albumin/Globulin Ratio 04/09/2020 1.3  1.0 - 2.5 (calc) Final    Total Bilirubin 04/09/2020 0.6  0.2 - 1.2 mg/dL Final    Alkaline Phosphatase 04/09/2020 76  37 - 153 U/L Final    AST 04/09/2020 15  10 - 35 U/L Final    ALT 04/09/2020 8  6 - 29 U/L Final    CRP 04/09/2020 32.3* <8.0 mg/L Final    Color, UA 04/09/2020 YELLOW  YELLOW Final    Appearance, UA 04/09/2020 CLOUDY* CLEAR Final    Specific [place], UA 04/09/2020 1.021  1.001 - 1.035 Final    pH, UA 04/09/2020 5.5  5.0 - 8.0 Final    Glucose, UA 04/09/2020 NEGATIVE  NEGATIVE Final    Bilirubin, UA 04/09/2020 NEGATIVE  NEGATIVE Final    Ketones, UA 04/09/2020 NEGATIVE  NEGATIVE Final    Occult Blood UA 04/09/2020 NEGATIVE  NEGATIVE Final    Protein, UA 04/09/2020 NEGATIVE  NEGATIVE Final    Nitrite, UA 04/09/2020 NEGATIVE  NEGATIVE Final    Leukocytes, UA 04/09/2020 2+* NEGATIVE Final    WBC Casts, UA 04/09/2020 20-40* < OR = 5 /HPF Final    RBC Casts, UA 04/09/2020 0-2  < OR = 2 /HPF Final    Squam Epithel, UA 04/09/2020 6-10* < OR = 5 /HPF Final    Renal Epithel, UA 04/09/2020 1-3  < OR = 3 /HPF Final    Bacteria, UA 04/09/2020 FEW* NONE SEEN /HPF Final    Hyaline Casts, UA 04/09/2020 NONE SEEN  NONE SEEN /LPF Final    Comments: 04/09/2020 FEW MUCOUS THREADS   Final   Office Visit on 12/18/2019   Component Date Value Ref Range Status    Iron 12/18/2019 45  45 - 160 mcg/dL Final    TIBC 12/18/2019 290  250 - 450 mcg/dL (calc) Final    Iron Saturation 12/18/2019 16  16  - 45 % (calc) Final    Ferritin 12/18/2019 337* 16 - 288 ng/mL Final    Vitamin B-12 12/18/2019 472  200 - 1,100 pg/mL Final    Retic 12/18/2019 0.9  % Final    Retic Ct Abs 12/18/2019 41,760  20,000 - 80,000 cells/uL Final    Folate 12/18/2019 3.7* ng/mL Final    Comment:                            Reference Range                             Low:           <3.4                             Borderline:    3.4-5.4                             Normal:        >5.4         Fecal Globin, Insure 12/22/2019    Final    Comment:   FECAL GLOBIN BY IMMUNOCHEMISTRY         Micro Number:      [id number]    Test Status:       Final    Specimen Source:   INSURE (TM) FOBT TEST CARD    Specimen Quality:  Adequate    Fecal Globin:      Not Detected      Fecal Globin, Insure 12/20/2019    Final    Comment:   FECAL GLOBIN BY IMMUNOCHEMISTRY         Micro Number:      [id number]    Test Status:       Final    Specimen Source:   Branch2RE Viewdle) FOBT TEST CARD    Specimen Quality:  Adequate    Fecal Globin:      Not Detected      Fecal Globin, Insure 12/19/2019    Final    Comment:   FECAL GLOBIN BY IMMUNOCHEMISTRY         Micro Number:      [id number]    Test Status:       Final    Specimen Source:   Branch2RE (TM) FOBT TEST CARD    Specimen Quality:  Adequate    Fecal Globin:      Not Detected     Orders Only on 12/17/2019   Component Date Value Ref Range Status    Clinical Information 12/17/2019 D721   Final    Specimen Type 12/17/2019 WB   Final    Viability 12/17/2019 95  % Final    Interpretation 12/17/2019 SEE NOTE   Final    Comment:     MILD INCREASED EOSINOPHILS WITH OTHERWISE NO DEFINITIVE  IMMUNOPHENOTYPIC ABNORMALITIES DETECTED (SEE COMMENT).     COMMENT: Flow cytometry demonstrates mild increased  eosinophils (8%) and mixed populations of mature myeloid  cells, monocytes, and lymphocytes. Lymphocytes include a  mixed population of B-cells, T-cells, and NK-cells. T-cells  show normal CD4/CD8 ratio without overt aberrant loss  of  T-cell markers. B-cells are polyclonal in respect to surface  light chains. Myelomonocytic cells show a mature  unremarkable immunophenotype. No significant increase of  blasts is detected. No overt myeloid-associated antigenic  aberrancy, or abnormal subset of lymphoid cells are seen.  Recommend correlation with clinical findings (i.e.  persistence of eosinophilia for more than 3-6 months) and if  clinical suspicion warrants bone marrow morphology,  cytogenetics, and FISH/molecular studies (i.e. PDGFRA,  PDGFRB, FGFR1). A negative immunophenotype does not rule out  neoplastic process.                            Correlation with clinical findings,  morphology or other diagnostic information is recommended.        Results reviewed by Rodney Denis M.D.      Sample Description 12/17/2019 SEE NOTE   Final    Comment:     The sample consists of 85% granulocytic cells, 11%  lymphocytoid cells, and 3% monocytoid cells.         Gating Strategy 12/17/2019 SEE NOTE   Final    Comment:     Gating in the specimen was performed on the cell population  that resembles lymphocytes and was based on CD45 (pan  leukocyte fluorescent intensity) vs. side scatter (internal  complexity).      Markers 12/17/2019 SEE NOTE   Final    Comment:     Region B                         CD Marker        %Positive       ------------     ---------       CD2                     85       CD3                     63       CD4                     44       CD5                     71       CD7                     86       CD8                     19       CD10                     0       CD11c                   24       CD13                     1       CD19                     6       CD19+CD5+                0       CD20                     6       CD23                     3       CD33                     1       CD34                     0       CD38                     5       CD45                   100       CD56+CD3-               27        CD64                     2                           0       HLA_DR                  11       Kappa CD19+             49       Lambda CD19+            47       K/L Ratio              1.0          This test was developed and its analytical performance  characteristics have been determined by [employer]  [facility]. It has not been  cleared or approved by FDA. This assay has been validated  pursuant to the CLIA regulations and is used for clinical  purposes.      Number of Markers 12/17/2019 22   Final   Orders Only on 12/11/2019   Component Date Value Ref Range Status    Color, UA 12/11/2019 YELLOW  YELLOW Final    Appearance, UA 12/11/2019 CLEAR  CLEAR Final    Specific [place], UA 12/11/2019 1.017  1.001 - 1.035 Final    pH, UA 12/11/2019 < OR = 5.0  5.0 - 8.0 Final    Glucose, UA 12/11/2019 NEGATIVE  NEGATIVE Final    Bilirubin, UA 12/11/2019 NEGATIVE  NEGATIVE Final    Ketones, UA 12/11/2019 NEGATIVE  NEGATIVE Final    Occult Blood UA 12/11/2019 1+* NEGATIVE Final    Protein, UA 12/11/2019 TRACE* NEGATIVE Final    Nitrite, UA 12/11/2019 NEGATIVE  NEGATIVE Final    Leukocytes, UA 12/11/2019 NEGATIVE  NEGATIVE Final    WBC Casts, UA 12/11/2019 6-10* < OR = 5 /HPF Final    RBC Casts, UA 12/11/2019 3-10* < OR = 2 /HPF Final    Squam Epithel, UA 12/11/2019 0-5  < OR = 5 /HPF Final    Bacteria, UA 12/11/2019 FEW* NONE SEEN /HPF Final    Ca Oxalate Margy, UA 12/11/2019 MANY* NONE OR FEW /HPF Final    Hyaline Casts, UA 12/11/2019 NONE SEEN  NONE SEEN /LPF Final    WBC 12/11/2019 19.5* 3.8 - 10.8 Thousand/uL Final    RBC 12/11/2019 4.55  3.80 - 5.10 Million/uL Final    Hemoglobin 12/11/2019 10.8* 11.7 - 15.5 g/dL Final    Hematocrit 12/11/2019 35.3  35.0 - 45.0 % Final    Mean Corpuscular Volume 12/11/2019 77.6* 80.0 - 100.0 fL Final    Mean Corpuscular Hemoglobin 12/11/2019 23.7* 27.0 - 33.0 pg Final    Mean Corpuscular Hemoglobin  Conc 12/11/2019 30.6* 32.0 - 36.0 g/dL Final    RDW 12/11/2019 16.5* 11.0 - 15.0 % Final    Platelets 12/11/2019 436* 140 - 400 Thousand/uL Final    MPV 12/11/2019 9.9  7.5 - 12.5 fL Final    Neutrophils Absolute 12/11/2019 6,611  1,500 - 7,800 cells/uL Final    Lymph # 12/11/2019 1,950  850 - 3,900 cells/uL Final    Mono # 12/11/2019 819  200 - 950 cells/uL Final    Eos # 12/11/2019 10,043* 15 - 500 cells/uL Final    Baso # 12/11/2019 78  0 - 200 cells/uL Final    Neutrophils Relative 12/11/2019 33.9  % Final    Lymph% 12/11/2019 10.0  % Final    Mono% 12/11/2019 4.2  % Final    Eosinophil% 12/11/2019 51.5  % Final    Basophil% 12/11/2019 0.4  % Final    Differential Comment 12/11/2019    Final    Comment: Review of peripheral smear confirms  automated results.      Centromere B Antibody 12/11/2019 <1.0 NEG  <1.0 NEG AI Final    Reflexive Urine Culture 12/11/2019 CULTURE INDICATED - RESULTS TO FOLLOW   Final    Urine Culture, Routine 12/11/2019    Final    Comment:   CULTURE, URINE, ROUTINE         Micro Number:      [id number]    Test Status:       Final    Specimen Source:   URINE    Specimen Quality:  Adequate    Result:            Multiple organisms present, each less than 10,000                       CFU/mL. These organisms, commonly found on                       external and internal genitalia, are considered                       to be colonizers. No further testing performed.     There may be more visits with results that are not included.       Imaging  X-ray Chest Pa And Lateral    Result Date: 5/26/2020  EXAMINATION: XR CHEST PA AND LATERAL CLINICAL HISTORY: Other forms of dyspnea TECHNIQUE: PA and lateral views of the chest were performed. COMPARISON: 07/13/2007 FINDINGS: Cardiac silhouette is slightly enlarged.  There is central vascular congestion.  No focal consolidation.  No large effusion or pneumothorax.  Scoliosis.  No acute osseous findings.  Incidental azygos fissure again  noted.     Cardiomegaly and central vascular congestion Electronically signed by: Dorian Hilton MD Date:    05/26/2020 Time:    15:32      Assessment  1. Acute systolic heart failure  Her echocardiogram shows a new wall motion abnormality and reduction in left ventricular systolic function.  Therefore, in spite of a normal stress test last year, would proceed with coronary angiography as coronary artery artery disease is the most likely etiology.  - furosemide (LASIX) 40 MG tablet; Take 1 tablet (40 mg total) by mouth daily as needed (fluid).  Dispense: 90 tablet; Refill: 1  - Case Request-Cath Lab: ANGIOGRAM, CORONARY ARTERY - right radial; Standing      Plan and Discussion    Plan for coronary angiography. The procedure, its risks, benefits and alternatives were discussed in detail.  All questions were answered.  Appropriate consents were signed.    Follow Up  After angiography      Fabrice Rodriguez MD

## 2020-06-03 NOTE — PROGRESS NOTES
[facility]    Chief Complaint  Chief Complaint   Patient presents with    Results    Congestive Heart Failure       HPI:    She feels better.  No exertional dyspnea.  She has diuresed over 10 lb.    Medications  Current Outpatient Medications   Medication Sig Dispense Refill    aspirin (LO-DOSE ASPIRIN) 81 MG EC tablet Take 81 mg by mouth once daily.      cyanocobalamin, vitamin B-12, 200 mcg/spray SpSn Place 200 mcg under the tongue every 14 (fourteen) days. 12 mL 3    folic acid (FOLVITE) 1 MG tablet Take 1 tablet (1 mg total) by mouth once daily. 90 tablet 2    furosemide (LASIX) 40 MG tablet Take 1 tablet (40 mg total) by mouth daily as needed (fluid). 90 tablet 1    hydroxychloroquine (PLAQUENIL) 200 mg tablet Take 1 tablet (200 mg total) by mouth 2 (two) times daily. 60 tablet 5    ibuprofen (ADVIL,MOTRIN) 800 MG tablet   0    levothyroxine (SYNTHROID) 50 MCG tablet Take 50 mcg by mouth.      methocarbamol (ROBAXIN) 500 MG Tab Take 500 mg by mouth continuous prn.   0    predniSONE (DELTASONE) 10 MG tablet Take 1 tablet (10 mg total) by mouth once daily. Take one tablet every day until advised otherwise by this prescriber. 30 tablet 2     Current Facility-Administered Medications   Medication Dose Route Frequency Provider Last Rate Last Dose    cyanocobalamin injection 1,000 mcg  1,000 mcg Subcutaneous Q30 Days Srinivasa Huffman MD   1,000 mcg at 05/13/20 1110      Prior to Admission medications    Medication Sig Start Date End Date Taking? Authorizing Provider   aspirin (LO-DOSE ASPIRIN) 81 MG EC tablet Take 81 mg by mouth once daily.   Yes Historical Provider, MD   cyanocobalamin, vitamin B-12, 200 mcg/spray SpSn Place 200 mcg under the tongue every 14 (fourteen) days. 4/28/20  Yes Srinivasa Huffman MD   folic acid (FOLVITE) 1 MG tablet Take 1 tablet (1 mg total) by mouth once daily. 4/28/20 4/28/21 Yes Srinivasa Huffman MD   furosemide (LASIX) 40 MG tablet Take 1 tablet (40  mg total) by mouth daily as needed (fluid). 6/3/20  Yes Fabrice Rodriguez MD   hydroxychloroquine (PLAQUENIL) 200 mg tablet Take 1 tablet (200 mg total) by mouth 2 (two) times daily. 4/28/20 10/25/20 Yes Refugio Owens MD   ibuprofen (ADVIL,MOTRIN) 800 MG tablet  10/26/19  Yes Historical Provider, MD   levothyroxine (SYNTHROID) 50 MCG tablet Take 50 mcg by mouth. 11/18/19  Yes Historical Provider, MD   methocarbamol (ROBAXIN) 500 MG Tab Take 500 mg by mouth continuous prn.  10/26/19  Yes Historical Provider, MD   predniSONE (DELTASONE) 10 MG tablet Take 1 tablet (10 mg total) by mouth once daily. Take one tablet every day until advised otherwise by this prescriber. 4/28/20 7/27/20 Yes Refugio Owens MD   furosemide (LASIX) 40 MG tablet Take 40 mg by mouth as needed.  11/25/19 6/3/20 Yes Historical Provider, MD       History  Past Medical History:   Diagnosis Date    Anemia, chronic disease 12/18/2019    Chronic pain     Eosinophilia 12/18/2019    Hypothyroidism     Insomnia     Leucocytosis 12/18/2019     Past Surgical History:   Procedure Laterality Date    ANKLE LIGAMENT RECONSTRUCTION Right     CATARACT EXTRACTION Bilateral     CHOLECYSTECTOMY       Social History     Socioeconomic History    Marital status: Single     Spouse name: Not on file    Number of children: Not on file    Years of education: Not on file    Highest education level: Not on file   Occupational History    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   Social Needs    Financial resource strain: Not on file    Food insecurity:     Worry: Not on file     Inability: Not on file    Transportation needs:     Medical: Not on file     Non-medical: Not on file   Tobacco Use    Smoking status: Former Smoker    Smokeless tobacco: Never Used   Substance and Sexual Activity    Alcohol use: No    Drug use: No    Sexual activity: Yes     Partners: Male     Birth control/protection: None   Lifestyle    Physical  activity:     Days per week: Not on file     Minutes per session: Not on file    Stress: Not on file   Relationships    Social connections:     Talks on phone: Not on file     Gets together: Not on file     Attends [employer] service: Not on file     Active member of club or organization: Not on file     Attends meetings of clubs or organizations: Not on file     Relationship status: Not on file   Other Topics Concern    Not on file   Social History Narrative    Not on file       Allergies  Review of patient's allergies indicates:  No Known Allergies    Review of Systems   Review of Systems   Constitution: Negative for malaise/fatigue, weight gain and weight loss.   Eyes: Positive for vision loss in left eye. Negative for visual disturbance.   Cardiovascular: Negative for chest pain, claudication, cyanosis, irregular heartbeat, leg swelling, near-syncope, orthopnea, palpitations, paroxysmal nocturnal dyspnea and syncope.   Respiratory: Negative for cough, hemoptysis, sleep disturbances due to breathing and wheezing.    Hematologic/Lymphatic: Negative for bleeding problem. Does not bruise/bleed easily.   Skin: Negative for poor wound healing.   Musculoskeletal: Negative for muscle cramps and myalgias.   Gastrointestinal: Negative for abdominal pain, anorexia, diarrhea, heartburn, hematemesis, hematochezia, melena, nausea and vomiting.   Genitourinary: Negative for hematuria and nocturia.   Neurological: Negative for excessive daytime sleepiness, dizziness, focal weakness, light-headedness and weakness.       Physical Exam  Vitals:    06/03/20 1132   BP: 118/70   Pulse: 84     Wt Readings from Last 1 Encounters:   06/03/20 71 kg (156 lb 8 oz)     Physical Exam   Constitutional: She is oriented to person, place, and time. She is cooperative.  Non-toxic appearance. No distress.   HENT:   Head: Normocephalic and atraumatic.   Eyes: Conjunctivae are normal. No scleral icterus.   Neck: Neck supple. No hepatojugular  reflux and no JVD present. Carotid bruit is not present. No tracheal deviation and no edema present. No thyromegaly present.   Cardiovascular: Normal rate, regular rhythm, S1 normal and S2 normal.  No extrasystoles are present. PMI is not displaced. Exam reveals no S3 and no S4.   No murmur heard.  Pulses:       Carotid pulses are 2+ on the right side, and 2+ on the left side.       Radial pulses are 2+ on the right side, and 2+ on the left side.        Dorsalis pedis pulses are 2+ on the right side, and 2+ on the left side.        Posterior tibial pulses are 2+ on the right side, and 2+ on the left side.   Pulmonary/Chest: No accessory muscle usage. No respiratory distress. She has no decreased breath sounds. She has no wheezes. She has no rhonchi. She has no rales.   Abdominal: Soft. Bowel sounds are normal. She exhibits no pulsatile liver, no abdominal bruit and no pulsatile midline mass. There is no splenomegaly or hepatomegaly. There is no tenderness.   Musculoskeletal: She exhibits no edema, tenderness or deformity.   Neurological: She is alert and oriented to person, place, and time. She has normal strength. No cranial nerve deficit or sensory deficit.   Skin: Skin is warm, dry and intact. She is not diaphoretic. No cyanosis. No pallor. Nails show no clubbing.   Psychiatric: She has a normal mood and affect. Her speech is normal and behavior is normal.       Labs  Lab Visit on 05/28/2020   Component Date Value Ref Range Status    SARS-CoV2 (COVID-19) Qualitative P* 05/28/2020 Not Detected  Not Detected Final    Comment: This test utilizes a real-time reverse transcription  polymerase chain reaction procedure to amplify and   detect the SARS-CoV-2 RdRp and N genes.    The analytical sensitivity (limit of detection) of   this assay is 100 copies/mL.   A Detected result is considered positive for COVID-19.  This patient is considered infected with the   SARS-CoV-2 virus and is presumed to be contagious.    A  Not Detected result means that SARS-CoV-2 RNA is not  present above the limit of detection. It does not rule  out the possibility of COVID-19 and should not be the  sole basis for treatment decisions.  If COVID-19 is   strongly suspected based on clinical and exposure   history,re-testing should be considered.    This test is only for use under Food and Drug   Administration s Emergency Use Authorization (EUA).   Commercial reagents are provided by [employer]  Performance characteristics of the EUA have been   independently verified by [facility]   Department of Pathology and L                           aboratory Medicine.       Hospital Outpatient Visit on 05/26/2020   Component Date Value Ref Range Status    Ascending aorta 05/26/2020 2.80  cm Final    STJ 05/26/2020 2.67  cm Final    AV mean gradient 05/26/2020 2  mmHg Final    Ao peak dino 05/26/2020 0.99  m/s Final    Ao VTI 05/26/2020 17.16  cm Final    IVS 05/26/2020 0.95  0.6 - 1.1 cm Final    LA size 05/26/2020 4.29  cm Final    Left Atrium Major Axis 05/26/2020 5.39  cm Final    Left Atrium Minor Axis 05/26/2020 5.24  cm Final    LVIDD 05/26/2020 4.78  3.5 - 6.0 cm Final    LVIDS 05/26/2020 3.83  2.1 - 4.0 cm Final    LVOT diameter 05/26/2020 1.86  cm Final    LVOT peak VTI 05/26/2020 18.58  cm Final    PW 05/26/2020 0.94  0.6 - 1.1 cm Final    MV Peak A Dino 05/26/2020 1.02  m/s Final    E wave decelartion time 05/26/2020 121.69  msec Final    MV Peak E Dino 05/26/2020 0.88  m/s Final    PV Peak D Dino 05/26/2020 0.59  m/s Final    PV Peak S Dino 05/26/2020 0.64  m/s Final    RA Major Axis 05/26/2020 4.83  cm Final    Sinus 05/26/2020 2.93  cm Final    TAPSE 05/26/2020 2.81  cm Final    TR Max Dino 05/26/2020 1.99  m/s Final    LA WIDTH 05/26/2020 4.99  cm Final    PV PEAK VELOCITY 05/26/2020 1.26  cm/s Final    LV Diastolic Volume 05/26/2020 106.56  mL Final    LV Systolic Volume 05/26/2020 63.24  mL Final     LVOT peak reema 05/26/2020 0.90  m/s Final    FS 05/26/2020 20  % Final    LA volume 05/26/2020 96.69  cm3 Final    LV mass 05/26/2020 156.62  g Final    Left Ventricle Relative Wall Thick* 05/26/2020 0.39  cm Final    AV valve area 05/26/2020 2.94  cm2 Final    AV Velocity Ratio 05/26/2020 0.91   Final    AV index (prosthetic) 05/26/2020 1.08   Final    E/A ratio 05/26/2020 0.86   Final    Pulm vein S/D ratio 05/26/2020 1.08   Final    LVOT area 05/26/2020 2.7  cm2 Final    LVOT stroke volume 05/26/2020 50.46  cm3 Final    AV peak gradient 05/26/2020 4  mmHg Final    Triscuspid Valve Regurgitation Pea* 05/26/2020 16  mmHg Final    TDI SEPTAL 05/26/2020 0.06  m/s Final    LV LATERAL E/E' RATIO 05/26/2020 17.60  m/s Final    LV SEPTAL E/E' RATIO 05/26/2020 14.67  m/s Final    TDI LATERAL 05/26/2020 0.05  m/s Final    Mean e' 05/26/2020 0.06  m/s Final    E/E' ratio 05/26/2020 16.00  m/s Final    LA volume (mod) 05/26/2020 58.00  cm3 Final    BSA 05/26/2020 1.81  m2 Final    Right Atrial Pressure (from IVC) 05/26/2020 8  mmHg Final    MV valve area p 1/2 method 05/26/2020 6.29  cm2 Final    TV rest pulmonary artery pressure 05/26/2020 24  mmHg Final    MV stenosis pressure 1/2 time 05/26/2020 35  ms Final    LV Systolic Volume Index 05/26/2020 35.9  mL/m2 Final    LV Diastolic Volume Index 05/26/2020 60.50  mL/m2 Final    LA Volume Index 05/26/2020 54.9  mL/m2 Final    LV Mass Index 05/26/2020 89  g/m2 Final    LA Volume Index (Mod) 05/26/2020 32.9  mL/m2 Final   Hospital Outpatient Visit on 05/26/2020   Component Date Value Ref Range Status    [facility] CV CAROTID ULTRASOUND LEFT ICA* 05/26/2020 0.89   Final    [facility] CV CAROTID ULTRASOUND RIGHT IC* 05/26/2020 1.33   Final    [facility] CV PV CAROTID LEFT HIGHEST ICA 05/26/2020 66.00   Final    [facility] CV PV CAROTID LEFT HIGHEST CCA 05/26/2020 74   Final    [facility] CV PV CAROTID RIGHT HIGHEST ICA 05/26/2020 72.00   Final    [facility] CV PV CAROTID RIGHT HIGHEST  CCA 05/26/2020 54   Final    [facility] CV CAROTID RIGHT ICA EDV HIGHE* 05/26/2020 22.00   Final    LT Highest EDV 05/26/2020 16.00   Final    Right CCA prox sys 05/26/2020 54  cm/s Final    Right CCA prox adamson 05/26/2020 9  cm/s Final    Right cca dist sys 05/26/2020 54  cm/s Final    Right CCA dist adamson 05/26/2020 9  cm/s Final    Right ICA prox sys 05/26/2020 64  cm/s Final    Right ICA prox adamson 05/26/2020 16  cm/s Final    Right ICA mid sys 05/26/2020 72  cm/s Final    Right ICA mid adamson 05/26/2020 22  cm/s Final    Right ICA dist sys 05/26/2020 65  cm/s Final    Right ICA dist adamson 05/26/2020 17  cm/s Final    Right eca sys 05/26/2020 100  cm/s Final    Right vertebral sys 05/26/2020 42  cm/s Final    Left CCA prox sys 05/26/2020 74  cm/s Final    Left CCA prox adamson 05/26/2020 10  cm/s Final    Left CCA dist sys 05/26/2020 64  cm/s Final    Left CCA dist adamson 05/26/2020 12  cm/s Final    Left ICA prox sys 05/26/2020 55  cm/s Final    Left ICA prox adamson 05/26/2020 14  cm/s Final    Left ICA mid sys 05/26/2020 47  cm/s Final    Left ICA mid adamson 05/26/2020 11  cm/s Final    Left ICA dist sys 05/26/2020 66  cm/s Final    Left ICA dist adamson 05/26/2020 16  cm/s Final    Left ECA sys 05/26/2020 83  cm/s Final    Left vertebral sys 05/26/2020 36  cm/s Final    Right vertebral adamson 05/26/2020 11  cm/s Final    Left vertebral adamson 05/26/2020 9  cm/s Final   Lab Visit on 05/22/2020   Component Date Value Ref Range Status    BNP 05/22/2020 876* 0 - 99 pg/mL Final    Values of less than 100 pg/ml are consistent with non-CHF populations.   Orders Only on 05/21/2020   Component Date Value Ref Range Status    Total Protein 05/21/2020 7.5  6.1 - 8.1 g/dL Final    Albumin 05/21/2020 3.7* 3.8 - 4.8 g/dL Final    Alpha-1-Globulins 05/21/2020 0.4* 0.2 - 0.3 g/dL Final    Alpha-2-Globulins 05/21/2020 0.8  0.5 - 0.9 g/dL Final    Beta Globulin 05/21/2020 0.5  0.4 - 0.6 g/dL Final    Beta-2 Microglobulin  05/21/2020 0.5  0.2 - 0.5 g/dL Final    Gamma Globulin 05/21/2020 1.5  0.8 - 1.7 g/dL Final    Interpretation 05/21/2020    Final    Comment:    Pattern consistent with an acute phase reaction        Possible monoclonal protein (M-protein) present.  Suggest serum immunofixation.         ANCA Screen 05/21/2020 NEGATIVE  NEGATIVE Final    Comment:    ANCA Screen includes evaluation for p-ANCA, c-ANCA and  atypical p-ANCA. A positive ANCA screen reflexes to titer  and pattern(s), e.g., cytoplasmic pattern (c-ANCA),  perinuclear pattern (p-ANCA), or atypical p-ANCA pattern.  c-ANCA and p-ANCA are observed in vasculitis, whereas  atypical p-ANCA is observed in IBD (Inflammatory Bowel  Disease). Atypical p-ANCA is detected in about 55% to 80% of  patients with ulcerative colitis but only 5% to 25% of  patients with Crohn's disease.      Myeloperoxidase Ab 05/21/2020 <1.0  <1.0 AI Final    Comment:          <1.0 AI No Antibody Detected  > or = 1.0 AI Antibody Detected     Autoantibodies to myeloperoxidase (MPO) are commonly  associated with the following small-vessel vasculitides:  microscopic polyangiitis, polyarteritis nodosa,  Churg-Candy syndrome, necrotizing and crescentic  glomerulonephritis and occasionally granulomatosis with  polyangiitis (GPA, Wegener's). The perinuclear IFA pattern,  (p-ANCA) is based largely on autoantibody to myeloperoxidase  which serves as the primary antigen. These autoantibodies  are present in active disease.      Proteinase 3 Antibody 05/21/2020 <1.0  <1.0 AI Final    Comment:          <1.0 AI No Antibody Detected  > or = 1.0 AI Antibody Detected     Autoantibodies to proteinase-3 (PA-3) are accepted as  characteristic for granulomatosis with polyangiitis (GPA,  Wegener's), and are detectable in 95% of the histologically  proven cases. The cytoplasmic IFA pattern, (c-ANCA), is  based largely on autoantibody to PA-3 which serves as the  primary antigen. These autoantibodies are  present in active  disease.      Sed Rate 05/21/2020 33* < OR = 30 mm/h Final    CRP 05/21/2020 27.8* <8.0 mg/L Final   Office Visit on 05/19/2020   Component Date Value Ref Range Status    Cardiolipin Ab IgG 05/19/2020 <14  GPL Final    Comment:     Value       Interpretation      -----       --------------      < or = 14   Negative      15 - 20     Indeterminate      21 - 80     Low to Medium Positive      >80         High Positive     The antiphospholipid antibody syndrome (APS) is a   clinical-pathologic correlation that includes a   clinical event (e.g. thrombosis, pregnancy loss,   thrombocytopenia) and persistent positive   antiphospholipid antibodies   (IgM or IgG GARIMA >40 MPL/GPL, IgM or IgG anti-b2GPI  antibodies or a lupus anticoagulant). International   consensus guidelines for APS suggest waiting at least   12 weeks before retesting to confirm antibody   persistence.  The [employer]   [facility]aborating Clinics immunological classification   criteria for systemic lupus erythematosus (SLE)   include testing for isotype IgA, which has yet to be   incorporated into APS criteria. Low level   antiphospholipid antibodies may sometimes be detected   in the setting of infection, drug therapy or aging.      Cardiolipin Ab IgA 05/19/2020 <11  APL Final    Comment:     Value       Interpretation      -----       --------------      < or = 11   Negative      12 - 20     Indeterminate      21 - 80     Low to Medium Positive      >80         High Positive       The antiphospholipid antibody syndrome (APS) is a   clinical-pathologic correlation that includes a   clinical event (e.g. thrombosis, pregnancy loss,   thrombocytopenia) and persistent positive   antiphospholipid antibodies   (IgM or IgG GARIMA >40 MPL/GPL, IgM or IgG anti-b2GPI  antibodies or a lupus anticoagulant). International   consensus guidelines for APS suggest waiting at least   12 weeks before retesting to confirm antibody    persistence.  The [facility]   [facility]aborating [facility]ics immunological classification   criteria for systemic lupus erythematosus (SLE)   include testing for isotype IgA, which has yet to be   incorporated into APS criteria. Low level   antiphospholipid antibodies may sometimes be detected   in the setting of infection, drug therapy or aging.      Cardiolipin Ab IgM 05/19/2020 23* MPL Final    Comment:     Value       Interpretation      -----       --------------      < or = 12   Negative      13 - 20     Indeterminate      21 - 80     Low to Medium Positive      >80         High Positive     The antiphospholipid antibody syndrome (APS) is a   clinical-pathologic correlation that includes a   clinical event (e.g. thrombosis, pregnancy loss,   thrombocytopenia) and persistent positive   antiphospholipid antibodies   (IgM or IgG GARIMA >40 MPL/GPL, IgM or IgG anti-b2GPI  antibodies or a lupus anticoagulant). International   consensus guidelines for APS suggest waiting at least   12 weeks before retesting to confirm antibody   persistence.  The [facility] [employer]   [facility]aborating [facility]ics immunological classification   criteria for systemic lupus erythematosus (SLE)   include testing for isotype IgA, which has yet to be   incorporated into APS criteria. Low level   antiphospholipid antibodies may sometimes be detected   in the setting of infection, drug therapy or aging.      Lupus Anticoagulant 05/19/2020 SEE NOTE  NOT DETECTED Final    Comment: A Lupus Anticoagulant is not detected.     Common causes for a prolonged screen and negative  confirmatory test include factor deficiencies or  anticoagulant therapy.     This interpretation is based  on the following test results:      PTT LA Screen 05/19/2020 42* < OR = 40 seconds Final    Comment:    For more information on this test, go to:  http://[email or web address].[email or web address] Response/[email or web address]  (This link is being provided for  informational/educational  purposes only.)      DRVVT Screen 05/19/2020 40  < OR = 45 seconds Final    Beta 2 Microglobulin, Serum 05/19/2020 TNP  mg/L Final    Comment: TEST NOT PERFORMED     No suitable specimen received.      Hexagonal Phase Confirm 05/19/2020 NEGATIVE  NEGATIVE Final   Orders Only on 04/09/2020   Component Date Value Ref Range Status    WBC 04/09/2020 11.8* 3.8 - 10.8 Thousand/uL Final    RBC 04/09/2020 4.80  3.80 - 5.10 Million/uL Final    Hemoglobin 04/09/2020 11.6* 11.7 - 15.5 g/dL Final    Hematocrit 04/09/2020 38.4  35.0 - 45.0 % Final    Mean Corpuscular Volume 04/09/2020 80.0  80.0 - 100.0 fL Final    Mean Corpuscular Hemoglobin 04/09/2020 24.2* 27.0 - 33.0 pg Final    Mean Corpuscular Hemoglobin Conc 04/09/2020 30.2* 32.0 - 36.0 g/dL Final    RDW 04/09/2020 14.6  11.0 - 15.0 % Final    Platelets 04/09/2020 401* 140 - 400 Thousand/uL Final    MPV 04/09/2020 9.6  7.5 - 12.5 fL Final    Neutrophils Absolute 04/09/2020 9,676* 1,500 - 7,800 cells/uL Final    Lymph # 04/09/2020 1,109  850 - 3,900 cells/uL Final    Mono # 04/09/2020 212  200 - 950 cells/uL Final    Eos # 04/09/2020 767* 15 - 500 cells/uL Final    Baso # 04/09/2020 35  0 - 200 cells/uL Final    Neutrophils Relative 04/09/2020 82  % Final    Lymph% 04/09/2020 9.4  % Final    Mono% 04/09/2020 1.8  % Final    Eosinophil% 04/09/2020 6.5  % Final    Basophil% 04/09/2020 0.3  % Final    Glucose 04/09/2020 133  65 - 139 mg/dL Final    Comment:           Non-fasting reference interval         BUN, Bld 04/09/2020 24  7 - 25 mg/dL Final    Creatinine 04/09/2020 0.89  0.60 - 0.93 mg/dL Final    Comment: For patients >49 years of age, the reference limit  for Creatinine is approximately 13% higher for people  identified as -American.         eGFR if non African American 04/09/2020 63  > OR = 60 mL/min/1.73m2 Final    eGFR if African American 04/09/2020 73  > OR = 60 mL/min/1.73m2 Final    BUN/Creatinine  Ratio 04/09/2020 NOT APPLICABLE  6 - 22 (calc) Final    Sodium 04/09/2020 140  135 - 146 mmol/L Final    Potassium 04/09/2020 4.4  3.5 - 5.3 mmol/L Final    Chloride 04/09/2020 103  98 - 110 mmol/L Final    CO2 04/09/2020 29  20 - 32 mmol/L Final    Calcium 04/09/2020 9.4  8.6 - 10.4 mg/dL Final    Total Protein 04/09/2020 7.6  6.1 - 8.1 g/dL Final    Albumin 04/09/2020 4.3  3.6 - 5.1 g/dL Final    Globulin, Total 04/09/2020 3.3  1.9 - 3.7 g/dL (calc) Final    Albumin/Globulin Ratio 04/09/2020 1.3  1.0 - 2.5 (calc) Final    Total Bilirubin 04/09/2020 0.6  0.2 - 1.2 mg/dL Final    Alkaline Phosphatase 04/09/2020 76  37 - 153 U/L Final    AST 04/09/2020 15  10 - 35 U/L Final    ALT 04/09/2020 8  6 - 29 U/L Final    CRP 04/09/2020 32.3* <8.0 mg/L Final    Color, UA 04/09/2020 YELLOW  YELLOW Final    Appearance, UA 04/09/2020 CLOUDY* CLEAR Final    Specific [place], UA 04/09/2020 1.021  1.001 - 1.035 Final    pH, UA 04/09/2020 5.5  5.0 - 8.0 Final    Glucose, UA 04/09/2020 NEGATIVE  NEGATIVE Final    Bilirubin, UA 04/09/2020 NEGATIVE  NEGATIVE Final    Ketones, UA 04/09/2020 NEGATIVE  NEGATIVE Final    Occult Blood UA 04/09/2020 NEGATIVE  NEGATIVE Final    Protein, UA 04/09/2020 NEGATIVE  NEGATIVE Final    Nitrite, UA 04/09/2020 NEGATIVE  NEGATIVE Final    Leukocytes, UA 04/09/2020 2+* NEGATIVE Final    WBC Casts, UA 04/09/2020 20-40* < OR = 5 /HPF Final    RBC Casts, UA 04/09/2020 0-2  < OR = 2 /HPF Final    Squam Epithel, UA 04/09/2020 6-10* < OR = 5 /HPF Final    Renal Epithel, UA 04/09/2020 1-3  < OR = 3 /HPF Final    Bacteria, UA 04/09/2020 FEW* NONE SEEN /HPF Final    Hyaline Casts, UA 04/09/2020 NONE SEEN  NONE SEEN /LPF Final    Comments: 04/09/2020 FEW MUCOUS THREADS   Final   Office Visit on 12/18/2019   Component Date Value Ref Range Status    Iron 12/18/2019 45  45 - 160 mcg/dL Final    TIBC 12/18/2019 290  250 - 450 mcg/dL (calc) Final    Iron Saturation 12/18/2019 16  16  - 45 % (calc) Final    Ferritin 12/18/2019 337* 16 - 288 ng/mL Final    Vitamin B-12 12/18/2019 472  200 - 1,100 pg/mL Final    Retic 12/18/2019 0.9  % Final    Retic Ct Abs 12/18/2019 41,760  20,000 - 80,000 cells/uL Final    Folate 12/18/2019 3.7* ng/mL Final    Comment:                            Reference Range                             Low:           <3.4                             Borderline:    3.4-5.4                             Normal:        >5.4         Fecal Globin, Insure 12/22/2019    Final    Comment:   FECAL GLOBIN BY IMMUNOCHEMISTRY         Micro Number:      [id number]    Test Status:       Final    Specimen Source:   INSURE (TM) FOBT TEST CARD    Specimen Quality:  Adequate    Fecal Globin:      Not Detected      Fecal Globin, Insure 12/20/2019    Final    Comment:   FECAL GLOBIN BY IMMUNOCHEMISTRY         Micro Number:      [id number]    Test Status:       Final    Specimen Source:   [employer] MDC[employer]) FOBT TEST CARD    Specimen Quality:  Adequate    Fecal Globin:      Not Detected      Fecal Globin, Insure 12/19/2019    Final    Comment:   FECAL GLOBIN BY IMMUNOCHEMISTRY         Micro Number:      [id number]    Test Status:       Final    Specimen Source:   [employer] (TM) FOBT TEST CARD    Specimen Quality:  Adequate    Fecal Globin:      Not Detected     Orders Only on 12/17/2019   Component Date Value Ref Range Status    Clinical Information 12/17/2019 D721   Final    Specimen Type 12/17/2019 WB   Final    Viability 12/17/2019 95  % Final    Interpretation 12/17/2019 SEE NOTE   Final    Comment:     MILD INCREASED EOSINOPHILS WITH OTHERWISE NO DEFINITIVE  IMMUNOPHENOTYPIC ABNORMALITIES DETECTED (SEE COMMENT).     COMMENT: Flow cytometry demonstrates mild increased  eosinophils (8%) and mixed populations of mature myeloid  cells, monocytes, and lymphocytes. Lymphocytes include a  mixed population of B-cells, T-cells, and NK-cells. T-cells  show normal CD4/CD8 ratio without overt aberrant loss  of  T-cell markers. B-cells are polyclonal in respect to surface  light chains. Myelomonocytic cells show a mature  unremarkable immunophenotype. No significant increase of  blasts is detected. No overt myeloid-associated antigenic  aberrancy, or abnormal subset of lymphoid cells are seen.  Recommend correlation with clinical findings (i.e.  persistence of eosinophilia for more than 3-6 months) and if  clinical suspicion warrants bone marrow morphology,  cytogenetics, and FISH/molecular studies (i.e. PDGFRA,  PDGFRB, FGFR1). A negative immunophenotype does not rule out  neoplastic process.                            Correlation with clinical findings,  morphology or other diagnostic information is recommended.        Results reviewed by Rodney Denis M.D.      Sample Description 12/17/2019 SEE NOTE   Final    Comment:     The sample consists of 85% granulocytic cells, 11%  lymphocytoid cells, and 3% monocytoid cells.         Gating Strategy 12/17/2019 SEE NOTE   Final    Comment:     Gating in the specimen was performed on the cell population  that resembles lymphocytes and was based on CD45 (pan  leukocyte fluorescent intensity) vs. side scatter (internal  complexity).      Markers 12/17/2019 SEE NOTE   Final    Comment:     Region B                         CD Marker        %Positive       ------------     ---------       CD2                     85       CD3                     63       CD4                     44       CD5                     71       CD7                     86       CD8                     19       CD10                     0       CD11c                   24       CD13                     1       CD19                     6       CD19+CD5+                0       CD20                     6       CD23                     3       CD33                     1       CD34                     0       CD38                     5       CD45                   100       CD56+CD3-               27        CD64                     2                           0       HLA_DR                  11       Kappa CD19+             49       Lambda CD19+            47       K/L Ratio              1.0          This test was developed and its analytical performance  characteristics have been determined by [employer]  [facility]. It has not been  cleared or approved by FDA. This assay has been validated  pursuant to the CLIA regulations and is used for clinical  purposes.      Number of Markers 12/17/2019 22   Final   Orders Only on 12/11/2019   Component Date Value Ref Range Status    Color, UA 12/11/2019 YELLOW  YELLOW Final    Appearance, UA 12/11/2019 CLEAR  CLEAR Final    Specific [place], UA 12/11/2019 1.017  1.001 - 1.035 Final    pH, UA 12/11/2019 < OR = 5.0  5.0 - 8.0 Final    Glucose, UA 12/11/2019 NEGATIVE  NEGATIVE Final    Bilirubin, UA 12/11/2019 NEGATIVE  NEGATIVE Final    Ketones, UA 12/11/2019 NEGATIVE  NEGATIVE Final    Occult Blood UA 12/11/2019 1+* NEGATIVE Final    Protein, UA 12/11/2019 TRACE* NEGATIVE Final    Nitrite, UA 12/11/2019 NEGATIVE  NEGATIVE Final    Leukocytes, UA 12/11/2019 NEGATIVE  NEGATIVE Final    WBC Casts, UA 12/11/2019 6-10* < OR = 5 /HPF Final    RBC Casts, UA 12/11/2019 3-10* < OR = 2 /HPF Final    Squam Epithel, UA 12/11/2019 0-5  < OR = 5 /HPF Final    Bacteria, UA 12/11/2019 FEW* NONE SEEN /HPF Final    Ca Oxalate Margy, UA 12/11/2019 MANY* NONE OR FEW /HPF Final    Hyaline Casts, UA 12/11/2019 NONE SEEN  NONE SEEN /LPF Final    WBC 12/11/2019 19.5* 3.8 - 10.8 Thousand/uL Final    RBC 12/11/2019 4.55  3.80 - 5.10 Million/uL Final    Hemoglobin 12/11/2019 10.8* 11.7 - 15.5 g/dL Final    Hematocrit 12/11/2019 35.3  35.0 - 45.0 % Final    Mean Corpuscular Volume 12/11/2019 77.6* 80.0 - 100.0 fL Final    Mean Corpuscular Hemoglobin 12/11/2019 23.7* 27.0 - 33.0 pg Final    Mean Corpuscular Hemoglobin  Conc 12/11/2019 30.6* 32.0 - 36.0 g/dL Final    RDW 12/11/2019 16.5* 11.0 - 15.0 % Final    Platelets 12/11/2019 436* 140 - 400 Thousand/uL Final    MPV 12/11/2019 9.9  7.5 - 12.5 fL Final    Neutrophils Absolute 12/11/2019 6,611  1,500 - 7,800 cells/uL Final    Lymph # 12/11/2019 1,950  850 - 3,900 cells/uL Final    Mono # 12/11/2019 819  200 - 950 cells/uL Final    Eos # 12/11/2019 10,043* 15 - 500 cells/uL Final    Baso # 12/11/2019 78  0 - 200 cells/uL Final    Neutrophils Relative 12/11/2019 33.9  % Final    Lymph% 12/11/2019 10.0  % Final    Mono% 12/11/2019 4.2  % Final    Eosinophil% 12/11/2019 51.5  % Final    Basophil% 12/11/2019 0.4  % Final    Differential Comment 12/11/2019    Final    Comment: Review of peripheral smear confirms  automated results.      Centromere B Antibody 12/11/2019 <1.0 NEG  <1.0 NEG AI Final    Reflexive Urine Culture 12/11/2019 CULTURE INDICATED - RESULTS TO FOLLOW   Final    Urine Culture, Routine 12/11/2019    Final    Comment:   CULTURE, URINE, ROUTINE         Micro Number:      [id number]    Test Status:       Final    Specimen Source:   URINE    Specimen Quality:  Adequate    Result:            Multiple organisms present, each less than 10,000                       CFU/mL. These organisms, commonly found on                       external and internal genitalia, are considered                       to be colonizers. No further testing performed.     There may be more visits with results that are not included.       Imaging  X-ray Chest Pa And Lateral    Result Date: 5/26/2020  EXAMINATION: XR CHEST PA AND LATERAL CLINICAL HISTORY: Other forms of dyspnea TECHNIQUE: PA and lateral views of the chest were performed. COMPARISON: 07/13/2007 FINDINGS: Cardiac silhouette is slightly enlarged.  There is central vascular congestion.  No focal consolidation.  No large effusion or pneumothorax.  Scoliosis.  No acute osseous findings.  Incidental azygos fissure again  noted.     Cardiomegaly and central vascular congestion Electronically signed by: Dorian Hilton MD Date:    05/26/2020 Time:    15:32      Assessment  1. Acute systolic heart failure  Her echocardiogram shows a new wall motion abnormality and reduction in left ventricular systolic function.  Therefore, in spite of a normal stress test last year, would proceed with coronary angiography as coronary artery artery disease is the most likely etiology.  - furosemide (LASIX) 40 MG tablet; Take 1 tablet (40 mg total) by mouth daily as needed (fluid).  Dispense: 90 tablet; Refill: 1  - Case Request-Cath Lab: ANGIOGRAM, CORONARY ARTERY - right radial; Standing      Plan and Discussion    Plan for coronary angiography. The procedure, its risks, benefits and alternatives were discussed in detail.  All questions were answered.  Appropriate consents were signed.    Follow Up  After angiography      Fabrice Rodriguez MD

## 2020-06-08 DIAGNOSIS — R76.8 POSITIVE ANA (ANTINUCLEAR ANTIBODY): ICD-10-CM

## 2020-06-08 RX ORDER — PREDNISONE 10 MG/1
10 TABLET ORAL 3 TIMES DAILY
Qty: 90 TABLET | Refills: 1 | Status: SHIPPED | OUTPATIENT
Start: 2020-06-08 | End: 2020-06-29 | Stop reason: SDUPTHER

## 2020-06-09 ENCOUNTER — OFFICE VISIT (OUTPATIENT)
Dept: SURGERY | Facility: CLINIC | Age: 75
End: 2020-06-09
Payer: MEDICARE

## 2020-06-09 ENCOUNTER — TELEPHONE (OUTPATIENT)
Dept: SURGERY | Facility: CLINIC | Age: 75
End: 2020-06-09

## 2020-06-09 VITALS
DIASTOLIC BLOOD PRESSURE: 77 MMHG | HEART RATE: 85 BPM | HEIGHT: 62 IN | BODY MASS INDEX: 28.71 KG/M2 | WEIGHT: 156 LBS | TEMPERATURE: 98 F | SYSTOLIC BLOOD PRESSURE: 118 MMHG

## 2020-06-09 DIAGNOSIS — H54.62 VISION LOSS OF LEFT EYE: Primary | ICD-10-CM

## 2020-06-09 PROCEDURE — 1126F PR PAIN SEVERITY QUANTIFIED, NO PAIN PRESENT: ICD-10-PCS | Mod: S$GLB,,, | Performed by: SURGERY

## 2020-06-09 PROCEDURE — 99203 OFFICE O/P NEW LOW 30 MIN: CPT | Mod: S$GLB,,, | Performed by: SURGERY

## 2020-06-09 PROCEDURE — 1101F PT FALLS ASSESS-DOCD LE1/YR: CPT | Mod: S$GLB,,, | Performed by: SURGERY

## 2020-06-09 PROCEDURE — 1101F PR PT FALLS ASSESS DOC 0-1 FALLS W/OUT INJ PAST YR: ICD-10-PCS | Mod: S$GLB,,, | Performed by: SURGERY

## 2020-06-09 PROCEDURE — 1159F MED LIST DOCD IN RCRD: CPT | Mod: S$GLB,,, | Performed by: SURGERY

## 2020-06-09 PROCEDURE — 99203 PR OFFICE/OUTPT VISIT, NEW, LEVL III, 30-44 MIN: ICD-10-PCS | Mod: S$GLB,,, | Performed by: SURGERY

## 2020-06-09 PROCEDURE — 1159F PR MEDICATION LIST DOCUMENTED IN MEDICAL RECORD: ICD-10-PCS | Mod: S$GLB,,, | Performed by: SURGERY

## 2020-06-09 PROCEDURE — 1126F AMNT PAIN NOTED NONE PRSNT: CPT | Mod: S$GLB,,, | Performed by: SURGERY

## 2020-06-09 NOTE — PROGRESS NOTES
Subjective:       Patient ID: Jacqueline Goldberg is a 74 y.o. female.    Chief Complaint: Consult (Referred by Dr. Owens )      HPI:  Patient is 74-year-old female referred to the office request for temporal artery biopsy.  Patient had sudden loss of vision in the left eye.  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  Vision in the left eye has not returned.  Sed rate and C-reactive protein are elevated.  OBINNA reportedly positive.  Temporal artery biopsy to rule out giant cell arteritis has been requested.  Patient also having dyspnea on exertion and fatigue.  She is scheduled for cardiac evaluation in the upcoming days.    Past Medical History:   Diagnosis Date    Anemia, chronic disease 12/18/2019    Chronic pain     Eosinophilia 12/18/2019    Hypothyroidism     Insomnia     Leucocytosis 12/18/2019     Past Surgical History:   Procedure Laterality Date    ANKLE LIGAMENT RECONSTRUCTION Right     CATARACT EXTRACTION Bilateral     CHOLECYSTECTOMY       Review of patient's allergies indicates:  No Known Allergies  Medication List with Changes/Refills   Current Medications    ASPIRIN (LO-DOSE ASPIRIN) 81 MG EC TABLET    Take 81 mg by mouth once daily.    CYANOCOBALAMIN, VITAMIN B-12, 200 MCG/SPRAY SPSN    Place 200 mcg under the tongue every 14 (fourteen) days.    FOLIC ACID (FOLVITE) 1 MG TABLET    Take 1 tablet (1 mg total) by mouth once daily.    FUROSEMIDE (LASIX) 40 MG TABLET    Take 1 tablet (40 mg total) by mouth daily as needed (fluid).    HYDROXYCHLOROQUINE (PLAQUENIL) 200 MG TABLET    Take 1 tablet (200 mg total) by mouth 2 (two) times daily.    IBUPROFEN (ADVIL,MOTRIN) 800 MG TABLET        LEVOTHYROXINE (SYNTHROID) 50 MCG TABLET    Take 50 mcg by mouth.    PREDNISONE (DELTASONE) 10 MG TABLET    Take 1 tablet (10 mg total) by mouth 3 (three) times daily. DOSE INCREASED 5/8/20!   Discontinued Medications    METHOCARBAMOL (ROBAXIN) 500 MG TAB    Take 500 mg by mouth continuous  prn.      Family History   Problem Relation Age of Onset    Diabetes Mother     Cancer Mother     Heart disease Father     Arthritis Sister      Social History     Socioeconomic History    Marital status: Single     Spouse name: Not on file    Number of children: Not on file    Years of education: Not on file    Highest education level: Not on file   Occupational History    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   Social Needs    Financial resource strain: Not on file    Food insecurity:     Worry: Not on file     Inability: Not on file    Transportation needs:     Medical: Not on file     Non-medical: Not on file   Tobacco Use    Smoking status: Former Smoker    Smokeless tobacco: Never Used   Substance and Sexual Activity    Alcohol use: No    Drug use: No    Sexual activity: Yes     Partners: Male     Birth control/protection: None   Lifestyle    Physical activity:     Days per week: Not on file     Minutes per session: Not on file    Stress: Not on file   Relationships    Social connections:     Talks on phone: Not on file     Gets together: Not on file     Attends Temple service: Not on file     Active member of club or organization: Not on file     Attends meetings of clubs or organizations: Not on file     Relationship status: Not on file   Other Topics Concern    Not on file   Social History Narrative    Not on file         Review of Systems   Constitutional: Negative for appetite change, chills, fever and unexpected weight change.   HENT: Negative for hearing loss, rhinorrhea, sore throat and voice change.    Eyes: Negative for photophobia and visual disturbance.   Respiratory: Negative for cough, choking and shortness of breath.    Cardiovascular: Negative for chest pain, palpitations and leg swelling.   Gastrointestinal: Negative for abdominal pain, blood in stool, constipation, diarrhea, nausea and vomiting.   Endocrine: Negative for cold intolerance, heat  intolerance, polydipsia and polyuria.   Musculoskeletal: Negative for arthralgias, back pain, joint swelling and neck stiffness.   Skin: Negative for color change, pallor and rash.   Neurological: Negative for dizziness, seizures, syncope and headaches.   Hematological: Negative for adenopathy. Does not bruise/bleed easily.   Psychiatric/Behavioral: Negative for agitation, behavioral problems and confusion.       Objective:      Physical Exam  HENT:      Head:      Comments: Temporal pulse palpated bilateral  Neck:      Musculoskeletal: Neck supple.      Trachea: Phonation normal.   Pulmonary:      Effort: Pulmonary effort is normal. No respiratory distress.   Abdominal:      General: There is no distension.      Palpations: Abdomen is soft.      Tenderness: There is no abdominal tenderness. There is no guarding or rebound.   Neurological:      Mental Status: She is alert and oriented to person, place, and time.         Assessment/Plan:   Marimar was seen today for consult.    Diagnoses and all orders for this visit:    Vision loss of left eye      Faith artery biopsy has been requested by her rheumatologist.  Will plan on bilateral temporal artery biopsy in the upcoming days.  She has cardiology appointment this week to address cardiac issues.  We will follow up after cardiac appointment to schedule definitive date for surgery.

## 2020-06-09 NOTE — LETTER
June 17, 2020      Refugio Owens MD  [address]  [address]  [place] [address]           [facility]  [address]  [place] [address]  Phone: [phone]  Fax: [phone]          Patient: Jacqueline M Goldberg   MR Number: [id number]   YOB: 1945   Date of Visit: 6/9/2020       Dear Dr. Refugio Owens:    Thank you for referring Jacqueline Goldberg to me for evaluation. Attached you will find relevant portions of my assessment and plan of care.    If you have questions, please do not hesitate to call me. I look forward to following Jacqueline Goldberg along with you.    Sincerely,    Layo Lincoln III, MD    Enclosure  CC:  No Recipients    If you would like to receive this communication electronically, please contact [email or web address] or [phone] to request more information on [employer] access.    For providers and/or their staff who would like to refer a patient to [facility], please contact us through our one-stop-shop provider referral line, [facility], at [phone].    If you feel you have received this communication in error or would no longer like to receive these types of communications, please e-mail [email or web address]

## 2020-06-09 NOTE — TELEPHONE ENCOUNTER
Pt needs a temporal artery bx.. However, she also needs an angiogram.. Per Dr.Petitto acosta, call cardiologist & see whether or not we can proceed w/ surgery prior to angiogram being done..     Called Dr. Fabrice Rodriguez's office.. Angiogram needs to be done prior to surgery in order for pt to be cleared. They have been trying to reach pt to sched angiogram since last week.. They will attempt to contact pt tomorrow 6/10.. Angiogram can be scheduled as early as next week.. Cardiac clearance request faxed 6/9/20    Informed pt of above info. Asked that she call me once all of this is finished & we will get her sched for surgery. Pt verbalized understanding.

## 2020-06-10 ENCOUNTER — CLINICAL SUPPORT (OUTPATIENT)
Dept: HEMATOLOGY/ONCOLOGY | Facility: CLINIC | Age: 75
End: 2020-06-10
Payer: MEDICARE

## 2020-06-10 DIAGNOSIS — E53.8 B12 DEFICIENCY: ICD-10-CM

## 2020-06-10 DIAGNOSIS — R06.09 DYSPNEA ON EXERTION: Primary | ICD-10-CM

## 2020-06-10 PROCEDURE — 96372 PR INJECTION,THERAP/PROPH/DIAG2ST, IM OR SUBCUT: ICD-10-PCS | Mod: S$GLB,,, | Performed by: INTERNAL MEDICINE

## 2020-06-10 PROCEDURE — 96372 THER/PROPH/DIAG INJ SC/IM: CPT | Mod: S$GLB,,, | Performed by: INTERNAL MEDICINE

## 2020-06-10 RX ADMIN — CYANOCOBALAMIN 1000 MCG: 1000 INJECTION, SOLUTION INTRAMUSCULAR; SUBCUTANEOUS at 10:06

## 2020-06-12 LAB
INTERPRETATION SERPL IFE-IMP: NORMAL
INTERPRETATION UR IFE-IMP: NORMAL

## 2020-06-17 ENCOUNTER — HOSPITAL ENCOUNTER (OUTPATIENT)
Dept: PREADMISSION TESTING | Facility: OTHER | Age: 75
Discharge: HOME | End: 2020-06-17
Attending: INTERNAL MEDICINE
Payer: MEDICARE

## 2020-06-17 VITALS
DIASTOLIC BLOOD PRESSURE: 74 MMHG | BODY MASS INDEX: 28.16 KG/M2 | SYSTOLIC BLOOD PRESSURE: 133 MMHG | HEIGHT: 62 IN | TEMPERATURE: 97 F | WEIGHT: 153 LBS | HEART RATE: 79 BPM | OXYGEN SATURATION: 95 %

## 2020-06-17 DIAGNOSIS — R06.09 DYSPNEA ON EXERTION: ICD-10-CM

## 2020-06-17 DIAGNOSIS — I50.21 ACUTE SYSTOLIC HEART FAILURE: ICD-10-CM

## 2020-06-17 LAB
INR PPP: 1 (ref 0.8–1.2)
PROTHROMBIN TIME: 11.2 SEC (ref 9–12.5)

## 2020-06-17 PROCEDURE — U0003 INFECTIOUS AGENT DETECTION BY NUCLEIC ACID (DNA OR RNA); SEVERE ACUTE RESPIRATORY SYNDROME CORONAVIRUS 2 (SARS-COV-2) (CORONAVIRUS DISEASE [COVID-19]), AMPLIFIED PROBE TECHNIQUE, MAKING USE OF HIGH THROUGHPUT TECHNOLOGIES AS DESCRIBED BY CMS-2020-01-R: HCPCS

## 2020-06-17 PROCEDURE — 36415 COLL VENOUS BLD VENIPUNCTURE: CPT

## 2020-06-17 PROCEDURE — 85610 PROTHROMBIN TIME: CPT

## 2020-06-17 NOTE — DISCHARGE INSTRUCTIONS
Information to Prepare you for your Surgery    PRE-ADMIT TESTING -  [phone]    [address]  [place] [facility]          Your surgery has been scheduled at [facility]. We are pleased to have the opportunity to serve you. For Further Information please call [phone].    On the day of surgery please report to the Information Desk on the 1st floor.    · CONTACT YOUR PHYSICIAN'S OFFICE THE DAY PRIOR TO YOUR SURGERY TO OBTAIN YOUR ARRIVAL TIME.     · The evening before surgery do not eat anything after 9 p.m. ( this includes hard candy, chewing gum and mints).  You may only have GATORADE, POWERADE AND WATER  from 9 p.m. until you leave your home.   DO NOT DRINK ANY LIQUIDS ON THE WAY TO THE HOSPITAL.      SPECIAL MEDICATION INSTRUCTIONS: TAKE medications checked off by the Anesthesiologist on your Medication List.    Angiogram Patients: Take medications as instructed by your physician, including aspirin.     Surgery Patients:    If you take ASPIRIN - Your PHYSICIAN/SURGEON will need to inform you IF/OR when you need to stop taking aspirin prior to your surgery.     Do Not take any medications containing IBUPROFEN.  Do Not Wear any make-up or dark nail polish   (especially eye make-up) to surgery. If you come to surgery with makeup on you will be required to remove the makeup or nail polish.    Do not shave your surgical area at least 5 days prior to your surgery. The surgical prep will be performed at the hospital according to Infection Control regulations.    Leave all valuables at home.   Do Not wear any jewelry or watches, including any metal in body piercings. Jewelry must be removed prior to coming to the hospital.  There is a possibility that rings that are unable to be removed may be cut off if they are on the surgical extremity.    Contact Lens must be removed before surgery. Either do not wear the contact lens or bring a case and solution for  storage.  Please bring a container for eyeglasses or dentures as required.  Bring any paperwork your physician has provided, such as consent forms,  history and physicals, doctor's orders, etc.   Bring comfortable clothes that are loose fitting to wear upon discharge. Take into consideration the type of surgery being performed.  Maintain your diet as advised per your physician the day prior to surgery.      Adequate rest the night before surgery is advised.   Park in the Parking lot behind the hospital or in the [facility] across the street from the parking lot. Parking is complimentary.  If you will be discharged the same day as your procedure, please arrange for a responsible adult to drive you home or to accompany you if traveling by taxi.   YOU WILL NOT BE PERMITTED TO DRIVE OR TO LEAVE THE HOSPITAL ALONE AFTER SURGERY.   If you are being discharged the same day, it is strongly recommended that you arrange for someone to remain with you for the first 24 hrs following your surgery.    The Surgeon will speak to your family/visitor after your surgery regarding the outcome of your surgery and post op care.  The Surgeon may speak to you after your surgery, but there is a possibility you may not remember the details.  Please check with your family members regarding the conversation with the Surgeon.    We strongly recommend whoever is bringing you home be present for discharge instructions.  This will ensure a thorough understanding for your post op home care.    ALL CHILDREN MUST ALWAYS BE ACCOMPANIED BY AN ADULT.    Visitors-Refer to current Visitor policy handouts.    Thank you for your cooperation.  The Staff of [facility].                Bathing Instructions with Hibiclens     Shower the evening before and morning of your procedure with Hibiclens:   Wash your face with water and your regular face wash/soap   Apply Hibiclens directly on your skin or on a wet washcloth and wash  gently. When showering: Move away from the shower stream when applying Hibiclens to avoid rinsing off too soon.   Rinse thoroughly with warm water   Do not dilute Hibiclens         Dry off as usual, do not use any deodorant, powder, body lotions, perfume, after shave or cologne.

## 2020-06-17 NOTE — PRE ADMISSION SCREENING
Patient stated she will either need to drive herself to and from surgery or she will need to use uber.Patient states she has no family or friends to pick her up after surgery. Patient was instructed that she will not be allowed to  herself or uber home the day of the procedure. Spoke with Margy who said patient can not drive herself or uber same day as procedure. Margy suggested patient could stay over night and then uber 24 hours post procedure. Ángela with Dr. Rodriguez notified. She will discuss with Dr. Rodriguez.

## 2020-06-18 LAB — SARS-COV-2 RNA RESP QL NAA+PROBE: NOT DETECTED

## 2020-06-19 ENCOUNTER — HOSPITAL ENCOUNTER (OUTPATIENT)
Facility: OTHER | Age: 75
Discharge: HOME | End: 2020-06-20
Attending: INTERNAL MEDICINE | Admitting: INTERNAL MEDICINE
Payer: MEDICARE

## 2020-06-19 DIAGNOSIS — I25.10 CAD (CORONARY ARTERY DISEASE): ICD-10-CM

## 2020-06-19 DIAGNOSIS — D63.8 ANEMIA, CHRONIC DISEASE: ICD-10-CM

## 2020-06-19 DIAGNOSIS — E78.00 HYPERCHOLESTEREMIA: Chronic | ICD-10-CM

## 2020-06-19 DIAGNOSIS — I50.21 ACUTE SYSTOLIC HEART FAILURE: ICD-10-CM

## 2020-06-19 DIAGNOSIS — I25.118 ATHEROSCLEROSIS OF NATIVE CORONARY ARTERY OF NATIVE HEART WITH STABLE ANGINA PECTORIS: Primary | ICD-10-CM

## 2020-06-19 DIAGNOSIS — I25.10 CORONARY ARTERY DISEASE INVOLVING NATIVE CORONARY ARTERY OF NATIVE HEART WITHOUT ANGINA PECTORIS: ICD-10-CM

## 2020-06-19 LAB
ANION GAP SERPL CALC-SCNC: 12 MMOL/L (ref 8–16)
BASOPHILS # BLD AUTO: 0.02 K/UL (ref 0–0.2)
BASOPHILS NFR BLD: 0.2 % (ref 0–1.9)
BUN SERPL-MCNC: 47 MG/DL (ref 8–23)
CALCIUM SERPL-MCNC: 8.7 MG/DL (ref 8.7–10.5)
CHLORIDE SERPL-SCNC: 100 MMOL/L (ref 95–110)
CO2 SERPL-SCNC: 26 MMOL/L (ref 23–29)
CREAT SERPL-MCNC: 1 MG/DL (ref 0.5–1.4)
DIFFERENTIAL METHOD: ABNORMAL
EOSINOPHIL # BLD AUTO: 0.1 K/UL (ref 0–0.5)
EOSINOPHIL NFR BLD: 1.1 % (ref 0–8)
ERYTHROCYTE [DISTWIDTH] IN BLOOD BY AUTOMATED COUNT: 19.7 % (ref 11.5–14.5)
EST. GFR  (AFRICAN AMERICAN): >60 ML/MIN/1.73 M^2
EST. GFR  (NON AFRICAN AMERICAN): 55 ML/MIN/1.73 M^2
GLUCOSE SERPL-MCNC: 105 MG/DL (ref 70–110)
HCT VFR BLD AUTO: 41.3 % (ref 37–48.5)
HGB BLD-MCNC: 12.4 G/DL (ref 12–16)
IMM GRANULOCYTES # BLD AUTO: 0.03 K/UL (ref 0–0.04)
IMM GRANULOCYTES NFR BLD AUTO: 0.2 % (ref 0–0.5)
LYMPHOCYTES # BLD AUTO: 2 K/UL (ref 1–4.8)
LYMPHOCYTES NFR BLD: 15.7 % (ref 18–48)
MCH RBC QN AUTO: 23.6 PG (ref 27–31)
MCHC RBC AUTO-ENTMCNC: 30 G/DL (ref 32–36)
MCV RBC AUTO: 79 FL (ref 82–98)
MONOCYTES # BLD AUTO: 1 K/UL (ref 0.3–1)
MONOCYTES NFR BLD: 8.2 % (ref 4–15)
NEUTROPHILS # BLD AUTO: 9.3 K/UL (ref 1.8–7.7)
NEUTROPHILS NFR BLD: 74.6 % (ref 38–73)
NRBC BLD-RTO: 0 /100 WBC
PLATELET # BLD AUTO: 310 K/UL (ref 150–350)
PMV BLD AUTO: 10 FL (ref 9.2–12.9)
POTASSIUM SERPL-SCNC: 4 MMOL/L (ref 3.5–5.1)
RBC # BLD AUTO: 5.25 M/UL (ref 4–5.4)
SODIUM SERPL-SCNC: 138 MMOL/L (ref 136–145)
TROPONIN I SERPL DL<=0.01 NG/ML-MCNC: 0.22 NG/ML (ref 0–0.03)
WBC # BLD AUTO: 12.52 K/UL (ref 3.9–12.7)

## 2020-06-19 PROCEDURE — 25000003 PHARM REV CODE 250: Performed by: INTERNAL MEDICINE

## 2020-06-19 PROCEDURE — 93454 CORONARY ARTERY ANGIO S&I: CPT | Mod: 59 | Performed by: INTERNAL MEDICINE

## 2020-06-19 PROCEDURE — 92943 PR CTO: ICD-10-PCS | Mod: LD,,, | Performed by: INTERNAL MEDICINE

## 2020-06-19 PROCEDURE — 94761 N-INVAS EAR/PLS OXIMETRY MLT: CPT

## 2020-06-19 PROCEDURE — 93005 ELECTROCARDIOGRAM TRACING: CPT

## 2020-06-19 PROCEDURE — 36415 COLL VENOUS BLD VENIPUNCTURE: CPT

## 2020-06-19 PROCEDURE — G0378 HOSPITAL OBSERVATION PER HR: HCPCS

## 2020-06-19 PROCEDURE — 85025 COMPLETE CBC W/AUTO DIFF WBC: CPT

## 2020-06-19 PROCEDURE — 93010 EKG 12-LEAD: ICD-10-PCS | Mod: ,,, | Performed by: INTERNAL MEDICINE

## 2020-06-19 PROCEDURE — C1887 CATHETER, GUIDING: HCPCS | Performed by: INTERNAL MEDICINE

## 2020-06-19 PROCEDURE — 80048 BASIC METABOLIC PNL TOTAL CA: CPT

## 2020-06-19 PROCEDURE — 84484 ASSAY OF TROPONIN QUANT: CPT

## 2020-06-19 PROCEDURE — 25500020 PHARM REV CODE 255: Performed by: INTERNAL MEDICINE

## 2020-06-19 PROCEDURE — 99152 MOD SED SAME PHYS/QHP 5/>YRS: CPT | Performed by: INTERNAL MEDICINE

## 2020-06-19 PROCEDURE — C1725 CATH, TRANSLUMIN NON-LASER: HCPCS | Performed by: INTERNAL MEDICINE

## 2020-06-19 PROCEDURE — C1894 INTRO/SHEATH, NON-LASER: HCPCS | Performed by: INTERNAL MEDICINE

## 2020-06-19 PROCEDURE — C9607 PERC D-E COR REVASC CHRO SIN: HCPCS | Mod: LD | Performed by: INTERNAL MEDICINE

## 2020-06-19 PROCEDURE — 93454 CORONARY ARTERY ANGIO S&I: CPT | Mod: 26,59,51, | Performed by: INTERNAL MEDICINE

## 2020-06-19 PROCEDURE — C1769 GUIDE WIRE: HCPCS | Performed by: INTERNAL MEDICINE

## 2020-06-19 PROCEDURE — 63600175 PHARM REV CODE 636 W HCPCS: Performed by: INTERNAL MEDICINE

## 2020-06-19 PROCEDURE — C1874 STENT, COATED/COV W/DEL SYS: HCPCS | Performed by: INTERNAL MEDICINE

## 2020-06-19 PROCEDURE — 93454 PR CATH PLACE/CORONARY ANGIO, IMG SUPER/INTERP: ICD-10-PCS | Mod: 26,59,51, | Performed by: INTERNAL MEDICINE

## 2020-06-19 PROCEDURE — 93010 ELECTROCARDIOGRAM REPORT: CPT | Mod: ,,, | Performed by: INTERNAL MEDICINE

## 2020-06-19 PROCEDURE — 99153 MOD SED SAME PHYS/QHP EA: CPT | Performed by: INTERNAL MEDICINE

## 2020-06-19 PROCEDURE — 92943 PRQ TRLUML REVSC CH OCC ANT: CPT | Mod: LD,,, | Performed by: INTERNAL MEDICINE

## 2020-06-19 PROCEDURE — 20000000 HC ICU ROOM

## 2020-06-19 DEVICE — STENT RONYX27518UX RESOLUTE ONYX 2.75X18
Type: IMPLANTABLE DEVICE | Site: HEART | Status: FUNCTIONAL
Brand: RESOLUTE ONYX™

## 2020-06-19 RX ORDER — CLOPIDOGREL BISULFATE 75 MG/1
75 TABLET ORAL DAILY
Qty: 90 TABLET | Refills: 3 | Status: SHIPPED | OUTPATIENT
Start: 2020-06-20 | End: 2021-02-23

## 2020-06-19 RX ORDER — CLOPIDOGREL 300 MG/1
TABLET, FILM COATED ORAL
Status: DISCONTINUED | OUTPATIENT
Start: 2020-06-19 | End: 2020-06-19 | Stop reason: HOSPADM

## 2020-06-19 RX ORDER — LIDOCAINE HYDROCHLORIDE 10 MG/ML
INJECTION, SOLUTION EPIDURAL; INFILTRATION; INTRACAUDAL; PERINEURAL
Status: DISCONTINUED | OUTPATIENT
Start: 2020-06-19 | End: 2020-06-19 | Stop reason: HOSPADM

## 2020-06-19 RX ORDER — NAPROXEN SODIUM 220 MG/1
TABLET, FILM COATED ORAL
Status: DISCONTINUED | OUTPATIENT
Start: 2020-06-19 | End: 2020-06-19 | Stop reason: HOSPADM

## 2020-06-19 RX ORDER — LEVOTHYROXINE SODIUM 25 UG/1
50 TABLET ORAL
Status: DISCONTINUED | OUTPATIENT
Start: 2020-06-20 | End: 2020-06-20 | Stop reason: HOSPADM

## 2020-06-19 RX ORDER — HYDROCODONE BITARTRATE AND ACETAMINOPHEN 5; 325 MG/1; MG/1
1 TABLET ORAL EVERY 4 HOURS PRN
Status: DISCONTINUED | OUTPATIENT
Start: 2020-06-19 | End: 2020-06-20 | Stop reason: HOSPADM

## 2020-06-19 RX ORDER — HYDROXYCHLOROQUINE SULFATE 200 MG/1
200 TABLET, FILM COATED ORAL 2 TIMES DAILY
Status: DISCONTINUED | OUTPATIENT
Start: 2020-06-19 | End: 2020-06-20 | Stop reason: HOSPADM

## 2020-06-19 RX ORDER — MIDAZOLAM HYDROCHLORIDE 1 MG/ML
INJECTION INTRAMUSCULAR; INTRAVENOUS
Status: DISCONTINUED | OUTPATIENT
Start: 2020-06-19 | End: 2020-06-19 | Stop reason: HOSPADM

## 2020-06-19 RX ORDER — SODIUM CHLORIDE 0.9 % (FLUSH) 0.9 %
10 SYRINGE (ML) INJECTION
Status: DISCONTINUED | OUTPATIENT
Start: 2020-06-19 | End: 2020-06-19 | Stop reason: HOSPADM

## 2020-06-19 RX ORDER — DIPHENHYDRAMINE HCL 25 MG
25 CAPSULE ORAL
Status: DISCONTINUED | OUTPATIENT
Start: 2020-06-19 | End: 2020-06-19 | Stop reason: HOSPADM

## 2020-06-19 RX ORDER — SODIUM CHLORIDE 9 MG/ML
INJECTION, SOLUTION INTRAVENOUS ONCE
Status: COMPLETED | OUTPATIENT
Start: 2020-06-19 | End: 2020-06-19

## 2020-06-19 RX ORDER — OXYCODONE HYDROCHLORIDE 5 MG/1
10 TABLET ORAL EVERY 4 HOURS PRN
Status: DISCONTINUED | OUTPATIENT
Start: 2020-06-19 | End: 2020-06-20 | Stop reason: HOSPADM

## 2020-06-19 RX ORDER — NITROGLYCERIN 5 MG/ML
INJECTION, SOLUTION INTRAVENOUS
Status: DISCONTINUED | OUTPATIENT
Start: 2020-06-19 | End: 2020-06-19 | Stop reason: HOSPADM

## 2020-06-19 RX ORDER — LOSARTAN POTASSIUM 25 MG/1
25 TABLET ORAL EVERY 12 HOURS
Qty: 180 TABLET | Refills: 3 | Status: SHIPPED | OUTPATIENT
Start: 2020-06-20 | End: 2021-04-08 | Stop reason: SDUPTHER

## 2020-06-19 RX ORDER — SODIUM CHLORIDE 9 MG/ML
INJECTION, SOLUTION INTRAVENOUS CONTINUOUS
Status: ACTIVE | OUTPATIENT
Start: 2020-06-19 | End: 2020-06-19

## 2020-06-19 RX ORDER — HEPARIN SODIUM 1000 [USP'U]/ML
INJECTION, SOLUTION INTRAVENOUS; SUBCUTANEOUS
Status: DISCONTINUED | OUTPATIENT
Start: 2020-06-19 | End: 2020-06-19 | Stop reason: HOSPADM

## 2020-06-19 RX ORDER — FOLIC ACID 1 MG/1
1 TABLET ORAL DAILY
Status: DISCONTINUED | OUTPATIENT
Start: 2020-06-19 | End: 2020-06-20 | Stop reason: HOSPADM

## 2020-06-19 RX ORDER — ATORVASTATIN CALCIUM 20 MG/1
20 TABLET, FILM COATED ORAL NIGHTLY
Qty: 90 TABLET | Refills: 3 | Status: SHIPPED | OUTPATIENT
Start: 2020-06-20 | End: 2021-04-06 | Stop reason: SDUPTHER

## 2020-06-19 RX ORDER — LOSARTAN POTASSIUM 25 MG/1
25 TABLET ORAL EVERY 12 HOURS
Status: DISCONTINUED | OUTPATIENT
Start: 2020-06-19 | End: 2020-06-20 | Stop reason: HOSPADM

## 2020-06-19 RX ORDER — HEPARIN SOD,PORCINE/0.9 % NACL 1000/500ML
INTRAVENOUS SOLUTION INTRAVENOUS
Status: DISCONTINUED | OUTPATIENT
Start: 2020-06-19 | End: 2020-06-19 | Stop reason: HOSPADM

## 2020-06-19 RX ORDER — ACETAMINOPHEN 325 MG/1
650 TABLET ORAL EVERY 4 HOURS PRN
Status: DISCONTINUED | OUTPATIENT
Start: 2020-06-19 | End: 2020-06-20 | Stop reason: HOSPADM

## 2020-06-19 RX ORDER — PREDNISONE 5 MG/1
10 TABLET ORAL 3 TIMES DAILY
Status: DISCONTINUED | OUTPATIENT
Start: 2020-06-19 | End: 2020-06-20 | Stop reason: HOSPADM

## 2020-06-19 RX ORDER — CLOPIDOGREL BISULFATE 75 MG/1
75 TABLET ORAL DAILY
Status: DISCONTINUED | OUTPATIENT
Start: 2020-06-20 | End: 2020-06-20 | Stop reason: HOSPADM

## 2020-06-19 RX ORDER — ASPIRIN 81 MG/1
81 TABLET ORAL DAILY
Status: DISCONTINUED | OUTPATIENT
Start: 2020-06-19 | End: 2020-06-20 | Stop reason: HOSPADM

## 2020-06-19 RX ORDER — DIAZEPAM 5 MG/1
5 TABLET ORAL
Status: DISCONTINUED | OUTPATIENT
Start: 2020-06-19 | End: 2020-06-19 | Stop reason: HOSPADM

## 2020-06-19 RX ORDER — FENTANYL CITRATE 50 UG/ML
INJECTION, SOLUTION INTRAMUSCULAR; INTRAVENOUS
Status: DISCONTINUED | OUTPATIENT
Start: 2020-06-19 | End: 2020-06-19 | Stop reason: HOSPADM

## 2020-06-19 RX ORDER — ATORVASTATIN CALCIUM 20 MG/1
20 TABLET, FILM COATED ORAL NIGHTLY
Status: DISCONTINUED | OUTPATIENT
Start: 2020-06-19 | End: 2020-06-20 | Stop reason: HOSPADM

## 2020-06-19 RX ORDER — VERAPAMIL HYDROCHLORIDE 2.5 MG/ML
INJECTION, SOLUTION INTRAVENOUS
Status: DISCONTINUED | OUTPATIENT
Start: 2020-06-19 | End: 2020-06-19 | Stop reason: HOSPADM

## 2020-06-19 RX ADMIN — ASPIRIN 81 MG: 81 TABLET, COATED ORAL at 12:06

## 2020-06-19 RX ADMIN — HYDROXYCHLOROQUINE SULFATE 200 MG: 200 TABLET, FILM COATED ORAL at 08:06

## 2020-06-19 RX ADMIN — LOSARTAN POTASSIUM 25 MG: 25 TABLET ORAL at 08:06

## 2020-06-19 RX ADMIN — PREDNISONE 10 MG: 5 TABLET ORAL at 03:06

## 2020-06-19 RX ADMIN — SODIUM CHLORIDE: 0.9 INJECTION, SOLUTION INTRAVENOUS at 12:06

## 2020-06-19 RX ADMIN — HYDROXYCHLOROQUINE SULFATE 200 MG: 200 TABLET, FILM COATED ORAL at 12:06

## 2020-06-19 RX ADMIN — DIAZEPAM 5 MG: 5 TABLET ORAL at 09:06

## 2020-06-19 RX ADMIN — PREDNISONE 10 MG: 5 TABLET ORAL at 08:06

## 2020-06-19 RX ADMIN — FOLIC ACID 1 MG: 1 TABLET ORAL at 12:06

## 2020-06-19 RX ADMIN — DIPHENHYDRAMINE HYDROCHLORIDE 25 MG: 25 CAPSULE ORAL at 09:06

## 2020-06-19 RX ADMIN — ATORVASTATIN CALCIUM 20 MG: 20 TABLET, FILM COATED ORAL at 08:06

## 2020-06-19 RX ADMIN — SODIUM CHLORIDE: 0.9 INJECTION, SOLUTION INTRAVENOUS at 09:06

## 2020-06-19 NOTE — NURSING
Pt aaox4 gcs 15, in NAD currently sitting up in recliner chair eating food from dinner tray. Right radial site intact with bleeding controlled and tegaderm placed over site. No hematoma noted to site, minor bruising noted inferior to insertion site, cms intact to RUE, pt denies any pain to site and frequently reminded to keep wrist stable until 24 hours after removal of radial site. All pulses present. Pt denies any SOB, denies chest pain. VS currently stable (NSR on Lead II of cardiac monitor) No complications during shift. See flowsheet and Mar for additional charting

## 2020-06-19 NOTE — OR NURSING
Notified Dr Rodriguez pt does not have a ride home and was planning on taking an uber. Per Dr Herrera would make the decision if ok to send home in an uber if in stable condition but would make that decision once procedure is finished.

## 2020-06-19 NOTE — INTERVAL H&P NOTE
The patient has been examined and the H&P has been reviewed:    I concur with the findings and no changes have occurred since H&P was written.    Anesthesia/Surgery risks, benefits and alternative options discussed and understood by patient/family.          Active Hospital Problems    Diagnosis  POA    Acute systolic heart failure [I50.21]  Yes      Resolved Hospital Problems   No resolved problems to display.

## 2020-06-19 NOTE — NURSING
Pt arrived from cath lab in bed, accomp by cath lab nurse. Pt awake alert and oriented. Radial band in place to Right wrist. Placed on Bedside monitor, VSS, NSR noted. Reviewed plan of care with pt as well as restriction of movement to right wrist.  See flowsheets for further assessment

## 2020-06-19 NOTE — Clinical Note
ostium   left main. Angiography performed of the left coronary arteries. Angiography performed via hand injection with .

## 2020-06-19 NOTE — Clinical Note
180 ml injected throughout the case. 20 mL total wasted during the case. 200 mL total used in the case.

## 2020-06-19 NOTE — Clinical Note
Catheter insertion attempt made into the left main. Angiography performed of the left coronary arteries in multiple views. Angiography performed via hand injection with .

## 2020-06-19 NOTE — Clinical Note
The wire is removed from the distal left anterior descending artery. Posterior Auricular Interpolation Flap Text: A decision was made to reconstruct the defect utilizing an interpolation axial flap and a staged reconstruction.  A telfa template was made of the defect.  This telfa template was then used to outline the posterior auricular interpolation flap.  The donor area for the pedicle flap was then injected with anesthesia.  The flap was excised through the skin and subcutaneous tissue down to the layer of the underlying musculature.  The pedicle flap was carefully excised within this deep plane to maintain its blood supply.  The edges of the donor site were undermined.   The donor site was closed in a primary fashion.  The pedicle was then rotated into position and sutured.  Once the tube was sutured into place, adequate blood supply was confirmed with blanching and refill.  The pedicle was then wrapped with xeroform gauze and dressed appropriately with a telfa and gauze bandage to ensure continued blood supply and protect the attached pedicle.

## 2020-06-19 NOTE — NURSING
Radial armband removed at 14:30. Bleeding controlled to site. CMS intact to RUE. Pt denies pain to RUE. No hematoma noted to site

## 2020-06-19 NOTE — Clinical Note
Catheter insertion attempt made into the left main. Angiography performed of the left coronary arteries. Angiography performed via hand injection with .

## 2020-06-19 NOTE — PLAN OF CARE
F - Nirali and Belief: Latter-day. Pt mentioned her sister is religous and has been in constant contact with her, encouraging her.      I - Importance:     C - Community: Pt mentioned her sister was a support.     A - Address in Care:  Introduced and offered pastoral support. Pt thankful for pastoral visit and had no spiritual needs expressed.

## 2020-06-20 VITALS
TEMPERATURE: 98 F | OXYGEN SATURATION: 98 % | HEIGHT: 62 IN | DIASTOLIC BLOOD PRESSURE: 57 MMHG | BODY MASS INDEX: 29.63 KG/M2 | WEIGHT: 161 LBS | HEART RATE: 66 BPM | SYSTOLIC BLOOD PRESSURE: 104 MMHG | RESPIRATION RATE: 15 BRPM

## 2020-06-20 PROBLEM — I25.10 CORONARY ARTERY DISEASE: Status: ACTIVE | Noted: 2020-06-20

## 2020-06-20 LAB
ANION GAP SERPL CALC-SCNC: 12 MMOL/L (ref 8–16)
BASOPHILS # BLD AUTO: 0.01 K/UL (ref 0–0.2)
BASOPHILS NFR BLD: 0.1 % (ref 0–1.9)
BUN SERPL-MCNC: 39 MG/DL (ref 8–23)
CALCIUM SERPL-MCNC: 9.2 MG/DL (ref 8.7–10.5)
CHLORIDE SERPL-SCNC: 104 MMOL/L (ref 95–110)
CHOLEST SERPL-MCNC: 152 MG/DL (ref 120–199)
CHOLEST/HDLC SERPL: 2.4 {RATIO} (ref 2–5)
CK MB SERPL-MCNC: 3.3 NG/ML (ref 0.1–6.5)
CK MB SERPL-RTO: 12.7 % (ref 0–5)
CK SERPL-CCNC: 26 U/L (ref 20–180)
CO2 SERPL-SCNC: 23 MMOL/L (ref 23–29)
CREAT SERPL-MCNC: 0.9 MG/DL (ref 0.5–1.4)
DIFFERENTIAL METHOD: ABNORMAL
EOSINOPHIL # BLD AUTO: 0.1 K/UL (ref 0–0.5)
EOSINOPHIL NFR BLD: 0.8 % (ref 0–8)
ERYTHROCYTE [DISTWIDTH] IN BLOOD BY AUTOMATED COUNT: 18.9 % (ref 11.5–14.5)
EST. GFR  (AFRICAN AMERICAN): >60 ML/MIN/1.73 M^2
EST. GFR  (NON AFRICAN AMERICAN): >60 ML/MIN/1.73 M^2
GLUCOSE SERPL-MCNC: 119 MG/DL (ref 70–110)
HCT VFR BLD AUTO: 37.7 % (ref 37–48.5)
HDLC SERPL-MCNC: 64 MG/DL (ref 40–75)
HDLC SERPL: 42.1 % (ref 20–50)
HGB BLD-MCNC: 11.2 G/DL (ref 12–16)
IMM GRANULOCYTES # BLD AUTO: 0.03 K/UL (ref 0–0.04)
IMM GRANULOCYTES NFR BLD AUTO: 0.3 % (ref 0–0.5)
LDLC SERPL CALC-MCNC: 74.8 MG/DL (ref 63–159)
LYMPHOCYTES # BLD AUTO: 1.1 K/UL (ref 1–4.8)
LYMPHOCYTES NFR BLD: 9.6 % (ref 18–48)
MCH RBC QN AUTO: 23.5 PG (ref 27–31)
MCHC RBC AUTO-ENTMCNC: 29.7 G/DL (ref 32–36)
MCV RBC AUTO: 79 FL (ref 82–98)
MONOCYTES # BLD AUTO: 0.9 K/UL (ref 0.3–1)
MONOCYTES NFR BLD: 7.1 % (ref 4–15)
NEUTROPHILS # BLD AUTO: 9.8 K/UL (ref 1.8–7.7)
NEUTROPHILS NFR BLD: 82.1 % (ref 38–73)
NONHDLC SERPL-MCNC: 88 MG/DL
NRBC BLD-RTO: 0 /100 WBC
PLATELET # BLD AUTO: 293 K/UL (ref 150–350)
PMV BLD AUTO: 10.6 FL (ref 9.2–12.9)
POTASSIUM SERPL-SCNC: 4 MMOL/L (ref 3.5–5.1)
RBC # BLD AUTO: 4.76 M/UL (ref 4–5.4)
SODIUM SERPL-SCNC: 139 MMOL/L (ref 136–145)
TRIGL SERPL-MCNC: 66 MG/DL (ref 30–150)
TROPONIN I SERPL DL<=0.01 NG/ML-MCNC: 0.31 NG/ML (ref 0–0.03)
WBC # BLD AUTO: 11.93 K/UL (ref 3.9–12.7)

## 2020-06-20 PROCEDURE — 82553 CREATINE MB FRACTION: CPT

## 2020-06-20 PROCEDURE — 99217 PR OBSERVATION CARE DISCHARGE: CPT | Mod: 25,,, | Performed by: INTERNAL MEDICINE

## 2020-06-20 PROCEDURE — 99217 PR OBSERVATION CARE DISCHARGE: ICD-10-PCS | Mod: 25,,, | Performed by: INTERNAL MEDICINE

## 2020-06-20 PROCEDURE — 94761 N-INVAS EAR/PLS OXIMETRY MLT: CPT

## 2020-06-20 PROCEDURE — 36415 COLL VENOUS BLD VENIPUNCTURE: CPT

## 2020-06-20 PROCEDURE — 82550 ASSAY OF CK (CPK): CPT

## 2020-06-20 PROCEDURE — 80061 LIPID PANEL: CPT

## 2020-06-20 PROCEDURE — 25000003 PHARM REV CODE 250: Performed by: INTERNAL MEDICINE

## 2020-06-20 PROCEDURE — 85025 COMPLETE CBC W/AUTO DIFF WBC: CPT

## 2020-06-20 PROCEDURE — 93010 ELECTROCARDIOGRAM REPORT: CPT | Mod: ,,, | Performed by: INTERNAL MEDICINE

## 2020-06-20 PROCEDURE — 63600175 PHARM REV CODE 636 W HCPCS: Performed by: INTERNAL MEDICINE

## 2020-06-20 PROCEDURE — G0378 HOSPITAL OBSERVATION PER HR: HCPCS

## 2020-06-20 PROCEDURE — 93010 EKG 12-LEAD: ICD-10-PCS | Mod: ,,, | Performed by: INTERNAL MEDICINE

## 2020-06-20 PROCEDURE — 84484 ASSAY OF TROPONIN QUANT: CPT

## 2020-06-20 PROCEDURE — 93005 ELECTROCARDIOGRAM TRACING: CPT

## 2020-06-20 PROCEDURE — 80048 BASIC METABOLIC PNL TOTAL CA: CPT

## 2020-06-20 RX ADMIN — LEVOTHYROXINE SODIUM 50 MCG: 25 TABLET ORAL at 05:06

## 2020-06-20 RX ADMIN — LOSARTAN POTASSIUM 25 MG: 25 TABLET ORAL at 08:06

## 2020-06-20 RX ADMIN — PREDNISONE 10 MG: 5 TABLET ORAL at 08:06

## 2020-06-20 RX ADMIN — CLOPIDOGREL BISULFATE 75 MG: 75 TABLET ORAL at 08:06

## 2020-06-20 RX ADMIN — FOLIC ACID 1 MG: 1 TABLET ORAL at 08:06

## 2020-06-20 RX ADMIN — ASPIRIN 81 MG: 81 TABLET, COATED ORAL at 08:06

## 2020-06-20 RX ADMIN — HYDROXYCHLOROQUINE SULFATE 200 MG: 200 TABLET, FILM COATED ORAL at 08:06

## 2020-06-20 NOTE — PLAN OF CARE
Patient is s/p right wrist cardiac cath POD#1. AAOx4, on RA, VSS, afebrile. No c/o pain nor nausea. Dressing intact with small dried sanguineous drainage. Patient has ambulated three times to the bathroom during this shift.

## 2020-06-20 NOTE — NURSING
Pt aaox4, denies chest pain, denies sob. Ambulates hallway with no issues nor complications.  seen patient this morning prior to d/c to give d/c instructions. D/c instructions/follow up plans (written and verbal)  repeated to patient by primary nurse prior to transitioning off unit. Pt states that she understands all instructions given and says that she is going to the pharmacy after being discharged to  newly prescribed medications ordered by . Pt in NAD at this time, primary nurse assisting patient to car.

## 2020-06-20 NOTE — PROGRESS NOTES
[facility]  Cardiology  Progress Note    Patient Name: Jacqueline M Goldberg  MRN: [id number]  Admission Date: 6/19/2020  Hospital Length of Stay: 1 days  Code Status: Prior   Attending Physician: Fabrice Rodriguez MD   Primary Care Physician: Karly Rodriguez MD  Expected Discharge Date: 6/20/2020  Principal Problem:Acute systolic heart failure    Subjective:     Brief HPI:    Jacqueline M Goldberg is a 74 y.o.female with new WMA on Echo.    Hospital Course:    6/19/2020: PCI of LAD.    Interval History:     Doing well. No CP or SOB. Right wrist fine.    Review of Systems   Constitution: Negative for fever.   Cardiovascular: Negative for chest pain, leg swelling, orthopnea, palpitations and paroxysmal nocturnal dyspnea.   Respiratory: Negative for hemoptysis, shortness of breath and wheezing.    Gastrointestinal: Negative for heartburn, hematemesis, hematochezia, melena, nausea and vomiting.   Genitourinary: Negative for dysuria and hematuria.     Objective:     Vital Signs (Most Recent):  Temp: 97.8 °F (36.6 °C) (06/20/20 0738)  Pulse: 70 (06/20/20 0905)  Resp: 18 (06/20/20 0905)  BP: 124/60 (06/20/20 0905)  SpO2: 98 % (06/20/20 0905) Vital Signs (24h Range):  Temp:  [97.6 °F (36.4 °C)-98.1 °F (36.7 °C)] 97.8 °F (36.6 °C)  Pulse:  [60-84] 70  Resp:  [13-25] 18  SpO2:  [94 %-99 %] 98 %  BP: (100-124)/(55-85) 124/60     Weight: 73 kg (161 lb)  Body mass index is 29.45 kg/m².    SpO2: 98 %  O2 Device (Oxygen Therapy): room air      Intake/Output Summary (Last 24 hours) at 6/20/2020 0924  Last data filed at 6/19/2020 2300  Gross per 24 hour   Intake 620 ml   Output 900 ml   Net -280 ml       Lines/Drains/Airways     Peripheral Intravenous Line                 Peripheral IV - Single Lumen 06/19/20 0859 22 G Left Wrist 1 day                Physical Exam   Constitutional: She appears well-developed and well-nourished.   Cardiovascular: Normal rate and regular rhythm.   Murmur heard.   Midsystolic murmur is  present with a grade of 2/6 at the upper right sternal border.  Pulses:       Radial pulses are 2+ on the right side and 2+ on the left side.   Pulmonary/Chest: Effort normal. She has rhonchi in the right middle field and the left middle field.   Abdominal: Soft. Normal appearance.   Skin: Ecchymosis (arms) noted.       Current Medications:     aspirin  81 mg Oral Daily    atorvastatin  20 mg Oral QHS    clopidogreL  75 mg Oral Daily    folic acid  1 mg Oral Daily    hydroxychloroquine  200 mg Oral BID    levothyroxine  50 mcg Oral Before breakfast    losartan  25 mg Oral Q12H    predniSONE  10 mg Oral TID     Current Laboratory Results:    Recent Results (from the past 24 hour(s))   Basic metabolic panel    Collection Time: 06/19/20  1:16 PM   Result Value Ref Range    Sodium 138 136 - 145 mmol/L    Potassium 4.0 3.5 - 5.1 mmol/L    Chloride 100 95 - 110 mmol/L    CO2 26 23 - 29 mmol/L    Glucose 105 70 - 110 mg/dL    BUN, Bld 47 (H) 8 - 23 mg/dL    Creatinine 1.0 0.5 - 1.4 mg/dL    Calcium 8.7 8.7 - 10.5 mg/dL    Anion Gap 12 8 - 16 mmol/L    eGFR if African American >60 >60 mL/min/1.73 m^2    eGFR if non African American 55 (A) >60 mL/min/1.73 m^2   Troponin I    Collection Time: 06/19/20  1:16 PM   Result Value Ref Range    Troponin I 0.222 (H) 0.000 - 0.026 ng/mL   CBC auto differential    Collection Time: 06/19/20  1:17 PM   Result Value Ref Range    WBC 12.52 3.90 - 12.70 K/uL    RBC 5.25 4.00 - 5.40 M/uL    Hemoglobin 12.4 12.0 - 16.0 g/dL    Hematocrit 41.3 37.0 - 48.5 %    Mean Corpuscular Volume 79 (L) 82 - 98 fL    Mean Corpuscular Hemoglobin 23.6 (L) 27.0 - 31.0 pg    Mean Corpuscular Hemoglobin Conc 30.0 (L) 32.0 - 36.0 g/dL    RDW 19.7 (H) 11.5 - 14.5 %    Platelets 310 150 - 350 K/uL    MPV 10.0 9.2 - 12.9 fL    Immature Granulocytes 0.2 0.0 - 0.5 %    Gran # (ANC) 9.3 (H) 1.8 - 7.7 K/uL    Immature Grans (Abs) 0.03 0.00 - 0.04 K/uL    Lymph # 2.0 1.0 - 4.8 K/uL    Mono # 1.0 0.3 - 1.0 K/uL     Eos # 0.1 0.0 - 0.5 K/uL    Baso # 0.02 0.00 - 0.20 K/uL    nRBC 0 0 /100 WBC    Gran% 74.6 (H) 38.0 - 73.0 %    Lymph% 15.7 (L) 18.0 - 48.0 %    Mono% 8.2 4.0 - 15.0 %    Eosinophil% 1.1 0.0 - 8.0 %    Basophil% 0.2 0.0 - 1.9 %    Differential Method Automated    Basic metabolic panel    Collection Time: 06/20/20  3:44 AM   Result Value Ref Range    Sodium 139 136 - 145 mmol/L    Potassium 4.0 3.5 - 5.1 mmol/L    Chloride 104 95 - 110 mmol/L    CO2 23 23 - 29 mmol/L    Glucose 119 (H) 70 - 110 mg/dL    BUN, Bld 39 (H) 8 - 23 mg/dL    Creatinine 0.9 0.5 - 1.4 mg/dL    Calcium 9.2 8.7 - 10.5 mg/dL    Anion Gap 12 8 - 16 mmol/L    eGFR if African American >60 >60 mL/min/1.73 m^2    eGFR if non African American >60 >60 mL/min/1.73 m^2   CK-MB in a.m.    Collection Time: 06/20/20  3:44 AM   Result Value Ref Range    CPK 26 20 - 180 U/L    CPK MB 3.3 0.1 - 6.5 ng/mL    MB% 12.7 (H) 0.0 - 5.0 %   Troponin I in a.m.    Collection Time: 06/20/20  3:44 AM   Result Value Ref Range    Troponin I 0.308 (H) 0.000 - 0.026 ng/mL   CBC auto differential    Collection Time: 06/20/20  3:44 AM   Result Value Ref Range    WBC 11.93 3.90 - 12.70 K/uL    RBC 4.76 4.00 - 5.40 M/uL    Hemoglobin 11.2 (L) 12.0 - 16.0 g/dL    Hematocrit 37.7 37.0 - 48.5 %    Mean Corpuscular Volume 79 (L) 82 - 98 fL    Mean Corpuscular Hemoglobin 23.5 (L) 27.0 - 31.0 pg    Mean Corpuscular Hemoglobin Conc 29.7 (L) 32.0 - 36.0 g/dL    RDW 18.9 (H) 11.5 - 14.5 %    Platelets 293 150 - 350 K/uL    MPV 10.6 9.2 - 12.9 fL    Immature Granulocytes 0.3 0.0 - 0.5 %    Gran # (ANC) 9.8 (H) 1.8 - 7.7 K/uL    Immature Grans (Abs) 0.03 0.00 - 0.04 K/uL    Lymph # 1.1 1.0 - 4.8 K/uL    Mono # 0.9 0.3 - 1.0 K/uL    Eos # 0.1 0.0 - 0.5 K/uL    Baso # 0.01 0.00 - 0.20 K/uL    nRBC 0 0 /100 WBC    Gran% 82.1 (H) 38.0 - 73.0 %    Lymph% 9.6 (L) 18.0 - 48.0 %    Mono% 7.1 4.0 - 15.0 %    Eosinophil% 0.8 0.0 - 8.0 %    Basophil% 0.1 0.0 - 1.9 %    Differential Method  Automated    Lipid panel    Collection Time: 06/20/20  3:44 AM   Result Value Ref Range    Cholesterol 152 120 - 199 mg/dL    Triglycerides 66 30 - 150 mg/dL    HDL 64 40 - 75 mg/dL    LDL Cholesterol 74.8 63.0 - 159.0 mg/dL    Hdl/Cholesterol Ratio 42.1 20.0 - 50.0 %    Total Cholesterol/HDL Ratio 2.4 2.0 - 5.0    Non-HDL Cholesterol 88 mg/dL     Current Imaging Results:    No orders to display       Assessment and Plan:     Problem List:    Active Diagnoses:    Diagnosis Date Noted POA    PRINCIPAL PROBLEM:  Acute systolic heart failure [I50.21] 06/19/2020 Yes      Problems Resolved During this Admission:     Assessment and Plan:    1. Coronary Artery Disease   6/19/2020: PCI of LAD.   On aspirin and clopidogrel.   Doing well.   Discussed importance of DAPT.    2. Heart Failure, Systolic, Acute   Well compensated.    3. Disposition   Home today.   Rx sent to pharmacy.   Follow up Dr. Bradnon Rodriguez in 2 weeks.      VTE Risk Mitigation (From admission, onward)    None          Linda Buckner MD  Cardiology  [facility]

## 2020-06-22 NOTE — DISCHARGE SUMMARY
[facility]  Cardiology  Discharge Summary      Patient Name: Jacqueline M Goldberg  MRN: [id number]  Admission Date: 6/19/2020  Hospital Length of Stay: 1 days  Discharge Date and Time: 6/20/2020 11:20 AM  Attending Physician: Fabrice Rodriguez MD   Discharging Provider: Fabrice Rodriguez MD  Primary Care Physician: Karly Rodrgiuez MD    HPI:   Patient was admitted for angiography due to new wall motion abnormalities and new development of heart failure.    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  It was successfully opened with implantation of a stent as described in the catheterization report.  She was admitted for overnight observation.  She did well.  Her access site right wrist occluded with no bleeding, hematoma, or bruit.  She was deemed ready for discharge.  Prior to discharge, the importance of compliance with dual antiplatelet therapy was emphasized.    Consults:     Significant Diagnostic Studies:  See cath report    Pending Diagnostic Studies:     None          Final Active Diagnoses:    Diagnosis Date Noted POA    PRINCIPAL PROBLEM:  Acute systolic heart failure [I50.21] 06/19/2020 Yes    Coronary artery disease [I25.10] 06/20/2020 Yes    Hypercholesteremia [E78.00] 05/16/2018 Yes     Chronic      Problems Resolved During this Admission:     No new Assessment & Plan notes have been filed under this hospital service since the last note was generated.  Service: Cardiology      Discharged Condition: good    Disposition: Home or Self Care    Follow Up:  Follow-up Information     Fabrice Rodriguez MD. Schedule an appointment as soon as possible for a visit in 2 weeks.    Specialties:  Cardiovascular Disease, Cardiology  Why: CARDIAC FOLLOW UP  Contact information:  [address]  [address]  [facility] [address]  [phone]                 Patient Instructions:      Diet Cardiac     Diet Cardiac     No dressing needed     Activity as tolerated     Activity as tolerated     Medications:  Reconciled Home Medications:      Medication List      START taking these medications    atorvastatin 20 MG tablet  Commonly known as: LIPITOR  Take 1 tablet (20 mg total) by mouth every evening.     clopidogreL 75 mg tablet  Commonly known as: PLAVIX  Take 1 tablet (75 mg total) by mouth once daily.     losartan 25 MG tablet  Commonly known as: COZAAR  Take 1 tablet (25 mg total) by mouth every 12 (twelve) hours.        CHANGE how you take these medications    furosemide 40 MG tablet  Commonly known as: LASIX  Take 1 tablet (40 mg total) by mouth daily as needed (fluid).  What changed: when to take this        CONTINUE taking these medications    cyanocobalamin (vitamin B-12) 200 mcg/spray Spsn  Place 200 mcg under the tongue every 14 (fourteen) days.     EYE DROPS OPHT  Apply to eye.     folic acid 1 MG tablet  Commonly known as: FOLVITE  Take 1 tablet (1 mg total) by mouth once daily.     hydroxychloroquine 200 mg tablet  Commonly known as: PLAQUENIL  Take 1 tablet (200 mg total) by mouth 2 (two) times daily.     ibuprofen 800 MG tablet  Commonly known as: ADVIL,MOTRIN     levothyroxine 50 MCG tablet  Commonly known as: SYNTHROID  Take 50 mcg by mouth.     LO-DOSE ASPIRIN 81 MG EC tablet  Generic drug: aspirin  Take 81 mg by mouth once daily.     predniSONE 10 MG tablet  Commonly known as: DELTASONE  Take 1 tablet (10 mg total) by mouth 3 (three) times daily. DOSE INCREASED 5/8/20!            Time spent on the discharge of patient: 20 minutes    Fabrice Rodriguez MD  Cardiology  [facility]

## 2020-06-29 ENCOUNTER — OFFICE VISIT (OUTPATIENT)
Dept: RHEUMATOLOGY | Facility: CLINIC | Age: 75
End: 2020-06-29
Payer: MEDICARE

## 2020-06-29 VITALS
DIASTOLIC BLOOD PRESSURE: 71 MMHG | WEIGHT: 157.31 LBS | BODY MASS INDEX: 28.77 KG/M2 | TEMPERATURE: 97 F | SYSTOLIC BLOOD PRESSURE: 114 MMHG

## 2020-06-29 DIAGNOSIS — M35.9 UNDIFFERENTIATED CONNECTIVE TISSUE DISEASE: Primary | ICD-10-CM

## 2020-06-29 DIAGNOSIS — R76.8 POSITIVE ANA (ANTINUCLEAR ANTIBODY): ICD-10-CM

## 2020-06-29 DIAGNOSIS — Z79.899 ENCOUNTER FOR LONG-TERM (CURRENT) DRUG USE: ICD-10-CM

## 2020-06-29 PROCEDURE — 1159F PR MEDICATION LIST DOCUMENTED IN MEDICAL RECORD: ICD-10-PCS | Mod: S$GLB,,, | Performed by: INTERNAL MEDICINE

## 2020-06-29 PROCEDURE — 1125F AMNT PAIN NOTED PAIN PRSNT: CPT | Mod: S$GLB,,, | Performed by: INTERNAL MEDICINE

## 2020-06-29 PROCEDURE — 99214 PR OFFICE/OUTPT VISIT, EST, LEVL IV, 30-39 MIN: ICD-10-PCS | Mod: S$GLB,,, | Performed by: INTERNAL MEDICINE

## 2020-06-29 PROCEDURE — 1101F PR PT FALLS ASSESS DOC 0-1 FALLS W/OUT INJ PAST YR: ICD-10-PCS | Mod: S$GLB,,, | Performed by: INTERNAL MEDICINE

## 2020-06-29 PROCEDURE — 1111F DSCHRG MED/CURRENT MED MERGE: CPT | Mod: S$GLB,,, | Performed by: INTERNAL MEDICINE

## 2020-06-29 PROCEDURE — 1111F PR DISCHARGE MEDS RECONCILED W/ CURRENT OUTPATIENT MED LIST: ICD-10-PCS | Mod: S$GLB,,, | Performed by: INTERNAL MEDICINE

## 2020-06-29 PROCEDURE — 1101F PT FALLS ASSESS-DOCD LE1/YR: CPT | Mod: S$GLB,,, | Performed by: INTERNAL MEDICINE

## 2020-06-29 PROCEDURE — 1125F PR PAIN SEVERITY QUANTIFIED, PAIN PRESENT: ICD-10-PCS | Mod: S$GLB,,, | Performed by: INTERNAL MEDICINE

## 2020-06-29 PROCEDURE — 99214 OFFICE O/P EST MOD 30 MIN: CPT | Mod: S$GLB,,, | Performed by: INTERNAL MEDICINE

## 2020-06-29 PROCEDURE — 1159F MED LIST DOCD IN RCRD: CPT | Mod: S$GLB,,, | Performed by: INTERNAL MEDICINE

## 2020-06-29 RX ORDER — PREDNISONE 10 MG/1
10 TABLET ORAL DAILY
Qty: 90 TABLET | Refills: 1 | Status: SHIPPED | OUTPATIENT
Start: 2020-06-29 | End: 2020-09-27

## 2020-06-30 NOTE — PROGRESS NOTES
[facility] RHEUMATOLOGY           Follow-up visit    Notes dictated to M*Modal. Please forgive any unintended errors.  Subjective:       Patient ID:   NAME: Jacqueline M Goldberg : 1945     74 y.o. female    Referring Doc: No ref. provider found  Other Physicians:    Chief Complaint:  Undifferentiated connective tissue disease and Lupus      HPI/Interval History:  The patient is doing very well.  She underwent angiography earlier this month and had a single stent placed.  She has had immediate improvement of her previous complaints shortness of breath and very poor stamina.  In fact, she feels well enough to take a road trip with her sister sometime next month.  She has had no headaches, visual changes, excessive lacrimation, or jaw claudication.  Her strength has been fine.    ROS:   GEN:    no fever, night sweats or weight loss  SKIN:   no rashes, erythema, bruising, or swelling, no Raynauds, no photosensitivity  HEENT: no changes in vision, no mouth ulcers, no sicca symptoms, no scalp tenderness, no jaw claudication.  CV:      no CP, PND, COTA, orthopnea, no palpitations  PULM: no SOB, cough, hemoptysis, sputum or pleuritic pain  GI:       no GERD, no dysphagia, no hematemesis, no abdominal pain, nausea, vomiting, constipation, diarrhea, melanotic stools, BRBPR  :      no hematuria, dysuria  NEURO: no paresthesias, headaches, acute visual disturbances  MUSCULOSKELETAL:  As above    PSYCH:   No increased insomnia, no increased anxiety, no increased depression    Past Medical/Surgical History:  Past Medical History:   Diagnosis Date    Anemia, chronic disease 2019    Arthritis     Blind left eye     Chronic pain     Coronary artery disease 2020    Eosinophilia 2019    Hypothyroidism     Insomnia     Leucocytosis 2019    Pneumonia     PONV (postoperative nausea and vomiting)     needs phenergan    Scoliosis      Past Surgical History:   Procedure Laterality Date     ANKLE LIGAMENT RECONSTRUCTION Right     CATARACT EXTRACTION Bilateral     CHOLECYSTECTOMY      CORONARY ANGIOGRAPHY N/A 6/19/2020    Procedure: ANGIOGRAM, CORONARY ARTERY - right radial;  Surgeon: Fabrice Rodriguez MD;  Location: [facility] CATH LAB;  Service: Cardiology;  Laterality: N/A;    CORONARY STENT PLACEMENT         Allergies:  Review of patient's allergies indicates:  No Known Allergies    Social/Family History:  Social History     Socioeconomic History    Marital status: Single     Spouse name: Not on file    Number of children: Not on file    Years of education: Not on file    Highest education level: Not on file   Occupational History    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   Social Needs    Financial resource strain: Not on file    Food insecurity     Worry: Not on file     Inability: Not on file    Transportation needs     Medical: Not on file     Non-medical: Not on file   Tobacco Use    Smoking status: Former Smoker    Smokeless tobacco: Never Used   Substance and Sexual Activity    Alcohol use: Yes    Drug use: Never    Sexual activity: Yes     Partners: Male     Birth control/protection: None   Lifestyle    Physical activity     Days per week: Not on file     Minutes per session: Not on file    Stress: Not on file   Relationships    Social connections     Talks on phone: Not on file     Gets together: Not on file     Attends Cheondoism service: Not on file     Active member of club or organization: Not on file     Attends meetings of clubs or organizations: Not on file     Relationship status: Not on file   Other Topics Concern    Not on file   Social History Narrative    Not on file     Family History   Problem Relation Age of Onset    Diabetes Mother     Cancer Mother     Heart disease Father     Arthritis Sister      FAMILY HISTORY: negative for Connective Tissue Disease      Medications:    Current Outpatient Medications:     aspirin (LO-DOSE ASPIRIN) 81 MG  EC tablet, Take 81 mg by mouth once daily., Disp: , Rfl:     atorvastatin (LIPITOR) 20 MG tablet, Take 1 tablet (20 mg total) by mouth every evening., Disp: 90 tablet, Rfl: 3    clopidogreL (PLAVIX) 75 mg tablet, Take 1 tablet (75 mg total) by mouth once daily., Disp: 90 tablet, Rfl: 3    cyanocobalamin, vitamin B-12, 200 mcg/spray SpSn, Place 200 mcg under the tongue every 14 (fourteen) days., Disp: 12 mL, Rfl: 3    folic acid (FOLVITE) 1 MG tablet, Take 1 tablet (1 mg total) by mouth once daily., Disp: 90 tablet, Rfl: 2    furosemide (LASIX) 40 MG tablet, Take 1 tablet (40 mg total) by mouth daily as needed (fluid). (Patient taking differently: Take 40 mg by mouth once daily. ), Disp: 90 tablet, Rfl: 1    hydroxychloroquine (PLAQUENIL) 200 mg tablet, Take 1 tablet (200 mg total) by mouth 2 (two) times daily., Disp: 60 tablet, Rfl: 5    ibuprofen (ADVIL,MOTRIN) 800 MG tablet, , Disp: , Rfl: 0    levothyroxine (SYNTHROID) 50 MCG tablet, Take 50 mcg by mouth., Disp: , Rfl:     losartan (COZAAR) 25 MG tablet, Take 1 tablet (25 mg total) by mouth every 12 (twelve) hours., Disp: 180 tablet, Rfl: 3    predniSONE (DELTASONE) 10 MG tablet, Take 1 tablet (10 mg total) by mouth once daily. DOSE DECREASE 6/29/2020, Disp: 90 tablet, Rfl: 1    tetrahydrozoline/polyethyl gly (EYE DROPS OPHT), Apply to eye., Disp: , Rfl:     Current Facility-Administered Medications:     cyanocobalamin injection 1,000 mcg, 1,000 mcg, Subcutaneous, Q30 Days, Srinivasa Huffman MD, 1,000 mcg at 06/10/20 1050      Objective:     Vitals:  Blood pressure 114/71, temperature 97.1 °F (36.2 °C), weight 71.4 kg (157 lb 4.8 oz).    Physical Examination:   GEN: wn/wd female in no apparent distress  SKIN: no rashes, no sclerodactyly, no Raynaud's, no periungual erythema, no digital tip ulcerations, no nailbed pitting  HEAD: no alopecia, no scalp tenderness, no temporal artery tenderness or induration.  EYES: no pallor, no icterus, PERRLA  ENT:   no thrush, no mucosal dryness or ulcerations, adequate oral hygiene & dentition.  NECK: supple x 6, no masses, no thyromegaly, no lymphadenopathy.  CV:   S1 and S2 regular, no murmurs, gallop or rubs  CHEST: Normal respiratory effort;  normal breath sounds/no adventitious sounds. No signs of consolidation.  ABD: non-tender and non-distended; soft; normal bowel sounds; no rebound/guarding or tenderness. No hepatosplenomegaly.  Musculoskeletal:  No evidence of inflammatory arthritis.  No progressive deformity    EXTREM: no clubbing, cyanosis or edema. normal pulses.  NEURO:  grossly intact; motor/sensory WNL; no tremors  PSYCH:  normal mood, affect and behavior    Labs:   Lab Results   Component Value Date    WBC 11.93 06/20/2020    HGB 11.2 (L) 06/20/2020    HCT 37.7 06/20/2020    MCV 79 (L) 06/20/2020     06/20/2020   CMP@  Sodium   Date Value Ref Range Status   06/20/2020 139 136 - 145 mmol/L Final     Potassium   Date Value Ref Range Status   06/20/2020 4.0 3.5 - 5.1 mmol/L Final     Chloride   Date Value Ref Range Status   06/20/2020 104 95 - 110 mmol/L Final     CO2   Date Value Ref Range Status   06/20/2020 23 23 - 29 mmol/L Final     Glucose   Date Value Ref Range Status   06/20/2020 119 (H) 70 - 110 mg/dL Final     BUN, Bld   Date Value Ref Range Status   06/20/2020 39 (H) 8 - 23 mg/dL Final     Creatinine   Date Value Ref Range Status   06/20/2020 0.9 0.5 - 1.4 mg/dL Final     Calcium   Date Value Ref Range Status   06/20/2020 9.2 8.7 - 10.5 mg/dL Final     Total Protein   Date Value Ref Range Status   05/21/2020 7.5 6.1 - 8.1 g/dL Final     Albumin   Date Value Ref Range Status   04/09/2020 4.3 3.6 - 5.1 g/dL Final     Total Bilirubin   Date Value Ref Range Status   04/09/2020 0.6 0.2 - 1.2 mg/dL Final     Alkaline Phosphatase   Date Value Ref Range Status   04/09/2020 76 37 - 153 U/L Final     AST   Date Value Ref Range Status   04/09/2020 15 10 - 35 U/L Final     ALT   Date Value Ref Range Status    04/09/2020 8 6 - 29 U/L Final     CPK   Date Value Ref Range Status   06/20/2020 26 20 - 180 U/L Final     CRP   Date Value Ref Range Status   05/21/2020 27.8 (H) <8.0 mg/L Final     OBINNA   Date Value Ref Range Status   12/04/2019 POSITIVE (A) NEGATIVE Final     Comment:     OBINNA IFA is a first line screen for detecting the  presence of up to approximately 150 autoantibodies in  various autoimmune diseases. A positive OBINNA IFA result  is suggestive of autoimmune disease and reflexes to  titer and pattern. Further laboratory testing may be  considered if clinically indicated.     For additional information, please refer to  http://education.[employer].me/faq/KMT602  (This link is being provided for informational/  educational purposes only.)             Radiology/Diagnostic Studies:    None    Assessment/Discussion/Plan:   74 y.o. 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  This was done while a biopsy was to be performed to rule out giant cell arteritis.  She did not have symptoms compatible with GCA but in an abundance of caution, I felt that was appropriate.  At this point however the likelihood of a good biopsy is quite small.  I do not now think that it warrants the risks involved.  The patient is in full agreement.    I have tapered her prednisone today to 10 mg daily. The signs of adrenal insufficiency were explained in detail.  The patient will notify me immediately if she has any untoward symptoms.  She may, at her own discretion, restart the high-dose prednisone if she has had difficulty reaching me.    I have ordered appropriate follow-up blood testing.  This is to be done in 6 weeks.    RTC:  I will see her back in 2 months    Electronically signed by  Refugio Owens MD

## 2020-07-17 ENCOUNTER — OFFICE VISIT (OUTPATIENT)
Dept: CARDIOLOGY | Facility: CLINIC | Age: 75
End: 2020-07-17
Payer: MEDICARE

## 2020-07-17 VITALS
SYSTOLIC BLOOD PRESSURE: 106 MMHG | WEIGHT: 156.5 LBS | BODY MASS INDEX: 28.8 KG/M2 | HEIGHT: 62 IN | DIASTOLIC BLOOD PRESSURE: 62 MMHG | HEART RATE: 60 BPM

## 2020-07-17 DIAGNOSIS — I25.118 ATHEROSCLEROSIS OF NATIVE CORONARY ARTERY OF NATIVE HEART WITH STABLE ANGINA PECTORIS: Primary | ICD-10-CM

## 2020-07-17 DIAGNOSIS — E78.00 HYPERCHOLESTEREMIA: Chronic | ICD-10-CM

## 2020-07-17 DIAGNOSIS — I50.21 ACUTE SYSTOLIC HEART FAILURE: ICD-10-CM

## 2020-07-17 PROCEDURE — 99999 PR PBB SHADOW E&M-EST. PATIENT-LVL IV: CPT | Mod: PBBFAC,,, | Performed by: INTERNAL MEDICINE

## 2020-07-17 PROCEDURE — 99215 PR OFFICE/OUTPT VISIT, EST, LEVL V, 40-54 MIN: ICD-10-PCS | Mod: S$GLB,,, | Performed by: INTERNAL MEDICINE

## 2020-07-17 PROCEDURE — 1126F AMNT PAIN NOTED NONE PRSNT: CPT | Mod: S$GLB,,, | Performed by: INTERNAL MEDICINE

## 2020-07-17 PROCEDURE — 1101F PR PT FALLS ASSESS DOC 0-1 FALLS W/OUT INJ PAST YR: ICD-10-PCS | Mod: CPTII,S$GLB,, | Performed by: INTERNAL MEDICINE

## 2020-07-17 PROCEDURE — 93005 ELECTROCARDIOGRAM TRACING: CPT

## 2020-07-17 PROCEDURE — 93010 EKG 12-LEAD: ICD-10-PCS | Mod: S$GLB,,, | Performed by: INTERNAL MEDICINE

## 2020-07-17 PROCEDURE — 1101F PT FALLS ASSESS-DOCD LE1/YR: CPT | Mod: CPTII,S$GLB,, | Performed by: INTERNAL MEDICINE

## 2020-07-17 PROCEDURE — 93010 ELECTROCARDIOGRAM REPORT: CPT | Mod: S$GLB,,, | Performed by: INTERNAL MEDICINE

## 2020-07-17 PROCEDURE — 1159F MED LIST DOCD IN RCRD: CPT | Mod: S$GLB,,, | Performed by: INTERNAL MEDICINE

## 2020-07-17 PROCEDURE — 99999 PR PBB SHADOW E&M-EST. PATIENT-LVL IV: ICD-10-PCS | Mod: PBBFAC,,, | Performed by: INTERNAL MEDICINE

## 2020-07-17 PROCEDURE — 99215 OFFICE O/P EST HI 40 MIN: CPT | Mod: S$GLB,,, | Performed by: INTERNAL MEDICINE

## 2020-07-17 PROCEDURE — 1159F PR MEDICATION LIST DOCUMENTED IN MEDICAL RECORD: ICD-10-PCS | Mod: S$GLB,,, | Performed by: INTERNAL MEDICINE

## 2020-07-17 PROCEDURE — 1126F PR PAIN SEVERITY QUANTIFIED, NO PAIN PRESENT: ICD-10-PCS | Mod: S$GLB,,, | Performed by: INTERNAL MEDICINE

## 2020-07-17 NOTE — PROGRESS NOTES
[facility]    Chief Complaint  Chief Complaint   Patient presents with    Coronary Artery Disease    Congestive Heart Failure       HPI:    She has been doing well since her stent.  Feels back to normal.  Active without limitations.    Medications  Current Outpatient Medications   Medication Sig Dispense Refill    aspirin (LO-DOSE ASPIRIN) 81 MG EC tablet Take 81 mg by mouth once daily.      atorvastatin (LIPITOR) 20 MG tablet Take 1 tablet (20 mg total) by mouth every evening. 90 tablet 3    clopidogreL (PLAVIX) 75 mg tablet Take 1 tablet (75 mg total) by mouth once daily. 90 tablet 3    cyanocobalamin, vitamin B-12, 200 mcg/spray SpSn Place 200 mcg under the tongue every 14 (fourteen) days. 12 mL 3    folic acid (FOLVITE) 1 MG tablet Take 1 tablet (1 mg total) by mouth once daily. 90 tablet 2    furosemide (LASIX) 40 MG tablet Take 1 tablet (40 mg total) by mouth daily as needed (fluid). (Patient taking differently: Take 40 mg by mouth once daily. ) 90 tablet 1    hydroxychloroquine (PLAQUENIL) 200 mg tablet Take 1 tablet (200 mg total) by mouth 2 (two) times daily. 60 tablet 5    ibuprofen (ADVIL,MOTRIN) 800 MG tablet   0    levothyroxine (SYNTHROID) 50 MCG tablet Take 50 mcg by mouth.      losartan (COZAAR) 25 MG tablet Take 1 tablet (25 mg total) by mouth every 12 (twelve) hours. 180 tablet 3    predniSONE (DELTASONE) 10 MG tablet Take 1 tablet (10 mg total) by mouth once daily. DOSE DECREASE 6/29/2020 90 tablet 1    tetrahydrozoline/polyethyl gly (EYE DROPS OPHT) Apply to eye.       Current Facility-Administered Medications   Medication Dose Route Frequency Provider Last Rate Last Dose    cyanocobalamin injection 1,000 mcg  1,000 mcg Subcutaneous Q30 Days Srinivasa Huffman MD   1,000 mcg at 06/10/20 1050      Prior to Admission medications    Medication Sig Start Date End Date Taking? Authorizing Provider   aspirin (LO-DOSE ASPIRIN) 81 MG EC tablet Take 81 mg by mouth once  daily.   Yes Historical Provider, MD   atorvastatin (LIPITOR) 20 MG tablet Take 1 tablet (20 mg total) by mouth every evening. 6/20/20 6/20/21 Yes Fabrice Rodriguez MD   clopidogreL (PLAVIX) 75 mg tablet Take 1 tablet (75 mg total) by mouth once daily. 6/20/20 6/20/21 Yes Fabrice Rodriguez MD   cyanocobalamin, vitamin B-12, 200 mcg/spray SpSn Place 200 mcg under the tongue every 14 (fourteen) days. 4/28/20  Yes Srinivasa Huffman MD   folic acid (FOLVITE) 1 MG tablet Take 1 tablet (1 mg total) by mouth once daily. 4/28/20 4/28/21 Yes Srinivasa Huffman MD   furosemide (LASIX) 40 MG tablet Take 1 tablet (40 mg total) by mouth daily as needed (fluid).  Patient taking differently: Take 40 mg by mouth once daily.  6/3/20  Yes Fabrice Rodriguez MD   hydroxychloroquine (PLAQUENIL) 200 mg tablet Take 1 tablet (200 mg total) by mouth 2 (two) times daily. 4/28/20 10/25/20 Yes Refugio Owens MD   ibuprofen (ADVIL,MOTRIN) 800 MG tablet  10/26/19  Yes Historical Provider, MD   levothyroxine (SYNTHROID) 50 MCG tablet Take 50 mcg by mouth. 11/18/19  Yes Historical Provider, MD   losartan (COZAAR) 25 MG tablet Take 1 tablet (25 mg total) by mouth every 12 (twelve) hours. 6/20/20 6/20/21 Yes Fabrice Rodriguez MD   predniSONE (DELTASONE) 10 MG tablet Take 1 tablet (10 mg total) by mouth once daily. DOSE DECREASE 6/29/2020 6/29/20 9/27/20 Yes Refugio Owens MD   tetrahydrozoline/polyethyl gly (EYE DROPS OPHT) Apply to eye.   Yes Historical Provider, MD       History  Past Medical History:   Diagnosis Date    Anemia, chronic disease 12/18/2019    Arthritis     Blind left eye     Chronic pain     Coronary atherosclerosis 06/20/2020    Eosinophilia 12/18/2019    Hypothyroidism     Insomnia     Leucocytosis 12/18/2019    Pneumonia     PONV (postoperative nausea and vomiting)     needs phenergan    Scoliosis      Past Surgical History:   Procedure Laterality Date    ANKLE LIGAMENT RECONSTRUCTION Right     CATARACT  EXTRACTION Bilateral     CHOLECYSTECTOMY      CORONARY ANGIOGRAPHY N/A 6/19/2020    Procedure: ANGIOGRAM, CORONARY ARTERY - right radial;  Surgeon: Fabrice Rodriguez MD;  Location: [facility] CATH LAB;  Service: Cardiology;  Laterality: N/A;    CORONARY STENT PLACEMENT  06/19/2020    pLAD [place] 2.75 x 18 mm     Social History     Socioeconomic History    Marital status: Single     Spouse name: Not on file    Number of children: Not on file    Years of education: Not on file    Highest education level: Not on file   Occupational History    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   Social Needs    Financial resource strain: Not on file    Food insecurity     Worry: Not on file     Inability: Not on file    Transportation needs     Medical: Not on file     Non-medical: Not on file   Tobacco Use    Smoking status: Former Smoker    Smokeless tobacco: Never Used   Substance and Sexual Activity    Alcohol use: Not Currently    Drug use: Never    Sexual activity: Yes     Partners: Male     Birth control/protection: None   Lifestyle    Physical activity     Days per week: Not on file     Minutes per session: Not on file    Stress: Not on file   Relationships    Social connections     Talks on phone: Not on file     Gets together: Not on file     Attends Congregation service: Not on file     Active member of club or organization: Not on file     Attends meetings of clubs or organizations: Not on file     Relationship status: Not on file   Other Topics Concern    Not on file   Social History Narrative    Not on file       Allergies  Review of patient's allergies indicates:  No Known Allergies    Review of Systems   Review of Systems   Constitution: Negative for malaise/fatigue, weight gain and weight loss.   Eyes: Negative for visual disturbance.   Cardiovascular: Negative for chest pain, claudication, cyanosis, dyspnea on exertion, irregular heartbeat, leg swelling, near-syncope, orthopnea,  palpitations, paroxysmal nocturnal dyspnea and syncope.   Respiratory: Negative for cough, hemoptysis, shortness of breath, sleep disturbances due to breathing and wheezing.    Hematologic/Lymphatic: Negative for bleeding problem. Bruises/bleeds easily.   Skin: Negative for poor wound healing.   Musculoskeletal: Negative for muscle cramps and myalgias.   Gastrointestinal: Negative for abdominal pain, anorexia, diarrhea, heartburn, hematemesis, hematochezia, melena, nausea and vomiting.   Genitourinary: Negative for hematuria and nocturia.   Neurological: Negative for excessive daytime sleepiness, dizziness, focal weakness, light-headedness and weakness.       Physical Exam  Vitals:    07/17/20 0858   BP: 106/62   Pulse: 60     Wt Readings from Last 1 Encounters:   07/17/20 71 kg (156 lb 8.4 oz)     Physical Exam   Constitutional: She is oriented to person, place, and time. She is cooperative.  Non-toxic appearance. No distress.   HENT:   Head: Normocephalic and atraumatic.   Eyes: Conjunctivae are normal. No scleral icterus.   Neck: Neck supple. No hepatojugular reflux and no JVD present. Carotid bruit is not present. No tracheal deviation and no edema present. No thyromegaly present.   Cardiovascular: Normal rate, regular rhythm, S1 normal and S2 normal.  No extrasystoles are present. PMI is not displaced. Exam reveals no S3 and no S4.   No murmur heard.  Pulses:       Carotid pulses are 2+ on the right side and 2+ on the left side.       Radial pulses are 2+ on the right side and 2+ on the left side.        Dorsalis pedis pulses are 2+ on the right side and 2+ on the left side.        Posterior tibial pulses are 2+ on the right side and 2+ on the left side.   Right radial access site is healed.   Pulmonary/Chest: No accessory muscle usage. No respiratory distress. She has no decreased breath sounds. She has no wheezes. She has no rhonchi. She has no rales.   Abdominal: Soft. Bowel sounds are normal. She exhibits  no pulsatile liver, no abdominal bruit and no pulsatile midline mass. There is no splenomegaly or hepatomegaly. There is no abdominal tenderness.   Musculoskeletal:         General: No tenderness, deformity or edema.   Neurological: She is alert and oriented to person, place, and time. She has normal strength. No cranial nerve deficit or sensory deficit.   Skin: Skin is warm, dry and intact. She is not diaphoretic. No cyanosis. No pallor. Nails show no clubbing.   Psychiatric: She has a normal mood and affect. Her speech is normal and behavior is normal.       Labs  Admission on 06/19/2020, Discharged on 06/20/2020   Component Date Value Ref Range Status    WBC 06/19/2020 12.52  3.90 - 12.70 K/uL Final    RBC 06/19/2020 5.25  4.00 - 5.40 M/uL Final    Hemoglobin 06/19/2020 12.4  12.0 - 16.0 g/dL Final    Hematocrit 06/19/2020 41.3  37.0 - 48.5 % Final    Mean Corpuscular Volume 06/19/2020 79* 82 - 98 fL Final    Mean Corpuscular Hemoglobin 06/19/2020 23.6* 27.0 - 31.0 pg Final    Mean Corpuscular Hemoglobin Conc 06/19/2020 30.0* 32.0 - 36.0 g/dL Final    RDW 06/19/2020 19.7* 11.5 - 14.5 % Final    Platelets 06/19/2020 310  150 - 350 K/uL Final    MPV 06/19/2020 10.0  9.2 - 12.9 fL Final    Immature Granulocytes 06/19/2020 0.2  0.0 - 0.5 % Final    Gran # (ANC) 06/19/2020 9.3* 1.8 - 7.7 K/uL Final    Immature Grans (Abs) 06/19/2020 0.03  0.00 - 0.04 K/uL Final    Comment: Mild elevation in immature granulocytes is non specific and   can be seen in a variety of conditions including stress response,   acute inflammation, trauma and pregnancy. Correlation with other   laboratory and clinical findings is essential.      Lymph # 06/19/2020 2.0  1.0 - 4.8 K/uL Final    Mono # 06/19/2020 1.0  0.3 - 1.0 K/uL Final    Eos # 06/19/2020 0.1  0.0 - 0.5 K/uL Final    Baso # 06/19/2020 0.02  0.00 - 0.20 K/uL Final    nRBC 06/19/2020 0  0 /100 WBC Final    Gran% 06/19/2020 74.6* 38.0 - 73.0 % Final    Lymph%  06/19/2020 15.7* 18.0 - 48.0 % Final    Mono% 06/19/2020 8.2  4.0 - 15.0 % Final    Eosinophil% 06/19/2020 1.1  0.0 - 8.0 % Final    Basophil% 06/19/2020 0.2  0.0 - 1.9 % Final    Differential Method 06/19/2020 Automated   Final    Sodium 06/19/2020 138  136 - 145 mmol/L Final    Potassium 06/19/2020 4.0  3.5 - 5.1 mmol/L Final    Chloride 06/19/2020 100  95 - 110 mmol/L Final    CO2 06/19/2020 26  23 - 29 mmol/L Final    Glucose 06/19/2020 105  70 - 110 mg/dL Final    BUN, Bld 06/19/2020 47* 8 - 23 mg/dL Final    Creatinine 06/19/2020 1.0  0.5 - 1.4 mg/dL Final    Calcium 06/19/2020 8.7  8.7 - 10.5 mg/dL Final    Anion Gap 06/19/2020 12  8 - 16 mmol/L Final    eGFR if African American 06/19/2020 >60  >60 mL/min/1.73 m^2 Final    eGFR if non African American 06/19/2020 55* >60 mL/min/1.73 m^2 Final    Comment: Calculation used to obtain the estimated glomerular filtration  rate (eGFR) is the CKD-EPI equation.       Troponin I 06/19/2020 0.222* 0.000 - 0.026 ng/mL Final    Comment: The reference interval for Troponin I represents the 99th percentile   cutoff   for our facility and is consistent with 3rd generation assay   performance.      Sodium 06/20/2020 139  136 - 145 mmol/L Final    Potassium 06/20/2020 4.0  3.5 - 5.1 mmol/L Final    Chloride 06/20/2020 104  95 - 110 mmol/L Final    CO2 06/20/2020 23  23 - 29 mmol/L Final    Glucose 06/20/2020 119* 70 - 110 mg/dL Final    BUN, Bld 06/20/2020 39* 8 - 23 mg/dL Final    Creatinine 06/20/2020 0.9  0.5 - 1.4 mg/dL Final    Calcium 06/20/2020 9.2  8.7 - 10.5 mg/dL Final    Anion Gap 06/20/2020 12  8 - 16 mmol/L Final    eGFR if African American 06/20/2020 >60  >60 mL/min/1.73 m^2 Final    eGFR if non African American 06/20/2020 >60  >60 mL/min/1.73 m^2 Final    Comment: Calculation used to obtain the estimated glomerular filtration  rate (eGFR) is the CKD-EPI equation.       CPK 06/20/2020 26  20 - 180 U/L Final    CPK MB 06/20/2020 3.3   "0.1 - 6.5 ng/mL Final    MB% 06/20/2020 12.7* 0.0 - 5.0 % Final    Comment: To be positive, the MB% must be greater than 5% AND the CK-MB  greater than 6.5 ng/mL. Values not in the reference interval,   but not qualifying as positive, should be considered   "trace".      Troponin I 06/20/2020 0.308* 0.000 - 0.026 ng/mL Final    Comment: The reference interval for Troponin I represents the 99th percentile   cutoff   for our facility and is consistent with 3rd generation assay   performance.      WBC 06/20/2020 11.93  3.90 - 12.70 K/uL Final    RBC 06/20/2020 4.76  4.00 - 5.40 M/uL Final    Hemoglobin 06/20/2020 11.2* 12.0 - 16.0 g/dL Final    Hematocrit 06/20/2020 37.7  37.0 - 48.5 % Final    Mean Corpuscular Volume 06/20/2020 79* 82 - 98 fL Final    Mean Corpuscular Hemoglobin 06/20/2020 23.5* 27.0 - 31.0 pg Final    Mean Corpuscular Hemoglobin Conc 06/20/2020 29.7* 32.0 - 36.0 g/dL Final    RDW 06/20/2020 18.9* 11.5 - 14.5 % Final    Platelets 06/20/2020 293  150 - 350 K/uL Final    MPV 06/20/2020 10.6  9.2 - 12.9 fL Final    Immature Granulocytes 06/20/2020 0.3  0.0 - 0.5 % Final    Gran # (ANC) 06/20/2020 9.8* 1.8 - 7.7 K/uL Final    Immature Grans (Abs) 06/20/2020 0.03  0.00 - 0.04 K/uL Final    Comment: Mild elevation in immature granulocytes is non specific and   can be seen in a variety of conditions including stress response,   acute inflammation, trauma and pregnancy. Correlation with other   laboratory and clinical findings is essential.      Lymph # 06/20/2020 1.1  1.0 - 4.8 K/uL Final    Mono # 06/20/2020 0.9  0.3 - 1.0 K/uL Final    Eos # 06/20/2020 0.1  0.0 - 0.5 K/uL Final    Baso # 06/20/2020 0.01  0.00 - 0.20 K/uL Final    nRBC 06/20/2020 0  0 /100 WBC Final    Gran% 06/20/2020 82.1* 38.0 - 73.0 % Final    Lymph% 06/20/2020 9.6* 18.0 - 48.0 % Final    Mono% 06/20/2020 7.1  4.0 - 15.0 % Final    Eosinophil% 06/20/2020 0.8  0.0 - 8.0 % Final    Basophil% 06/20/2020 0.1  0.0 - " 1.9 % Final    Differential Method 06/20/2020 Automated   Final    Cholesterol 06/20/2020 152  120 - 199 mg/dL Final    Comment: The National Cholesterol Education Program (NCEP) has set the  following guidelines (reference ranges) for Cholesterol:  Optimal.....................<200 mg/dL  Borderline High.............200-239 mg/dL  High........................> or = 240 mg/dL      Triglycerides 06/20/2020 66  30 - 150 mg/dL Final    Comment: The National Cholesterol Education Program (NCEP) has set the  following guidelines (reference values) for triglycerides:  Normal......................<150 mg/dL  Borderline High.............150-199 mg/dL  High........................200-499 mg/dL      HDL 06/20/2020 64  40 - 75 mg/dL Final    Comment: The National Cholesterol Education Program (NCEP) has set the  following guidelines (reference values) for HDL Cholesterol:  Low...............<40 mg/dL  Optimal...........>60 mg/dL      LDL Cholesterol 06/20/2020 74.8  63.0 - 159.0 mg/dL Final    Comment: The National Cholesterol Education Program (NCEP) has set the  following guidelines (reference values) for LDL Cholesterol:  Optimal.......................<130 mg/dL  Borderline High...............130-159 mg/dL  High..........................160-189 mg/dL  Very High.....................>190 mg/dL      Hdl/Cholesterol Ratio 06/20/2020 42.1  20.0 - 50.0 % Final    Total Cholesterol/HDL Ratio 06/20/2020 2.4  2.0 - 5.0 Final    Non-HDL Cholesterol 06/20/2020 88  mg/dL Final    Comment: Risk category and Non-HDL cholesterol goals:  Coronary heart disease (CHD)or equivalent (10-year risk of CHD >20%):  Non-HDL cholesterol goal     <130 mg/dL  Two or more CHD risk factors and 10-year risk of CHD <= 20%:  Non-HDL cholesterol goal     <160 mg/dL  0 to 1 CHD risk factor:  Non-HDL cholesterol goal     <190 mg/dL     Hospital Outpatient Visit on 06/17/2020   Component Date Value Ref Range Status    Prothrombin Time 06/17/2020 11.2   9.0 - 12.5 sec Final    INR 06/17/2020 1.0  0.8 - 1.2 Final    Comment: Coumadin Therapy:  2.0 - 3.0 for INR for all indicators except mechanical heart valves  and antiphospholipid syndromes which should use 2.5 - 3.5.     Hospital Outpatient Visit on 06/17/2020   Component Date Value Ref Range Status    SARS-CoV2 (COVID-19) Qualitative P* 06/17/2020 Not Detected  Not Detected Final    Comment: This test utilizes a real-time reverse transcription  polymerase chain reaction procedure to amplify and   detect the SARS-CoV-2 RdRp and N genes.    The analytical sensitivity (limit of detection) of   this assay is 100 copies/mL.   A Detected result is considered positive for COVID-19.  This patient is considered infected with the   SARS-CoV-2 virus and is presumed to be contagious.    A Not Detected result means that SARS-CoV-2 RNA is not  present above the limit of detection. It does not rule  out the possibility of COVID-19 and should not be the  sole basis for treatment decisions.  If COVID-19 is   strongly suspected based on clinical and exposure   history,re-testing should be considered.    This test is only for use under Food and Drug   Administration s Emergency Use Authorization (EUA).   Commercial reagents are provided by [employer].  Performance characteristics of the EUA have been   independently verified by [facility]   Department of Pathology and L                           aboratory Medicine.       Lab Visit on 05/28/2020   Component Date Value Ref Range Status    SARS-CoV2 (COVID-19) Qualitative P* 05/28/2020 Not Detected  Not Detected Final    Comment: This test utilizes a real-time reverse transcription  polymerase chain reaction procedure to amplify and   detect the SARS-CoV-2 RdRp and N genes.    The analytical sensitivity (limit of detection) of   this assay is 100 copies/mL.   A Detected result is considered positive for COVID-19.  This patient is considered infected with the    SARS-CoV-2 virus and is presumed to be contagious.    A Not Detected result means that SARS-CoV-2 RNA is not  present above the limit of detection. It does not rule  out the possibility of COVID-19 and should not be the  sole basis for treatment decisions.  If COVID-19 is   strongly suspected based on clinical and exposure   history,re-testing should be considered.    This test is only for use under Food and Drug   Administration s Emergency Use Authorization (EUA).   Commercial reagents are provided by [employer].  Performance characteristics of the EUA have been   independently verified by [facility]   Department of Pathology and L                           aboratory Medicine.       Orders Only on 05/27/2020   Component Date Value Ref Range Status    Interpretation 06/09/2020    Final    Comment:    Faint monoclonal band with strong polyclonal  background, consistent with monoclonal gammopathy  of undetermined origin (MGUS). Suggest repeating  immunofixation in six months.         Interpretation: 06/09/2020    Final    Comment:    No monoclonal immunoglobulin detected.        Hospital Outpatient Visit on 05/26/2020   Component Date Value Ref Range Status    Ascending aorta 05/26/2020 2.80  cm Final    STJ 05/26/2020 2.67  cm Final    AV mean gradient 05/26/2020 2  mmHg Final    Ao peak dino 05/26/2020 0.99  m/s Final    Ao VTI 05/26/2020 17.16  cm Final    IVS 05/26/2020 0.95  0.6 - 1.1 cm Final    LA size 05/26/2020 4.29  cm Final    Left Atrium Major Axis 05/26/2020 5.39  cm Final    Left Atrium Minor Axis 05/26/2020 5.24  cm Final    LVIDD 05/26/2020 4.78  3.5 - 6.0 cm Final    LVIDS 05/26/2020 3.83  2.1 - 4.0 cm Final    LVOT diameter 05/26/2020 1.86  cm Final    LVOT peak VTI 05/26/2020 18.58  cm Final    PW 05/26/2020 0.94  0.6 - 1.1 cm Final    MV Peak A Dino 05/26/2020 1.02  m/s Final    E wave decelartion time 05/26/2020 121.69  msec Final    MV Peak E Dino  05/26/2020 0.88  m/s Final    PV Peak D Dino 05/26/2020 0.59  m/s Final    PV Peak S Dino 05/26/2020 0.64  m/s Final    RA Major Axis 05/26/2020 4.83  cm Final    Sinus 05/26/2020 2.93  cm Final    TAPSE 05/26/2020 2.81  cm Final    TR Max Dino 05/26/2020 1.99  m/s Final    LA WIDTH 05/26/2020 4.99  cm Final    PV PEAK VELOCITY 05/26/2020 1.26  cm/s Final    LV Diastolic Volume 05/26/2020 106.56  mL Final    LV Systolic Volume 05/26/2020 63.24  mL Final    LVOT peak dino 05/26/2020 0.90  m/s Final    FS 05/26/2020 20  % Final    LA volume 05/26/2020 96.69  cm3 Final    LV mass 05/26/2020 156.62  g Final    Left Ventricle Relative Wall Thick* 05/26/2020 0.39  cm Final    AV valve area 05/26/2020 2.94  cm2 Final    AV Velocity Ratio 05/26/2020 0.91   Final    AV index (prosthetic) 05/26/2020 1.08   Final    E/A ratio 05/26/2020 0.86   Final    Pulm vein S/D ratio 05/26/2020 1.08   Final    LVOT area 05/26/2020 2.7  cm2 Final    LVOT stroke volume 05/26/2020 50.46  cm3 Final    AV peak gradient 05/26/2020 4  mmHg Final    Triscuspid Valve Regurgitation Pea* 05/26/2020 16  mmHg Final    TDI SEPTAL 05/26/2020 0.06  m/s Final    LV LATERAL E/E' RATIO 05/26/2020 17.60  m/s Final    LV SEPTAL E/E' RATIO 05/26/2020 14.67  m/s Final    TDI LATERAL 05/26/2020 0.05  m/s Final    Mean e' 05/26/2020 0.06  m/s Final    E/E' ratio 05/26/2020 16.00  m/s Final    LA volume (mod) 05/26/2020 58.00  cm3 Final    BSA 05/26/2020 1.81  m2 Final    Right Atrial Pressure (from IVC) 05/26/2020 8  mmHg Final    MV valve area p 1/2 method 05/26/2020 6.29  cm2 Final    TV rest pulmonary artery pressure 05/26/2020 24  mmHg Final    MV stenosis pressure 1/2 time 05/26/2020 35  ms Final    LV Systolic Volume Index 05/26/2020 35.9  mL/m2 Final    LV Diastolic Volume Index 05/26/2020 60.50  mL/m2 Final    LA Volume Index 05/26/2020 54.9  mL/m2 Final    LV Mass Index 05/26/2020 89  g/m2 Final    LA Volume Index  (Mod) 05/26/2020 32.9  mL/m2 Final   Hospital Outpatient Visit on 05/26/2020   Component Date Value Ref Range Status    [facility] CV CAROTID ULTRASOUND LEFT ICA* 05/26/2020 0.89   Final    [facility] CV CAROTID ULTRASOUND RIGHT IC* 05/26/2020 1.33   Final    [facility] CV PV CAROTID LEFT HIGHEST ICA 05/26/2020 66.00   Final    [facility] CV PV CAROTID LEFT HIGHEST CCA 05/26/2020 74   Final    [facility] CV PV CAROTID RIGHT HIGHEST ICA 05/26/2020 72.00   Final    [facility] CV PV CAROTID RIGHT HIGHEST CCA 05/26/2020 54   Final    [facility] CV CAROTID RIGHT ICA EDV HIGHE* 05/26/2020 22.00   Final    LT Highest EDV 05/26/2020 16.00   Final    Right CCA prox sys 05/26/2020 54  cm/s Final    Right CCA prox adamson 05/26/2020 9  cm/s Final    Right cca dist sys 05/26/2020 54  cm/s Final    Right CCA dist adamson 05/26/2020 9  cm/s Final    Right ICA prox sys 05/26/2020 64  cm/s Final    Right ICA prox adamson 05/26/2020 16  cm/s Final    Right ICA mid sys 05/26/2020 72  cm/s Final    Right ICA mid adamson 05/26/2020 22  cm/s Final    Right ICA dist sys 05/26/2020 65  cm/s Final    Right ICA dist adamson 05/26/2020 17  cm/s Final    Right eca sys 05/26/2020 100  cm/s Final    Right vertebral sys 05/26/2020 42  cm/s Final    Left CCA prox sys 05/26/2020 74  cm/s Final    Left CCA prox adamson 05/26/2020 10  cm/s Final    Left CCA dist sys 05/26/2020 64  cm/s Final    Left CCA dist adamson 05/26/2020 12  cm/s Final    Left ICA prox sys 05/26/2020 55  cm/s Final    Left ICA prox adamson 05/26/2020 14  cm/s Final    Left ICA mid sys 05/26/2020 47  cm/s Final    Left ICA mid adamson 05/26/2020 11  cm/s Final    Left ICA dist sys 05/26/2020 66  cm/s Final    Left ICA dist adamson 05/26/2020 16  cm/s Final    Left ECA sys 05/26/2020 83  cm/s Final    Left vertebral sys 05/26/2020 36  cm/s Final    Right vertebral adamson 05/26/2020 11  cm/s Final    Left vertebral adamson 05/26/2020 9  cm/s Final   Lab Visit on 05/22/2020   Component Date Value Ref Range Status    BNP  05/22/2020 876* 0 - 99 pg/mL Final    Values of less than 100 pg/ml are consistent with non-CHF populations.   Orders Only on 05/21/2020   Component Date Value Ref Range Status    Total Protein 05/21/2020 7.5  6.1 - 8.1 g/dL Final    Albumin 05/21/2020 3.7* 3.8 - 4.8 g/dL Final    Alpha-1-Globulins 05/21/2020 0.4* 0.2 - 0.3 g/dL Final    Alpha-2-Globulins 05/21/2020 0.8  0.5 - 0.9 g/dL Final    Beta Globulin 05/21/2020 0.5  0.4 - 0.6 g/dL Final    Beta-2 Microglobulin 05/21/2020 0.5  0.2 - 0.5 g/dL Final    Gamma Globulin 05/21/2020 1.5  0.8 - 1.7 g/dL Final    Interpretation 05/21/2020    Final    Comment:    Pattern consistent with an acute phase reaction        Possible monoclonal protein (M-protein) present.  Suggest serum immunofixation.         ANCA Screen 05/21/2020 NEGATIVE  NEGATIVE Final    Comment:    ANCA Screen includes evaluation for p-ANCA, c-ANCA and  atypical p-ANCA. A positive ANCA screen reflexes to titer  and pattern(s), e.g., cytoplasmic pattern (c-ANCA),  perinuclear pattern (p-ANCA), or atypical p-ANCA pattern.  c-ANCA and p-ANCA are observed in vasculitis, whereas  atypical p-ANCA is observed in IBD (Inflammatory Bowel  Disease). Atypical p-ANCA is detected in about 55% to 80% of  patients with ulcerative colitis but only 5% to 25% of  patients with Crohn's disease.      Myeloperoxidase Ab 05/21/2020 <1.0  <1.0 AI Final    Comment:          <1.0 AI No Antibody Detected  > or = 1.0 AI Antibody Detected     Autoantibodies to myeloperoxidase (MPO) are commonly  associated with the following small-vessel vasculitides:  microscopic polyangiitis, polyarteritis nodosa,  Churg-Candy syndrome, necrotizing and crescentic  glomerulonephritis and occasionally granulomatosis with  polyangiitis (GPA, Wegener's). The perinuclear IFA pattern,  (p-ANCA) is based largely on autoantibody to myeloperoxidase  which serves as the primary antigen. These autoantibodies  are present in active disease.       Proteinase 3 Antibody 05/21/2020 <1.0  <1.0 AI Final    Comment:          <1.0 AI No Antibody Detected  > or = 1.0 AI Antibody Detected     Autoantibodies to proteinase-3 (OR-3) are accepted as  characteristic for granulomatosis with polyangiitis (GPA,  Wegener's), and are detectable in 95% of the histologically  proven cases. The cytoplasmic IFA pattern, (c-ANCA), is  based largely on autoantibody to OR-3 which serves as the  primary antigen. These autoantibodies are present in active  disease.      Sed Rate 05/21/2020 33* < OR = 30 mm/h Final    CRP 05/21/2020 27.8* <8.0 mg/L Final   Office Visit on 05/19/2020   Component Date Value Ref Range Status    Cardiolipin Ab IgG 05/19/2020 <14  GPL Final    Comment:     Value       Interpretation      -----       --------------      < or = 14   Negative      15 - 20     Indeterminate      21 - 80     Low to Medium Positive      >80         High Positive     The antiphospholipid antibody syndrome (APS) is a   clinical-pathologic correlation that includes a   clinical event (e.g. thrombosis, pregnancy loss,   thrombocytopenia) and persistent positive   antiphospholipid antibodies   (IgM or IgG GARIMA >40 MPL/GPL, IgM or IgG anti-b2GPI  antibodies or a lupus anticoagulant). International   consensus guidelines for APS suggest waiting at least   12 weeks before retesting to confirm antibody   persistence.  The [employer]   [facility] immunological classification   criteria for systemic lupus erythematosus (SLE)   include testing for isotype IgA, which has yet to be   incorporated into APS criteria. Low level   antiphospholipid antibodies may sometimes be detected   in the setting of infection, drug therapy or aging.      Cardiolipin Ab IgA 05/19/2020 <11  APL Final    Comment:     Value       Interpretation      -----       --------------      < or = 11   Negative      12 - 20     Indeterminate      21 - 80     Low to Medium Positive      >80          High Positive       The antiphospholipid antibody syndrome (APS) is a   clinical-pathologic correlation that includes a   clinical event (e.g. thrombosis, pregnancy loss,   thrombocytopenia) and persistent positive   antiphospholipid antibodies   (IgM or IgG GARIMA >40 MPL/GPL, IgM or IgG anti-b2GPI  antibodies or a lupus anticoagulant). International   consensus guidelines for APS suggest waiting at least   12 weeks before retesting to confirm antibody   persistence.  [facility] [employer]   [facility] immunological classification   criteria for systemic lupus erythematosus (SLE)   include testing for isotype IgA, which has yet to be   incorporated into APS criteria. Low level   antiphospholipid antibodies may sometimes be detected   in the setting of infection, drug therapy or aging.      Cardiolipin Ab IgM 05/19/2020 23* MPL Final    Comment:     Value       Interpretation      -----       --------------      < or = 12   Negative      13 - 20     Indeterminate      21 - 80     Low to Medium Positive      >80         High Positive     The antiphospholipid antibody syndrome (APS) is a   clinical-pathologic correlation that includes a   clinical event (e.g. thrombosis, pregnancy loss,   thrombocytopenia) and persistent positive   antiphospholipid antibodies   (IgM or IgG GARIMA >40 MPL/GPL, IgM or IgG anti-b2GPI  antibodies or a lupus anticoagulant). International   consensus guidelines for APS suggest waiting at least   12 weeks before retesting to confirm antibody   persistence.  [facility] [employer]   [facility] immunological classification   criteria for systemic lupus erythematosus (SLE)   include testing for isotype IgA, which has yet to be   incorporated into APS criteria. Low level   antiphospholipid antibodies may sometimes be detected   in the setting of infection, drug therapy or aging.      Lupus Anticoagulant 05/19/2020 SEE NOTE  NOT DETECTED Final     Comment: A Lupus Anticoagulant is not detected.     Common causes for a prolonged screen and negative  confirmatory test include factor deficiencies or  anticoagulant therapy.     This interpretation is based  on the following test results:      PTT LA Screen 05/19/2020 42* < OR = 40 seconds Final    Comment:    For more information on this test, go to:  http://education.[employer]/faq/KZI90i7  (This link is being provided for informational/educational  purposes only.)      DRVVT Screen 05/19/2020 40  < OR = 45 seconds Final    Beta 2 Microglobulin, Serum 05/19/2020 TNP  mg/L Final    Comment: TEST NOT PERFORMED     No suitable specimen received.      Hexagonal Phase Confirm 05/19/2020 NEGATIVE  NEGATIVE Final   There may be more visits with results that are not included.       Imaging  No results found.    Assessment  1. Atherosclerosis of native coronary artery of native heart with stable angina pectoris  Stable and free of symptoms  - IN OFFICE EKG 12-LEAD (to Muse)  - Echo Color Flow Doppler? Yes; Future  - Lipid Panel; Future  - Comprehensive metabolic panel; Future    2. Hypercholesteremia  Needs reassessment with new goals  - Lipid Panel; Future  - Comprehensive metabolic panel; Future    3. Acute systolic heart failure  Compensated      Plan and Discussion    Echocardiogram to reassess left ventricular contractility after revascularization.  Continue current guideline directed medical therapy.  Can start tapering back on furosemide and take on an as-needed basis.    Follow Up  Follow up in about 3 months (around 10/17/2020).      Fabrice Rodriguez MD

## 2020-08-12 DIAGNOSIS — I50.21 ACUTE SYSTOLIC HEART FAILURE: ICD-10-CM

## 2020-08-12 RX ORDER — FUROSEMIDE 40 MG/1
40 TABLET ORAL DAILY PRN
Qty: 90 TABLET | Refills: 1 | Status: SHIPPED | OUTPATIENT
Start: 2020-08-12 | End: 2021-12-02 | Stop reason: SDUPTHER

## 2020-08-17 ENCOUNTER — OFFICE VISIT (OUTPATIENT)
Dept: RHEUMATOLOGY | Facility: CLINIC | Age: 75
End: 2020-08-17
Payer: MEDICARE

## 2020-08-17 VITALS
WEIGHT: 160.63 LBS | BODY MASS INDEX: 29.37 KG/M2 | DIASTOLIC BLOOD PRESSURE: 68 MMHG | SYSTOLIC BLOOD PRESSURE: 115 MMHG | TEMPERATURE: 97 F

## 2020-08-17 DIAGNOSIS — M35.9 UNDIFFERENTIATED CONNECTIVE TISSUE DISEASE: Primary | ICD-10-CM

## 2020-08-17 DIAGNOSIS — Z79.899 ENCOUNTER FOR LONG-TERM (CURRENT) DRUG USE: ICD-10-CM

## 2020-08-17 PROCEDURE — 1101F PT FALLS ASSESS-DOCD LE1/YR: CPT | Mod: S$GLB,,, | Performed by: INTERNAL MEDICINE

## 2020-08-17 PROCEDURE — 1159F MED LIST DOCD IN RCRD: CPT | Mod: S$GLB,,, | Performed by: INTERNAL MEDICINE

## 2020-08-17 PROCEDURE — 1101F PR PT FALLS ASSESS DOC 0-1 FALLS W/OUT INJ PAST YR: ICD-10-PCS | Mod: S$GLB,,, | Performed by: INTERNAL MEDICINE

## 2020-08-17 PROCEDURE — 1159F PR MEDICATION LIST DOCUMENTED IN MEDICAL RECORD: ICD-10-PCS | Mod: S$GLB,,, | Performed by: INTERNAL MEDICINE

## 2020-08-17 PROCEDURE — 1126F PR PAIN SEVERITY QUANTIFIED, NO PAIN PRESENT: ICD-10-PCS | Mod: S$GLB,,, | Performed by: INTERNAL MEDICINE

## 2020-08-17 PROCEDURE — 99213 PR OFFICE/OUTPT VISIT, EST, LEVL III, 20-29 MIN: ICD-10-PCS | Mod: S$GLB,,, | Performed by: INTERNAL MEDICINE

## 2020-08-17 PROCEDURE — 99213 OFFICE O/P EST LOW 20 MIN: CPT | Mod: S$GLB,,, | Performed by: INTERNAL MEDICINE

## 2020-08-17 PROCEDURE — 1126F AMNT PAIN NOTED NONE PRSNT: CPT | Mod: S$GLB,,, | Performed by: INTERNAL MEDICINE

## 2020-08-17 NOTE — PROGRESS NOTES
[facility] RHEUMATOLOGY           Follow-up visit    Notes dictated to M*Modal. Please forgive any unintended errors.  Subjective:       Patient ID:   NAME: Jacqueline M Goldberg : 1945     74 y.o. female    Referring Doc: No ref. provider found  Other Physicians:    Chief Complaint:  Undifferentiated connective tissue disease and Lupus      HPI/Interval History:  The patient states I am my self again.  She has absolutely no complaints of fatigue, shortness of breath, chest pain, or pleurisy.  She has had no problems with joint pain or swelling.  She is fully active now and able to do anything or go anywhere she pleases.    ROS:   GEN:    no fever, night sweats or weight loss  SKIN:   no rashes, bruising, or swelling, no Raynauds, no photosensitivity  HEENT: no changes in vision, no mouth ulcers, no sicca symptoms, no scalp tenderness, no jaw claudication.  CV:      no CP, PND, COTA, orthopnea, no palpitations  PULM: no SOB, cough, hemoptysis, sputum or pleuritic pain  GI:        no dysphagia, no GERD, no hematemesis, no abdominal pain, nausea, vomiting, constipation, diarrhea, melanotic stools, BRBPR  :      no hematuria, dysuria  NEURO: no paresthesias, headaches, acute visual disturbances  MUSCULOSKELETAL:  No red, hot, and/or swollen joints  PSYCH:   No increased insomnia, no increased anxiety, no increased depression    Past Medical/Surgical History:  Past Medical History:   Diagnosis Date    Anemia, chronic disease 2019    Arthritis     Blind left eye     Chronic pain     Coronary atherosclerosis 2020    Eosinophilia 2019    Hypothyroidism     Insomnia     Leucocytosis 2019    Pneumonia     PONV (postoperative nausea and vomiting)     needs phenergan    Scoliosis      Past Surgical History:   Procedure Laterality Date    ANKLE LIGAMENT RECONSTRUCTION Right     CATARACT EXTRACTION Bilateral     CHOLECYSTECTOMY      CORONARY ANGIOGRAPHY N/A 2020     Procedure: ANGIOGRAM, CORONARY ARTERY - right radial;  Surgeon: Fabrice Rodriguez MD;  Location[facility] CATH LAB;  Service: Cardiology;  Laterality: N/A;    CORONARY STENT PLACEMENT  06/19/2020    pLAD Nathan 2.75 x 18 mm       Allergies:  Review of patient's allergies indicates:  No Known Allergies    Social/Family History:  Social History     Socioeconomic History    Marital status: Single     Spouse name: Not on file    Number of children: Not on file    Years of education: Not on file    Highest education level: Not on file   Occupational History    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   Social Needs    Financial resource strain: Not on file    Food insecurity     Worry: Not on file     Inability: Not on file    Transportation needs     Medical: Not on file     Non-medical: Not on file   Tobacco Use    Smoking status: Former Smoker    Smokeless tobacco: Never Used   Substance and Sexual Activity    Alcohol use: Not Currently    Drug use: Never    Sexual activity: Yes     Partners: Male     Birth control/protection: None   Lifestyle    Physical activity     Days per week: Not on file     Minutes per session: Not on file    Stress: Not on file   Relationships    Social connections     Talks on phone: Not on file     Gets together: Not on file     Attends Anabaptist service: Not on file     Active member of club or organization: Not on file     Attends meetings of clubs or organizations: Not on file     Relationship status: Not on file   Other Topics Concern    Not on file   Social History Narrative    Not on file     Family History   Problem Relation Age of Onset    Diabetes Mother     Cancer Mother     Heart disease Father     Arthritis Sister      FAMILY HISTORY: negative for Connective Tissue Disease      Medications:    Current Outpatient Medications:     aspirin (LO-DOSE ASPIRIN) 81 MG EC tablet, Take 81 mg by mouth once daily., Disp: , Rfl:     atorvastatin (LIPITOR) 20 MG  tablet, Take 1 tablet (20 mg total) by mouth every evening., Disp: 90 tablet, Rfl: 3    clopidogreL (PLAVIX) 75 mg tablet, Take 1 tablet (75 mg total) by mouth once daily., Disp: 90 tablet, Rfl: 3    cyanocobalamin, vitamin B-12, 200 mcg/spray SpSn, Place 200 mcg under the tongue every 14 (fourteen) days., Disp: 12 mL, Rfl: 3    folic acid (FOLVITE) 1 MG tablet, Take 1 tablet (1 mg total) by mouth once daily., Disp: 90 tablet, Rfl: 2    furosemide (LASIX) 40 MG tablet, Take 1 tablet (40 mg total) by mouth daily as needed (fluid)., Disp: 90 tablet, Rfl: 1    hydroxychloroquine (PLAQUENIL) 200 mg tablet, Take 1 tablet (200 mg total) by mouth 2 (two) times daily., Disp: 60 tablet, Rfl: 5    ibuprofen (ADVIL,MOTRIN) 800 MG tablet, , Disp: , Rfl: 0    levothyroxine (SYNTHROID) 50 MCG tablet, Take 50 mcg by mouth., Disp: , Rfl:     losartan (COZAAR) 25 MG tablet, Take 1 tablet (25 mg total) by mouth every 12 (twelve) hours., Disp: 180 tablet, Rfl: 3    predniSONE (DELTASONE) 10 MG tablet, Take 1 tablet (10 mg total) by mouth once daily. DOSE DECREASE 6/29/2020, Disp: 90 tablet, Rfl: 1    tetrahydrozoline/polyethyl gly (EYE DROPS OPHT), Apply to eye., Disp: , Rfl:     Current Facility-Administered Medications:     cyanocobalamin injection 1,000 mcg, 1,000 mcg, Subcutaneous, Q30 Days, Srinivasa Huffman MD, 1,000 mcg at 06/10/20 1050      Objective:     Vitals:  Blood pressure 115/68, temperature 97.4 °F (36.3 °C), weight 72.8 kg (160 lb 9.6 oz).    Physical Examination:   GEN: wn/wd female in no apparent distress  SKIN: no rashes, no sclerodactyly, no Raynaud's, no periungual erythema, no digital tip ulcerations, no nailbed pitting  HEAD: no alopecia, no scalp tenderness, no temporal artery tenderness or induration.  EYES: no pallor, no icterus, PERRLA  ENT:  no thrush, no mucosal dryness or ulcerations, adequate oral hygiene & dentition.  NECK: supple x 6, no masses, no thyromegaly, no lymphadenopathy.  CV:    S1 and S2 regular, no murmurs, gallop or rubs  CHEST: Normal respiratory effort;  normal breath sounds/no adventitious sounds. No signs of consolidation.  ABD: non-tender and non-distended; soft; normal bowel sounds; no rebound/guarding or tenderness. No hepatosplenomegaly.  Musculoskeletal:  No evidence of inflammatory arthritis  EXTREM: no clubbing, cyanosis or edema. normal pulses.  NEURO:  grossly intact; motor/sensory WNL; no tremors  PSYCH:  normal mood, affect and behavior    Labs:   Lab Results   Component Value Date    WBC 11.93 06/20/2020    HGB 11.2 (L) 06/20/2020    HCT 37.7 06/20/2020    MCV 79 (L) 06/20/2020     06/20/2020   CMP@  Sodium   Date Value Ref Range Status   06/20/2020 139 136 - 145 mmol/L Final     Potassium   Date Value Ref Range Status   06/20/2020 4.0 3.5 - 5.1 mmol/L Final     Chloride   Date Value Ref Range Status   06/20/2020 104 95 - 110 mmol/L Final     CO2   Date Value Ref Range Status   06/20/2020 23 23 - 29 mmol/L Final     Glucose   Date Value Ref Range Status   06/20/2020 119 (H) 70 - 110 mg/dL Final     BUN, Bld   Date Value Ref Range Status   06/20/2020 39 (H) 8 - 23 mg/dL Final     Creatinine   Date Value Ref Range Status   06/20/2020 0.9 0.5 - 1.4 mg/dL Final     Calcium   Date Value Ref Range Status   06/20/2020 9.2 8.7 - 10.5 mg/dL Final     Total Protein   Date Value Ref Range Status   05/21/2020 7.5 6.1 - 8.1 g/dL Final     Albumin   Date Value Ref Range Status   04/09/2020 4.3 3.6 - 5.1 g/dL Final     Total Bilirubin   Date Value Ref Range Status   04/09/2020 0.6 0.2 - 1.2 mg/dL Final     Alkaline Phosphatase   Date Value Ref Range Status   04/09/2020 76 37 - 153 U/L Final     AST   Date Value Ref Range Status   04/09/2020 15 10 - 35 U/L Final     ALT   Date Value Ref Range Status   04/09/2020 8 6 - 29 U/L Final     CPK   Date Value Ref Range Status   06/20/2020 26 20 - 180 U/L Final     CRP   Date Value Ref Range Status   05/21/2020 27.8 (H) <8.0 mg/L Final      OBINNA   Date Value Ref Range Status   12/04/2019 POSITIVE (A) NEGATIVE Final     Comment:     OBINNA IFA is a first line screen for detecting the  presence of up to approximately 150 autoantibodies in  various autoimmune diseases. A positive OBINNA IFA result  is suggestive of autoimmune disease and reflexes to  titer and pattern. Further laboratory testing may be  considered if clinically indicated.     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  I have instructed her to decrease prednisone to 10 mg every other day.  She will continue hydroxychloroquine without change.  Follow-up blood testing will be done in 2 months.  I will see her back in 3 months.    RTC:  3 months or sooner if needed    Electronically signed by Refugio Owens MD

## 2020-08-18 LAB
ALBUMIN SERPL-MCNC: 4 G/DL (ref 3.6–5.1)
ALBUMIN/GLOB SERPL: 1.4 (CALC) (ref 1–2.5)
ALP SERPL-CCNC: 111 U/L (ref 37–153)
ALT SERPL-CCNC: 25 U/L (ref 6–29)
AST SERPL-CCNC: 20 U/L (ref 10–35)
BASOPHILS # BLD AUTO: 58 CELLS/UL (ref 0–200)
BASOPHILS NFR BLD AUTO: 0.5 %
BILIRUB SERPL-MCNC: 0.4 MG/DL (ref 0.2–1.2)
BUN SERPL-MCNC: 28 MG/DL (ref 7–25)
BUN/CREAT SERPL: 29 (CALC) (ref 6–22)
CALCIUM SERPL-MCNC: 9.8 MG/DL (ref 8.6–10.4)
CHLORIDE SERPL-SCNC: 98 MMOL/L (ref 98–110)
CO2 SERPL-SCNC: 33 MMOL/L (ref 20–32)
CREAT SERPL-MCNC: 0.97 MG/DL (ref 0.6–0.93)
CRP SERPL-MCNC: 2.4 MG/L
EOSINOPHIL # BLD AUTO: 104 CELLS/UL (ref 15–500)
EOSINOPHIL NFR BLD AUTO: 0.9 %
ERYTHROCYTE [DISTWIDTH] IN BLOOD BY AUTOMATED COUNT: 16.1 % (ref 11–15)
GFRSERPLBLD MDRD-ARVRAT: 57 ML/MIN/1.73M2
GLOBULIN SER CALC-MCNC: 2.8 G/DL (CALC) (ref 1.9–3.7)
GLUCOSE SERPL-MCNC: 110 MG/DL (ref 65–99)
HCT VFR BLD AUTO: 39.6 % (ref 35–45)
HGB BLD-MCNC: 12.1 G/DL (ref 11.7–15.5)
LYMPHOCYTES # BLD AUTO: 1162 CELLS/UL (ref 850–3900)
LYMPHOCYTES NFR BLD AUTO: 10.1 %
MCH RBC QN AUTO: 23.9 PG (ref 27–33)
MCHC RBC AUTO-ENTMCNC: 30.6 G/DL (ref 32–36)
MCV RBC AUTO: 78.1 FL (ref 80–100)
MONOCYTES # BLD AUTO: 529 CELLS/UL (ref 200–950)
MONOCYTES NFR BLD AUTO: 4.6 %
NEUTROPHILS # BLD AUTO: 9649 CELLS/UL (ref 1500–7800)
NEUTROPHILS NFR BLD AUTO: 83.9 %
PLATELET # BLD AUTO: 307 THOUSAND/UL (ref 140–400)
PMV BLD REES-ECKER: 10.1 FL (ref 7.5–12.5)
POTASSIUM SERPL-SCNC: 4.3 MMOL/L (ref 3.5–5.3)
PROT SERPL-MCNC: 6.8 G/DL (ref 6.1–8.1)
RBC # BLD AUTO: 5.07 MILLION/UL (ref 3.8–5.1)
SODIUM SERPL-SCNC: 140 MMOL/L (ref 135–146)
WBC # BLD AUTO: 11.5 THOUSAND/UL (ref 3.8–10.8)

## 2020-10-13 ENCOUNTER — HOSPITAL ENCOUNTER (OUTPATIENT)
Dept: CARDIOLOGY | Facility: OTHER | Age: 75
Discharge: HOME | End: 2020-10-13
Attending: INTERNAL MEDICINE
Payer: MEDICARE

## 2020-10-13 VITALS
WEIGHT: 160 LBS | SYSTOLIC BLOOD PRESSURE: 115 MMHG | DIASTOLIC BLOOD PRESSURE: 68 MMHG | HEART RATE: 60 BPM | BODY MASS INDEX: 29.44 KG/M2 | HEIGHT: 62 IN

## 2020-10-13 DIAGNOSIS — I25.118 ATHEROSCLEROSIS OF NATIVE CORONARY ARTERY OF NATIVE HEART WITH STABLE ANGINA PECTORIS: ICD-10-CM

## 2020-10-13 LAB
AV INDEX (PROSTH): 0.95
AV MEAN GRADIENT: 3 MMHG
AV PEAK GRADIENT: 7 MMHG
AV VALVE AREA: 2.88 CM2
AV VELOCITY RATIO: 0.91
BSA FOR ECHO PROCEDURE: 1.78 M2
CV ECHO LV RWT: 0.47 CM
DOP CALC AO PEAK VEL: 1.32 M/S
DOP CALC AO VTI: 26.32 CM
DOP CALC LVOT AREA: 3 CM2
DOP CALC LVOT DIAMETER: 1.97 CM
DOP CALC LVOT PEAK VEL: 1.2 M/S
DOP CALC LVOT STROKE VOLUME: 75.8 CM3
DOP CALCLVOT PEAK VEL VTI: 24.88 CM
E WAVE DECELERATION TIME: 168.96 MSEC
E/A RATIO: 0.74
E/E' RATIO: 10.27 M/S
ECHO LV POSTERIOR WALL: 0.84 CM (ref 0.6–1.1)
FRACTIONAL SHORTENING: 31 % (ref 28–44)
INTERVENTRICULAR SEPTUM: 0.77 CM (ref 0.6–1.1)
IVRT: 96.12 MSEC
LA MAJOR: 5.24 CM
LA MINOR: 5.65 CM
LA WIDTH: 4.18 CM
LEFT ATRIUM SIZE: 3.44 CM
LEFT ATRIUM VOLUME INDEX MOD: 38.5 ML/M2
LEFT ATRIUM VOLUME INDEX: 38.2 ML/M2
LEFT ATRIUM VOLUME MOD: 67 CM3
LEFT ATRIUM VOLUME: 66.46 CM3
LEFT INTERNAL DIMENSION IN SYSTOLE: 2.46 CM (ref 2.1–4)
LEFT VENTRICLE DIASTOLIC VOLUME INDEX: 30.72 ML/M2
LEFT VENTRICLE DIASTOLIC VOLUME: 53.41 ML
LEFT VENTRICLE MASS INDEX: 45 G/M2
LEFT VENTRICLE SYSTOLIC VOLUME INDEX: 12.4 ML/M2
LEFT VENTRICLE SYSTOLIC VOLUME: 21.49 ML
LEFT VENTRICULAR INTERNAL DIMENSION IN DIASTOLE: 3.57 CM (ref 3.5–6)
LEFT VENTRICULAR MASS: 78.39 G
LV LATERAL E/E' RATIO: 8.56 M/S
LV SEPTAL E/E' RATIO: 12.83 M/S
MV PEAK A VEL: 1.04 M/S
MV PEAK E VEL: 0.77 M/S
MV STENOSIS PRESSURE HALF TIME: 49 MS
MV VALVE AREA P 1/2 METHOD: 4.49 CM2
PISA MRMAX VEL: 0.03 M/S
PISA TR MAX VEL: 2.91 M/S
PULM VEIN S/D RATIO: 1.08
PV PEAK D VEL: 0.6 M/S
PV PEAK S VEL: 0.65 M/S
PV PEAK VELOCITY: 0.9 CM/S
RA MAJOR: 4.62 CM
RA PRESSURE: 3 MMHG
RA WIDTH: 3.16 CM
SINUS: 2.2 CM
STJ: 2 CM
TDI LATERAL: 0.09 M/S
TDI SEPTAL: 0.06 M/S
TDI: 0.08 M/S
TR MAX PG: 34 MMHG
TRICUSPID ANNULAR PLANE SYSTOLIC EXCURSION: 2.3 CM
TV REST PULMONARY ARTERY PRESSURE: 37 MMHG

## 2020-10-13 PROCEDURE — 93306 ECHO (CUPID ONLY): ICD-10-PCS | Mod: 26,,, | Performed by: INTERNAL MEDICINE

## 2020-10-13 PROCEDURE — 93306 TTE W/DOPPLER COMPLETE: CPT

## 2020-10-13 PROCEDURE — 93306 TTE W/DOPPLER COMPLETE: CPT | Mod: 26,,, | Performed by: INTERNAL MEDICINE

## 2020-10-19 ENCOUNTER — OFFICE VISIT (OUTPATIENT)
Dept: CARDIOLOGY | Facility: CLINIC | Age: 75
End: 2020-10-19
Payer: MEDICARE

## 2020-10-19 VITALS
WEIGHT: 163.38 LBS | OXYGEN SATURATION: 99 % | HEART RATE: 61 BPM | SYSTOLIC BLOOD PRESSURE: 116 MMHG | BODY MASS INDEX: 29.88 KG/M2 | DIASTOLIC BLOOD PRESSURE: 60 MMHG

## 2020-10-19 DIAGNOSIS — I50.21 ACUTE SYSTOLIC HEART FAILURE: ICD-10-CM

## 2020-10-19 DIAGNOSIS — E78.00 HYPERCHOLESTEREMIA: Chronic | ICD-10-CM

## 2020-10-19 DIAGNOSIS — I25.10 ATHEROSCLEROSIS OF NATIVE CORONARY ARTERY OF NATIVE HEART WITHOUT ANGINA PECTORIS: Primary | ICD-10-CM

## 2020-10-19 PROBLEM — I50.20 SYSTOLIC HEART FAILURE: Status: ACTIVE | Noted: 2020-06-19

## 2020-10-19 PROCEDURE — 99999 PR PBB SHADOW E&M-EST. PATIENT-LVL IV: ICD-10-PCS | Mod: PBBFAC,,, | Performed by: INTERNAL MEDICINE

## 2020-10-19 PROCEDURE — 1101F PT FALLS ASSESS-DOCD LE1/YR: CPT | Mod: CPTII,S$GLB,, | Performed by: INTERNAL MEDICINE

## 2020-10-19 PROCEDURE — 99214 OFFICE O/P EST MOD 30 MIN: CPT | Mod: S$GLB,,, | Performed by: INTERNAL MEDICINE

## 2020-10-19 PROCEDURE — 1159F PR MEDICATION LIST DOCUMENTED IN MEDICAL RECORD: ICD-10-PCS | Mod: S$GLB,,, | Performed by: INTERNAL MEDICINE

## 2020-10-19 PROCEDURE — 1126F PR PAIN SEVERITY QUANTIFIED, NO PAIN PRESENT: ICD-10-PCS | Mod: S$GLB,,, | Performed by: INTERNAL MEDICINE

## 2020-10-19 PROCEDURE — 99214 PR OFFICE/OUTPT VISIT, EST, LEVL IV, 30-39 MIN: ICD-10-PCS | Mod: S$GLB,,, | Performed by: INTERNAL MEDICINE

## 2020-10-19 PROCEDURE — 1126F AMNT PAIN NOTED NONE PRSNT: CPT | Mod: S$GLB,,, | Performed by: INTERNAL MEDICINE

## 2020-10-19 PROCEDURE — 1101F PR PT FALLS ASSESS DOC 0-1 FALLS W/OUT INJ PAST YR: ICD-10-PCS | Mod: CPTII,S$GLB,, | Performed by: INTERNAL MEDICINE

## 2020-10-19 PROCEDURE — 99999 PR PBB SHADOW E&M-EST. PATIENT-LVL IV: CPT | Mod: PBBFAC,,, | Performed by: INTERNAL MEDICINE

## 2020-10-19 PROCEDURE — 1159F MED LIST DOCD IN RCRD: CPT | Mod: S$GLB,,, | Performed by: INTERNAL MEDICINE

## 2020-10-19 NOTE — PROGRESS NOTES
[facility]    Chief Complaint  Chief Complaint   Patient presents with    Congestive Heart Failure    Coronary Artery Disease       HPI:    Yo, she developed a nose bleed after an eye injection for glaucoma.  She was instructed by her ophthalmologist to stop aspirin.  She then went to the emergency department because it did not get better.  She was again instructed not to take aspirin.  She continued to take Plavix.  Nose bleed has resolved.    Otherwise, feels well.  No problems with exertional dyspnea or chest discomfort.  Weight has been stable.    Medications  Current Outpatient Medications   Medication Sig Dispense Refill    atorvastatin (LIPITOR) 20 MG tablet Take 1 tablet (20 mg total) by mouth every evening. 90 tablet 3    clopidogreL (PLAVIX) 75 mg tablet Take 1 tablet (75 mg total) by mouth once daily. 90 tablet 3    cyanocobalamin, vitamin B-12, 200 mcg/spray SpSn Place 200 mcg under the tongue every 14 (fourteen) days. 12 mL 3    folic acid (FOLVITE) 1 MG tablet Take 1 tablet (1 mg total) by mouth once daily. 90 tablet 2    furosemide (LASIX) 40 MG tablet Take 1 tablet (40 mg total) by mouth daily as needed (fluid). 90 tablet 1    hydroxychloroquine (PLAQUENIL) 200 mg tablet Take 1 tablet (200 mg total) by mouth 2 (two) times daily. 60 tablet 5    ibuprofen (ADVIL,MOTRIN) 800 MG tablet   0    levothyroxine (SYNTHROID) 50 MCG tablet Take 50 mcg by mouth.      losartan (COZAAR) 25 MG tablet Take 1 tablet (25 mg total) by mouth every 12 (twelve) hours. 180 tablet 3    tetrahydrozoline/polyethyl gly (EYE DROPS OPHT) Apply to eye.      aspirin (LO-DOSE ASPIRIN) 81 MG EC tablet Take 81 mg by mouth once daily.       Current Facility-Administered Medications   Medication Dose Route Frequency Provider Last Rate Last Dose    cyanocobalamin injection 1,000 mcg  1,000 mcg Subcutaneous Q30 Days Srinivasa Huffman MD   1,000 mcg at 06/10/20 1050        History  Past Medical History:    Diagnosis Date    Anemia, chronic disease 12/18/2019    Arthritis     Blind left eye     Chronic pain     Coronary atherosclerosis 06/20/2020    Eosinophilia 12/18/2019    Hypothyroidism     Insomnia     Leucocytosis 12/18/2019    Pneumonia     PONV (postoperative nausea and vomiting)     needs phenergan    Scoliosis      Past Surgical History:   Procedure Laterality Date    ANKLE LIGAMENT RECONSTRUCTION Right     CATARACT EXTRACTION Bilateral     CHOLECYSTECTOMY      CORONARY ANGIOGRAPHY N/A 6/19/2020    Procedure: ANGIOGRAM, CORONARY ARTERY - right radial;  Surgeon: Fabrice Rodriguez MD;  Location: [facility] CATH LAB;  Service: Cardiology;  Laterality: N/A;    CORONARY STENT PLACEMENT  06/19/2020    pLAD Nathan 2.75 x 18 mm     Social History     Socioeconomic History    Marital status: Single     Spouse name: Not on file    Number of children: Not on file    Years of education: Not on file    Highest education level: Not on file   Occupational History    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   Social Needs    Financial resource strain: Not on file    Food insecurity     Worry: Not on file     Inability: Not on file    Transportation needs     Medical: Not on file     Non-medical: Not on file   Tobacco Use    Smoking status: Former Smoker    Smokeless tobacco: Never Used   Substance and Sexual Activity    Alcohol use: Not Currently    Drug use: Never    Sexual activity: Yes     Partners: Male     Birth control/protection: None   Lifestyle    Physical activity     Days per week: Not on file     Minutes per session: Not on file    Stress: Not on file   Relationships    Social connections     Talks on phone: Not on file     Gets together: Not on file     Attends Rastafarian service: Not on file     Active member of club or organization: Not on file     Attends meetings of clubs or organizations: Not on file     Relationship status: Not on file   Other Topics Concern    Not on  file   Social History Narrative    Not on file     Family History   Problem Relation Age of Onset    Diabetes Mother     Cancer Mother     Heart disease Father     Arthritis Sister         Allergies  Review of patient's allergies indicates:  No Known Allergies    Review of Systems   Review of Systems   Constitution: Negative for malaise/fatigue, weight gain and weight loss.   HENT: Positive for nosebleeds.    Eyes: Negative for visual disturbance.   Cardiovascular: Negative for chest pain, claudication, cyanosis, dyspnea on exertion, irregular heartbeat, leg swelling, near-syncope, orthopnea, palpitations, paroxysmal nocturnal dyspnea and syncope.   Respiratory: Negative for cough, hemoptysis, shortness of breath, sleep disturbances due to breathing and wheezing.    Hematologic/Lymphatic: Negative for bleeding problem. Bruises/bleeds easily.   Skin: Negative for poor wound healing.   Musculoskeletal: Negative for muscle cramps and myalgias.   Gastrointestinal: Negative for abdominal pain, anorexia, diarrhea, heartburn, hematemesis, hematochezia, melena, nausea and vomiting.   Genitourinary: Negative for hematuria and nocturia.   Neurological: Negative for excessive daytime sleepiness, dizziness, focal weakness, light-headedness and weakness.       Physical Exam  Vitals:    10/19/20 1012   BP: 116/60   Pulse: 61     Wt Readings from Last 1 Encounters:   10/19/20 74.1 kg (163 lb 5.8 oz)     Physical Exam   Constitutional: She is oriented to person, place, and time. She is cooperative.  Non-toxic appearance. No distress.   HENT:   Head: Normocephalic and atraumatic.   Eyes: Conjunctivae are normal. No scleral icterus.   Neck: Neck supple. No hepatojugular reflux and no JVD present. Carotid bruit is not present. No tracheal deviation and no edema present. No thyromegaly present.   Cardiovascular: Normal rate, regular rhythm, S1 normal and S2 normal.  No extrasystoles are present. PMI is not displaced. Exam reveals  no S3 and no S4.   No murmur heard.  Pulses:       Carotid pulses are 2+ on the right side and 2+ on the left side.       Radial pulses are 2+ on the right side and 2+ on the left side.        Dorsalis pedis pulses are 2+ on the right side and 2+ on the left side.        Posterior tibial pulses are 2+ on the right side and 2+ on the left side.   Pulmonary/Chest: No accessory muscle usage. No respiratory distress. She has no decreased breath sounds. She has no wheezes. She has no rhonchi. She has no rales.   Abdominal: Soft. Bowel sounds are normal. She exhibits no pulsatile liver, no abdominal bruit and no pulsatile midline mass. There is no splenomegaly or hepatomegaly. There is no abdominal tenderness.   Musculoskeletal:         General: No tenderness, deformity or edema.   Neurological: She is alert and oriented to person, place, and time. She has normal strength. No cranial nerve deficit or sensory deficit.   Skin: Skin is warm, dry and intact. She is not diaphoretic. No cyanosis. No pallor. Nails show no clubbing.   Psychiatric: She has a normal mood and affect. Her speech is normal and behavior is normal.       Labs  Hospital Outpatient Visit on 10/13/2020   Component Date Value Ref Range Status    BSA 10/13/2020 1.78  m2 Final    TDI SEPTAL 10/13/2020 0.06  m/s Final    LV LATERAL E/E' RATIO 10/13/2020 8.56  m/s Final    LV SEPTAL E/E' RATIO 10/13/2020 12.83  m/s Final    LA WIDTH 10/13/2020 4.18  cm Final    TDI LATERAL 10/13/2020 0.09  m/s Final    PV PEAK VELOCITY 10/13/2020 0.90  cm/s Final    LVIDd 10/13/2020 3.57  3.5 - 6.0 cm Final    IVS 10/13/2020 0.77  0.6 - 1.1 cm Final    Posterior Wall 10/13/2020 0.84  0.6 - 1.1 cm Final    LVIDs 10/13/2020 2.46  2.1 - 4.0 cm Final    FS 10/13/2020 31  28 - 44 % Final    LA volume 10/13/2020 66.46  cm3 Final    Sinus 10/13/2020 2.20  cm Final    STJ 10/13/2020 2.00  cm Final    LV mass 10/13/2020 78.39  g Final    LA size 10/13/2020 3.44  cm  Final    TAPSE 10/13/2020 2.30  cm Final    Left Ventricle Relative Wall Thick* 10/13/2020 0.47  cm Final    AV mean gradient 10/13/2020 3  mmHg Final    AV valve area 10/13/2020 2.88  cm2 Final    AV Velocity Ratio 10/13/2020 0.91   Final    AV index (prosthetic) 10/13/2020 0.95   Final    MV valve area p 1/2 method 10/13/2020 4.49  cm2 Final    E/A ratio 10/13/2020 0.74   Final    Mean e' 10/13/2020 0.08  m/s Final    E wave decelartion time 10/13/2020 168.96  msec Final    IVRT 10/13/2020 96.12  msec Final    Pulm vein S/D ratio 10/13/2020 1.08   Final    LVOT diameter 10/13/2020 1.97  cm Final    LVOT area 10/13/2020 3.0  cm2 Final    LVOT peak dino 10/13/2020 1.20  m/s Final    LVOT peak VTI 10/13/2020 24.88  cm Final    Ao peak dino 10/13/2020 1.32  m/s Final    Ao VTI 10/13/2020 26.32  cm Final    Mr max dino 10/13/2020 0.03  m/s Final    LVOT stroke volume 10/13/2020 75.80  cm3 Final    AV peak gradient 10/13/2020 7  mmHg Final    E/E' ratio 10/13/2020 10.27  m/s Final    MV Peak E Dino 10/13/2020 0.77  m/s Final    TR Max Dino 10/13/2020 2.91  m/s Final    MV stenosis pressure 1/2 time 10/13/2020 49.00  ms Final    MV Peak A Dino 10/13/2020 1.04  m/s Final    PV Peak S Dino 10/13/2020 0.65  m/s Final    PV Peak D Dino 10/13/2020 0.60  m/s Final    LV Systolic Volume 10/13/2020 21.49  mL Final    LV Systolic Volume Index 10/13/2020 12.4  mL/m2 Final    LV Diastolic Volume 10/13/2020 53.41  mL Final    LV Diastolic Volume Index 10/13/2020 30.72  mL/m2 Final    LA Volume Index 10/13/2020 38.2  mL/m2 Final    LV Mass Index 10/13/2020 45  g/m2 Final    RA Major Axis 10/13/2020 4.62  cm Final    Left Atrium Minor Axis 10/13/2020 5.65  cm Final    Left Atrium Major Axis 10/13/2020 5.24  cm Final    Triscuspid Valve Regurgitation Pea* 10/13/2020 34  mmHg Final    LA Volume Index (Mod) 10/13/2020 38.5  mL/m2 Final    LA volume (mod) 10/13/2020 67.00  cm3 Final    RA Width 10/13/2020  3.16  cm Final    Right Atrial Pressure (from IVC) 10/13/2020 3  mmHg Final    TV rest pulmonary artery pressure 10/13/2020 37  mmHg Final   Lab Visit on 10/13/2020   Component Date Value Ref Range Status    Cholesterol 10/13/2020 131  120 - 199 mg/dL Final    Comment: The National Cholesterol Education Program (NCEP) has set the  following guidelines (reference ranges) for Cholesterol:  Optimal.....................<200 mg/dL  Borderline High.............200-239 mg/dL  High........................> or = 240 mg/dL      Triglycerides 10/13/2020 76  30 - 150 mg/dL Final    Comment: The National Cholesterol Education Program (NCEP) has set the  following guidelines (reference values) for triglycerides:  Normal......................<150 mg/dL  Borderline High.............150-199 mg/dL  High........................200-499 mg/dL      HDL 10/13/2020 57  40 - 75 mg/dL Final    Comment: The National Cholesterol Education Program (NCEP) has set the  following guidelines (reference values) for HDL Cholesterol:  Low...............<40 mg/dL  Optimal...........>60 mg/dL      LDL Cholesterol 10/13/2020 58.8* 63.0 - 159.0 mg/dL Final    Comment: The National Cholesterol Education Program (NCEP) has set the  following guidelines (reference values) for LDL Cholesterol:  Optimal.......................<130 mg/dL  Borderline High...............130-159 mg/dL  High..........................160-189 mg/dL  Very High.....................>190 mg/dL      Hdl/Cholesterol Ratio 10/13/2020 43.5  20.0 - 50.0 % Final    Total Cholesterol/HDL Ratio 10/13/2020 2.3  2.0 - 5.0 Final    Non-HDL Cholesterol 10/13/2020 74  mg/dL Final    Comment: Risk category and Non-HDL cholesterol goals:  Coronary heart disease (CHD)or equivalent (10-year risk of CHD >20%):  Non-HDL cholesterol goal     <130 mg/dL  Two or more CHD risk factors and 10-year risk of CHD <= 20%:  Non-HDL cholesterol goal     <160 mg/dL  0 to 1 CHD risk factor:  Non-HDL cholesterol  goal     <190 mg/dL      Sodium 10/13/2020 142  136 - 145 mmol/L Final    Potassium 10/13/2020 4.0  3.5 - 5.1 mmol/L Final    Chloride 10/13/2020 102  95 - 110 mmol/L Final    CO2 10/13/2020 28  23 - 29 mmol/L Final    Glucose 10/13/2020 104  70 - 110 mg/dL Final    BUN, Bld 10/13/2020 22  8 - 23 mg/dL Final    Creatinine 10/13/2020 0.9  0.5 - 1.4 mg/dL Final    Calcium 10/13/2020 9.8  8.7 - 10.5 mg/dL Final    Total Protein 10/13/2020 7.1  6.0 - 8.4 g/dL Final    Albumin 10/13/2020 3.7  3.5 - 5.2 g/dL Final    Total Bilirubin 10/13/2020 0.7  0.1 - 1.0 mg/dL Final    Comment: For infants and newborns, interpretation of results should be based  on gestational age, weight and in agreement with clinical  observations.  Premature Infant recommended reference ranges:  Up to 24 hours.............<8.0 mg/dL  Up to 48 hours............<12.0 mg/dL  3-5 days..................<15.0 mg/dL  6-29 days.................<15.0 mg/dL      Alkaline Phosphatase 10/13/2020 124  55 - 135 U/L Final    AST 10/13/2020 19  10 - 40 U/L Final    ALT 10/13/2020 23  10 - 44 U/L Final    Anion Gap 10/13/2020 12  8 - 16 mmol/L Final    eGFR if African American 10/13/2020 >60  >60 mL/min/1.73 m^2 Final    eGFR if non African American 10/13/2020 >60  >60 mL/min/1.73 m^2 Final    Comment: Calculation used to obtain the estimated glomerular filtration  rate (eGFR) is the CKD-EPI equation.       WBC 10/13/2020 9.93  3.90 - 12.70 K/uL Final    RBC 10/13/2020 4.65  4.00 - 5.40 M/uL Final    Hemoglobin 10/13/2020 11.6* 12.0 - 16.0 g/dL Final    Hematocrit 10/13/2020 37.3  37.0 - 48.5 % Final    Mean Corpuscular Volume 10/13/2020 80* 82 - 98 fL Final    Mean Corpuscular Hemoglobin 10/13/2020 24.9* 27.0 - 31.0 pg Final    Mean Corpuscular Hemoglobin Conc 10/13/2020 31.1* 32.0 - 36.0 g/dL Final    RDW 10/13/2020 16.2* 11.5 - 14.5 % Final    Platelets 10/13/2020 295  150 - 350 K/uL Final    MPV 10/13/2020 10.0  9.2 - 12.9 fL Final     Immature Granulocytes 10/13/2020 0.3  0.0 - 0.5 % Final    Gran # (ANC) 10/13/2020 6.4  1.8 - 7.7 K/uL Final    Immature Grans (Abs) 10/13/2020 0.03  0.00 - 0.04 K/uL Final    Comment: Mild elevation in immature granulocytes is non specific and   can be seen in a variety of conditions including stress response,   acute inflammation, trauma and pregnancy. Correlation with other   laboratory and clinical findings is essential.      Lymph # 10/13/2020 1.7  1.0 - 4.8 K/uL Final    Mono # 10/13/2020 1.0  0.3 - 1.0 K/uL Final    Eos # 10/13/2020 0.7* 0.0 - 0.5 K/uL Final    Baso # 10/13/2020 0.10  0.00 - 0.20 K/uL Final    nRBC 10/13/2020 0  0 /100 WBC Final    Gran% 10/13/2020 64.5  38.0 - 73.0 % Final    Lymph% 10/13/2020 17.0* 18.0 - 48.0 % Final    Mono% 10/13/2020 10.3  4.0 - 15.0 % Final    Eosinophil% 10/13/2020 6.9  0.0 - 8.0 % Final    Basophil% 10/13/2020 1.0  0.0 - 1.9 % Final    Differential Method 10/13/2020 Automated   Final   Orders Only on 08/17/2020   Component Date Value Ref Range Status    Glucose 08/17/2020 110* 65 - 99 mg/dL Final    Comment:               Fasting reference interval     For someone without known diabetes, a glucose value  between 100 and 125 mg/dL is consistent with  prediabetes and should be confirmed with a  follow-up test.         BUN, Bld 08/17/2020 28* 7 - 25 mg/dL Final    Creatinine 08/17/2020 0.97* 0.60 - 0.93 mg/dL Final    Comment: For patients >49 years of age, the reference limit  for Creatinine is approximately 13% higher for people  identified as -American.         eGFR if non African American 08/17/2020 57* > OR = 60 mL/min/1.73m2 Final    eGFR if African American 08/17/2020 66  > OR = 60 mL/min/1.73m2 Final    BUN/Creatinine Ratio 08/17/2020 29* 6 - 22 (calc) Final    Sodium 08/17/2020 140  135 - 146 mmol/L Final    Potassium 08/17/2020 4.3  3.5 - 5.3 mmol/L Final    Chloride 08/17/2020 98  98 - 110 mmol/L Final    CO2 08/17/2020 33*  20 - 32 mmol/L Final    Calcium 08/17/2020 9.8  8.6 - 10.4 mg/dL Final    Total Protein 08/17/2020 6.8  6.1 - 8.1 g/dL Final    Albumin 08/17/2020 4.0  3.6 - 5.1 g/dL Final    Globulin, Total 08/17/2020 2.8  1.9 - 3.7 g/dL (calc) Final    Albumin/Globulin Ratio 08/17/2020 1.4  1.0 - 2.5 (calc) Final    Total Bilirubin 08/17/2020 0.4  0.2 - 1.2 mg/dL Final    Alkaline Phosphatase 08/17/2020 111  37 - 153 U/L Final    AST 08/17/2020 20  10 - 35 U/L Final    ALT 08/17/2020 25  6 - 29 U/L Final    WBC 08/17/2020 11.5* 3.8 - 10.8 Thousand/uL Final    RBC 08/17/2020 5.07  3.80 - 5.10 Million/uL Final    Hemoglobin 08/17/2020 12.1  11.7 - 15.5 g/dL Final    Hematocrit 08/17/2020 39.6  35.0 - 45.0 % Final    Mean Corpuscular Volume 08/17/2020 78.1* 80.0 - 100.0 fL Final    Mean Corpuscular Hemoglobin 08/17/2020 23.9* 27.0 - 33.0 pg Final    Mean Corpuscular Hemoglobin Conc 08/17/2020 30.6* 32.0 - 36.0 g/dL Final    RDW 08/17/2020 16.1* 11.0 - 15.0 % Final    Platelets 08/17/2020 307  140 - 400 Thousand/uL Final    MPV 08/17/2020 10.1  7.5 - 12.5 fL Final    Neutrophils Absolute 08/17/2020 9,649* 1,500 - 7,800 cells/uL Final    Lymph # 08/17/2020 1,162  850 - 3,900 cells/uL Final    Mono # 08/17/2020 529  200 - 950 cells/uL Final    Eos # 08/17/2020 104  15 - 500 cells/uL Final    Baso # 08/17/2020 58  0 - 200 cells/uL Final    Neutrophils Relative 08/17/2020 83.9  % Final    Lymph% 08/17/2020 10.1  % Final    Mono% 08/17/2020 4.6  % Final    Eosinophil% 08/17/2020 0.9  % Final    Basophil% 08/17/2020 0.5  % Final    CRP 08/17/2020 2.4  <8.0 mg/L Final   Admission on 06/19/2020, Discharged on 06/20/2020   Component Date Value Ref Range Status    WBC 06/19/2020 12.52  3.90 - 12.70 K/uL Final    RBC 06/19/2020 5.25  4.00 - 5.40 M/uL Final    Hemoglobin 06/19/2020 12.4  12.0 - 16.0 g/dL Final    Hematocrit 06/19/2020 41.3  37.0 - 48.5 % Final    Mean Corpuscular Volume 06/19/2020 79* 82 - 98  fL Final    Mean Corpuscular Hemoglobin 06/19/2020 23.6* 27.0 - 31.0 pg Final    Mean Corpuscular Hemoglobin Conc 06/19/2020 30.0* 32.0 - 36.0 g/dL Final    RDW 06/19/2020 19.7* 11.5 - 14.5 % Final    Platelets 06/19/2020 310  150 - 350 K/uL Final    MPV 06/19/2020 10.0  9.2 - 12.9 fL Final    Immature Granulocytes 06/19/2020 0.2  0.0 - 0.5 % Final    Gran # (ANC) 06/19/2020 9.3* 1.8 - 7.7 K/uL Final    Immature Grans (Abs) 06/19/2020 0.03  0.00 - 0.04 K/uL Final    Comment: Mild elevation in immature granulocytes is non specific and   can be seen in a variety of conditions including stress response,   acute inflammation, trauma and pregnancy. Correlation with other   laboratory and clinical findings is essential.      Lymph # 06/19/2020 2.0  1.0 - 4.8 K/uL Final    Mono # 06/19/2020 1.0  0.3 - 1.0 K/uL Final    Eos # 06/19/2020 0.1  0.0 - 0.5 K/uL Final    Baso # 06/19/2020 0.02  0.00 - 0.20 K/uL Final    nRBC 06/19/2020 0  0 /100 WBC Final    Gran% 06/19/2020 74.6* 38.0 - 73.0 % Final    Lymph% 06/19/2020 15.7* 18.0 - 48.0 % Final    Mono% 06/19/2020 8.2  4.0 - 15.0 % Final    Eosinophil% 06/19/2020 1.1  0.0 - 8.0 % Final    Basophil% 06/19/2020 0.2  0.0 - 1.9 % Final    Differential Method 06/19/2020 Automated   Final    Sodium 06/19/2020 138  136 - 145 mmol/L Final    Potassium 06/19/2020 4.0  3.5 - 5.1 mmol/L Final    Chloride 06/19/2020 100  95 - 110 mmol/L Final    CO2 06/19/2020 26  23 - 29 mmol/L Final    Glucose 06/19/2020 105  70 - 110 mg/dL Final    BUN, Bld 06/19/2020 47* 8 - 23 mg/dL Final    Creatinine 06/19/2020 1.0  0.5 - 1.4 mg/dL Final    Calcium 06/19/2020 8.7  8.7 - 10.5 mg/dL Final    Anion Gap 06/19/2020 12  8 - 16 mmol/L Final    eGFR if African American 06/19/2020 >60  >60 mL/min/1.73 m^2 Final    eGFR if non African American 06/19/2020 55* >60 mL/min/1.73 m^2 Final    Comment: Calculation used to obtain the estimated glomerular filtration  rate (eGFR) is the  CKD-EPI equation.       Troponin I 06/19/2020 0.222* 0.000 - 0.026 ng/mL Final    Comment: The reference interval for Troponin I represents the 99th percentile   cutoff   for our facility and is consistent with 3rd generation assay   performance.      Sodium 06/20/2020 139  136 - 145 mmol/L Final    Potassium 06/20/2020 4.0  3.5 - 5.1 mmol/L Final    Chloride 06/20/2020 104  95 - 110 mmol/L Final    CO2 06/20/2020 23  23 - 29 mmol/L Final    Glucose 06/20/2020 119* 70 - 110 mg/dL Final    BUN, Bld 06/20/2020 39* 8 - 23 mg/dL Final    Creatinine 06/20/2020 0.9  0.5 - 1.4 mg/dL Final    Calcium 06/20/2020 9.2  8.7 - 10.5 mg/dL Final    Anion Gap 06/20/2020 12  8 - 16 mmol/L Final    eGFR if African American 06/20/2020 >60  >60 mL/min/1.73 m^2 Final    eGFR if non African American 06/20/2020 >60  >60 mL/min/1.73 m^2 Final    Comment: Calculation used to obtain the estimated glomerular filtration  rate (eGFR) is the CKD-EPI equation.       CPK 06/20/2020 26  20 - 180 U/L Final    CPK MB 06/20/2020 3.3  0.1 - 6.5 ng/mL Final    MB% 06/20/2020 12.7* 0.0 - 5.0 % Final    Comment: To be positive, the MB% must be greater than 5% AND the CK-MB  greater than 6.5 ng/mL. Values not in the reference interval,   but not qualifying as positive, should be considered   &quot;trace&quot;.      Troponin I 06/20/2020 0.308* 0.000 - 0.026 ng/mL Final    Comment: The reference interval for Troponin I represents the 99th percentile   cutoff   for our facility and is consistent with 3rd generation assay   performance.      WBC 06/20/2020 11.93  3.90 - 12.70 K/uL Final    RBC 06/20/2020 4.76  4.00 - 5.40 M/uL Final    Hemoglobin 06/20/2020 11.2* 12.0 - 16.0 g/dL Final    Hematocrit 06/20/2020 37.7  37.0 - 48.5 % Final    Mean Corpuscular Volume 06/20/2020 79* 82 - 98 fL Final    Mean Corpuscular Hemoglobin 06/20/2020 23.5* 27.0 - 31.0 pg Final    Mean Corpuscular Hemoglobin Conc 06/20/2020 29.7* 32.0 - 36.0 g/dL Final     RDW 06/20/2020 18.9* 11.5 - 14.5 % Final    Platelets 06/20/2020 293  150 - 350 K/uL Final    MPV 06/20/2020 10.6  9.2 - 12.9 fL Final    Immature Granulocytes 06/20/2020 0.3  0.0 - 0.5 % Final    Gran # (ANC) 06/20/2020 9.8* 1.8 - 7.7 K/uL Final    Immature Grans (Abs) 06/20/2020 0.03  0.00 - 0.04 K/uL Final    Comment: Mild elevation in immature granulocytes is non specific and   can be seen in a variety of conditions including stress response,   acute inflammation, trauma and pregnancy. Correlation with other   laboratory and clinical findings is essential.      Lymph # 06/20/2020 1.1  1.0 - 4.8 K/uL Final    Mono # 06/20/2020 0.9  0.3 - 1.0 K/uL Final    Eos # 06/20/2020 0.1  0.0 - 0.5 K/uL Final    Baso # 06/20/2020 0.01  0.00 - 0.20 K/uL Final    nRBC 06/20/2020 0  0 /100 WBC Final    Gran% 06/20/2020 82.1* 38.0 - 73.0 % Final    Lymph% 06/20/2020 9.6* 18.0 - 48.0 % Final    Mono% 06/20/2020 7.1  4.0 - 15.0 % Final    Eosinophil% 06/20/2020 0.8  0.0 - 8.0 % Final    Basophil% 06/20/2020 0.1  0.0 - 1.9 % Final    Differential Method 06/20/2020 Automated   Final    Cholesterol 06/20/2020 152  120 - 199 mg/dL Final    Comment: The National Cholesterol Education Program (NCEP) has set the  following guidelines (reference ranges) for Cholesterol:  Optimal.....................<200 mg/dL  Borderline High.............200-239 mg/dL  High........................> or = 240 mg/dL      Triglycerides 06/20/2020 66  30 - 150 mg/dL Final    Comment: The National Cholesterol Education Program (NCEP) has set the  following guidelines (reference values) for triglycerides:  Normal......................<150 mg/dL  Borderline High.............150-199 mg/dL  High........................200-499 mg/dL      HDL 06/20/2020 64  40 - 75 mg/dL Final    Comment: The National Cholesterol Education Program (NCEP) has set the  following guidelines (reference values) for HDL Cholesterol:  Low...............<40  mg/dL  Optimal...........>60 mg/dL      LDL Cholesterol 06/20/2020 74.8  63.0 - 159.0 mg/dL Final    Comment: The National Cholesterol Education Program (NCEP) has set the  following guidelines (reference values) for LDL Cholesterol:  Optimal.......................<130 mg/dL  Borderline High...............130-159 mg/dL  High..........................160-189 mg/dL  Very High.....................>190 mg/dL      Hdl/Cholesterol Ratio 06/20/2020 42.1  20.0 - 50.0 % Final    Total Cholesterol/HDL Ratio 06/20/2020 2.4  2.0 - 5.0 Final    Non-HDL Cholesterol 06/20/2020 88  mg/dL Final    Comment: Risk category and Non-HDL cholesterol goals:  Coronary heart disease (CHD)or equivalent (10-year risk of CHD >20%):  Non-HDL cholesterol goal     <130 mg/dL  Two or more CHD risk factors and 10-year risk of CHD <= 20%:  Non-HDL cholesterol goal     <160 mg/dL  0 to 1 CHD risk factor:  Non-HDL cholesterol goal     <190 mg/dL     Hospital Outpatient Visit on 06/17/2020   Component Date Value Ref Range Status    Prothrombin Time 06/17/2020 11.2  9.0 - 12.5 sec Final    INR 06/17/2020 1.0  0.8 - 1.2 Final    Comment: Coumadin Therapy:  2.0 - 3.0 for INR for all indicators except mechanical heart valves  and antiphospholipid syndromes which should use 2.5 - 3.5.     Hospital Outpatient Visit on 06/17/2020   Component Date Value Ref Range Status    SARS-CoV2 (COVID-19) Qualitative P* 06/17/2020 Not Detected  Not Detected Final    Comment: This test utilizes a real-time reverse transcription  polymerase chain reaction procedure to amplify and   detect the SARS-CoV-2 RdRp and N genes.    The analytical sensitivity (limit of detection) of   this assay is 100 copies/mL.   A Detected result is considered positive for COVID-19.  This patient is considered infected with the   SARS-CoV-2 virus and is presumed to be contagious.    A Not Detected result means that SARS-CoV-2 RNA is not  present above the limit of detection. It does not  rule  out the possibility of COVID-19 and should not be the  sole basis for treatment decisions.  If COVID-19 is   strongly suspected based on clinical and exposure   history,re-testing should be considered.    This test is only for use under Food and Drug   Administration s Emergency Use Authorization (EUA).   Commercial reagents are provided by [employer].  Performance characteristics of the EUA have been   independently verified by [facility]   Department of Pathology and L                           aboratory Medicine.       Lab Visit on 05/28/2020   Component Date Value Ref Range Status    SARS-CoV2 (COVID-19) Qualitative P* 05/28/2020 Not Detected  Not Detected Final    Comment: This test utilizes a real-time reverse transcription  polymerase chain reaction procedure to amplify and   detect the SARS-CoV-2 RdRp and N genes.    The analytical sensitivity (limit of detection) of   this assay is 100 copies/mL.   A Detected result is considered positive for COVID-19.  This patient is considered infected with the   SARS-CoV-2 virus and is presumed to be contagious.    A Not Detected result means that SARS-CoV-2 RNA is not  present above the limit of detection. It does not rule  out the possibility of COVID-19 and should not be the  sole basis for treatment decisions.  If COVID-19 is   strongly suspected based on clinical and exposure   history,re-testing should be considered.    This test is only for use under Food and Drug   Administration s Emergency Use Authorization (EUA).   Commercial reagents are provided by [employer].  Performance characteristics of the EUA have been   independently verified by [facility]   Department of Pathology and L                           aboratory Medicine.       Orders Only on 05/27/2020   Component Date Value Ref Range Status    Interpretation 06/09/2020    Final    Comment:    Faint monoclonal band with strong polyclonal  background,  consistent with monoclonal gammopathy  of undetermined origin (MGUS). Suggest repeating  immunofixation in six months.         Interpretation: 06/09/2020    Final    Comment:    No monoclonal immunoglobulin detected.        Hospital Outpatient Visit on 05/26/2020   Component Date Value Ref Range Status    Ascending aorta 05/26/2020 2.80  cm Final    STJ 05/26/2020 2.67  cm Final    AV mean gradient 05/26/2020 2  mmHg Final    Ao peak dino 05/26/2020 0.99  m/s Final    Ao VTI 05/26/2020 17.16  cm Final    IVS 05/26/2020 0.95  0.6 - 1.1 cm Final    LA size 05/26/2020 4.29  cm Final    Left Atrium Major Axis 05/26/2020 5.39  cm Final    Left Atrium Minor Axis 05/26/2020 5.24  cm Final    LVIDd 05/26/2020 4.78  3.5 - 6.0 cm Final    LVIDs 05/26/2020 3.83  2.1 - 4.0 cm Final    LVOT diameter 05/26/2020 1.86  cm Final    LVOT peak VTI 05/26/2020 18.58  cm Final    Posterior Wall 05/26/2020 0.94  0.6 - 1.1 cm Final    MV Peak A Dino 05/26/2020 1.02  m/s Final    E wave decelartion time 05/26/2020 121.69  msec Final    MV Peak E Dino 05/26/2020 0.88  m/s Final    PV Peak D Dino 05/26/2020 0.59  m/s Final    PV Peak S Dino 05/26/2020 0.64  m/s Final    RA Major Axis 05/26/2020 4.83  cm Final    Sinus 05/26/2020 2.93  cm Final    TAPSE 05/26/2020 2.81  cm Final    TR Max Dino 05/26/2020 1.99  m/s Final    LA WIDTH 05/26/2020 4.99  cm Final    PV PEAK VELOCITY 05/26/2020 1.26  cm/s Final    LV Diastolic Volume 05/26/2020 106.56  mL Final    LV Systolic Volume 05/26/2020 63.24  mL Final    LVOT peak dino 05/26/2020 0.90  m/s Final    FS 05/26/2020 20  % Final    LA volume 05/26/2020 96.69  cm3 Final    LV mass 05/26/2020 156.62  g Final    Left Ventricle Relative Wall Thick* 05/26/2020 0.39  cm Final    AV valve area 05/26/2020 2.94  cm2 Final    AV Velocity Ratio 05/26/2020 0.91   Final    AV index (prosthetic) 05/26/2020 1.08   Final    E/A ratio 05/26/2020 0.86   Final    Pulm vein S/D ratio  05/26/2020 1.08   Final    LVOT area 05/26/2020 2.7  cm2 Final    LVOT stroke volume 05/26/2020 50.46  cm3 Final    AV peak gradient 05/26/2020 4  mmHg Final    Triscuspid Valve Regurgitation Pea* 05/26/2020 16  mmHg Final    TDI SEPTAL 05/26/2020 0.06  m/s Final    LV LATERAL E/E' RATIO 05/26/2020 17.60  m/s Final    LV SEPTAL E/E' RATIO 05/26/2020 14.67  m/s Final    TDI LATERAL 05/26/2020 0.05  m/s Final    Mean e' 05/26/2020 0.06  m/s Final    E/E' ratio 05/26/2020 16.00  m/s Final    LA volume (mod) 05/26/2020 58.00  cm3 Final    BSA 05/26/2020 1.81  m2 Final    Right Atrial Pressure (from IVC) 05/26/2020 8  mmHg Final    MV valve area p 1/2 method 05/26/2020 6.29  cm2 Final    TV rest pulmonary artery pressure 05/26/2020 24  mmHg Final    MV stenosis pressure 1/2 time 05/26/2020 35  ms Final    LV Systolic Volume Index 05/26/2020 35.9  mL/m2 Final    LV Diastolic Volume Index 05/26/2020 60.50  mL/m2 Final    LA Volume Index 05/26/2020 54.9  mL/m2 Final    LV Mass Index 05/26/2020 89  g/m2 Final    LA Volume Index (Mod) 05/26/2020 32.9  mL/m2 Final   Hospital Outpatient Visit on 05/26/2020   Component Date Value Ref Range Status    [facility] CV CAROTID ULTRASOUND LEFT ICA* 05/26/2020 0.89   Final    [facility] CV CAROTID ULTRASOUND RIGHT IC* 05/26/2020 1.33   Final    [facility] CV PV CAROTID LEFT HIGHEST ICA 05/26/2020 66.00   Final    [facility] CV PV CAROTID LEFT HIGHEST CCA 05/26/2020 74   Final    [facility] CV PV CAROTID RIGHT HIGHEST ICA 05/26/2020 72.00   Final    [facility] CV PV CAROTID RIGHT HIGHEST CCA 05/26/2020 54   Final    [facility] CV CAROTID RIGHT ICA EDV HIGHE* 05/26/2020 22.00   Final    LT Highest EDV 05/26/2020 16.00   Final    Right CCA prox sys 05/26/2020 54  cm/s Final    Right CCA prox adamson 05/26/2020 9  cm/s Final    Right cca dist sys 05/26/2020 54  cm/s Final    Right CCA dist adamson 05/26/2020 9  cm/s Final    Right ICA prox sys 05/26/2020 64  cm/s Final    Right ICA prox adamson 05/26/2020 16   cm/s Final    Right ICA mid sys 05/26/2020 72  cm/s Final    Right ICA mid adamson 05/26/2020 22  cm/s Final    Right ICA dist sys 05/26/2020 65  cm/s Final    Right ICA dist adamson 05/26/2020 17  cm/s Final    Right eca sys 05/26/2020 100  cm/s Final    Right vertebral sys 05/26/2020 42  cm/s Final    Left CCA prox sys 05/26/2020 74  cm/s Final    Left CCA prox adamson 05/26/2020 10  cm/s Final    Left CCA dist sys 05/26/2020 64  cm/s Final    Left CCA dist adamson 05/26/2020 12  cm/s Final    Left ICA prox sys 05/26/2020 55  cm/s Final    Left ICA prox adamson 05/26/2020 14  cm/s Final    Left ICA mid sys 05/26/2020 47  cm/s Final    Left ICA mid adamson 05/26/2020 11  cm/s Final    Left ICA dist sys 05/26/2020 66  cm/s Final    Left ICA dist adamson 05/26/2020 16  cm/s Final    Left ECA sys 05/26/2020 83  cm/s Final    Left vertebral sys 05/26/2020 36  cm/s Final    Right vertebral adamson 05/26/2020 11  cm/s Final    Left vertebral adamson 05/26/2020 9  cm/s Final   There may be more visits with results that are not included.       Imaging  No results found.    Assessment  1. Atherosclerosis of native coronary artery of native heart without angina pectoris  Stable status post stent to proximal left anterior descending artery    2. Acute systolic heart failure  Resolved with normalization of left ventricular function after revascularization    3. Hypercholesteremia  Controlled      Plan and Discussion    She was instructed to resume aspirin.  She should call me if she has any bleeding.  We discussed the importance of compliance with dual anti-platelet therapy in the prevention of acute stent thrombosis, myocardial infarction, and death.  Continue other guideline directed medical therapy.  Likely discontinue Plavix at next visit, 9 months after stent implantation.      Follow Up  Follow up in about 4 months (around 2/19/2021).      Fabrice Rodriguez MD

## 2020-11-17 ENCOUNTER — OFFICE VISIT (OUTPATIENT)
Dept: RHEUMATOLOGY | Facility: CLINIC | Age: 75
End: 2020-11-17
Payer: MEDICARE

## 2020-11-17 VITALS
SYSTOLIC BLOOD PRESSURE: 129 MMHG | WEIGHT: 161.81 LBS | BODY MASS INDEX: 29.59 KG/M2 | DIASTOLIC BLOOD PRESSURE: 72 MMHG | TEMPERATURE: 97 F

## 2020-11-17 DIAGNOSIS — Z79.899 ENCOUNTER FOR LONG-TERM (CURRENT) DRUG USE: ICD-10-CM

## 2020-11-17 DIAGNOSIS — M35.9 UNDIFFERENTIATED CONNECTIVE TISSUE DISEASE: Primary | ICD-10-CM

## 2020-11-17 PROCEDURE — 1159F MED LIST DOCD IN RCRD: CPT | Mod: S$GLB,,, | Performed by: INTERNAL MEDICINE

## 2020-11-17 PROCEDURE — 99214 PR OFFICE/OUTPT VISIT, EST, LEVL IV, 30-39 MIN: ICD-10-PCS | Mod: S$GLB,,, | Performed by: INTERNAL MEDICINE

## 2020-11-17 PROCEDURE — 1101F PT FALLS ASSESS-DOCD LE1/YR: CPT | Mod: S$GLB,,, | Performed by: INTERNAL MEDICINE

## 2020-11-17 PROCEDURE — 1125F AMNT PAIN NOTED PAIN PRSNT: CPT | Mod: S$GLB,,, | Performed by: INTERNAL MEDICINE

## 2020-11-17 PROCEDURE — 1159F PR MEDICATION LIST DOCUMENTED IN MEDICAL RECORD: ICD-10-PCS | Mod: S$GLB,,, | Performed by: INTERNAL MEDICINE

## 2020-11-17 PROCEDURE — 3288F FALL RISK ASSESSMENT DOCD: CPT | Mod: S$GLB,,, | Performed by: INTERNAL MEDICINE

## 2020-11-17 PROCEDURE — 1125F PR PAIN SEVERITY QUANTIFIED, PAIN PRESENT: ICD-10-PCS | Mod: S$GLB,,, | Performed by: INTERNAL MEDICINE

## 2020-11-17 PROCEDURE — 99214 OFFICE O/P EST MOD 30 MIN: CPT | Mod: S$GLB,,, | Performed by: INTERNAL MEDICINE

## 2020-11-17 PROCEDURE — 3288F PR FALLS RISK ASSESSMENT DOCUMENTED: ICD-10-PCS | Mod: S$GLB,,, | Performed by: INTERNAL MEDICINE

## 2020-11-17 PROCEDURE — 1101F PR PT FALLS ASSESS DOC 0-1 FALLS W/OUT INJ PAST YR: ICD-10-PCS | Mod: S$GLB,,, | Performed by: INTERNAL MEDICINE

## 2020-11-17 RX ORDER — PREDNISOLONE ACETATE 10 MG/ML
SUSPENSION/ DROPS OPHTHALMIC DAILY
COMMUNITY
Start: 2020-10-13 | End: 2023-06-16

## 2020-11-17 RX ORDER — LATANOPROST 50 UG/ML
SOLUTION/ DROPS OPHTHALMIC
COMMUNITY
Start: 2020-10-20 | End: 2023-06-16

## 2020-11-17 RX ORDER — DORZOLAMIDE HYDROCHLORIDE AND TIMOLOL MALEATE 20; 5 MG/ML; MG/ML
SOLUTION/ DROPS OPHTHALMIC
COMMUNITY
Start: 2020-10-20

## 2020-11-17 RX ORDER — HYDROXYCHLOROQUINE SULFATE 200 MG/1
200 TABLET, FILM COATED ORAL 2 TIMES DAILY
Qty: 180 TABLET | Refills: 1 | Status: SHIPPED | OUTPATIENT
Start: 2020-11-17 | End: 2021-08-18

## 2020-11-17 NOTE — PROGRESS NOTES
[facility] RHEUMATOLOGY           Follow-up visit    Notes dictated to M*Modal. Please forgive any unintended errors.  Subjective:       Patient ID:   NAME: Jacqueline M Goldberg : 1945     74 y.o. female    Referring Doc: No ref. provider found  Other Physicians:    Chief Complaint:  Undifferentiated connective tissue disease      HPI/Interval History:  The patient is doing very well. She is extremely pleased with her progress. She has just booked another [place] cruise for next year.  She attributes the stent placement as the biggest factor in her recovery of good health.  She has not recovered any vision in the left eye.  She is undergoing serial injections to lower the pressure on that side.  She has normal pressures on the right.    ROS:   GEN:    no fever, night sweats or weight loss  SKIN:   no rashes, bruising, or swelling, no Raynauds, no photosensitivity  HEENT: no changes in vision, no mouth ulcers, no sicca symptoms, no scalp tenderness, no jaw claudication.  CV:      no CP, PND, COTA, orthopnea, no palpitations  PULM: no SOB, cough, hemoptysis, sputum or pleuritic pain  GI:        no dysphagia, no GERD, no hematemesis, no abdominal pain, nausea, vomiting, constipation, diarrhea, melanotic stools, BRBPR  :      no hematuria, dysuria  NEURO: no paresthesias, headaches, acute visual disturbances  MUSCULOSKELETAL:  No muscle or joint complaints   PSYCH:   No increased insomnia, no increased anxiety, no increased depression    Past Medical/Surgical History:  Past Medical History:   Diagnosis Date    Anemia, chronic disease 2019    Arthritis     Blind left eye     Chronic pain     Coronary atherosclerosis 2020    Eosinophilia 2019    Hypothyroidism     Insomnia     Leucocytosis 2019    Pneumonia     PONV (postoperative nausea and vomiting)     needs phenergan    Scoliosis      Past Surgical History:   Procedure Laterality Date    ANKLE LIGAMENT  RECONSTRUCTION Right     CATARACT EXTRACTION Bilateral     CHOLECYSTECTOMY      CORONARY ANGIOGRAPHY N/A 6/19/2020    Procedure: ANGIOGRAM, CORONARY ARTERY - right radial;  Surgeon: Fabrice Rodriguez MD;  Location: [facility] CATH LAB;  Service: Cardiology;  Laterality: N/A;    CORONARY STENT PLACEMENT  06/19/2020    pLAD Nathan 2.75 x 18 mm       Allergies:  Review of patient's allergies indicates:  No Known Allergies    Social/Family History:  Social History     Socioeconomic History    Marital status: Single     Spouse name: Not on file    Number of children: Not on file    Years of education: Not on file    Highest education level: Not on file   Occupational History    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   Social Needs    Financial resource strain: Not on file    Food insecurity     Worry: Not on file     Inability: Not on file    Transportation needs     Medical: Not on file     Non-medical: Not on file   Tobacco Use    Smoking status: Former Smoker    Smokeless tobacco: Never Used   Substance and Sexual Activity    Alcohol use: Not Currently    Drug use: Never    Sexual activity: Yes     Partners: Male     Birth control/protection: None   Lifestyle    Physical activity     Days per week: Not on file     Minutes per session: Not on file    Stress: Not on file   Relationships    Social connections     Talks on phone: Not on file     Gets together: Not on file     Attends [employer] service: Not on file     Active member of club or organization: Not on file     Attends meetings of clubs or organizations: Not on file     Relationship status: Not on file   Other Topics Concern    Not on file   Social History Narrative    Not on file     Family History   Problem Relation Age of Onset    Diabetes Mother     Cancer Mother     Heart disease Father     Arthritis Sister      FAMILY HISTORY: negative for Connective Tissue Disease      Medications:    Current Outpatient Medications:      aspirin (LO-DOSE ASPIRIN) 81 MG EC tablet, Take 81 mg by mouth once daily., Disp: , Rfl:     atorvastatin (LIPITOR) 20 MG tablet, Take 1 tablet (20 mg total) by mouth every evening., Disp: 90 tablet, Rfl: 3    clopidogreL (PLAVIX) 75 mg tablet, Take 1 tablet (75 mg total) by mouth once daily., Disp: 90 tablet, Rfl: 3    cyanocobalamin, vitamin B-12, 200 mcg/spray SpSn, Place 200 mcg under the tongue every 14 (fourteen) days., Disp: 12 mL, Rfl: 3    dorzolamide-timolol 2-0.5% (COSOPT) 22.3-6.8 mg/mL ophthalmic solution, INSTILL 1 DROP IN THE LEFT EYE EVERY 12 HOURS, Disp: , Rfl:     folic acid (FOLVITE) 1 MG tablet, Take 1 tablet (1 mg total) by mouth once daily., Disp: 90 tablet, Rfl: 2    furosemide (LASIX) 40 MG tablet, Take 1 tablet (40 mg total) by mouth daily as needed (fluid)., Disp: 90 tablet, Rfl: 1    ibuprofen (ADVIL,MOTRIN) 800 MG tablet, , Disp: , Rfl: 0    latanoprost 0.005 % ophthalmic solution, INSTILL 1 DROP IN THE LEFT EYE EVERY EVENING. SEPARATE BY AT LEAST 10 MIN FROM OTHER EYE DROPS, Disp: , Rfl:     levothyroxine (SYNTHROID) 50 MCG tablet, Take 50 mcg by mouth., Disp: , Rfl:     losartan (COZAAR) 25 MG tablet, Take 1 tablet (25 mg total) by mouth every 12 (twelve) hours., Disp: 180 tablet, Rfl: 3    prednisoLONE acetate (PRED FORTE) 1 % DrpS, , Disp: , Rfl:     tetrahydrozoline/polyethyl gly (EYE DROPS OPHT), Apply to eye., Disp: , Rfl:     hydrOXYchloroQUINE (PLAQUENIL) 200 mg tablet, Take 1 tablet (200 mg total) by mouth 2 (two) times daily., Disp: 180 tablet, Rfl: 1    Current Facility-Administered Medications:     cyanocobalamin injection 1,000 mcg, 1,000 mcg, Subcutaneous, Q30 Days, Srinivasa Huffman MD, 1,000 mcg at 06/10/20 1050      Objective:     Vitals:  Blood pressure 129/72, temperature 97.1 °F (36.2 °C), weight 73.4 kg (161 lb 12.8 oz).    Physical Examination:   GEN: wn/wd female in no apparent distress  SKIN: no rashes, no sclerodactyly, no Raynaud's, no periungual  erythema, no digital tip ulcerations, no nailbed pitting  HEAD: no alopecia, no scalp tenderness, no temporal artery tenderness or induration.  EYES: no pallor, no icterus, PERRLA  ENT:  no thrush, no mucosal dryness or ulcerations, adequate oral hygiene & dentition.  NECK: supple x 6, no masses, no thyromegaly, no lymphadenopathy.  CV:   S1 and S2 regular, no murmurs, gallop or rubs  CHEST: Normal respiratory effort;  normal breath sounds/no adventitious sounds. No signs of consolidation.  ABD: non-tender and non-distended; soft; normal bowel sounds; no rebound/guarding or tenderness. No hepatosplenomegaly.  Musculoskeletal:  No evidence of inflammatory arthritis  EXTREM: no clubbing, cyanosis or edema. normal pulses.  NEURO:  grossly intact; motor/sensory WNL; no tremors  PSYCH:  normal mood, affect and behavior    Labs:   Lab Results   Component Value Date    WBC 9.93 10/13/2020    HGB 11.6 (L) 10/13/2020    HCT 37.3 10/13/2020    MCV 80 (L) 10/13/2020     10/13/2020   CMP@  Sodium   Date Value Ref Range Status   10/13/2020 142 136 - 145 mmol/L Final     Potassium   Date Value Ref Range Status   10/13/2020 4.0 3.5 - 5.1 mmol/L Final     Chloride   Date Value Ref Range Status   10/13/2020 102 95 - 110 mmol/L Final     CO2   Date Value Ref Range Status   10/13/2020 28 23 - 29 mmol/L Final     Glucose   Date Value Ref Range Status   10/13/2020 104 70 - 110 mg/dL Final     BUN   Date Value Ref Range Status   10/13/2020 22 8 - 23 mg/dL Final     Creatinine   Date Value Ref Range Status   10/13/2020 0.9 0.5 - 1.4 mg/dL Final     Calcium   Date Value Ref Range Status   10/13/2020 9.8 8.7 - 10.5 mg/dL Final     Total Protein   Date Value Ref Range Status   10/13/2020 7.1 6.0 - 8.4 g/dL Final     Albumin   Date Value Ref Range Status   10/13/2020 3.7 3.5 - 5.2 g/dL Final     Total Bilirubin   Date Value Ref Range Status   10/13/2020 0.7 0.1 - 1.0 mg/dL Final     Comment:     For infants and newborns, interpretation  of results should be based  on gestational age, weight and in agreement with clinical  observations.  Premature Infant recommended reference ranges:  Up to 24 hours.............<8.0 mg/dL  Up to 48 hours............<12.0 mg/dL  3-5 days..................<15.0 mg/dL  6-29 days.................<15.0 mg/dL       Alkaline Phosphatase   Date Value Ref Range Status   10/13/2020 124 55 - 135 U/L Final     AST   Date Value Ref Range Status   10/13/2020 19 10 - 40 U/L Final     ALT   Date Value Ref Range Status   10/13/2020 23 10 - 44 U/L Final     CPK   Date Value Ref Range Status   06/20/2020 26 20 - 180 U/L Final     CRP   Date Value Ref Range Status   08/17/2020 2.4 <8.0 mg/L Final     OBINNA   Date Value Ref Range Status   12/04/2019 POSITIVE (A) NEGATIVE Final     Comment:     OBINNA IFA is a first line screen for detecting the  presence of up to approximately 150 autoantibodies in  various autoimmune diseases. A positive OBINNA IFA result  is suggestive of autoimmune disease and reflexes to  titer and pattern. Further laboratory testing may be  considered if clinically indicated.     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  She has noted no difference on the day she takes prednisone from the day she does not.  I have directed her to stop prednisone entirely.  She is to call me immediately if she develops problems with the vision in her right eye or if she notes weakness, diaphoresis, exhaustion, etcetera. If she is unable to reach me, she should be seen in the emergency room.  Routine follow-up labs were  ordered.    RTC:  I will see her back in 3 months    Electronically signed by Refugio Owens MD

## 2020-11-18 DIAGNOSIS — Z79.899 ENCOUNTER FOR LONG-TERM (CURRENT) DRUG USE: ICD-10-CM

## 2020-11-18 DIAGNOSIS — M35.9 UNDIFFERENTIATED CONNECTIVE TISSUE DISEASE: Primary | ICD-10-CM

## 2020-11-23 LAB
ALBUMIN SERPL-MCNC: 4 G/DL (ref 3.6–5.1)
ALBUMIN/GLOB SERPL: 1.4 (CALC) (ref 1–2.5)
ALP SERPL-CCNC: 112 U/L (ref 37–153)
ALT SERPL-CCNC: 32 U/L (ref 6–29)
APPEARANCE UR: CLEAR
AST SERPL-CCNC: 20 U/L (ref 10–35)
BASOPHILS # BLD AUTO: 103 CELLS/UL (ref 0–200)
BASOPHILS NFR BLD AUTO: 1 %
BILIRUB SERPL-MCNC: 0.6 MG/DL (ref 0.2–1.2)
BILIRUB UR QL STRIP: NEGATIVE
BUN SERPL-MCNC: 29 MG/DL (ref 7–25)
BUN/CREAT SERPL: 31 (CALC) (ref 6–22)
CALCIUM SERPL-MCNC: 9.5 MG/DL (ref 8.6–10.4)
CHLORIDE SERPL-SCNC: 101 MMOL/L (ref 98–110)
CO2 SERPL-SCNC: 30 MMOL/L (ref 20–32)
COLOR UR: YELLOW
CREAT SERPL-MCNC: 0.93 MG/DL (ref 0.6–0.93)
CRP SERPL-MCNC: 16.9 MG/L
EOSINOPHIL # BLD AUTO: 834 CELLS/UL (ref 15–500)
EOSINOPHIL NFR BLD AUTO: 8.1 %
ERYTHROCYTE [DISTWIDTH] IN BLOOD BY AUTOMATED COUNT: 14.4 % (ref 11–15)
GFRSERPLBLD MDRD-ARVRAT: 60 ML/MIN/1.73M2
GLOBULIN SER CALC-MCNC: 2.8 G/DL (CALC) (ref 1.9–3.7)
GLUCOSE SERPL-MCNC: 82 MG/DL (ref 65–99)
GLUCOSE UR QL STRIP: NEGATIVE
HCT VFR BLD AUTO: 37.4 % (ref 35–45)
HGB BLD-MCNC: 11.5 G/DL (ref 11.7–15.5)
HGB UR QL STRIP: NEGATIVE
KETONES UR QL STRIP: NEGATIVE
LEUKOCYTE ESTERASE UR QL STRIP: ABNORMAL
LYMPHOCYTES # BLD AUTO: 1906 CELLS/UL (ref 850–3900)
LYMPHOCYTES NFR BLD AUTO: 18.5 %
MCH RBC QN AUTO: 24.9 PG (ref 27–33)
MCHC RBC AUTO-ENTMCNC: 30.7 G/DL (ref 32–36)
MCV RBC AUTO: 81 FL (ref 80–100)
MONOCYTES # BLD AUTO: 1009 CELLS/UL (ref 200–950)
MONOCYTES NFR BLD AUTO: 9.8 %
NEUTROPHILS # BLD AUTO: 6448 CELLS/UL (ref 1500–7800)
NEUTROPHILS NFR BLD AUTO: 62.6 %
NITRITE UR QL STRIP: NEGATIVE
PH UR STRIP: 5.5 [PH] (ref 5–8)
PLATELET # BLD AUTO: 307 THOUSAND/UL (ref 140–400)
PMV BLD REES-ECKER: 10.4 FL (ref 7.5–12.5)
POTASSIUM SERPL-SCNC: 4.3 MMOL/L (ref 3.5–5.3)
PROT SERPL-MCNC: 6.8 G/DL (ref 6.1–8.1)
PROT UR QL STRIP: NEGATIVE
RBC # BLD AUTO: 4.62 MILLION/UL (ref 3.8–5.1)
SODIUM SERPL-SCNC: 141 MMOL/L (ref 135–146)
SP GR UR STRIP: 1.02 (ref 1–1.03)
WBC # BLD AUTO: 10.3 THOUSAND/UL (ref 3.8–10.8)

## 2021-01-09 ENCOUNTER — IMMUNIZATION (OUTPATIENT)
Dept: PRIMARY CARE CLINIC | Facility: CLINIC | Age: 76
End: 2021-01-09
Payer: MEDICARE

## 2021-01-09 DIAGNOSIS — Z23 NEED FOR VACCINATION: ICD-10-CM

## 2021-01-09 PROCEDURE — 91300 COVID-19, MRNA, LNP-S, PF, 30 MCG/0.3 ML DOSE VACCINE: CPT | Mod: PBBFAC | Performed by: FAMILY MEDICINE

## 2021-01-30 ENCOUNTER — IMMUNIZATION (OUTPATIENT)
Dept: PRIMARY CARE CLINIC | Facility: CLINIC | Age: 76
End: 2021-01-30
Payer: MEDICARE

## 2021-01-30 DIAGNOSIS — Z23 NEED FOR VACCINATION: Primary | ICD-10-CM

## 2021-01-30 PROCEDURE — 91300 COVID-19, MRNA, LNP-S, PF, 30 MCG/0.3 ML DOSE VACCINE: CPT | Mod: PBBFAC | Performed by: EMERGENCY MEDICINE

## 2021-01-30 PROCEDURE — 0002A COVID-19, MRNA, LNP-S, PF, 30 MCG/0.3 ML DOSE VACCINE: CPT | Mod: PBBFAC | Performed by: EMERGENCY MEDICINE

## 2021-02-01 ENCOUNTER — PATIENT MESSAGE (OUTPATIENT)
Dept: CARDIOLOGY | Facility: CLINIC | Age: 76
End: 2021-02-01

## 2021-02-01 ENCOUNTER — PATIENT MESSAGE (OUTPATIENT)
Dept: RHEUMATOLOGY | Facility: CLINIC | Age: 76
End: 2021-02-01

## 2021-02-01 DIAGNOSIS — M35.9 UNDIFFERENTIATED CONNECTIVE TISSUE DISEASE: Primary | ICD-10-CM

## 2021-02-01 DIAGNOSIS — Z79.899 ENCOUNTER FOR LONG-TERM (CURRENT) DRUG USE: ICD-10-CM

## 2021-02-19 LAB
ALBUMIN SERPL-MCNC: 4.1 G/DL (ref 3.6–5.1)
ALBUMIN/GLOB SERPL: 1.2 (CALC) (ref 1–2.5)
ALP SERPL-CCNC: 121 U/L (ref 37–153)
ALT SERPL-CCNC: 27 U/L (ref 6–29)
APPEARANCE UR: ABNORMAL
AST SERPL-CCNC: 21 U/L (ref 10–35)
BASOPHILS # BLD AUTO: 90 CELLS/UL (ref 0–200)
BASOPHILS NFR BLD AUTO: 0.7 %
BILIRUB SERPL-MCNC: 0.5 MG/DL (ref 0.2–1.2)
BILIRUB UR QL STRIP: NEGATIVE
BUN SERPL-MCNC: 23 MG/DL (ref 7–25)
BUN/CREAT SERPL: NORMAL (CALC) (ref 6–22)
CALCIUM SERPL-MCNC: 9.4 MG/DL (ref 8.6–10.4)
CHLORIDE SERPL-SCNC: 102 MMOL/L (ref 98–110)
CK SERPL-CCNC: 30 U/L (ref 29–143)
CO2 SERPL-SCNC: 29 MMOL/L (ref 20–32)
COLOR UR: YELLOW
CREAT SERPL-MCNC: 0.76 MG/DL (ref 0.6–0.93)
CRP SERPL-MCNC: 10.1 MG/L
EOSINOPHIL # BLD AUTO: 2851 CELLS/UL (ref 15–500)
EOSINOPHIL NFR BLD AUTO: 22.1 %
ERYTHROCYTE [DISTWIDTH] IN BLOOD BY AUTOMATED COUNT: 16 % (ref 11–15)
GFRSERPLBLD MDRD-ARVRAT: 76 ML/MIN/1.73M2
GLOBULIN SER CALC-MCNC: 3.3 G/DL (CALC) (ref 1.9–3.7)
GLUCOSE SERPL-MCNC: 89 MG/DL (ref 65–99)
GLUCOSE UR QL STRIP: NEGATIVE
HCT VFR BLD AUTO: 35.4 % (ref 35–45)
HGB BLD-MCNC: 10.5 G/DL (ref 11.7–15.5)
HGB UR QL STRIP: NEGATIVE
KETONES UR QL STRIP: NEGATIVE
LEUKOCYTE ESTERASE UR QL STRIP: ABNORMAL
LYMPHOCYTES # BLD AUTO: 1638 CELLS/UL (ref 850–3900)
LYMPHOCYTES NFR BLD AUTO: 12.7 %
MCH RBC QN AUTO: 23.8 PG (ref 27–33)
MCHC RBC AUTO-ENTMCNC: 29.7 G/DL (ref 32–36)
MCV RBC AUTO: 80.1 FL (ref 80–100)
MONOCYTES # BLD AUTO: 1019 CELLS/UL (ref 200–950)
MONOCYTES NFR BLD AUTO: 7.9 %
NEUTROPHILS # BLD AUTO: 7301 CELLS/UL (ref 1500–7800)
NEUTROPHILS NFR BLD AUTO: 56.6 %
NITRITE UR QL STRIP: NEGATIVE
PH UR STRIP: ABNORMAL [PH] (ref 5–8)
PLATELET # BLD AUTO: 363 THOUSAND/UL (ref 140–400)
PMV BLD REES-ECKER: 10.2 FL (ref 7.5–12.5)
POTASSIUM SERPL-SCNC: 4.1 MMOL/L (ref 3.5–5.3)
PROT SERPL-MCNC: 7.4 G/DL (ref 6.1–8.1)
PROT UR QL STRIP: NEGATIVE
RBC # BLD AUTO: 4.42 MILLION/UL (ref 3.8–5.1)
SODIUM SERPL-SCNC: 140 MMOL/L (ref 135–146)
SP GR UR STRIP: 1.02 (ref 1–1.03)
WBC # BLD AUTO: 12.9 THOUSAND/UL (ref 3.8–10.8)

## 2021-02-22 ENCOUNTER — OFFICE VISIT (OUTPATIENT)
Dept: CARDIOLOGY | Facility: CLINIC | Age: 76
End: 2021-02-22
Payer: MEDICARE

## 2021-02-22 VITALS
BODY MASS INDEX: 28.47 KG/M2 | WEIGHT: 155.63 LBS | SYSTOLIC BLOOD PRESSURE: 124 MMHG | HEART RATE: 71 BPM | DIASTOLIC BLOOD PRESSURE: 62 MMHG | OXYGEN SATURATION: 97 %

## 2021-02-22 DIAGNOSIS — E78.00 HYPERCHOLESTEREMIA: ICD-10-CM

## 2021-02-22 DIAGNOSIS — I25.10 ATHEROSCLEROSIS OF NATIVE CORONARY ARTERY OF NATIVE HEART WITHOUT ANGINA PECTORIS: ICD-10-CM

## 2021-02-22 DIAGNOSIS — E78.00 HYPERCHOLESTEREMIA: Primary | ICD-10-CM

## 2021-02-22 DIAGNOSIS — I25.10 ATHEROSCLEROSIS OF NATIVE CORONARY ARTERY OF NATIVE HEART WITHOUT ANGINA PECTORIS: Primary | ICD-10-CM

## 2021-02-22 DIAGNOSIS — I50.22 CHRONIC SYSTOLIC HEART FAILURE: ICD-10-CM

## 2021-02-22 PROCEDURE — 1101F PT FALLS ASSESS-DOCD LE1/YR: CPT | Mod: CPTII,S$GLB,, | Performed by: INTERNAL MEDICINE

## 2021-02-22 PROCEDURE — 1101F PR PT FALLS ASSESS DOC 0-1 FALLS W/OUT INJ PAST YR: ICD-10-PCS | Mod: CPTII,S$GLB,, | Performed by: INTERNAL MEDICINE

## 2021-02-22 PROCEDURE — 1126F PR PAIN SEVERITY QUANTIFIED, NO PAIN PRESENT: ICD-10-PCS | Mod: S$GLB,,, | Performed by: INTERNAL MEDICINE

## 2021-02-22 PROCEDURE — 3288F FALL RISK ASSESSMENT DOCD: CPT | Mod: CPTII,S$GLB,, | Performed by: INTERNAL MEDICINE

## 2021-02-22 PROCEDURE — 1159F MED LIST DOCD IN RCRD: CPT | Mod: S$GLB,,, | Performed by: INTERNAL MEDICINE

## 2021-02-22 PROCEDURE — 99999 PR PBB SHADOW E&M-EST. PATIENT-LVL III: ICD-10-PCS | Mod: PBBFAC,,, | Performed by: INTERNAL MEDICINE

## 2021-02-22 PROCEDURE — 99214 OFFICE O/P EST MOD 30 MIN: CPT | Mod: S$GLB,,, | Performed by: INTERNAL MEDICINE

## 2021-02-22 PROCEDURE — 3288F PR FALLS RISK ASSESSMENT DOCUMENTED: ICD-10-PCS | Mod: CPTII,S$GLB,, | Performed by: INTERNAL MEDICINE

## 2021-02-22 PROCEDURE — 99999 PR PBB SHADOW E&M-EST. PATIENT-LVL III: CPT | Mod: PBBFAC,,, | Performed by: INTERNAL MEDICINE

## 2021-02-22 PROCEDURE — 1159F PR MEDICATION LIST DOCUMENTED IN MEDICAL RECORD: ICD-10-PCS | Mod: S$GLB,,, | Performed by: INTERNAL MEDICINE

## 2021-02-22 PROCEDURE — 1126F AMNT PAIN NOTED NONE PRSNT: CPT | Mod: S$GLB,,, | Performed by: INTERNAL MEDICINE

## 2021-02-22 PROCEDURE — 99214 PR OFFICE/OUTPT VISIT, EST, LEVL IV, 30-39 MIN: ICD-10-PCS | Mod: S$GLB,,, | Performed by: INTERNAL MEDICINE

## 2021-02-22 RX ORDER — TEMAZEPAM 15 MG/1
15 CAPSULE ORAL NIGHTLY PRN
COMMUNITY
End: 2022-08-15 | Stop reason: SDUPTHER

## 2021-02-23 ENCOUNTER — OFFICE VISIT (OUTPATIENT)
Dept: RHEUMATOLOGY | Facility: CLINIC | Age: 76
End: 2021-02-23
Payer: MEDICARE

## 2021-02-23 VITALS
WEIGHT: 156.5 LBS | TEMPERATURE: 98 F | SYSTOLIC BLOOD PRESSURE: 110 MMHG | HEIGHT: 62 IN | DIASTOLIC BLOOD PRESSURE: 73 MMHG | BODY MASS INDEX: 28.8 KG/M2

## 2021-02-23 DIAGNOSIS — M35.9 UNDIFFERENTIATED CONNECTIVE TISSUE DISEASE: Primary | ICD-10-CM

## 2021-02-23 DIAGNOSIS — Z79.899 ENCOUNTER FOR LONG-TERM (CURRENT) DRUG USE: ICD-10-CM

## 2021-02-23 PROCEDURE — 3288F FALL RISK ASSESSMENT DOCD: CPT | Mod: S$GLB,,, | Performed by: INTERNAL MEDICINE

## 2021-02-23 PROCEDURE — 3288F PR FALLS RISK ASSESSMENT DOCUMENTED: ICD-10-PCS | Mod: S$GLB,,, | Performed by: INTERNAL MEDICINE

## 2021-02-23 PROCEDURE — 1159F PR MEDICATION LIST DOCUMENTED IN MEDICAL RECORD: ICD-10-PCS | Mod: S$GLB,,, | Performed by: INTERNAL MEDICINE

## 2021-02-23 PROCEDURE — 1125F AMNT PAIN NOTED PAIN PRSNT: CPT | Mod: S$GLB,,, | Performed by: INTERNAL MEDICINE

## 2021-02-23 PROCEDURE — 1101F PR PT FALLS ASSESS DOC 0-1 FALLS W/OUT INJ PAST YR: ICD-10-PCS | Mod: S$GLB,,, | Performed by: INTERNAL MEDICINE

## 2021-02-23 PROCEDURE — 99214 OFFICE O/P EST MOD 30 MIN: CPT | Mod: S$GLB,,, | Performed by: INTERNAL MEDICINE

## 2021-02-23 PROCEDURE — 1101F PT FALLS ASSESS-DOCD LE1/YR: CPT | Mod: S$GLB,,, | Performed by: INTERNAL MEDICINE

## 2021-02-23 PROCEDURE — 1159F MED LIST DOCD IN RCRD: CPT | Mod: S$GLB,,, | Performed by: INTERNAL MEDICINE

## 2021-02-23 PROCEDURE — 1125F PR PAIN SEVERITY QUANTIFIED, PAIN PRESENT: ICD-10-PCS | Mod: S$GLB,,, | Performed by: INTERNAL MEDICINE

## 2021-02-23 PROCEDURE — 99214 PR OFFICE/OUTPT VISIT, EST, LEVL IV, 30-39 MIN: ICD-10-PCS | Mod: S$GLB,,, | Performed by: INTERNAL MEDICINE

## 2021-03-18 ENCOUNTER — PATIENT MESSAGE (OUTPATIENT)
Dept: RESEARCH | Facility: HOSPITAL | Age: 76
End: 2021-03-18

## 2021-03-26 ENCOUNTER — PATIENT MESSAGE (OUTPATIENT)
Dept: RESEARCH | Facility: HOSPITAL | Age: 76
End: 2021-03-26

## 2021-04-05 ENCOUNTER — PATIENT MESSAGE (OUTPATIENT)
Dept: CARDIOLOGY | Facility: CLINIC | Age: 76
End: 2021-04-05

## 2021-04-06 RX ORDER — ATORVASTATIN CALCIUM 20 MG/1
20 TABLET, FILM COATED ORAL NIGHTLY
Qty: 90 TABLET | Refills: 3 | Status: SHIPPED | OUTPATIENT
Start: 2021-04-06 | End: 2021-08-23 | Stop reason: DRUGHIGH

## 2021-04-08 RX ORDER — LOSARTAN POTASSIUM 25 MG/1
25 TABLET ORAL EVERY 12 HOURS
Qty: 180 TABLET | Refills: 3 | Status: SHIPPED | OUTPATIENT
Start: 2021-04-08 | End: 2022-02-08 | Stop reason: SDUPTHER

## 2021-06-21 ENCOUNTER — PATIENT MESSAGE (OUTPATIENT)
Dept: RHEUMATOLOGY | Facility: CLINIC | Age: 76
End: 2021-06-21

## 2021-06-21 DIAGNOSIS — M35.9 UNDIFFERENTIATED CONNECTIVE TISSUE DISEASE: Primary | ICD-10-CM

## 2021-06-21 DIAGNOSIS — Z79.899 ENCOUNTER FOR LONG-TERM (CURRENT) DRUG USE: ICD-10-CM

## 2021-06-22 LAB
ALBUMIN SERPL-MCNC: 4 G/DL (ref 3.6–5.1)
ALBUMIN/GLOB SERPL: 1.5 (CALC) (ref 1–2.5)
ALP SERPL-CCNC: 113 U/L (ref 37–153)
ALT SERPL-CCNC: 39 U/L (ref 6–29)
APPEARANCE UR: CLEAR
AST SERPL-CCNC: 30 U/L (ref 10–35)
BASOPHILS # BLD AUTO: 61 CELLS/UL (ref 0–200)
BASOPHILS NFR BLD AUTO: 0.8 %
BILIRUB SERPL-MCNC: 0.4 MG/DL (ref 0.2–1.2)
BILIRUB UR QL STRIP: NEGATIVE
BUN SERPL-MCNC: 22 MG/DL (ref 7–25)
BUN/CREAT SERPL: ABNORMAL (CALC) (ref 6–22)
CALCIUM SERPL-MCNC: 9.6 MG/DL (ref 8.6–10.4)
CHLORIDE SERPL-SCNC: 103 MMOL/L (ref 98–110)
CK SERPL-CCNC: 36 U/L (ref 29–143)
CO2 SERPL-SCNC: 30 MMOL/L (ref 20–32)
COLOR UR: YELLOW
CREAT SERPL-MCNC: 0.91 MG/DL (ref 0.6–0.93)
CRP SERPL-MCNC: 1 MG/L
EOSINOPHIL # BLD AUTO: 418 CELLS/UL (ref 15–500)
EOSINOPHIL NFR BLD AUTO: 5.5 %
ERYTHROCYTE [DISTWIDTH] IN BLOOD BY AUTOMATED COUNT: 14.9 % (ref 11–15)
GLOBULIN SER CALC-MCNC: 2.7 G/DL (CALC) (ref 1.9–3.7)
GLUCOSE SERPL-MCNC: 102 MG/DL (ref 65–99)
GLUCOSE UR QL STRIP: NEGATIVE
HCT VFR BLD AUTO: 37.3 % (ref 35–45)
HGB BLD-MCNC: 11.5 G/DL (ref 11.7–15.5)
HGB UR QL STRIP: NEGATIVE
KETONES UR QL STRIP: NEGATIVE
LEUKOCYTE ESTERASE UR QL STRIP: NEGATIVE
LYMPHOCYTES # BLD AUTO: 1748 CELLS/UL (ref 850–3900)
LYMPHOCYTES NFR BLD AUTO: 23 %
MCH RBC QN AUTO: 23.6 PG (ref 27–33)
MCHC RBC AUTO-ENTMCNC: 30.8 G/DL (ref 32–36)
MCV RBC AUTO: 76.4 FL (ref 80–100)
MONOCYTES # BLD AUTO: 669 CELLS/UL (ref 200–950)
MONOCYTES NFR BLD AUTO: 8.8 %
NEUTROPHILS # BLD AUTO: 4704 CELLS/UL (ref 1500–7800)
NEUTROPHILS NFR BLD AUTO: 61.9 %
NITRITE UR QL STRIP: NEGATIVE
PH UR STRIP: 5.5 [PH] (ref 5–8)
PLATELET # BLD AUTO: 273 THOUSAND/UL (ref 140–400)
PMV BLD REES-ECKER: 10.6 FL (ref 7.5–12.5)
POTASSIUM SERPL-SCNC: 4.6 MMOL/L (ref 3.5–5.3)
PROT SERPL-MCNC: 6.7 G/DL (ref 6.1–8.1)
PROT UR QL STRIP: ABNORMAL
RBC # BLD AUTO: 4.88 MILLION/UL (ref 3.8–5.1)
SODIUM SERPL-SCNC: 140 MMOL/L (ref 135–146)
SP GR UR STRIP: 1.02 (ref 1–1.03)
WBC # BLD AUTO: 7.6 THOUSAND/UL (ref 3.8–10.8)

## 2021-06-23 ENCOUNTER — OFFICE VISIT (OUTPATIENT)
Dept: RHEUMATOLOGY | Facility: CLINIC | Age: 76
End: 2021-06-23
Payer: MEDICARE

## 2021-06-23 VITALS — WEIGHT: 158.5 LBS | BODY MASS INDEX: 29.46 KG/M2 | DIASTOLIC BLOOD PRESSURE: 70 MMHG | SYSTOLIC BLOOD PRESSURE: 145 MMHG

## 2021-06-23 DIAGNOSIS — M35.9 UNDIFFERENTIATED CONNECTIVE TISSUE DISEASE: Primary | ICD-10-CM

## 2021-06-23 DIAGNOSIS — Z79.899 ENCOUNTER FOR LONG-TERM (CURRENT) DRUG USE: ICD-10-CM

## 2021-06-23 PROCEDURE — 99213 PR OFFICE/OUTPT VISIT, EST, LEVL III, 20-29 MIN: ICD-10-PCS | Mod: S$GLB,,, | Performed by: INTERNAL MEDICINE

## 2021-06-23 PROCEDURE — 1159F MED LIST DOCD IN RCRD: CPT | Mod: S$GLB,,, | Performed by: INTERNAL MEDICINE

## 2021-06-23 PROCEDURE — 99213 OFFICE O/P EST LOW 20 MIN: CPT | Mod: S$GLB,,, | Performed by: INTERNAL MEDICINE

## 2021-06-23 PROCEDURE — 1159F PR MEDICATION LIST DOCUMENTED IN MEDICAL RECORD: ICD-10-PCS | Mod: S$GLB,,, | Performed by: INTERNAL MEDICINE

## 2021-08-17 ENCOUNTER — PATIENT MESSAGE (OUTPATIENT)
Dept: RHEUMATOLOGY | Facility: CLINIC | Age: 76
End: 2021-08-17

## 2021-08-19 ENCOUNTER — HOSPITAL ENCOUNTER (OUTPATIENT)
Dept: CARDIOLOGY | Facility: OTHER | Age: 76
Discharge: HOME | End: 2021-08-19
Attending: INTERNAL MEDICINE
Payer: MEDICARE

## 2021-08-19 VITALS
SYSTOLIC BLOOD PRESSURE: 145 MMHG | WEIGHT: 158 LBS | HEIGHT: 61 IN | BODY MASS INDEX: 29.83 KG/M2 | DIASTOLIC BLOOD PRESSURE: 70 MMHG

## 2021-08-19 DIAGNOSIS — I50.22 CHRONIC SYSTOLIC HEART FAILURE: ICD-10-CM

## 2021-08-19 LAB
ASCENDING AORTA: 2.76 CM
AV INDEX (PROSTH): 0.86
AV MEAN GRADIENT: 3 MMHG
AV PEAK GRADIENT: 7 MMHG
AV VALVE AREA: 3.24 CM2
AV VELOCITY RATIO: 0.81
BSA FOR ECHO PROCEDURE: 1.76 M2
CV ECHO LV RWT: 0.5 CM
DOP CALC AO PEAK VEL: 1.29 M/S
DOP CALC AO VTI: 27.92 CM
DOP CALC LVOT AREA: 3.8 CM2
DOP CALC LVOT DIAMETER: 2.19 CM
DOP CALC LVOT PEAK VEL: 1.04 M/S
DOP CALC LVOT STROKE VOLUME: 90.47 CM3
DOP CALCLVOT PEAK VEL VTI: 24.03 CM
E WAVE DECELERATION TIME: 223.56 MSEC
E/A RATIO: 0.84
E/E' RATIO: 10.88 M/S
ECHO LV POSTERIOR WALL: 0.99 CM (ref 0.6–1.1)
EJECTION FRACTION: 60 %
FRACTIONAL SHORTENING: 28 % (ref 28–44)
INTERVENTRICULAR SEPTUM: 1.05 CM (ref 0.6–1.1)
IVRT: 126.87 MSEC
LA MAJOR: 5.38 CM
LA MINOR: 5.06 CM
LA WIDTH: 4.06 CM
LEFT ATRIUM SIZE: 3.75 CM
LEFT ATRIUM VOLUME INDEX MOD: 32.7 ML/M2
LEFT ATRIUM VOLUME INDEX: 39.5 ML/M2
LEFT ATRIUM VOLUME MOD: 56 CM3
LEFT ATRIUM VOLUME: 67.49 CM3
LEFT INTERNAL DIMENSION IN SYSTOLE: 2.84 CM (ref 2.1–4)
LEFT VENTRICLE DIASTOLIC VOLUME INDEX: 39.56 ML/M2
LEFT VENTRICLE DIASTOLIC VOLUME: 67.65 ML
LEFT VENTRICLE MASS INDEX: 75 G/M2
LEFT VENTRICLE SYSTOLIC VOLUME INDEX: 17.8 ML/M2
LEFT VENTRICLE SYSTOLIC VOLUME: 30.49 ML
LEFT VENTRICULAR INTERNAL DIMENSION IN DIASTOLE: 3.94 CM (ref 3.5–6)
LEFT VENTRICULAR MASS: 127.63 G
LV LATERAL E/E' RATIO: 9.67 M/S
LV SEPTAL E/E' RATIO: 12.43 M/S
MV PEAK A VEL: 1.04 M/S
MV PEAK E VEL: 0.87 M/S
MV STENOSIS PRESSURE HALF TIME: 64.83 MS
MV VALVE AREA P 1/2 METHOD: 3.39 CM2
PISA TR MAX VEL: 2.66 M/S
PULM VEIN S/D RATIO: 1.37
PV PEAK D VEL: 0.35 M/S
PV PEAK S VEL: 0.48 M/S
PV PEAK VELOCITY: 1.14 CM/S
RA MAJOR: 4.78 CM
RA PRESSURE: 3 MMHG
RIGHT VENTRICULAR END-DIASTOLIC DIMENSION: 2.85 CM
RV TISSUE DOPPLER FREE WALL SYSTOLIC VELOCITY 1 (APICAL 4 CHAMBER VIEW): 11.89 CM/S
SINUS: 2.88 CM
STJ: 2.47 CM
TDI LATERAL: 0.09 M/S
TDI SEPTAL: 0.07 M/S
TDI: 0.08 M/S
TR MAX PG: 28 MMHG
TRICUSPID ANNULAR PLANE SYSTOLIC EXCURSION: 2.8 CM
TV REST PULMONARY ARTERY PRESSURE: 31 MMHG

## 2021-08-19 PROCEDURE — 93306 ECHO (CUPID ONLY): ICD-10-PCS | Mod: 26,,, | Performed by: INTERNAL MEDICINE

## 2021-08-19 PROCEDURE — 93306 TTE W/DOPPLER COMPLETE: CPT | Mod: 26,,, | Performed by: INTERNAL MEDICINE

## 2021-08-19 PROCEDURE — 93306 TTE W/DOPPLER COMPLETE: CPT

## 2021-08-23 ENCOUNTER — OFFICE VISIT (OUTPATIENT)
Dept: CARDIOLOGY | Facility: CLINIC | Age: 76
End: 2021-08-23
Payer: MEDICARE

## 2021-08-23 VITALS
WEIGHT: 161.31 LBS | HEART RATE: 63 BPM | DIASTOLIC BLOOD PRESSURE: 62 MMHG | BODY MASS INDEX: 30.48 KG/M2 | SYSTOLIC BLOOD PRESSURE: 124 MMHG

## 2021-08-23 DIAGNOSIS — M35.9 UNDIFFERENTIATED CONNECTIVE TISSUE DISEASE: ICD-10-CM

## 2021-08-23 DIAGNOSIS — I25.10 ATHEROSCLEROSIS OF NATIVE CORONARY ARTERY OF NATIVE HEART WITHOUT ANGINA PECTORIS: ICD-10-CM

## 2021-08-23 DIAGNOSIS — I50.22 CHRONIC SYSTOLIC HEART FAILURE: ICD-10-CM

## 2021-08-23 DIAGNOSIS — E78.00 HYPERCHOLESTEREMIA: Primary | ICD-10-CM

## 2021-08-23 PROBLEM — I50.20 SYSTOLIC HEART FAILURE: Status: RESOLVED | Noted: 2020-06-19 | Resolved: 2021-08-23

## 2021-08-23 PROCEDURE — 1126F AMNT PAIN NOTED NONE PRSNT: CPT | Mod: CPTII,S$GLB,, | Performed by: INTERNAL MEDICINE

## 2021-08-23 PROCEDURE — 99999 PR PBB SHADOW E&M-EST. PATIENT-LVL III: ICD-10-PCS | Mod: PBBFAC,,, | Performed by: INTERNAL MEDICINE

## 2021-08-23 PROCEDURE — 3288F FALL RISK ASSESSMENT DOCD: CPT | Mod: CPTII,S$GLB,, | Performed by: INTERNAL MEDICINE

## 2021-08-23 PROCEDURE — 3288F PR FALLS RISK ASSESSMENT DOCUMENTED: ICD-10-PCS | Mod: CPTII,S$GLB,, | Performed by: INTERNAL MEDICINE

## 2021-08-23 PROCEDURE — 3078F DIAST BP <80 MM HG: CPT | Mod: CPTII,S$GLB,, | Performed by: INTERNAL MEDICINE

## 2021-08-23 PROCEDURE — 1160F PR REVIEW ALL MEDS BY PRESCRIBER/CLIN PHARMACIST DOCUMENTED: ICD-10-PCS | Mod: CPTII,S$GLB,, | Performed by: INTERNAL MEDICINE

## 2021-08-23 PROCEDURE — 3074F PR MOST RECENT SYSTOLIC BLOOD PRESSURE < 130 MM HG: ICD-10-PCS | Mod: CPTII,S$GLB,, | Performed by: INTERNAL MEDICINE

## 2021-08-23 PROCEDURE — 1126F PR PAIN SEVERITY QUANTIFIED, NO PAIN PRESENT: ICD-10-PCS | Mod: CPTII,S$GLB,, | Performed by: INTERNAL MEDICINE

## 2021-08-23 PROCEDURE — 1159F PR MEDICATION LIST DOCUMENTED IN MEDICAL RECORD: ICD-10-PCS | Mod: CPTII,S$GLB,, | Performed by: INTERNAL MEDICINE

## 2021-08-23 PROCEDURE — 99214 OFFICE O/P EST MOD 30 MIN: CPT | Mod: S$GLB,,, | Performed by: INTERNAL MEDICINE

## 2021-08-23 PROCEDURE — 3078F PR MOST RECENT DIASTOLIC BLOOD PRESSURE < 80 MM HG: ICD-10-PCS | Mod: CPTII,S$GLB,, | Performed by: INTERNAL MEDICINE

## 2021-08-23 PROCEDURE — 1101F PR PT FALLS ASSESS DOC 0-1 FALLS W/OUT INJ PAST YR: ICD-10-PCS | Mod: CPTII,S$GLB,, | Performed by: INTERNAL MEDICINE

## 2021-08-23 PROCEDURE — 99999 PR PBB SHADOW E&M-EST. PATIENT-LVL III: CPT | Mod: PBBFAC,,, | Performed by: INTERNAL MEDICINE

## 2021-08-23 PROCEDURE — 1159F MED LIST DOCD IN RCRD: CPT | Mod: CPTII,S$GLB,, | Performed by: INTERNAL MEDICINE

## 2021-08-23 PROCEDURE — 1160F RVW MEDS BY RX/DR IN RCRD: CPT | Mod: CPTII,S$GLB,, | Performed by: INTERNAL MEDICINE

## 2021-08-23 PROCEDURE — 1101F PT FALLS ASSESS-DOCD LE1/YR: CPT | Mod: CPTII,S$GLB,, | Performed by: INTERNAL MEDICINE

## 2021-08-23 PROCEDURE — 3074F SYST BP LT 130 MM HG: CPT | Mod: CPTII,S$GLB,, | Performed by: INTERNAL MEDICINE

## 2021-08-23 PROCEDURE — 99214 PR OFFICE/OUTPT VISIT, EST, LEVL IV, 30-39 MIN: ICD-10-PCS | Mod: S$GLB,,, | Performed by: INTERNAL MEDICINE

## 2021-08-23 RX ORDER — ATORVASTATIN CALCIUM 10 MG/1
10 TABLET, FILM COATED ORAL DAILY
Qty: 90 TABLET | Refills: 3 | Status: SHIPPED | OUTPATIENT
Start: 2021-08-23 | End: 2022-08-09 | Stop reason: SDUPTHER

## 2021-09-27 ENCOUNTER — IMMUNIZATION (OUTPATIENT)
Dept: PRIMARY CARE CLINIC | Facility: CLINIC | Age: 76
End: 2021-09-27
Payer: MEDICARE

## 2021-09-27 DIAGNOSIS — Z23 NEED FOR VACCINATION: Primary | ICD-10-CM

## 2021-09-27 PROCEDURE — 91300 COVID-19, MRNA, LNP-S, PF, 30 MCG/0.3 ML DOSE VACCINE: CPT | Mod: PBBFAC | Performed by: INTERNAL MEDICINE

## 2021-09-27 PROCEDURE — 0003A COVID-19, MRNA, LNP-S, PF, 30 MCG/0.3 ML DOSE VACCINE: CPT | Mod: CV19,PBBFAC | Performed by: INTERNAL MEDICINE

## 2021-10-20 DIAGNOSIS — Z79.899 ENCOUNTER FOR LONG-TERM (CURRENT) DRUG USE: ICD-10-CM

## 2021-10-20 DIAGNOSIS — M35.9 UNDIFFERENTIATED CONNECTIVE TISSUE DISEASE: Primary | ICD-10-CM

## 2021-10-21 LAB
ALBUMIN SERPL-MCNC: 4.3 G/DL (ref 3.6–5.1)
ALBUMIN/GLOB SERPL: 1.5 (CALC) (ref 1–2.5)
ALP SERPL-CCNC: 121 U/L (ref 37–153)
ALT SERPL-CCNC: 22 U/L (ref 6–29)
APPEARANCE UR: CLEAR
AST SERPL-CCNC: 17 U/L (ref 10–35)
BASOPHILS # BLD AUTO: 71 CELLS/UL (ref 0–200)
BASOPHILS NFR BLD AUTO: 0.9 %
BILIRUB SERPL-MCNC: 0.5 MG/DL (ref 0.2–1.2)
BILIRUB UR QL STRIP: NEGATIVE
BUN SERPL-MCNC: 36 MG/DL (ref 7–25)
BUN/CREAT SERPL: 37 (CALC) (ref 6–22)
CALCIUM SERPL-MCNC: 9.7 MG/DL (ref 8.6–10.4)
CHLORIDE SERPL-SCNC: 101 MMOL/L (ref 98–110)
CK SERPL-CCNC: 48 U/L (ref 29–143)
CO2 SERPL-SCNC: 29 MMOL/L (ref 20–32)
COLOR UR: YELLOW
CREAT SERPL-MCNC: 0.97 MG/DL (ref 0.6–0.93)
CRP SERPL-MCNC: 2.4 MG/L
EOSINOPHIL # BLD AUTO: 261 CELLS/UL (ref 15–500)
EOSINOPHIL NFR BLD AUTO: 3.3 %
ERYTHROCYTE [DISTWIDTH] IN BLOOD BY AUTOMATED COUNT: 15.1 % (ref 11–15)
GLOBULIN SER CALC-MCNC: 2.9 G/DL (CALC) (ref 1.9–3.7)
GLUCOSE SERPL-MCNC: 95 MG/DL (ref 65–99)
GLUCOSE UR QL STRIP: NEGATIVE
HCT VFR BLD AUTO: 38.5 % (ref 35–45)
HGB BLD-MCNC: 12.4 G/DL (ref 11.7–15.5)
HGB UR QL STRIP: NEGATIVE
KETONES UR QL STRIP: NEGATIVE
LEUKOCYTE ESTERASE UR QL STRIP: NEGATIVE
LYMPHOCYTES # BLD AUTO: 1683 CELLS/UL (ref 850–3900)
LYMPHOCYTES NFR BLD AUTO: 21.3 %
MCH RBC QN AUTO: 25.3 PG (ref 27–33)
MCHC RBC AUTO-ENTMCNC: 32.2 G/DL (ref 32–36)
MCV RBC AUTO: 78.6 FL (ref 80–100)
MONOCYTES # BLD AUTO: 679 CELLS/UL (ref 200–950)
MONOCYTES NFR BLD AUTO: 8.6 %
NEUTROPHILS # BLD AUTO: 5206 CELLS/UL (ref 1500–7800)
NEUTROPHILS NFR BLD AUTO: 65.9 %
NITRITE UR QL STRIP: NEGATIVE
PH UR STRIP: NORMAL [PH] (ref 5–8)
PLATELET # BLD AUTO: 270 THOUSAND/UL (ref 140–400)
PMV BLD REES-ECKER: 10.7 FL (ref 7.5–12.5)
POTASSIUM SERPL-SCNC: 3.9 MMOL/L (ref 3.5–5.3)
PROT SERPL-MCNC: 7.2 G/DL (ref 6.1–8.1)
PROT UR QL STRIP: NEGATIVE
RBC # BLD AUTO: 4.9 MILLION/UL (ref 3.8–5.1)
SODIUM SERPL-SCNC: 139 MMOL/L (ref 135–146)
SP GR UR STRIP: 1.02 (ref 1–1.03)
WBC # BLD AUTO: 7.9 THOUSAND/UL (ref 3.8–10.8)

## 2021-10-25 ENCOUNTER — OFFICE VISIT (OUTPATIENT)
Dept: RHEUMATOLOGY | Facility: CLINIC | Age: 76
End: 2021-10-25
Payer: MEDICARE

## 2021-10-25 VITALS
BODY MASS INDEX: 31.29 KG/M2 | DIASTOLIC BLOOD PRESSURE: 68 MMHG | SYSTOLIC BLOOD PRESSURE: 141 MMHG | WEIGHT: 165.63 LBS

## 2021-10-25 DIAGNOSIS — Z79.899 ENCOUNTER FOR LONG-TERM (CURRENT) DRUG USE: ICD-10-CM

## 2021-10-25 DIAGNOSIS — M35.9 UNDIFFERENTIATED CONNECTIVE TISSUE DISEASE: Primary | ICD-10-CM

## 2021-10-25 PROCEDURE — 3077F SYST BP >= 140 MM HG: CPT | Mod: S$GLB,,, | Performed by: INTERNAL MEDICINE

## 2021-10-25 PROCEDURE — 3288F PR FALLS RISK ASSESSMENT DOCUMENTED: ICD-10-PCS | Mod: S$GLB,,, | Performed by: INTERNAL MEDICINE

## 2021-10-25 PROCEDURE — 3078F DIAST BP <80 MM HG: CPT | Mod: S$GLB,,, | Performed by: INTERNAL MEDICINE

## 2021-10-25 PROCEDURE — 1159F MED LIST DOCD IN RCRD: CPT | Mod: S$GLB,,, | Performed by: INTERNAL MEDICINE

## 2021-10-25 PROCEDURE — 99213 OFFICE O/P EST LOW 20 MIN: CPT | Mod: S$GLB,,, | Performed by: INTERNAL MEDICINE

## 2021-10-25 PROCEDURE — 1101F PR PT FALLS ASSESS DOC 0-1 FALLS W/OUT INJ PAST YR: ICD-10-PCS | Mod: S$GLB,,, | Performed by: INTERNAL MEDICINE

## 2021-10-25 PROCEDURE — 1125F AMNT PAIN NOTED PAIN PRSNT: CPT | Mod: S$GLB,,, | Performed by: INTERNAL MEDICINE

## 2021-10-25 PROCEDURE — 3078F PR MOST RECENT DIASTOLIC BLOOD PRESSURE < 80 MM HG: ICD-10-PCS | Mod: S$GLB,,, | Performed by: INTERNAL MEDICINE

## 2021-10-25 PROCEDURE — 1125F PR PAIN SEVERITY QUANTIFIED, PAIN PRESENT: ICD-10-PCS | Mod: S$GLB,,, | Performed by: INTERNAL MEDICINE

## 2021-10-25 PROCEDURE — 1101F PT FALLS ASSESS-DOCD LE1/YR: CPT | Mod: S$GLB,,, | Performed by: INTERNAL MEDICINE

## 2021-10-25 PROCEDURE — 99213 PR OFFICE/OUTPT VISIT, EST, LEVL III, 20-29 MIN: ICD-10-PCS | Mod: S$GLB,,, | Performed by: INTERNAL MEDICINE

## 2021-10-25 PROCEDURE — 3288F FALL RISK ASSESSMENT DOCD: CPT | Mod: S$GLB,,, | Performed by: INTERNAL MEDICINE

## 2021-10-25 PROCEDURE — 1159F PR MEDICATION LIST DOCUMENTED IN MEDICAL RECORD: ICD-10-PCS | Mod: S$GLB,,, | Performed by: INTERNAL MEDICINE

## 2021-10-25 PROCEDURE — 3077F PR MOST RECENT SYSTOLIC BLOOD PRESSURE >= 140 MM HG: ICD-10-PCS | Mod: S$GLB,,, | Performed by: INTERNAL MEDICINE

## 2021-10-25 RX ORDER — HYDROXYCHLOROQUINE SULFATE 200 MG/1
TABLET, FILM COATED ORAL
Qty: 180 TABLET | Refills: 1 | Status: SHIPPED | OUTPATIENT
Start: 2021-10-25 | End: 2022-05-14 | Stop reason: SDUPTHER

## 2021-11-08 ENCOUNTER — PATIENT MESSAGE (OUTPATIENT)
Dept: RHEUMATOLOGY | Facility: CLINIC | Age: 76
End: 2021-11-08
Payer: MEDICARE

## 2021-11-11 DIAGNOSIS — Z79.899 ENCOUNTER FOR LONG-TERM (CURRENT) USE OF MEDICATIONS: ICD-10-CM

## 2021-11-11 DIAGNOSIS — M35.9 UNDIFFERENTIATED CONNECTIVE TISSUE DISEASE: Primary | ICD-10-CM

## 2021-12-02 DIAGNOSIS — I50.21 ACUTE SYSTOLIC HEART FAILURE: ICD-10-CM

## 2021-12-02 RX ORDER — FUROSEMIDE 40 MG/1
40 TABLET ORAL DAILY PRN
Qty: 90 TABLET | Refills: 3 | Status: SHIPPED | OUTPATIENT
Start: 2021-12-02 | End: 2022-08-09 | Stop reason: SDUPTHER

## 2021-12-13 ENCOUNTER — OFFICE VISIT (OUTPATIENT)
Dept: URGENT CARE | Facility: CLINIC | Age: 76
End: 2021-12-13
Payer: MEDICARE

## 2021-12-13 VITALS
HEIGHT: 61 IN | OXYGEN SATURATION: 97 % | WEIGHT: 165 LBS | BODY MASS INDEX: 31.15 KG/M2 | HEART RATE: 68 BPM | DIASTOLIC BLOOD PRESSURE: 82 MMHG | TEMPERATURE: 98 F | SYSTOLIC BLOOD PRESSURE: 155 MMHG | RESPIRATION RATE: 16 BRPM

## 2021-12-13 DIAGNOSIS — R04.0 EPISTAXIS: Primary | ICD-10-CM

## 2021-12-13 DIAGNOSIS — J06.9 UPPER RESPIRATORY INFECTION WITH COUGH AND CONGESTION: ICD-10-CM

## 2021-12-13 DIAGNOSIS — Z11.59 ENCOUNTER FOR SCREENING FOR OTHER VIRAL DISEASES: ICD-10-CM

## 2021-12-13 LAB
CTP QC/QA: YES
CTP QC/QA: YES
POC MOLECULAR INFLUENZA A AGN: NEGATIVE
POC MOLECULAR INFLUENZA B AGN: NEGATIVE
SARS-COV-2 RDRP RESP QL NAA+PROBE: NEGATIVE

## 2021-12-13 PROCEDURE — 87502 INFLUENZA DNA AMP PROBE: CPT | Mod: QW,S$GLB,, | Performed by: NURSE PRACTITIONER

## 2021-12-13 PROCEDURE — U0002: ICD-10-PCS | Mod: QW,S$GLB,, | Performed by: NURSE PRACTITIONER

## 2021-12-13 PROCEDURE — 99204 PR OFFICE/OUTPT VISIT, NEW, LEVL IV, 45-59 MIN: ICD-10-PCS | Mod: S$GLB,,, | Performed by: NURSE PRACTITIONER

## 2021-12-13 PROCEDURE — 87502 POCT INFLUENZA A/B MOLECULAR: ICD-10-PCS | Mod: QW,S$GLB,, | Performed by: NURSE PRACTITIONER

## 2021-12-13 PROCEDURE — 99204 OFFICE O/P NEW MOD 45 MIN: CPT | Mod: S$GLB,,, | Performed by: NURSE PRACTITIONER

## 2021-12-13 PROCEDURE — U0002 COVID-19 LAB TEST NON-CDC: HCPCS | Mod: QW,S$GLB,, | Performed by: NURSE PRACTITIONER

## 2021-12-13 RX ORDER — AZELASTINE 1 MG/ML
1 SPRAY, METERED NASAL 2 TIMES DAILY
Qty: 30 ML | Refills: 1 | Status: SHIPPED | OUTPATIENT
Start: 2021-12-13 | End: 2022-08-09

## 2022-02-02 ENCOUNTER — LAB VISIT (OUTPATIENT)
Dept: LAB | Facility: OTHER | Age: 77
End: 2022-02-02
Attending: INTERNAL MEDICINE
Payer: MEDICARE

## 2022-02-02 DIAGNOSIS — Z79.899 ENCOUNTER FOR LONG-TERM (CURRENT) USE OF OTHER MEDICATIONS: ICD-10-CM

## 2022-02-02 DIAGNOSIS — M35.9 DIFFUSE DISEASE OF CONNECTIVE TISSUE: Primary | ICD-10-CM

## 2022-02-02 DIAGNOSIS — E78.00 HYPERCHOLESTEREMIA: ICD-10-CM

## 2022-02-02 LAB
ALBUMIN SERPL BCP-MCNC: 4 G/DL (ref 3.5–5.2)
ALP SERPL-CCNC: 192 U/L (ref 55–135)
ALT SERPL W/O P-5'-P-CCNC: 61 U/L (ref 10–44)
ANION GAP SERPL CALC-SCNC: 10 MMOL/L (ref 8–16)
AST SERPL-CCNC: 26 U/L (ref 10–40)
BASOPHILS # BLD AUTO: 0.08 K/UL (ref 0–0.2)
BASOPHILS NFR BLD: 0.9 % (ref 0–1.9)
BILIRUB SERPL-MCNC: 0.4 MG/DL (ref 0.1–1)
BILIRUB UR QL STRIP: NEGATIVE
BUN SERPL-MCNC: 28 MG/DL (ref 8–23)
CALCIUM SERPL-MCNC: 9.8 MG/DL (ref 8.7–10.5)
CHLORIDE SERPL-SCNC: 102 MMOL/L (ref 95–110)
CHOLEST SERPL-MCNC: 142 MG/DL (ref 120–199)
CHOLEST/HDLC SERPL: 1.8 {RATIO} (ref 2–5)
CLARITY UR: CLEAR
CO2 SERPL-SCNC: 29 MMOL/L (ref 23–29)
COLOR UR: YELLOW
CREAT SERPL-MCNC: 1 MG/DL (ref 0.5–1.4)
CRP SERPL-MCNC: 0.8 MG/L (ref 0–8.2)
DIFFERENTIAL METHOD: ABNORMAL
EOSINOPHIL # BLD AUTO: 0.4 K/UL (ref 0–0.5)
EOSINOPHIL NFR BLD: 4.1 % (ref 0–8)
ERYTHROCYTE [DISTWIDTH] IN BLOOD BY AUTOMATED COUNT: 17.6 % (ref 11.5–14.5)
EST. GFR  (AFRICAN AMERICAN): >60 ML/MIN/1.73 M^2
EST. GFR  (NON AFRICAN AMERICAN): 54 ML/MIN/1.73 M^2
GLUCOSE SERPL-MCNC: 121 MG/DL (ref 70–110)
GLUCOSE UR QL STRIP: NEGATIVE
HCT VFR BLD AUTO: 40.3 % (ref 37–48.5)
HDLC SERPL-MCNC: 77 MG/DL (ref 40–75)
HDLC SERPL: 54.2 % (ref 20–50)
HGB BLD-MCNC: 12.2 G/DL (ref 12–16)
HGB UR QL STRIP: NEGATIVE
IMM GRANULOCYTES # BLD AUTO: 0.02 K/UL (ref 0–0.04)
IMM GRANULOCYTES NFR BLD AUTO: 0.2 % (ref 0–0.5)
KETONES UR QL STRIP: NEGATIVE
LDLC SERPL CALC-MCNC: 52.6 MG/DL (ref 63–159)
LEUKOCYTE ESTERASE UR QL STRIP: NEGATIVE
LYMPHOCYTES # BLD AUTO: 1.5 K/UL (ref 1–4.8)
LYMPHOCYTES NFR BLD: 17.5 % (ref 18–48)
MCH RBC QN AUTO: 24.2 PG (ref 27–31)
MCHC RBC AUTO-ENTMCNC: 30.3 G/DL (ref 32–36)
MCV RBC AUTO: 80 FL (ref 82–98)
MONOCYTES # BLD AUTO: 0.8 K/UL (ref 0.3–1)
MONOCYTES NFR BLD: 8.7 % (ref 4–15)
NEUTROPHILS # BLD AUTO: 6 K/UL (ref 1.8–7.7)
NEUTROPHILS NFR BLD: 68.6 % (ref 38–73)
NITRITE UR QL STRIP: NEGATIVE
NONHDLC SERPL-MCNC: 65 MG/DL
NRBC BLD-RTO: 0 /100 WBC
PH UR STRIP: 6 [PH] (ref 5–8)
PLATELET # BLD AUTO: 302 K/UL (ref 150–450)
PMV BLD AUTO: 10.7 FL (ref 9.2–12.9)
POTASSIUM SERPL-SCNC: 4.1 MMOL/L (ref 3.5–5.1)
PROT SERPL-MCNC: 7.6 G/DL (ref 6–8.4)
PROT UR QL STRIP: ABNORMAL
RBC # BLD AUTO: 5.04 M/UL (ref 4–5.4)
SODIUM SERPL-SCNC: 141 MMOL/L (ref 136–145)
SP GR UR STRIP: >=1.03 (ref 1–1.03)
TRIGL SERPL-MCNC: 62 MG/DL (ref 30–150)
URN SPEC COLLECT METH UR: ABNORMAL
UROBILINOGEN UR STRIP-ACNC: NEGATIVE EU/DL
WBC # BLD AUTO: 8.74 K/UL (ref 3.9–12.7)

## 2022-02-02 PROCEDURE — 81003 URINALYSIS AUTO W/O SCOPE: CPT | Performed by: INTERNAL MEDICINE

## 2022-02-02 PROCEDURE — 86140 C-REACTIVE PROTEIN: CPT | Performed by: INTERNAL MEDICINE

## 2022-02-02 PROCEDURE — 80053 COMPREHEN METABOLIC PANEL: CPT | Performed by: INTERNAL MEDICINE

## 2022-02-02 PROCEDURE — 36415 COLL VENOUS BLD VENIPUNCTURE: CPT | Performed by: INTERNAL MEDICINE

## 2022-02-02 PROCEDURE — 85025 COMPLETE CBC W/AUTO DIFF WBC: CPT | Performed by: INTERNAL MEDICINE

## 2022-02-02 PROCEDURE — 80061 LIPID PANEL: CPT | Performed by: INTERNAL MEDICINE

## 2022-02-08 ENCOUNTER — OFFICE VISIT (OUTPATIENT)
Dept: CARDIOLOGY | Facility: CLINIC | Age: 77
End: 2022-02-08
Payer: MEDICARE

## 2022-02-08 VITALS
BODY MASS INDEX: 31.59 KG/M2 | WEIGHT: 167.19 LBS | SYSTOLIC BLOOD PRESSURE: 126 MMHG | DIASTOLIC BLOOD PRESSURE: 62 MMHG | HEART RATE: 68 BPM

## 2022-02-08 DIAGNOSIS — M35.9 UNDIFFERENTIATED CONNECTIVE TISSUE DISEASE: ICD-10-CM

## 2022-02-08 DIAGNOSIS — R42 DIZZINESS: ICD-10-CM

## 2022-02-08 DIAGNOSIS — R04.0 EPISTAXIS: ICD-10-CM

## 2022-02-08 DIAGNOSIS — R42 DISEQUILIBRIUM: ICD-10-CM

## 2022-02-08 DIAGNOSIS — E78.00 HYPERCHOLESTEREMIA: Chronic | ICD-10-CM

## 2022-02-08 DIAGNOSIS — I25.10 ATHEROSCLEROSIS OF NATIVE CORONARY ARTERY OF NATIVE HEART WITHOUT ANGINA PECTORIS: Primary | ICD-10-CM

## 2022-02-08 PROCEDURE — 1159F MED LIST DOCD IN RCRD: CPT | Mod: CPTII,S$GLB,, | Performed by: INTERNAL MEDICINE

## 2022-02-08 PROCEDURE — 3074F PR MOST RECENT SYSTOLIC BLOOD PRESSURE < 130 MM HG: ICD-10-PCS | Mod: CPTII,S$GLB,, | Performed by: INTERNAL MEDICINE

## 2022-02-08 PROCEDURE — 3288F PR FALLS RISK ASSESSMENT DOCUMENTED: ICD-10-PCS | Mod: CPTII,S$GLB,, | Performed by: INTERNAL MEDICINE

## 2022-02-08 PROCEDURE — 1160F RVW MEDS BY RX/DR IN RCRD: CPT | Mod: CPTII,S$GLB,, | Performed by: INTERNAL MEDICINE

## 2022-02-08 PROCEDURE — 3078F DIAST BP <80 MM HG: CPT | Mod: CPTII,S$GLB,, | Performed by: INTERNAL MEDICINE

## 2022-02-08 PROCEDURE — 1101F PR PT FALLS ASSESS DOC 0-1 FALLS W/OUT INJ PAST YR: ICD-10-PCS | Mod: CPTII,S$GLB,, | Performed by: INTERNAL MEDICINE

## 2022-02-08 PROCEDURE — 1101F PT FALLS ASSESS-DOCD LE1/YR: CPT | Mod: CPTII,S$GLB,, | Performed by: INTERNAL MEDICINE

## 2022-02-08 PROCEDURE — 99999 PR PBB SHADOW E&M-EST. PATIENT-LVL III: CPT | Mod: PBBFAC,,, | Performed by: INTERNAL MEDICINE

## 2022-02-08 PROCEDURE — 3288F FALL RISK ASSESSMENT DOCD: CPT | Mod: CPTII,S$GLB,, | Performed by: INTERNAL MEDICINE

## 2022-02-08 PROCEDURE — 1126F AMNT PAIN NOTED NONE PRSNT: CPT | Mod: CPTII,S$GLB,, | Performed by: INTERNAL MEDICINE

## 2022-02-08 PROCEDURE — 3074F SYST BP LT 130 MM HG: CPT | Mod: CPTII,S$GLB,, | Performed by: INTERNAL MEDICINE

## 2022-02-08 PROCEDURE — 99999 PR PBB SHADOW E&M-EST. PATIENT-LVL III: ICD-10-PCS | Mod: PBBFAC,,, | Performed by: INTERNAL MEDICINE

## 2022-02-08 PROCEDURE — 99215 OFFICE O/P EST HI 40 MIN: CPT | Mod: S$GLB,,, | Performed by: INTERNAL MEDICINE

## 2022-02-08 PROCEDURE — 1126F PR PAIN SEVERITY QUANTIFIED, NO PAIN PRESENT: ICD-10-PCS | Mod: CPTII,S$GLB,, | Performed by: INTERNAL MEDICINE

## 2022-02-08 PROCEDURE — 1159F PR MEDICATION LIST DOCUMENTED IN MEDICAL RECORD: ICD-10-PCS | Mod: CPTII,S$GLB,, | Performed by: INTERNAL MEDICINE

## 2022-02-08 PROCEDURE — 3078F PR MOST RECENT DIASTOLIC BLOOD PRESSURE < 80 MM HG: ICD-10-PCS | Mod: CPTII,S$GLB,, | Performed by: INTERNAL MEDICINE

## 2022-02-08 PROCEDURE — 1160F PR REVIEW ALL MEDS BY PRESCRIBER/CLIN PHARMACIST DOCUMENTED: ICD-10-PCS | Mod: CPTII,S$GLB,, | Performed by: INTERNAL MEDICINE

## 2022-02-08 PROCEDURE — 99215 PR OFFICE/OUTPT VISIT, EST, LEVL V, 40-54 MIN: ICD-10-PCS | Mod: S$GLB,,, | Performed by: INTERNAL MEDICINE

## 2022-02-08 RX ORDER — LOSARTAN POTASSIUM 25 MG/1
25 TABLET ORAL EVERY 12 HOURS
Qty: 180 TABLET | Refills: 3 | Status: SHIPPED | OUTPATIENT
Start: 2022-02-08 | End: 2022-08-09 | Stop reason: SDUPTHER

## 2022-02-08 NOTE — PROGRESS NOTES
[facility]    Chief Complaint  Chief Complaint   Patient presents with    Coronary Artery Disease       HPI:    Presents today for routine follow-up.  Biggest concern is dizziness and imbalance.  Feels unsteady when she walks.  Having trouble going up or down occurred.  Also complains of intermittent frontal headache and nose bleed.  No problems with exertional dyspnea or chest discomfort.  No orthostasis.    Medications  Current Outpatient Medications   Medication Sig Dispense Refill    aspirin (ECOTRIN) 81 MG EC tablet Take 81 mg by mouth once daily.      atorvastatin (LIPITOR) 10 MG tablet Take 1 tablet (10 mg total) by mouth once daily. 90 tablet 3    azelastine (ASTELIN) 137 mcg (0.1 %) nasal spray 1 spray (137 mcg total) by Nasal route 2 (two) times daily. 30 mL 1    dorzolamide-timolol 2-0.5% (COSOPT) 22.3-6.8 mg/mL ophthalmic solution INSTILL 1 DROP IN THE LEFT EYE EVERY 12 HOURS      furosemide (LASIX) 40 MG tablet Take 1 tablet (40 mg total) by mouth daily as needed (fluid). 90 tablet 3    hydrOXYchloroQUINE (PLAQUENIL) 200 mg tablet TAKE 1 TABLET(200 MG) BY MOUTH TWICE DAILY 180 tablet 1    ibuprofen (ADVIL,MOTRIN) 800 MG tablet   0    latanoprost 0.005 % ophthalmic solution INSTILL 1 DROP IN THE LEFT EYE EVERY EVENING. SEPARATE BY AT LEAST 10 MIN FROM OTHER EYE DROPS      levothyroxine (SYNTHROID) 50 MCG tablet Take 50 mcg by mouth.      losartan (COZAAR) 25 MG tablet Take 1 tablet (25 mg total) by mouth every 12 (twelve) hours. 180 tablet 3    prednisoLONE acetate (PRED FORTE) 1 % DrpS 2 (two) times daily. Left Eye      propylene glycol/peg 400/PF (SYSTANE, PF, OPHT) Apply to eye 2 (two) times daily. Right Eye      temazepam (RESTORIL) 15 mg Cap Take 15 mg by mouth nightly as needed.      tetrahydrozoline/polyethyl gly (EYE DROPS OPHT) Apply to eye.       Current Facility-Administered Medications   Medication Dose Route Frequency Provider Last Rate Last Admin     cyanocobalamin injection 1,000 mcg  1,000 mcg Subcutaneous Q30 Days Srinivasa Huffman MD   1,000 mcg at 06/10/20 1050        History  Past Medical History:   Diagnosis Date    Anemia, chronic disease 12/18/2019    Arthritis     Blind left eye     Chronic pain     Coronary atherosclerosis 06/20/2020    Eosinophilia 12/18/2019    Hypothyroidism     Insomnia     Leucocytosis 12/18/2019    Pneumonia     PONV (postoperative nausea and vomiting)     needs phenergan    Scoliosis     Undifferentiated connective tissue disease      Past Surgical History:   Procedure Laterality Date    ANKLE LIGAMENT RECONSTRUCTION Right     CATARACT EXTRACTION Bilateral     CHOLECYSTECTOMY      CORONARY ANGIOGRAPHY N/A 6/19/2020    Procedure: ANGIOGRAM, CORONARY ARTERY - right radial;  Surgeon: Fabrice Rodriguez MD;  Location: [facility] CATH LAB;  Service: Cardiology;  Laterality: N/A;    CORONARY STENT PLACEMENT  06/19/2020    pLAD Nathan 2.75 x 18 mm     Social History     Socioeconomic History    Marital status: Single   Occupational History    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   Tobacco Use    Smoking status: Former Smoker    Smokeless tobacco: Never Used   Substance and Sexual Activity    Alcohol use: Not Currently    Drug use: Never    Sexual activity: Yes     Partners: Male     Birth control/protection: None     Family History   Problem Relation Age of Onset    Diabetes Mother     Cancer Mother     Heart disease Father     Arthritis Sister         Allergies  Review of patient's allergies indicates:  No Known Allergies    Review of Systems   Review of Systems   Constitutional: Negative for malaise/fatigue, weight gain and weight loss.   HENT: Positive for nosebleeds.    Eyes: Negative for visual disturbance.   Cardiovascular: Negative for chest pain, claudication, cyanosis, dyspnea on exertion, irregular heartbeat, leg swelling, near-syncope, orthopnea, palpitations, paroxysmal nocturnal  dyspnea and syncope.   Respiratory: Negative for cough, hemoptysis, shortness of breath, sleep disturbances due to breathing and wheezing.    Hematologic/Lymphatic: Negative for bleeding problem. Does not bruise/bleed easily.   Skin: Negative for poor wound healing.   Musculoskeletal: Negative for muscle cramps and myalgias.   Gastrointestinal: Negative for abdominal pain, anorexia, diarrhea, heartburn, hematemesis, hematochezia, melena, nausea and vomiting.   Genitourinary: Negative for hematuria and nocturia.   Neurological: Positive for headaches and loss of balance. Negative for excessive daytime sleepiness, dizziness, focal weakness, light-headedness and weakness.       Physical Exam  Vitals:    02/08/22 1111   BP: 126/62   Pulse: 68     Wt Readings from Last 1 Encounters:   02/08/22 75.8 kg (167 lb 3.2 oz)     Physical Exam  Constitutional:       General: She is not in acute distress.     Appearance: She is not toxic-appearing or diaphoretic.   HENT:      Head: Normocephalic and atraumatic.   Eyes:      General: No scleral icterus.     Conjunctiva/sclera: Conjunctivae normal.   Neck:      Thyroid: No thyromegaly.      Vascular: No carotid bruit, hepatojugular reflux or JVD.      Trachea: Trachea normal.   Cardiovascular:      Rate and Rhythm: Normal rate and regular rhythm.  No extrasystoles are present.     Chest Wall: PMI is not displaced.      Pulses:           Carotid pulses are 2+ on the right side and 2+ on the left side.       Radial pulses are 2+ on the right side and 2+ on the left side.        Dorsalis pedis pulses are 2+ on the right side and 2+ on the left side.        Posterior tibial pulses are 2+ on the right side and 2+ on the left side.      Heart sounds: S1 normal and S2 normal. No murmur heard.  No S3 or S4 sounds.    Pulmonary:      Effort: No accessory muscle usage or respiratory distress.      Breath sounds: No decreased breath sounds, wheezing, rhonchi or rales.   Abdominal:       General: Bowel sounds are normal. There is no abdominal bruit.      Palpations: Abdomen is soft. There is no hepatomegaly, splenomegaly or pulsatile mass.      Tenderness: There is no abdominal tenderness.   Musculoskeletal:         General: No tenderness or deformity.      Cervical back: No edema.      Right lower leg: No edema.      Left lower leg: No edema.   Skin:     General: Skin is warm and dry.      Coloration: Skin is not cyanotic or pale.      Nails: There is no clubbing.   Neurological:      General: No focal deficit present.      Mental Status: She is alert and oriented to person, place, and time.   Psychiatric:         Speech: Speech normal.         Behavior: Behavior normal. Behavior is cooperative.         Labs  Lab Visit on 02/02/2022   Component Date Value Ref Range Status    Sodium 02/02/2022 141  136 - 145 mmol/L Final    Potassium 02/02/2022 4.1  3.5 - 5.1 mmol/L Final    Chloride 02/02/2022 102  95 - 110 mmol/L Final    CO2 02/02/2022 29  23 - 29 mmol/L Final    Glucose 02/02/2022 121* 70 - 110 mg/dL Final    BUN 02/02/2022 28* 8 - 23 mg/dL Final    Creatinine 02/02/2022 1.0  0.5 - 1.4 mg/dL Final    Calcium 02/02/2022 9.8  8.7 - 10.5 mg/dL Final    Total Protein 02/02/2022 7.6  6.0 - 8.4 g/dL Final    Albumin 02/02/2022 4.0  3.5 - 5.2 g/dL Final    Total Bilirubin 02/02/2022 0.4  0.1 - 1.0 mg/dL Final    Comment: For infants and newborns, interpretation of results should be based  on gestational age, weight and in agreement with clinical  observations.    Premature Infant recommended reference ranges:  Up to 24 hours.............<8.0 mg/dL  Up to 48 hours............<12.0 mg/dL  3-5 days..................<15.0 mg/dL  6-29 days.................<15.0 mg/dL      Alkaline Phosphatase 02/02/2022 192* 55 - 135 U/L Final    AST 02/02/2022 26  10 - 40 U/L Final    ALT 02/02/2022 61* 10 - 44 U/L Final    Anion Gap 02/02/2022 10  8 - 16 mmol/L Final    eGFR if  02/02/2022  >60  >60 mL/min/1.73 m^2 Final    eGFR if non African American 02/02/2022 54* >60 mL/min/1.73 m^2 Final    Comment: Calculation used to obtain the estimated glomerular filtration  rate (eGFR) is the CKD-EPI equation.       Cholesterol 02/02/2022 142  120 - 199 mg/dL Final    Comment: The National Cholesterol Education Program (NCEP) has set the  following guidelines (reference ranges) for Cholesterol:  Optimal.....................<200 mg/dL  Borderline High.............200-239 mg/dL  High........................> or = 240 mg/dL      Triglycerides 02/02/2022 62  30 - 150 mg/dL Final    Comment: The National Cholesterol Education Program (NCEP) has set the  following guidelines (reference values) for triglycerides:  Normal......................<150 mg/dL  Borderline High.............150-199 mg/dL  High........................200-499 mg/dL      HDL 02/02/2022 77* 40 - 75 mg/dL Final    Comment: The National Cholesterol Education Program (NCEP) has set the  following guidelines (reference values) for HDL Cholesterol:  Low...............<40 mg/dL  Optimal...........>60 mg/dL      LDL Cholesterol 02/02/2022 52.6* 63.0 - 159.0 mg/dL Final    Comment: The National Cholesterol Education Program (NCEP) has set the  following guidelines (reference values) for LDL Cholesterol:  Optimal.......................<130 mg/dL  Borderline High...............130-159 mg/dL  High..........................160-189 mg/dL  Very High.....................>190 mg/dL      HDL/Cholesterol Ratio 02/02/2022 54.2* 20.0 - 50.0 % Final    Total Cholesterol/HDL Ratio 02/02/2022 1.8* 2.0 - 5.0 Final    Non-HDL Cholesterol 02/02/2022 65  mg/dL Final    Comment: Risk category and Non-HDL cholesterol goals:  Coronary heart disease (CHD)or equivalent (10-year risk of CHD >20%):  Non-HDL cholesterol goal     <130 mg/dL  Two or more CHD risk factors and 10-year risk of CHD <= 20%:  Non-HDL cholesterol goal     <160 mg/dL  0 to 1 CHD risk  factor:  Non-HDL cholesterol goal     <190 mg/dL      Specimen UA 02/02/2022 Urine, Clean Catch   Final    Color, UA 02/02/2022 Yellow  Yellow, Straw, Rita Final    Appearance, UA 02/02/2022 Clear  Clear Final    pH, UA 02/02/2022 6.0  5.0 - 8.0 Final    Specific Gravity, UA 02/02/2022 >=1.030* 1.005 - 1.030 Final    Protein, UA 02/02/2022 Trace* Negative Final    Comment: Recommend a 24 hour urine protein or a urine   protein/creatinine ratio if globulin induced proteinuria is  clinically suspected.      Glucose, UA 02/02/2022 Negative  Negative Final    Ketones, UA 02/02/2022 Negative  Negative Final    Bilirubin (UA) 02/02/2022 Negative  Negative Final    Occult Blood UA 02/02/2022 Negative  Negative Final    Nitrite, UA 02/02/2022 Negative  Negative Final    Urobilinogen, UA 02/02/2022 Negative  <2.0 EU/dL Final    Leukocytes, UA 02/02/2022 Negative  Negative Final    CRP 02/02/2022 0.8  0.0 - 8.2 mg/L Final    WBC 02/02/2022 8.74  3.90 - 12.70 K/uL Final    RBC 02/02/2022 5.04  4.00 - 5.40 M/uL Final    Hemoglobin 02/02/2022 12.2  12.0 - 16.0 g/dL Final    Hematocrit 02/02/2022 40.3  37.0 - 48.5 % Final    MCV 02/02/2022 80* 82 - 98 fL Final    MCH 02/02/2022 24.2* 27.0 - 31.0 pg Final    MCHC 02/02/2022 30.3* 32.0 - 36.0 g/dL Final    RDW 02/02/2022 17.6* 11.5 - 14.5 % Final    Platelets 02/02/2022 302  150 - 450 K/uL Final    MPV 02/02/2022 10.7  9.2 - 12.9 fL Final    Immature Granulocytes 02/02/2022 0.2  0.0 - 0.5 % Final    Gran # (ANC) 02/02/2022 6.0  1.8 - 7.7 K/uL Final    Immature Grans (Abs) 02/02/2022 0.02  0.00 - 0.04 K/uL Final    Comment: Mild elevation in immature granulocytes is non specific and   can be seen in a variety of conditions including stress response,   acute inflammation, trauma and pregnancy. Correlation with other   laboratory and clinical findings is essential.      Lymph # 02/02/2022 1.5  1.0 - 4.8 K/uL Final    Mono # 02/02/2022 0.8  0.3 - 1.0 K/uL  Final    Eos # 02/02/2022 0.4  0.0 - 0.5 K/uL Final    Baso # 02/02/2022 0.08  0.00 - 0.20 K/uL Final    nRBC 02/02/2022 0  0 /100 WBC Final    Gran % 02/02/2022 68.6  38.0 - 73.0 % Final    Lymph % 02/02/2022 17.5* 18.0 - 48.0 % Final    Mono % 02/02/2022 8.7  4.0 - 15.0 % Final    Eosinophil % 02/02/2022 4.1  0.0 - 8.0 % Final    Basophil % 02/02/2022 0.9  0.0 - 1.9 % Final    Differential Method 02/02/2022 Automated   Final   Office Visit on 12/13/2021   Component Date Value Ref Range Status    POC Rapid COVID 12/13/2021 Negative  Negative Final     Acceptable 12/13/2021 Yes   Final    POC Molecular Influenza A Ag 12/13/2021 Negative  Negative, Not Reported Final    POC Molecular Influenza B Ag 12/13/2021 Negative  Negative, Not Reported Final     Acceptable 12/13/2021 Yes   Final   Orders Only on 10/20/2021   Component Date Value Ref Range Status    Creatine Kinase, Total 10/20/2021 48  29 - 143 U/L Final    CRP 10/20/2021 2.4  <8.0 mg/L Final    Color, UA 10/20/2021 YELLOW  YELLOW Final    Appearance, UA 10/20/2021 CLEAR  CLEAR Final    Specific Gravity, UA 10/20/2021 1.022  1.001 - 1.035 Final    pH, UA 10/20/2021 < OR = 5.0  5.0 - 8.0 Final    Glucose, UA 10/20/2021 NEGATIVE  NEGATIVE Final    Bilirubin, UA 10/20/2021 NEGATIVE  NEGATIVE Final    Ketones, UA 10/20/2021 NEGATIVE  NEGATIVE Final    Occult Blood UA 10/20/2021 NEGATIVE  NEGATIVE Final    Protein, UA 10/20/2021 NEGATIVE  NEGATIVE Final    Nitrite, UA 10/20/2021 NEGATIVE  NEGATIVE Final    Leukocytes, UA 10/20/2021 NEGATIVE  NEGATIVE Final    Glucose 10/20/2021 95  65 - 99 mg/dL Final    Comment:               Fasting reference interval         BUN 10/20/2021 36* 7 - 25 mg/dL Final    Creatinine 10/20/2021 0.97* 0.60 - 0.93 mg/dL Final    Comment: For patients >49 years of age, the reference limit  for Creatinine is approximately 13% higher for people  identified as -American.          eGFR if non  10/20/2021 56* > OR = 60 mL/min/1.73m2 Final    eGFR if  10/20/2021 65  > OR = 60 mL/min/1.73m2 Final    BUN/Creatinine Ratio 10/20/2021 37* 6 - 22 (calc) Final    Sodium 10/20/2021 139  135 - 146 mmol/L Final    Potassium 10/20/2021 3.9  3.5 - 5.3 mmol/L Final    Chloride 10/20/2021 101  98 - 110 mmol/L Final    CO2 10/20/2021 29  20 - 32 mmol/L Final    Calcium 10/20/2021 9.7  8.6 - 10.4 mg/dL Final    Total Protein 10/20/2021 7.2  6.1 - 8.1 g/dL Final    Albumin 10/20/2021 4.3  3.6 - 5.1 g/dL Final    Globulin, Total 10/20/2021 2.9  1.9 - 3.7 g/dL (calc) Final    Albumin/Globulin Ratio 10/20/2021 1.5  1.0 - 2.5 (calc) Final    Total Bilirubin 10/20/2021 0.5  0.2 - 1.2 mg/dL Final    Alkaline Phosphatase 10/20/2021 121  37 - 153 U/L Final    AST 10/20/2021 17  10 - 35 U/L Final    ALT 10/20/2021 22  6 - 29 U/L Final    WBC 10/20/2021 7.9  3.8 - 10.8 Thousand/uL Final    RBC 10/20/2021 4.90  3.80 - 5.10 Million/uL Final    Hemoglobin 10/20/2021 12.4  11.7 - 15.5 g/dL Final    Hematocrit 10/20/2021 38.5  35.0 - 45.0 % Final    MCV 10/20/2021 78.6* 80.0 - 100.0 fL Final    MCH 10/20/2021 25.3* 27.0 - 33.0 pg Final    MCHC 10/20/2021 32.2  32.0 - 36.0 g/dL Final    RDW 10/20/2021 15.1* 11.0 - 15.0 % Final    Platelets 10/20/2021 270  140 - 400 Thousand/uL Final    MPV 10/20/2021 10.7  7.5 - 12.5 fL Final    Neutrophils, Abs 10/20/2021 5,206  1,500 - 7,800 cells/uL Final    Lymph # 10/20/2021 1,683  850 - 3,900 cells/uL Final    Mono # 10/20/2021 679  200 - 950 cells/uL Final    Eos # 10/20/2021 261  15 - 500 cells/uL Final    Baso # 10/20/2021 71  0 - 200 cells/uL Final    Neutrophils Relative 10/20/2021 65.9  % Final    Lymph % 10/20/2021 21.3  % Final    Mono % 10/20/2021 8.6  % Final    Eosinophil % 10/20/2021 3.3  % Final    Basophil % 10/20/2021 0.9  % Final   Hospital Outpatient Visit on 08/19/2021   Component Date Value Ref  Range Status    Ascending aorta 08/19/2021 2.76  cm Final    STJ 08/19/2021 2.47  cm Final    AV mean gradient 08/19/2021 3  mmHg Final    Ao peak dino 08/19/2021 1.29  m/s Final    Ao VTI 08/19/2021 27.92  cm Final    IVRT 08/19/2021 126.87  msec Final    IVS 08/19/2021 1.05  0.6 - 1.1 cm Final    LA size 08/19/2021 3.75  cm Final    Left Atrium Major Axis 08/19/2021 5.38  cm Final    Left Atrium Minor Axis 08/19/2021 5.06  cm Final    LVIDd 08/19/2021 3.94  3.5 - 6.0 cm Final    LVIDs 08/19/2021 2.84  2.1 - 4.0 cm Final    LVOT diameter 08/19/2021 2.19  cm Final    LVOT peak VTI 08/19/2021 24.03  cm Final    Posterior Wall 08/19/2021 0.99  0.6 - 1.1 cm Final    MV Peak A Dino 08/19/2021 1.04  m/s Final    E wave deceleration time 08/19/2021 223.56  msec Final    MV Peak E Dino 08/19/2021 0.87  m/s Final    PV Peak D Dino 08/19/2021 0.35  m/s Final    PV Peak S Dino 08/19/2021 0.48  m/s Final    RA Major Axis 08/19/2021 4.78  cm Final    RVDD 08/19/2021 2.85  cm Final    Sinus 08/19/2021 2.88  cm Final    TAPSE 08/19/2021 2.80  cm Final    TR Max Dino 08/19/2021 2.66  m/s Final    LA WIDTH 08/19/2021 4.06  cm Final    PV PEAK VELOCITY 08/19/2021 1.14  cm/s Final    MV stenosis pressure 1/2 time 08/19/2021 64.83  ms Final    LV Diastolic Volume 08/19/2021 67.65  mL Final    LV Systolic Volume 08/19/2021 30.49  mL Final    LVOT peak dino 08/19/2021 1.04  m/s Final    LA volume (mod) 08/19/2021 56.00  cm3 Final    RV S' 08/19/2021 11.89  cm/s Final    TDI LATERAL 08/19/2021 0.09  m/s Final    TDI SEPTAL 08/19/2021 0.07  m/s Final    LV LATERAL E/E' RATIO 08/19/2021 9.67  m/s Final    LV SEPTAL E/E' RATIO 08/19/2021 12.43  m/s Final    FS 08/19/2021 28  % Final    LA volume 08/19/2021 67.49  cm3 Final    LV mass 08/19/2021 127.63  g Final    Left Ventricle Relative Wall Thick* 08/19/2021 0.50  cm Final    AV valve area 08/19/2021 3.24  cm2 Final    AV Velocity Ratio 08/19/2021 0.81    Final    AV index (prosthetic) 08/19/2021 0.86   Final    MV valve area p 1/2 method 08/19/2021 3.39  cm2 Final    E/A ratio 08/19/2021 0.84   Final    Mean e' 08/19/2021 0.08  m/s Final    Pulm vein S/D ratio 08/19/2021 1.37   Final    LVOT area 08/19/2021 3.8  cm2 Final    LVOT stroke volume 08/19/2021 90.47  cm3 Final    AV peak gradient 08/19/2021 7  mmHg Final    E/E' ratio 08/19/2021 10.88  m/s Final    Triscuspid Valve Regurgitation Pea* 08/19/2021 28  mmHg Final    BSA 08/19/2021 1.76  m2 Final    Right Atrial Pressure (from IVC) 08/19/2021 3  mmHg Final    TV rest pulmonary artery pressure 08/19/2021 31  mmHg Final    LV Systolic Volume Index 08/19/2021 17.8  mL/m2 Final    LV Diastolic Volume Index 08/19/2021 39.56  mL/m2 Final    LA Volume Index 08/19/2021 39.5  mL/m2 Final    LV Mass Index 08/19/2021 75  g/m2 Final    LA Volume Index (Mod) 08/19/2021 32.7  mL/m2 Final    EF 08/19/2021 60  % Final   Lab Visit on 08/19/2021   Component Date Value Ref Range Status    Sodium 08/19/2021 141  136 - 145 mmol/L Final    Potassium 08/19/2021 4.3  3.5 - 5.1 mmol/L Final    Specimen slightly hemolyzed    Chloride 08/19/2021 104  95 - 110 mmol/L Final    CO2 08/19/2021 29  23 - 29 mmol/L Final    Glucose 08/19/2021 106  70 - 110 mg/dL Final    BUN 08/19/2021 26* 8 - 23 mg/dL Final    Creatinine 08/19/2021 0.9  0.5 - 1.4 mg/dL Final    Calcium 08/19/2021 9.5  8.7 - 10.5 mg/dL Final    Total Protein 08/19/2021 7.4  6.0 - 8.4 g/dL Final    Albumin 08/19/2021 3.9  3.5 - 5.2 g/dL Final    Total Bilirubin 08/19/2021 0.7  0.1 - 1.0 mg/dL Final    Comment: For infants and newborns, interpretation of results should be based  on gestational age, weight and in agreement with clinical  observations.    Premature Infant recommended reference ranges:  Up to 24 hours.............<8.0 mg/dL  Up to 48 hours............<12.0 mg/dL  3-5 days..................<15.0 mg/dL  6-29 days.................<15.0  mg/dL      Alkaline Phosphatase 08/19/2021 137* 55 - 135 U/L Final    AST 08/19/2021 30  10 - 40 U/L Final    ALT 08/19/2021 54* 10 - 44 U/L Final    Anion Gap 08/19/2021 8  8 - 16 mmol/L Final    eGFR if African American 08/19/2021 >60  >60 mL/min/1.73 m^2 Final    eGFR if non African American 08/19/2021 >60  >60 mL/min/1.73 m^2 Final    Comment: Calculation used to obtain the estimated glomerular filtration  rate (eGFR) is the CKD-EPI equation.       Cholesterol 08/19/2021 134  120 - 199 mg/dL Final    Comment: The National Cholesterol Education Program (NCEP) has set the  following guidelines (reference ranges) for Cholesterol:  Optimal.....................<200 mg/dL  Borderline High.............200-239 mg/dL  High........................> or = 240 mg/dL      Triglycerides 08/19/2021 58  30 - 150 mg/dL Final    Comment: The National Cholesterol Education Program (NCEP) has set the  following guidelines (reference values) for triglycerides:  Normal......................<150 mg/dL  Borderline High.............150-199 mg/dL  High........................200-499 mg/dL      HDL 08/19/2021 75  40 - 75 mg/dL Final    Comment: The National Cholesterol Education Program (NCEP) has set the  following guidelines (reference values) for HDL Cholesterol:  Low...............<40 mg/dL  Optimal...........>60 mg/dL      LDL Cholesterol 08/19/2021 47.4* 63.0 - 159.0 mg/dL Final    Comment: The National Cholesterol Education Program (NCEP) has set the  following guidelines (reference values) for LDL Cholesterol:  Optimal.......................<130 mg/dL  Borderline High...............130-159 mg/dL  High..........................160-189 mg/dL  Very High.....................>190 mg/dL      HDL/Cholesterol Ratio 08/19/2021 56.0* 20.0 - 50.0 % Final    Total Cholesterol/HDL Ratio 08/19/2021 1.8* 2.0 - 5.0 Final    Non-HDL Cholesterol 08/19/2021 59  mg/dL Final    Comment: Risk category and Non-HDL cholesterol  goals:  Coronary heart disease (CHD)or equivalent (10-year risk of CHD >20%):  Non-HDL cholesterol goal     <130 mg/dL  Two or more CHD risk factors and 10-year risk of CHD <= 20%:  Non-HDL cholesterol goal     <160 mg/dL  0 to 1 CHD risk factor:  Non-HDL cholesterol goal     <190 mg/dL         Imaging  No results found.    Assessment  1. Atherosclerosis of native coronary artery of native heart without angina pectoris  Stable    2. Hypercholesteremia  Remains controlled on lower dose of atorvastatin    3. Undifferentiated connective tissue disease  Dr. Owens    4. Dizziness  Given constellation of what sounds like disequilibrium, recurrent epistaxis and headache, recommended she start with an ENT evaluation.      Plan and Discussion    Continue current guideline directed medical therapy.    The 10-year ASCVD risk score (Steptoe MICHAEL Jr., et al., 2013) is: 22.3%    Values used to calculate the score:      Age: 76 years      Sex: Female      Is Non- : No      Diabetic: No      Tobacco smoker: No      Systolic Blood Pressure: 126 mmHg      Is BP treated: No      HDL Cholesterol: 77 mg/dL      Total Cholesterol: 142 mg/dL     Follow Up  Follow up in about 6 months (around 8/8/2022).      Fabrice Rodriguez MD

## 2022-02-10 ENCOUNTER — OFFICE VISIT (OUTPATIENT)
Dept: RHEUMATOLOGY | Facility: CLINIC | Age: 77
End: 2022-02-10
Payer: MEDICARE

## 2022-02-10 VITALS — SYSTOLIC BLOOD PRESSURE: 157 MMHG | BODY MASS INDEX: 31.55 KG/M2 | DIASTOLIC BLOOD PRESSURE: 78 MMHG | WEIGHT: 167 LBS

## 2022-02-10 DIAGNOSIS — M35.9 UNDIFFERENTIATED CONNECTIVE TISSUE DISEASE: Primary | ICD-10-CM

## 2022-02-10 DIAGNOSIS — Z79.899 ENCOUNTER FOR LONG-TERM (CURRENT) USE OF MEDICATIONS: ICD-10-CM

## 2022-02-10 DIAGNOSIS — R74.8 ELEVATED ALKALINE PHOSPHATASE LEVEL: ICD-10-CM

## 2022-02-10 PROCEDURE — 1160F RVW MEDS BY RX/DR IN RCRD: CPT | Mod: S$GLB,,, | Performed by: INTERNAL MEDICINE

## 2022-02-10 PROCEDURE — 99214 OFFICE O/P EST MOD 30 MIN: CPT | Mod: S$GLB,,, | Performed by: INTERNAL MEDICINE

## 2022-02-10 PROCEDURE — 1125F AMNT PAIN NOTED PAIN PRSNT: CPT | Mod: S$GLB,,, | Performed by: INTERNAL MEDICINE

## 2022-02-10 PROCEDURE — 1101F PT FALLS ASSESS-DOCD LE1/YR: CPT | Mod: S$GLB,,, | Performed by: INTERNAL MEDICINE

## 2022-02-10 PROCEDURE — 3078F PR MOST RECENT DIASTOLIC BLOOD PRESSURE < 80 MM HG: ICD-10-PCS | Mod: S$GLB,,, | Performed by: INTERNAL MEDICINE

## 2022-02-10 PROCEDURE — 1159F PR MEDICATION LIST DOCUMENTED IN MEDICAL RECORD: ICD-10-PCS | Mod: S$GLB,,, | Performed by: INTERNAL MEDICINE

## 2022-02-10 PROCEDURE — 99214 PR OFFICE/OUTPT VISIT, EST, LEVL IV, 30-39 MIN: ICD-10-PCS | Mod: S$GLB,,, | Performed by: INTERNAL MEDICINE

## 2022-02-10 PROCEDURE — 3077F SYST BP >= 140 MM HG: CPT | Mod: S$GLB,,, | Performed by: INTERNAL MEDICINE

## 2022-02-10 PROCEDURE — 1125F PR PAIN SEVERITY QUANTIFIED, PAIN PRESENT: ICD-10-PCS | Mod: S$GLB,,, | Performed by: INTERNAL MEDICINE

## 2022-02-10 PROCEDURE — 1160F PR REVIEW ALL MEDS BY PRESCRIBER/CLIN PHARMACIST DOCUMENTED: ICD-10-PCS | Mod: S$GLB,,, | Performed by: INTERNAL MEDICINE

## 2022-02-10 PROCEDURE — 3288F FALL RISK ASSESSMENT DOCD: CPT | Mod: S$GLB,,, | Performed by: INTERNAL MEDICINE

## 2022-02-10 PROCEDURE — 3288F PR FALLS RISK ASSESSMENT DOCUMENTED: ICD-10-PCS | Mod: S$GLB,,, | Performed by: INTERNAL MEDICINE

## 2022-02-10 PROCEDURE — 3077F PR MOST RECENT SYSTOLIC BLOOD PRESSURE >= 140 MM HG: ICD-10-PCS | Mod: S$GLB,,, | Performed by: INTERNAL MEDICINE

## 2022-02-10 PROCEDURE — 1159F MED LIST DOCD IN RCRD: CPT | Mod: S$GLB,,, | Performed by: INTERNAL MEDICINE

## 2022-02-10 PROCEDURE — 1101F PR PT FALLS ASSESS DOC 0-1 FALLS W/OUT INJ PAST YR: ICD-10-PCS | Mod: S$GLB,,, | Performed by: INTERNAL MEDICINE

## 2022-02-10 PROCEDURE — 3078F DIAST BP <80 MM HG: CPT | Mod: S$GLB,,, | Performed by: INTERNAL MEDICINE

## 2022-02-10 NOTE — PROGRESS NOTES
[facility] RHEUMATOLOGY           Follow-up visit    Notes dictated to M*Modal. Please forgive any unintended errors.  Subjective:       Patient ID:   NAME: Jacqueline M Goldberg : 1945     76 y.o. female    Referring Doc: No ref. provider found  Other Physicians:    Chief Complaint:  Undifferentiated connective tissue disease      HPI/Interval History:  The patient is doing well.  She has no complaints of new skin rashes, sicca symptoms, oral ulcerations, chest pain or pleurisy, Raynaud's, or arthritis.    ROS:   GEN:    no fever, night sweats or weight loss  SKIN:   no rashes, bruising, or swelling, no Raynauds, no photosensitivity  HEENT: no changes in vision, no mouth ulcers, no sicca symptoms, no scalp tenderness, no jaw claudication.  CV:      no CP, PND, COTA, orthopnea, no palpitations  PULM: no SOB, cough, hemoptysis, sputum or pleuritic pain  GI:        no dysphagia, no GERD, no hematemesis, no abdominal pain, nausea, vomiting, constipation, diarrhea, melanotic stools, BRBPR  :      no hematuria, dysuria  NEURO: no paresthesias, headaches, acute visual disturbances  MUSCULOSKELETAL:  As above  PSYCH:   No increased insomnia, no increased anxiety, no increased depression    Past Medical/Surgical History:  Past Medical History:   Diagnosis Date    Anemia, chronic disease 2019    Arthritis     Blind left eye     Chronic pain     Coronary atherosclerosis 2020    Eosinophilia 2019    Hypothyroidism     Insomnia     Leucocytosis 2019    Pneumonia     PONV (postoperative nausea and vomiting)     needs phenergan    Scoliosis     Undifferentiated connective tissue disease      Past Surgical History:   Procedure Laterality Date    ANKLE LIGAMENT RECONSTRUCTION Right     CATARACT EXTRACTION Bilateral     CHOLECYSTECTOMY      CORONARY ANGIOGRAPHY N/A 2020    Procedure: ANGIOGRAM, CORONARY ARTERY - right radial;  Surgeon: Fabrice Rodriguez MD;  Location: [facility]  LAB;  Service: Cardiology;  Laterality: N/A;    CORONARY STENT PLACEMENT  06/19/2020    pLAD [place] 2.75 x 18 mm       Allergies:  Review of patient's allergies indicates:  No Known Allergies    Social/Family History:  Social History     Socioeconomic History    Marital status: Single   Occupational History    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   Tobacco Use    Smoking status: Former Smoker    Smokeless tobacco: Never Used   Substance and Sexual Activity    Alcohol use: Not Currently    Drug use: Never    Sexual activity: Yes     Partners: Male     Birth control/protection: None     Family History   Problem Relation Age of Onset    Diabetes Mother     Cancer Mother     Heart disease Father     Arthritis Sister      FAMILY HISTORY: negative for Connective Tissue Disease      Medications:    Current Outpatient Medications:     aspirin (ECOTRIN) 81 MG EC tablet, Take 81 mg by mouth once daily., Disp: , Rfl:     atorvastatin (LIPITOR) 10 MG tablet, Take 1 tablet (10 mg total) by mouth once daily., Disp: 90 tablet, Rfl: 3    azelastine (ASTELIN) 137 mcg (0.1 %) nasal spray, 1 spray (137 mcg total) by Nasal route 2 (two) times daily., Disp: 30 mL, Rfl: 1    dorzolamide-timolol 2-0.5% (COSOPT) 22.3-6.8 mg/mL ophthalmic solution, INSTILL 1 DROP IN THE LEFT EYE EVERY 12 HOURS, Disp: , Rfl:     furosemide (LASIX) 40 MG tablet, Take 1 tablet (40 mg total) by mouth daily as needed (fluid)., Disp: 90 tablet, Rfl: 3    hydrOXYchloroQUINE (PLAQUENIL) 200 mg tablet, TAKE 1 TABLET(200 MG) BY MOUTH TWICE DAILY, Disp: 180 tablet, Rfl: 1    ibuprofen (ADVIL,MOTRIN) 800 MG tablet, , Disp: , Rfl: 0    latanoprost 0.005 % ophthalmic solution, INSTILL 1 DROP IN THE LEFT EYE EVERY EVENING. SEPARATE BY AT LEAST 10 MIN FROM OTHER EYE DROPS, Disp: , Rfl:     levothyroxine (SYNTHROID) 50 MCG tablet, Take 50 mcg by mouth., Disp: , Rfl:     losartan (COZAAR) 25 MG tablet, Take 1 tablet (25 mg total) by  mouth every 12 (twelve) hours., Disp: 180 tablet, Rfl: 3    prednisoLONE acetate (PRED FORTE) 1 % DrpS, 2 (two) times daily. Left Eye, Disp: , Rfl:     propylene glycol/peg 400/PF (SYSTANE, PF, OPHT), Apply to eye 2 (two) times daily. Right Eye, Disp: , Rfl:     temazepam (RESTORIL) 15 mg Cap, Take 15 mg by mouth nightly as needed., Disp: , Rfl:     tetrahydrozoline/polyethyl gly (EYE DROPS OPHT), Apply to eye., Disp: , Rfl:     Current Facility-Administered Medications:     cyanocobalamin injection 1,000 mcg, 1,000 mcg, Subcutaneous, Q30 Days, Srinivasa Huffman MD, 1,000 mcg at 06/10/20 1050      Objective:     Vitals:  Blood pressure (!) 157/78, weight 75.8 kg (167 lb).    Physical Examination:   GEN: wn/wd female in no apparent distress  SKIN: no rashes, no sclerodactyly, no Raynaud's, no periungual erythema, no digital tip ulcerations, no nailbed pitting  HEAD: no alopecia, no scalp tenderness, no temporal artery tenderness or induration.  EYES: no pallor, no icterus, PERRLA  ENT:  no thrush, no mucosal dryness or ulcerations, adequate oral hygiene & dentition.  NECK: supple x 6, no masses, no thyromegaly, no lymphadenopathy.  CV:   S1 and S2 regular, no murmurs, gallop or rubs  CHEST: Normal respiratory effort;  normal breath sounds/no adventitious sounds. No signs of consolidation.  ABD: non-tender and non-distended; soft; normal bowel sounds; no rebound/guarding or tenderness. No hepatosplenomegaly.  Musculoskeletal:  No evidence of inflammatory arthritis  EXTREM: no clubbing, cyanosis or edema. normal pulses.  NEURO:  grossly intact; motor/sensory WNL; no tremors  PSYCH:  normal mood, affect and behavior    Labs:   Lab Results   Component Value Date    WBC 8.74 02/02/2022    HGB 12.2 02/02/2022    HCT 40.3 02/02/2022    MCV 80 (L) 02/02/2022     02/02/2022   CMP@  Sodium   Date Value Ref Range Status   02/02/2022 141 136 - 145 mmol/L Final     Potassium   Date Value Ref Range Status    02/02/2022 4.1 3.5 - 5.1 mmol/L Final     Chloride   Date Value Ref Range Status   02/02/2022 102 95 - 110 mmol/L Final     CO2   Date Value Ref Range Status   02/02/2022 29 23 - 29 mmol/L Final     Glucose   Date Value Ref Range Status   02/02/2022 121 (H) 70 - 110 mg/dL Final     BUN   Date Value Ref Range Status   02/02/2022 28 (H) 8 - 23 mg/dL Final     Creatinine   Date Value Ref Range Status   02/02/2022 1.0 0.5 - 1.4 mg/dL Final     Calcium   Date Value Ref Range Status   02/02/2022 9.8 8.7 - 10.5 mg/dL Final     Total Protein   Date Value Ref Range Status   02/02/2022 7.6 6.0 - 8.4 g/dL Final     Albumin   Date Value Ref Range Status   02/02/2022 4.0 3.5 - 5.2 g/dL Final     Total Bilirubin   Date Value Ref Range Status   02/02/2022 0.4 0.1 - 1.0 mg/dL Final     Comment:     For infants and newborns, interpretation of results should be based  on gestational age, weight and in agreement with clinical  observations.    Premature Infant recommended reference ranges:  Up to 24 hours.............<8.0 mg/dL  Up to 48 hours............<12.0 mg/dL  3-5 days..................<15.0 mg/dL  6-29 days.................<15.0 mg/dL       Alkaline Phosphatase   Date Value Ref Range Status   02/02/2022 192 (H) 55 - 135 U/L Final     AST   Date Value Ref Range Status   02/02/2022 26 10 - 40 U/L Final     ALT   Date Value Ref Range Status   02/02/2022 61 (H) 10 - 44 U/L Final     CPK   Date Value Ref Range Status   06/20/2020 26 20 - 180 U/L Final     CRP   Date Value Ref Range Status   02/02/2022 0.8 0.0 - 8.2 mg/L Final     OBINNA   Date Value Ref Range Status   12/04/2019 POSITIVE (A) NEGATIVE Final     Comment:     OBINNA IFA is a first line screen for detecting the  presence of up to approximately 150 autoantibodies in  various autoimmune diseases. A positive OBINNA IFA result  is suggestive of autoimmune disease and reflexes to  titer and pattern. Further laboratory testing may be  considered if clinically indicated.     For  additional information, please refer to  http://education.1CLICK.TVShow Time/faq/CEV047  (This link is being provided for informational/  educational purposes only.)             Radiology/Diagnostic Studies:    None    Assessment/Discussion/Plan:   76 y.o. female with UCTD-good control on Plaquenil 400 mg daily  2) ELEVATED ALKALINE PHOSPHATASE- etiology unknown    PLAN:  I will continue her medication without change.  I discussed the elevated alk phos with her and its potential causes.  I have ordered blood testing today including alkaline phosphatase isoenzymes.  Additionally, a urine proline was ordered.  I will contact her with the results of that testing and consider whether any further testing or referral is appropriate.    RTC:  I will see her back in 4 months        Electronically signed by Refugio Owens MD

## 2022-02-11 LAB
ALBUMIN SERPL-MCNC: 4.6 G/DL (ref 3.6–5.1)
ALBUMIN/GLOB SERPL: 1.5 (CALC) (ref 1–2.5)
ALP SERPL-CCNC: 171 U/L (ref 37–153)
ALT SERPL-CCNC: 23 U/L (ref 6–29)
APPEARANCE UR: CLEAR
AST SERPL-CCNC: 17 U/L (ref 10–35)
BASOPHILS # BLD AUTO: 49 CELLS/UL (ref 0–200)
BASOPHILS NFR BLD AUTO: 0.5 %
BILIRUB SERPL-MCNC: 0.5 MG/DL (ref 0.2–1.2)
BILIRUB UR QL STRIP: NEGATIVE
BUN SERPL-MCNC: 31 MG/DL (ref 7–25)
BUN/CREAT SERPL: 29 (CALC) (ref 6–22)
CALCIUM SERPL-MCNC: 10 MG/DL (ref 8.6–10.4)
CHLORIDE SERPL-SCNC: 102 MMOL/L (ref 98–110)
CO2 SERPL-SCNC: 34 MMOL/L (ref 20–32)
COLOR UR: YELLOW
CREAT SERPL-MCNC: 1.07 MG/DL (ref 0.6–0.93)
CRP SERPL-MCNC: 1 MG/L
EOSINOPHIL # BLD AUTO: 281 CELLS/UL (ref 15–500)
EOSINOPHIL NFR BLD AUTO: 2.9 %
ERYTHROCYTE [DISTWIDTH] IN BLOOD BY AUTOMATED COUNT: 15.9 % (ref 11–15)
GLOBULIN SER CALC-MCNC: 3 G/DL (CALC) (ref 1.9–3.7)
GLUCOSE SERPL-MCNC: 86 MG/DL (ref 65–99)
GLUCOSE UR QL STRIP: NEGATIVE
HCT VFR BLD AUTO: 40 % (ref 35–45)
HGB BLD-MCNC: 12.6 G/DL (ref 11.7–15.5)
HGB UR QL STRIP: NEGATIVE
KETONES UR QL STRIP: NEGATIVE
LEUKOCYTE ESTERASE UR QL STRIP: NEGATIVE
LYMPHOCYTES # BLD AUTO: 1756 CELLS/UL (ref 850–3900)
LYMPHOCYTES NFR BLD AUTO: 18.1 %
MCH RBC QN AUTO: 24.7 PG (ref 27–33)
MCHC RBC AUTO-ENTMCNC: 31.5 G/DL (ref 32–36)
MCV RBC AUTO: 78.4 FL (ref 80–100)
MONOCYTES # BLD AUTO: 873 CELLS/UL (ref 200–950)
MONOCYTES NFR BLD AUTO: 9 %
NEUTROPHILS # BLD AUTO: 6742 CELLS/UL (ref 1500–7800)
NEUTROPHILS NFR BLD AUTO: 69.5 %
NITRITE UR QL STRIP: NEGATIVE
PH UR STRIP: ABNORMAL [PH] (ref 5–8)
PLATELET # BLD AUTO: 311 THOUSAND/UL (ref 140–400)
PMV BLD REES-ECKER: 10.5 FL (ref 7.5–12.5)
POTASSIUM SERPL-SCNC: 4.4 MMOL/L (ref 3.5–5.3)
PROT SERPL-MCNC: 7.6 G/DL (ref 6.1–8.1)
PROT UR QL STRIP: ABNORMAL
RBC # BLD AUTO: 5.1 MILLION/UL (ref 3.8–5.1)
SODIUM SERPL-SCNC: 144 MMOL/L (ref 135–146)
SP GR UR STRIP: 1.02 (ref 1–1.03)
WBC # BLD AUTO: 9.7 THOUSAND/UL (ref 3.8–10.8)

## 2022-02-14 DIAGNOSIS — Z79.899 ENCOUNTER FOR LONG-TERM (CURRENT) USE OF MEDICATIONS: ICD-10-CM

## 2022-02-14 DIAGNOSIS — R79.9 ELEVATED BUN: ICD-10-CM

## 2022-02-14 DIAGNOSIS — R74.8 ELEVATED ALKALINE PHOSPHATASE LEVEL: Primary | ICD-10-CM

## 2022-02-21 ENCOUNTER — TELEPHONE (OUTPATIENT)
Dept: CARDIOLOGY | Facility: CLINIC | Age: 77
End: 2022-02-21
Payer: MEDICARE

## 2022-02-21 ENCOUNTER — PATIENT MESSAGE (OUTPATIENT)
Dept: FAMILY MEDICINE | Facility: CLINIC | Age: 77
End: 2022-02-21
Payer: MEDICARE

## 2022-02-28 ENCOUNTER — CLINICAL SUPPORT (OUTPATIENT)
Dept: PRIMARY CARE CLINIC | Facility: CLINIC | Age: 77
End: 2022-02-28
Payer: MEDICARE

## 2022-02-28 ENCOUNTER — LAB VISIT (OUTPATIENT)
Dept: PRIMARY CARE CLINIC | Facility: CLINIC | Age: 77
End: 2022-02-28
Payer: MEDICARE

## 2022-02-28 DIAGNOSIS — Z79.899 ENCOUNTER FOR LONG-TERM (CURRENT) DRUG USE: ICD-10-CM

## 2022-02-28 DIAGNOSIS — R74.8 ELEVATED ALKALINE PHOSPHATASE LEVEL: ICD-10-CM

## 2022-02-28 DIAGNOSIS — M35.9 DIFFUSE DISEASE OF CONNECTIVE TISSUE: ICD-10-CM

## 2022-02-28 DIAGNOSIS — R79.9 ELEVATED BUN: ICD-10-CM

## 2022-02-28 DIAGNOSIS — Z79.899 ENCOUNTER FOR LONG-TERM (CURRENT) USE OF MEDICATIONS: ICD-10-CM

## 2022-02-28 DIAGNOSIS — Z79.899 ENCOUNTER FOR LONG-TERM (CURRENT) USE OF OTHER MEDICATIONS: ICD-10-CM

## 2022-02-28 DIAGNOSIS — M35.9 UNDIFFERENTIATED CONNECTIVE TISSUE DISEASE: ICD-10-CM

## 2022-02-28 LAB
ALBUMIN SERPL BCP-MCNC: 4.1 G/DL (ref 3.5–5.2)
ALP SERPL-CCNC: 140 U/L (ref 55–135)
ALT SERPL W/O P-5'-P-CCNC: 30 U/L (ref 10–44)
ANION GAP SERPL CALC-SCNC: 12 MMOL/L (ref 8–16)
AST SERPL-CCNC: 25 U/L (ref 10–40)
BILIRUB SERPL-MCNC: 0.5 MG/DL (ref 0.1–1)
BUN SERPL-MCNC: 39 MG/DL (ref 8–23)
CALCIUM SERPL-MCNC: 9.8 MG/DL (ref 8.7–10.5)
CHLORIDE SERPL-SCNC: 100 MMOL/L (ref 95–110)
CK SERPL-CCNC: 35 U/L (ref 20–180)
CO2 SERPL-SCNC: 29 MMOL/L (ref 23–29)
CREAT SERPL-MCNC: 1 MG/DL (ref 0.5–1.4)
CRP SERPL-MCNC: 0.7 MG/L (ref 0–8.2)
ERYTHROCYTE [DISTWIDTH] IN BLOOD BY AUTOMATED COUNT: 17.4 % (ref 11.5–14.5)
EST. GFR  (AFRICAN AMERICAN): >60 ML/MIN/1.73 M^2
EST. GFR  (NON AFRICAN AMERICAN): 54.1 ML/MIN/1.73 M^2
GLUCOSE SERPL-MCNC: 106 MG/DL (ref 70–110)
HCT VFR BLD AUTO: 41.8 % (ref 37–48.5)
HGB BLD-MCNC: 13 G/DL (ref 12–16)
MCH RBC QN AUTO: 25 PG (ref 27–31)
MCHC RBC AUTO-ENTMCNC: 31.1 G/DL (ref 32–36)
MCV RBC AUTO: 81 FL (ref 82–98)
PLATELET # BLD AUTO: 306 K/UL (ref 150–450)
PMV BLD AUTO: 10.9 FL (ref 9.2–12.9)
POTASSIUM SERPL-SCNC: 4.2 MMOL/L (ref 3.5–5.1)
PROT SERPL-MCNC: 7.5 G/DL (ref 6–8.4)
RBC # BLD AUTO: 5.19 M/UL (ref 4–5.4)
SODIUM SERPL-SCNC: 141 MMOL/L (ref 136–145)
WBC # BLD AUTO: 8.19 K/UL (ref 3.9–12.7)

## 2022-02-28 PROCEDURE — 84075 ASSAY ALKALINE PHOSPHATASE: CPT | Performed by: INTERNAL MEDICINE

## 2022-02-28 PROCEDURE — 86140 C-REACTIVE PROTEIN: CPT | Performed by: INTERNAL MEDICINE

## 2022-02-28 PROCEDURE — 80053 COMPREHEN METABOLIC PANEL: CPT | Performed by: INTERNAL MEDICINE

## 2022-02-28 PROCEDURE — 36415 COLL VENOUS BLD VENIPUNCTURE: CPT | Performed by: INTERNAL MEDICINE

## 2022-02-28 PROCEDURE — 85027 COMPLETE CBC AUTOMATED: CPT | Performed by: INTERNAL MEDICINE

## 2022-02-28 PROCEDURE — 82139 AMINO ACIDS QUAN 6 OR MORE: CPT | Performed by: INTERNAL MEDICINE

## 2022-02-28 PROCEDURE — 82550 ASSAY OF CK (CPK): CPT | Performed by: INTERNAL MEDICINE

## 2022-03-01 ENCOUNTER — PATIENT MESSAGE (OUTPATIENT)
Dept: RHEUMATOLOGY | Facility: CLINIC | Age: 77
End: 2022-03-01
Payer: MEDICARE

## 2022-03-02 ENCOUNTER — CLINICAL SUPPORT (OUTPATIENT)
Dept: PRIMARY CARE CLINIC | Facility: CLINIC | Age: 77
End: 2022-03-02
Payer: MEDICARE

## 2022-03-02 DIAGNOSIS — M35.9 UNDIFFERENTIATED CONNECTIVE TISSUE DISEASE: ICD-10-CM

## 2022-03-02 DIAGNOSIS — R77.8 ABNORMAL SPEP: ICD-10-CM

## 2022-03-02 DIAGNOSIS — R77.8 ABNORMAL SPEP: Primary | ICD-10-CM

## 2022-03-07 LAB
1ME-HIST UR-SCNC: 575 NMOL/MG CR (ref 23–1339)
3ME-HISTIDINE UR-SCNC: 149 NMOL/MG CR (ref 70–246)
A-AMINOBUTYR UR-SCNC: 4 NMOL/MG CR
AAA UR-SCNC: 3 NMOL/MG CR
ALANINE UR-SCNC: 92 NMOL/MG CR (ref 56–518)
ALLOISOLEUCINE/CREAT UR-SRTO: 1 NMOL/MG CR
AMINO ACIDS UR: NORMAL
ANSERINE/CREAT UR-RTO: 2 NMOL/MG CR
ARGININE UR-SCNC: 12 NMOL/MG CR
ARGININOSUCCINATE/CREAT UR-RTO: 4 NMOL/MG CR
ASPARAGINE UR-SCNC: 54 NMOL/MG CR (ref 25–238)
ASPARTATE/CREAT UR-RTO: 1 NMOL/MG CR
B-AIB UR-SCNC: 59 NMOL/MG CR
B-ALANINE UR-SCNC: 1 NMOL/MG CR
CARNOSINE UR-SCNC: 3 NMOL/MG CR
CITRULLINE UR-SCNC: 3 NMOL/MG CR
CYSTATHIONIN UR-SCNC: 5 NMOL/MG CR
CYSTINE UR-SCNC: 20 NMOL/MG CR (ref 10–98)
ETHANOLAMINE/CREAT UR-RTO: 265 NMOL/MG CR (ref 95–471)
GABA/CREAT UR-RTO: 1 NMOL/MG CR
GLUTAMATE UR-SCNC: 6 NMOL/MG CR
GLUTAMINE UR-SCNC: 219 NMOL/MG CR (ref 93–686)
GLYCINE UR-SCNC: 475 NMOL/MG CR (ref 229–2989)
HISTIDINE UR-SCNC: 94 NMOL/MG CR (ref 81–1128)
HOMOCITRULLINE/CREAT UR-RTO: 8 NMOL/MG CR
ISOLEUCINE UR-SCNC: 6 NMOL/MG CR
LEUCINE UR-SCNC: 15 NMOL/MG CR
LYSINE UR-SCNC: 30 NMOL/MG CR (ref 15–271)
METHIONINE UR-SCNC: 7 NMOL/MG CR
OH-LYSINE/CREAT UR-RTO: 1 NMOL/MG CR
OH-PROLINE/CREAT UR-RTO: 1 NMOL/MG CR
ORNITHINE UR-SCNC: 7 NMOL/MG CR
PETN/CREAT UR-RTO: 17 NMOL/MG CR
PHE UR-SCNC: 27 NMOL/MG CR (ref 13–70)
PHOSPHOSERINE/CREAT UR-RTO: 0 NMOL/MG CR
PROLINE UR-SCNC: 4 NMOL/MG CR
SARCOSINE/CREAT UR-RTO: 0 NMOL/MG CR
SERINE UR-SCNC: 141 NMOL/MG CR (ref 97–540)
TAURINE UR-SCNC: 30 NMOL/MG CR (ref 24–1531)
THREONINE UR-SCNC: 41 NMOL/MG CR (ref 31–278)
TRYPTOPHAN/CREAT UR-RTO: 30 NMOL/MG CR (ref 18–114)
TYROSINE UR-SCNC: 22 NMOL/MG CR (ref 15–115)
VALINE UR-SCNC: 22 NMOL/MG CR (ref 11–61)

## 2022-03-17 LAB
ALP BONE CFR SERPL: 45 % (ref 28–66)
ALP INTEST CFR SERPL: 11 % (ref 1–24)
ALP ISOS SERPL-IMP: NORMAL
ALP LIVER CFR SERPL: 44 % (ref 25–69)
ALP MACROHEPATIC CFR SERPL: NORMAL %
ALP PLAC CFR SERPL: 0 %
ALP SERPL-CCNC: 139 U/L (ref 37–153)

## 2022-03-18 ENCOUNTER — PATIENT MESSAGE (OUTPATIENT)
Dept: RHEUMATOLOGY | Facility: CLINIC | Age: 77
End: 2022-03-18
Payer: MEDICARE

## 2022-03-21 NOTE — TELEPHONE ENCOUNTER
Pt msg [No Acute Distress] : no acute distress [Normal Oropharynx] : normal oropharynx [Normal Appearance] : normal appearance [No Neck Mass] : no neck mass [Normal Rate/Rhythm] : normal rate/rhythm [Normal S1, S2] : normal s1, s2 [No Murmurs] : no murmurs [No Resp Distress] : no resp distress [Clear to Auscultation Bilaterally] : clear to auscultation bilaterally [No Abnormalities] : no abnormalities [Benign] : benign [Normal Gait] : normal gait [No Clubbing] : no clubbing [No Cyanosis] : no cyanosis [No Edema] : no edema [FROM] : FROM [Normal Color/ Pigmentation] : normal color/ pigmentation [No Focal Deficits] : no focal deficits [Oriented x3] : oriented x3 [Normal Affect] : normal affect

## 2022-06-06 ENCOUNTER — TELEPHONE (OUTPATIENT)
Dept: RHEUMATOLOGY | Facility: CLINIC | Age: 77
End: 2022-06-06

## 2022-06-06 ENCOUNTER — PATIENT MESSAGE (OUTPATIENT)
Dept: RHEUMATOLOGY | Facility: CLINIC | Age: 77
End: 2022-06-06

## 2022-06-06 DIAGNOSIS — M35.9 UNDIFFERENTIATED CONNECTIVE TISSUE DISEASE: ICD-10-CM

## 2022-06-06 DIAGNOSIS — Z79.899 ENCOUNTER FOR LONG-TERM (CURRENT) USE OF MEDICATIONS: Primary | ICD-10-CM

## 2022-06-10 ENCOUNTER — CLINICAL SUPPORT (OUTPATIENT)
Dept: PRIMARY CARE CLINIC | Facility: CLINIC | Age: 77
End: 2022-06-10
Payer: MEDICARE

## 2022-06-10 ENCOUNTER — LAB VISIT (OUTPATIENT)
Dept: PRIMARY CARE CLINIC | Facility: CLINIC | Age: 77
End: 2022-06-10
Payer: MEDICARE

## 2022-06-10 DIAGNOSIS — M35.9 UNDIFFERENTIATED CONNECTIVE TISSUE DISEASE: ICD-10-CM

## 2022-06-10 DIAGNOSIS — Z79.899 ENCOUNTER FOR LONG-TERM (CURRENT) USE OF MEDICATIONS: ICD-10-CM

## 2022-06-10 LAB
ALBUMIN SERPL BCP-MCNC: 4 G/DL (ref 3.5–5.2)
ALP SERPL-CCNC: 131 U/L (ref 55–135)
ALT SERPL W/O P-5'-P-CCNC: 20 U/L (ref 10–44)
ANION GAP SERPL CALC-SCNC: 12 MMOL/L (ref 8–16)
AST SERPL-CCNC: 19 U/L (ref 10–40)
BASOPHILS # BLD AUTO: 0.06 K/UL (ref 0–0.2)
BASOPHILS NFR BLD: 0.6 % (ref 0–1.9)
BILIRUB SERPL-MCNC: 0.4 MG/DL (ref 0.1–1)
BILIRUB UR QL STRIP: NEGATIVE
BUN SERPL-MCNC: 28 MG/DL (ref 8–23)
CALCIUM SERPL-MCNC: 9.7 MG/DL (ref 8.7–10.5)
CHLORIDE SERPL-SCNC: 102 MMOL/L (ref 95–110)
CLARITY UR REFRACT.AUTO: CLEAR
CO2 SERPL-SCNC: 25 MMOL/L (ref 23–29)
COLOR UR AUTO: YELLOW
CREAT SERPL-MCNC: 1.1 MG/DL (ref 0.5–1.4)
DIFFERENTIAL METHOD: ABNORMAL
EOSINOPHIL # BLD AUTO: 0.3 K/UL (ref 0–0.5)
EOSINOPHIL NFR BLD: 3 % (ref 0–8)
ERYTHROCYTE [DISTWIDTH] IN BLOOD BY AUTOMATED COUNT: 16 % (ref 11.5–14.5)
EST. GFR  (AFRICAN AMERICAN): 55.6 ML/MIN/1.73 M^2
EST. GFR  (NON AFRICAN AMERICAN): 48.2 ML/MIN/1.73 M^2
GLUCOSE SERPL-MCNC: 104 MG/DL (ref 70–110)
GLUCOSE UR QL STRIP: NEGATIVE
HCT VFR BLD AUTO: 40.2 % (ref 37–48.5)
HGB BLD-MCNC: 12.3 G/DL (ref 12–16)
HGB UR QL STRIP: NEGATIVE
IMM GRANULOCYTES # BLD AUTO: 0.02 K/UL (ref 0–0.04)
IMM GRANULOCYTES NFR BLD AUTO: 0.2 % (ref 0–0.5)
KETONES UR QL STRIP: NEGATIVE
LEUKOCYTE ESTERASE UR QL STRIP: NEGATIVE
LYMPHOCYTES # BLD AUTO: 2.3 K/UL (ref 1–4.8)
LYMPHOCYTES NFR BLD: 25.1 % (ref 18–48)
MCH RBC QN AUTO: 24.7 PG (ref 27–31)
MCHC RBC AUTO-ENTMCNC: 30.6 G/DL (ref 32–36)
MCV RBC AUTO: 81 FL (ref 82–98)
MICROSCOPIC COMMENT: NORMAL
MONOCYTES # BLD AUTO: 0.8 K/UL (ref 0.3–1)
MONOCYTES NFR BLD: 8.6 % (ref 4–15)
NEUTROPHILS # BLD AUTO: 5.8 K/UL (ref 1.8–7.7)
NEUTROPHILS NFR BLD: 62.5 % (ref 38–73)
NITRITE UR QL STRIP: NEGATIVE
NRBC BLD-RTO: 0 /100 WBC
PH UR STRIP: 5 [PH] (ref 5–8)
PLATELET # BLD AUTO: 261 K/UL (ref 150–450)
PMV BLD AUTO: 10.4 FL (ref 9.2–12.9)
POTASSIUM SERPL-SCNC: 4.3 MMOL/L (ref 3.5–5.1)
PROT SERPL-MCNC: 7.3 G/DL (ref 6–8.4)
PROT UR QL STRIP: NEGATIVE
RBC # BLD AUTO: 4.98 M/UL (ref 4–5.4)
RBC #/AREA URNS AUTO: 0 /HPF (ref 0–4)
SODIUM SERPL-SCNC: 139 MMOL/L (ref 136–145)
SP GR UR STRIP: 1.01 (ref 1–1.03)
SQUAMOUS #/AREA URNS AUTO: 0 /HPF
URN SPEC COLLECT METH UR: NORMAL
WBC # BLD AUTO: 9.3 K/UL (ref 3.9–12.7)
WBC #/AREA URNS AUTO: 1 /HPF (ref 0–5)

## 2022-06-10 PROCEDURE — 81001 URINALYSIS AUTO W/SCOPE: CPT | Performed by: INTERNAL MEDICINE

## 2022-06-10 PROCEDURE — 85025 COMPLETE CBC W/AUTO DIFF WBC: CPT | Performed by: INTERNAL MEDICINE

## 2022-06-10 PROCEDURE — 80053 COMPREHEN METABOLIC PANEL: CPT | Performed by: INTERNAL MEDICINE

## 2022-06-16 ENCOUNTER — OFFICE VISIT (OUTPATIENT)
Dept: RHEUMATOLOGY | Facility: CLINIC | Age: 77
End: 2022-06-16
Payer: MEDICARE

## 2022-06-16 ENCOUNTER — HOSPITAL ENCOUNTER (OUTPATIENT)
Dept: RADIOLOGY | Facility: HOSPITAL | Age: 77
Discharge: HOME | End: 2022-06-16
Attending: INTERNAL MEDICINE
Payer: MEDICARE

## 2022-06-16 VITALS — BODY MASS INDEX: 33.9 KG/M2 | WEIGHT: 179.38 LBS | SYSTOLIC BLOOD PRESSURE: 141 MMHG | DIASTOLIC BLOOD PRESSURE: 72 MMHG

## 2022-06-16 DIAGNOSIS — M35.9 UNDIFFERENTIATED CONNECTIVE TISSUE DISEASE: Primary | ICD-10-CM

## 2022-06-16 DIAGNOSIS — N18.30 CKD (CHRONIC KIDNEY DISEASE), STAGE III: ICD-10-CM

## 2022-06-16 DIAGNOSIS — Z79.899 ENCOUNTER FOR LONG-TERM (CURRENT) USE OF MEDICATIONS: ICD-10-CM

## 2022-06-16 PROCEDURE — 3078F PR MOST RECENT DIASTOLIC BLOOD PRESSURE < 80 MM HG: ICD-10-PCS | Mod: CPTII,S$GLB,, | Performed by: INTERNAL MEDICINE

## 2022-06-16 PROCEDURE — 3288F FALL RISK ASSESSMENT DOCD: CPT | Mod: CPTII,S$GLB,, | Performed by: INTERNAL MEDICINE

## 2022-06-16 PROCEDURE — 76770 US EXAM ABDO BACK WALL COMP: CPT | Mod: 26,,, | Performed by: RADIOLOGY

## 2022-06-16 PROCEDURE — 99213 OFFICE O/P EST LOW 20 MIN: CPT | Mod: S$GLB,,, | Performed by: INTERNAL MEDICINE

## 2022-06-16 PROCEDURE — 3077F SYST BP >= 140 MM HG: CPT | Mod: CPTII,S$GLB,, | Performed by: INTERNAL MEDICINE

## 2022-06-16 PROCEDURE — 3288F PR FALLS RISK ASSESSMENT DOCUMENTED: ICD-10-PCS | Mod: CPTII,S$GLB,, | Performed by: INTERNAL MEDICINE

## 2022-06-16 PROCEDURE — 1101F PR PT FALLS ASSESS DOC 0-1 FALLS W/OUT INJ PAST YR: ICD-10-PCS | Mod: CPTII,S$GLB,, | Performed by: INTERNAL MEDICINE

## 2022-06-16 PROCEDURE — 3077F PR MOST RECENT SYSTOLIC BLOOD PRESSURE >= 140 MM HG: ICD-10-PCS | Mod: CPTII,S$GLB,, | Performed by: INTERNAL MEDICINE

## 2022-06-16 PROCEDURE — 3078F DIAST BP <80 MM HG: CPT | Mod: CPTII,S$GLB,, | Performed by: INTERNAL MEDICINE

## 2022-06-16 PROCEDURE — 1125F PR PAIN SEVERITY QUANTIFIED, PAIN PRESENT: ICD-10-PCS | Mod: CPTII,S$GLB,, | Performed by: INTERNAL MEDICINE

## 2022-06-16 PROCEDURE — 1159F PR MEDICATION LIST DOCUMENTED IN MEDICAL RECORD: ICD-10-PCS | Mod: CPTII,S$GLB,, | Performed by: INTERNAL MEDICINE

## 2022-06-16 PROCEDURE — 1159F MED LIST DOCD IN RCRD: CPT | Mod: CPTII,S$GLB,, | Performed by: INTERNAL MEDICINE

## 2022-06-16 PROCEDURE — 76770 US EXAM ABDO BACK WALL COMP: CPT | Mod: TC

## 2022-06-16 PROCEDURE — 1101F PT FALLS ASSESS-DOCD LE1/YR: CPT | Mod: CPTII,S$GLB,, | Performed by: INTERNAL MEDICINE

## 2022-06-16 PROCEDURE — 1125F AMNT PAIN NOTED PAIN PRSNT: CPT | Mod: CPTII,S$GLB,, | Performed by: INTERNAL MEDICINE

## 2022-06-16 PROCEDURE — 76770 US RETROPERITONEAL COMPLETE: ICD-10-PCS | Mod: 26,,, | Performed by: RADIOLOGY

## 2022-06-16 PROCEDURE — 99213 PR OFFICE/OUTPT VISIT, EST, LEVL III, 20-29 MIN: ICD-10-PCS | Mod: S$GLB,,, | Performed by: INTERNAL MEDICINE

## 2022-06-16 RX ORDER — HYDROXYCHLOROQUINE SULFATE 200 MG/1
200 TABLET, FILM COATED ORAL DAILY
Qty: 90 TABLET | Refills: 1 | Status: SHIPPED | OUTPATIENT
Start: 2022-06-16 | End: 2022-10-13 | Stop reason: SDUPTHER

## 2022-06-16 NOTE — PROGRESS NOTES
[facility] RHEUMATOLOGY           Follow-up visit    Notes dictated to M*Modal. Please forgive any unintended errors.  Subjective:       Patient ID:   NAME: Jacqueline M Goldberg : 1945     76 y.o. female    Referring Doc: No ref. provider found  Other Physicians:    Chief Complaint:  No chief complaint on file.      HPI/Interval History:  The patient is doing great.  She has no complaints of skin rash, sicca symptoms, chest pain, shortness of breath, or arthritis.  There have been no interval changes in her vision    ROS:   GEN:    no fever, night sweats or weight loss  SKIN:   no rashes, bruising, or swelling, no Raynauds, no photosensitivity  HEENT: no changes in vision, no mouth ulcers, no sicca symptoms, no scalp tenderness, no jaw claudication.  CV:      no CP, PND, COTA, orthopnea, no palpitations  PULM: no SOB, cough, hemoptysis, sputum or pleuritic pain  GI:        no dysphagia, no GERD, no hematemesis, no abdominal pain, nausea, vomiting, constipation, diarrhea, melanotic stools, BRBPR  :      no hematuria, dysuria  NEURO: no paresthesias, headaches, acute visual disturbances  MUSCULOSKELETAL:  No red, hot, and/or swollen joints  PSYCH:   No increased insomnia, no increased anxiety, no increased depression    Past Medical/Surgical History:  Past Medical History:   Diagnosis Date    Anemia, chronic disease 2019    Arthritis     Blind left eye     Chronic pain     Coronary atherosclerosis 2020    Eosinophilia 2019    Hypothyroidism     Insomnia     Leucocytosis 2019    Pneumonia     PONV (postoperative nausea and vomiting)     needs phenergan    Scoliosis     Undifferentiated connective tissue disease      Past Surgical History:   Procedure Laterality Date    ANKLE LIGAMENT RECONSTRUCTION Right     CATARACT EXTRACTION Bilateral     CHOLECYSTECTOMY      CORONARY ANGIOGRAPHY N/A 2020    Procedure: ANGIOGRAM, CORONARY ARTERY - right radial;  Surgeon: Fbarice  KARMEN Rodriguez MD;  Location: [facility] CATH LAB;  Service: Cardiology;  Laterality: N/A;    CORONARY STENT PLACEMENT  06/19/2020    pLAD [place] 2.75 x 18 mm       Allergies:  Review of patient's allergies indicates:  No Known Allergies    Social/Family History:  Social History     Socioeconomic History    Marital status: Single   Occupational History    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   Tobacco Use    Smoking status: Former Smoker    Smokeless tobacco: Never Used   Substance and Sexual Activity    Alcohol use: Not Currently    Drug use: Never    Sexual activity: Yes     Partners: Male     Birth control/protection: None     Family History   Problem Relation Age of Onset    Diabetes Mother     Cancer Mother     Heart disease Father     Arthritis Sister      FAMILY HISTORY: negative for Connective Tissue Disease      Medications:    Current Outpatient Medications:     aspirin (ECOTRIN) 81 MG EC tablet, Take 81 mg by mouth once daily., Disp: , Rfl:     atorvastatin (LIPITOR) 10 MG tablet, Take 1 tablet (10 mg total) by mouth once daily., Disp: 90 tablet, Rfl: 3    azelastine (ASTELIN) 137 mcg (0.1 %) nasal spray, 1 spray (137 mcg total) by Nasal route 2 (two) times daily., Disp: 30 mL, Rfl: 1    dorzolamide-timolol 2-0.5% (COSOPT) 22.3-6.8 mg/mL ophthalmic solution, INSTILL 1 DROP IN THE LEFT EYE EVERY 12 HOURS, Disp: , Rfl:     furosemide (LASIX) 40 MG tablet, Take 1 tablet (40 mg total) by mouth daily as needed (fluid)., Disp: 90 tablet, Rfl: 3    hydrOXYchloroQUINE (PLAQUENIL) 200 mg tablet, TAKE 1 TABLET(200 MG) BY MOUTH TWICE DAILY, Disp: 180 tablet, Rfl: 1    ibuprofen (ADVIL,MOTRIN) 800 MG tablet, , Disp: , Rfl: 0    latanoprost 0.005 % ophthalmic solution, INSTILL 1 DROP IN THE LEFT EYE EVERY EVENING. SEPARATE BY AT LEAST 10 MIN FROM OTHER EYE DROPS, Disp: , Rfl:     levothyroxine (SYNTHROID) 50 MCG tablet, Take 50 mcg by mouth., Disp: , Rfl:     losartan (COZAAR) 25 MG tablet,  Take 1 tablet (25 mg total) by mouth every 12 (twelve) hours., Disp: 180 tablet, Rfl: 3    prednisoLONE acetate (PRED FORTE) 1 % DrpS, 2 (two) times daily. Left Eye, Disp: , Rfl:     propylene glycol/peg 400/PF (SYSTANE, PF, OPHT), Apply to eye 2 (two) times daily. Right Eye, Disp: , Rfl:     temazepam (RESTORIL) 15 mg Cap, Take 15 mg by mouth nightly as needed., Disp: , Rfl:     tetrahydrozoline/polyethyl gly (EYE DROPS OPHT), Apply to eye., Disp: , Rfl:     Current Facility-Administered Medications:     cyanocobalamin injection 1,000 mcg, 1,000 mcg, Subcutaneous, Q30 Days, Srinivasa Huffman MD, 1,000 mcg at 06/10/20 1050      Objective:     Vitals:  There were no vitals taken for this visit.    Physical Examination:   GEN: wn/wd female in no apparent distress  SKIN: no rashes, no sclerodactyly, no Raynaud's, no periungual erythema, no digital tip ulcerations, no nailbed pitting  HEAD: no alopecia, no scalp tenderness, no temporal artery tenderness or induration.  EYES: no pallor, no icterus, PERRLA  ENT:  no thrush, no mucosal dryness or ulcerations, adequate oral hygiene & dentition.  NECK: supple x 6, no masses, no thyromegaly, no lymphadenopathy.  CV:   S1 and S2 regular, no murmurs, gallop or rubs  CHEST: Normal respiratory effort;  normal breath sounds/no adventitious sounds. No signs of consolidation.  ABD: non-tender and non-distended; soft; normal bowel sounds; no rebound/guarding or tenderness. No hepatosplenomegaly.  Musculoskeletal:  No evidence of inflammatory arthritis  EXTREM: no clubbing, cyanosis or edema. normal pulses.  NEURO:  grossly intact; motor/sensory WNL; no tremors  PSYCH:  normal mood, affect and behavior    Labs:   Lab Results   Component Value Date    WBC 9.30 06/10/2022    HGB 12.3 06/10/2022    HCT 40.2 06/10/2022    MCV 81 (L) 06/10/2022     06/10/2022   CMP@  Sodium   Date Value Ref Range Status   06/10/2022 139 136 - 145 mmol/L Final     Potassium   Date Value Ref  Range Status   06/10/2022 4.3 3.5 - 5.1 mmol/L Final     Chloride   Date Value Ref Range Status   06/10/2022 102 95 - 110 mmol/L Final     CO2   Date Value Ref Range Status   06/10/2022 25 23 - 29 mmol/L Final     Glucose   Date Value Ref Range Status   06/10/2022 104 70 - 110 mg/dL Final     BUN   Date Value Ref Range Status   06/10/2022 28 (H) 8 - 23 mg/dL Final     Creatinine   Date Value Ref Range Status   06/10/2022 1.1 0.5 - 1.4 mg/dL Final     Calcium   Date Value Ref Range Status   06/10/2022 9.7 8.7 - 10.5 mg/dL Final     Total Protein   Date Value Ref Range Status   06/10/2022 7.3 6.0 - 8.4 g/dL Final     Albumin   Date Value Ref Range Status   06/10/2022 4.0 3.5 - 5.2 g/dL Final     Total Bilirubin   Date Value Ref Range Status   06/10/2022 0.4 0.1 - 1.0 mg/dL Final     Comment:     For infants and newborns, interpretation of results should be based  on gestational age, weight and in agreement with clinical  observations.    Premature Infant recommended reference ranges:  Up to 24 hours.............<8.0 mg/dL  Up to 48 hours............<12.0 mg/dL  3-5 days..................<15.0 mg/dL  6-29 days.................<15.0 mg/dL       Alkaline Phosphatase   Date Value Ref Range Status   06/10/2022 131 55 - 135 U/L Final     AST   Date Value Ref Range Status   06/10/2022 19 10 - 40 U/L Final     ALT   Date Value Ref Range Status   06/10/2022 20 10 - 44 U/L Final     CPK   Date Value Ref Range Status   02/28/2022 35 20 - 180 U/L Final     CRP   Date Value Ref Range Status   02/28/2022 0.7 0.0 - 8.2 mg/L Final     OBINNA   Date Value Ref Range Status   12/04/2019 POSITIVE (A) NEGATIVE Final     Comment:     OBINNA IFA is a first line screen for detecting the  presence of up to approximately 150 autoantibodies in  various autoimmune diseases. A positive OBINNA IFA result  is suggestive of autoimmune disease and reflexes to  titer and pattern. Further laboratory testing may be  considered if clinically indicated.     For  additional information, please refer to  http://education.Astro Ape.Oncothyreon/faq/RCH948  (This link is being provided for informational/  educational purposes only.)             Radiology/Diagnostic Studies:    None    Assessment/Discussion/Plan:   76 y.o. female with undifferentiated connective tissue disease-quiescent on Plaquenil 400 mg daily    PLAN:  I have decreased her Plaquenil to 200 mg daily.  She was reminded about her annual eye examinations.  Routine follow-up blood and urine testing were ordered.    RTC:  I will see her back in 4 months        Electronically signed by Refugio Owens MD

## 2022-06-24 ENCOUNTER — IMMUNIZATION (OUTPATIENT)
Dept: PRIMARY CARE CLINIC | Facility: CLINIC | Age: 77
End: 2022-06-24
Payer: MEDICARE

## 2022-06-24 DIAGNOSIS — Z23 NEED FOR VACCINATION: Primary | ICD-10-CM

## 2022-06-24 PROCEDURE — 0051A COVID-19, MRNA, LNP-S, PF, 30 MCG/0.3 ML DOSE VACCINE (PFIZER): CPT | Mod: PBBFAC | Performed by: FAMILY MEDICINE

## 2022-06-24 PROCEDURE — 91305 COVID-19, MRNA, LNP-S, PF, 30 MCG/0.3 ML DOSE VACCINE (PFIZER): CPT | Mod: PBBFAC | Performed by: FAMILY MEDICINE

## 2022-08-08 NOTE — PROGRESS NOTES
[facility] Note    Chief Complaint    Establishment of primary care and annual exam    History of Present Illness      Jacqueline M Goldberg is a 76 y.o. femalenew patient who presents today for establishment of primary care and annual exam.  She is a retired . She reports skin tag to left breast that is not painful. She does not wish to do more mammograms at this time in her life. Overall, she is feeling well. She will be setting out on a cross country trip down [address], [place], [place] and to [place] by herself. She is very much looking forward to this trip. She denies any SOB, chest pain, N/V, unintentional weight loss, loss of appetite, fatigue, diarrhea, constipation. She does report gaining 30 pounds since February. She is active daily and remains independent with ADL's.  We spoke about diet and exercise. Patient is making herself get more active on a daily basis. She understands the concept of diet and exercise to lose weight.  Problem List Items Addressed This Visit        Endocrine    Pre-diabetes (Chronic)    Relevant Orders    Hemoglobin A1C      Other Visit Diagnoses     Hypothyroidism, unspecified type    -  Primary    Relevant Medications    levothyroxine (SYNTHROID) 50 MCG tablet    Other Relevant Orders    TSH    T4, Free    Hyperlipidemia, unspecified hyperlipidemia type        Relevant Orders    Lipid Panel    Skin tag        Relevant Orders    Ambulatory referral/consult to Dermatology    Colon cancer screening        Relevant Orders    Fecal Immunochemical Test (iFOBT)    Back pain, unspecified back location, unspecified back pain laterality, unspecified chronicity        Relevant Medications    ibuprofen (ADVIL,MOTRIN) 800 MG tablet    Insomnia, unspecified type        Relevant Medications    temazepam (RESTORIL) 15 mg Cap          Review of Systems   Constitutional: Negative.    HENT: Negative.    Eyes:        + chronic decreased vision to left  eye.   Respiratory: Negative.    Cardiovascular: Negative.    Gastrointestinal: Negative.    Genitourinary: Negative.    Musculoskeletal: Negative.    Skin: Negative.    Neurological: Negative.    Endo/Heme/Allergies: Negative.    Psychiatric/Behavioral: Negative.         Past Medical History:  Past Medical History:   Diagnosis Date    Anemia, chronic disease 12/18/2019    Arthritis     Blind left eye     Chronic pain     Coronary atherosclerosis 06/20/2020    Eosinophilia 12/18/2019    Hypothyroidism     Insomnia     Leucocytosis 12/18/2019    Pneumonia     PONV (postoperative nausea and vomiting)     needs phenergan    Scoliosis     Undifferentiated connective tissue disease        Past Surgical History:  Past Surgical History:   Procedure Laterality Date    ANKLE LIGAMENT RECONSTRUCTION Right     CATARACT EXTRACTION Bilateral     CHOLECYSTECTOMY      CORONARY ANGIOGRAPHY N/A 6/19/2020    Procedure: ANGIOGRAM, CORONARY ARTERY - right radial;  Surgeon: Fabrice Rodriguez MD;  Location: [facility], [place], operated by [facility] CATH LAB;  Service: Cardiology;  Laterality: N/A;    CORONARY STENT PLACEMENT  06/19/2020    pLAD Nathan 2.75 x 18 mm       Family History:  family history includes Arthritis in her sister; Cancer in her mother; Diabetes in her mother; Heart disease in her father.   Family history was reviewed with patient.    Social History:  Social History     Socioeconomic History    Marital status: Single   Occupational History    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   Tobacco Use    Smoking status: Former Smoker    Smokeless tobacco: Former User   Substance and Sexual Activity    Alcohol use: Not Currently    Drug use: Never    Sexual activity: Yes     Partners: Male     Birth control/protection: None         Medications:  Outpatient Encounter Medications as of 8/15/2022   Medication Sig Dispense Refill    aspirin (ECOTRIN) 81 MG EC tablet Take 81 mg by mouth once daily.      atorvastatin (LIPITOR) 10  MG tablet Take 1 tablet (10 mg total) by mouth once daily. 90 tablet 3    dorzolamide-timolol 2-0.5% (COSOPT) 22.3-6.8 mg/mL ophthalmic solution INSTILL 1 DROP IN THE LEFT EYE EVERY 12 HOURS      furosemide (LASIX) 40 MG tablet Take 1 tablet (40 mg total) by mouth once daily. 90 tablet 3    hydrOXYchloroQUINE (PLAQUENIL) 200 mg tablet Take 1 tablet (200 mg total) by mouth once daily. 90 tablet 1    latanoprost 0.005 % ophthalmic solution INSTILL 1 DROP IN THE LEFT EYE EVERY EVENING. SEPARATE BY AT LEAST 10 MIN FROM OTHER EYE DROPS      losartan (COZAAR) 25 MG tablet Take 1 tablet (25 mg total) by mouth every 12 (twelve) hours. 180 tablet 3    prednisoLONE acetate (PRED FORTE) 1 % DrpS once daily. Left Eye      propylene glycol/peg 400/PF (SYSTANE, PF, OPHT) Apply to eye 2 (two) times daily. Right Eye      tetrahydrozoline/polyethyl gly (EYE DROPS OPHT) Apply to eye.      [DISCONTINUED] ibuprofen (ADVIL,MOTRIN) 800 MG tablet   0    [DISCONTINUED] levothyroxine (SYNTHROID) 50 MCG tablet Take 50 mcg by mouth.      [DISCONTINUED] temazepam (RESTORIL) 15 mg Cap Take 15 mg by mouth nightly as needed.      chlorhexidine (PERIDEX) 0.12 % solution SMARTSIG:By Mouth      ibuprofen (ADVIL,MOTRIN) 800 MG tablet Take 1 tablet (800 mg total) by mouth 3 (three) times daily as needed for Pain. 90 tablet 1    levothyroxine (SYNTHROID) 50 MCG tablet Take 1 tablet (50 mcg total) by mouth before breakfast. 30 tablet 3    temazepam (RESTORIL) 15 mg Cap Take 1 capsule (15 mg total) by mouth nightly as needed (insomnia). 30 capsule 1    [DISCONTINUED] atorvastatin (LIPITOR) 10 MG tablet Take 1 tablet (10 mg total) by mouth once daily. 90 tablet 3    [DISCONTINUED] azelastine (ASTELIN) 137 mcg (0.1 %) nasal spray 1 spray (137 mcg total) by Nasal route 2 (two) times daily. 30 mL 1    [DISCONTINUED] furosemide (LASIX) 40 MG tablet Take 1 tablet (40 mg total) by mouth daily as needed (fluid). 90 tablet 3    [DISCONTINUED]  "losartan (COZAAR) 25 MG tablet Take 1 tablet (25 mg total) by mouth every 12 (twelve) hours. 180 tablet 3     Facility-Administered Encounter Medications as of 8/15/2022   Medication Dose Route Frequency Provider Last Rate Last Admin    cyanocobalamin injection 1,000 mcg  1,000 mcg Subcutaneous Q30 Days Srinivasa Huffman MD   1,000 mcg at 06/10/20 1050       Allergies:  Review of patient's allergies indicates:  No Known Allergies    Health Maintenance:  Health Maintenance   Topic Date Due    Hepatitis C Screening  Never done    TETANUS VACCINE  Never done    DEXA Scan  05/16/2021    Aspirin/Antiplatelet Therapy  08/15/2023    Lipid Panel  02/02/2027     Health Maintenance Topics with due status: Not Due       Topic Last Completion Date    Influenza Vaccine 09/25/2021    Lipid Panel 02/02/2022    COVID-19 Vaccine 06/24/2022    Aspirin/Antiplatelet Therapy 08/15/2022       Physical Exam      Vital Signs  Temp: 97.8 °F (36.6 °C)  Pulse: 73  Resp: 18  SpO2: 95 %  BP: 126/70  Pain Score: 0-No pain  Height and Weight  Height: 5' 1" (154.9 cm)  Weight: 82.9 kg (182 lb 12.2 oz)  BSA (Calculated - sq m): 1.89 sq meters  BMI (Calculated): 34.6  Weight in (lb) to have BMI = 25: 132]    Physical Exam  Constitutional:       Appearance: Normal appearance. She is obese.   HENT:      Head: Normocephalic and atraumatic.      Right Ear: Tympanic membrane, ear canal and external ear normal.      Left Ear: Tympanic membrane, ear canal and external ear normal.      Nose: Nose normal.      Mouth/Throat:      Mouth: Mucous membranes are moist.      Pharynx: Oropharynx is clear.   Eyes:      Extraocular Movements: Extraocular movements intact.      Conjunctiva/sclera: Conjunctivae normal.      Pupils: Pupils are equal, round, and reactive to light.   Cardiovascular:      Rate and Rhythm: Normal rate and regular rhythm.      Pulses: Normal pulses.      Heart sounds: Normal heart sounds.   Pulmonary:      Effort: Pulmonary effort is " normal.      Breath sounds: Normal breath sounds.   Abdominal:      General: Abdomen is flat. Bowel sounds are normal.      Palpations: Abdomen is soft.   Musculoskeletal:         General: Normal range of motion.      Cervical back: Normal range of motion and neck supple.   Skin:     General: Skin is warm and dry.      Capillary Refill: Capillary refill takes less than 2 seconds.   Neurological:      General: No focal deficit present.      Mental Status: She is alert and oriented to person, place, and time. Mental status is at baseline.   Psychiatric:         Mood and Affect: Mood normal.         Behavior: Behavior normal.         Thought Content: Thought content normal.         Judgment: Judgment normal.          Laboratory:  CBC:  Recent Labs   Lab 02/10/22  1456 02/28/22  1005 06/10/22  0835   WBC 9.7 8.19 9.30   RBC 5.10 5.19 4.98   Hemoglobin 12.6 13.0 12.3   Hematocrit 40.0 41.8 40.2   Platelets 311 306 261   MCV 78.4 L 81 L 81 L   MCH 24.7 L 25.0 L 24.7 L   MCHC 31.5 L 31.1 L 30.6 L     CMP:  Recent Labs   Lab 02/10/22  1456 02/28/22  1005 06/10/22  0835   Glucose 86 106 104   Calcium 10.0 9.8 9.7   Albumin 4.6 4.1 4.0   Total Protein 7.6 7.5 7.3   Sodium 144 141 139   Potassium 4.4 4.2 4.3   CO2 34 H 29 25   Chloride 102 100 102   BUN 31 H 39 H 28 H   Alkaline Phosphatase  --  140 H 131   ALT 23 30 20   AST 17 25 19   Total Bilirubin 0.5 0.5 0.4     URINALYSIS:  Recent Labs   Lab 12/04/19  1141 12/11/19  0918 04/09/20  1629 11/20/20  1212 02/02/22  1309 02/10/22  1456 06/10/22  1035   Color, UA YELLOW YELLOW YELLOW   < > Yellow YELLOW Yellow   Specific Gravity, UA 1.014 1.017 1.021   < > >=1.030 A 1.019 1.015   pH, UA < OR = 5.0 < OR = 5.0 5.5   < > 6.0 < OR = 5.0 5.0   Protein, UA 2+ A TRACE A NEGATIVE   < > Trace A TRACE A Negative   Glucose, UA NEGATIVE NEGATIVE NEGATIVE   < >  --  NEGATIVE  --    Bacteria, UA FEW A FEW A FEW A  --   --   --   --    Nitrite, UA NEGATIVE NEGATIVE NEGATIVE   < > Negative  NEGATIVE Negative   Leukocytes, UA NEGATIVE NEGATIVE 2+ A   < > Negative NEGATIVE Negative   Urobilinogen, UA  --   --   --   --  Negative  --   --    Hyaline Casts, UA NONE SEEN NONE SEEN NONE SEEN  --   --   --   --     < > = values in this interval not displayed.      LIPIDS:  Recent Labs   Lab 10/13/20  1023 07/08/21  0953 08/19/21  0932 02/02/22  1050   HDL 57 81 75 77 H   Cholesterol 131 142 134 142   Triglycerides 76 61 58 62   LDL Cholesterol 58.8 L  --  47.4 L 52.6 L   HDL/Cholesterol Ratio 43.5  --  56.0 H 54.2 H   Non-HDL Cholesterol 74  --  59 65   Total Cholesterol/HDL Ratio 2.3  --  1.8 L 1.8 L     TSH:      A1C:  Recent Labs   Lab 09/22/20  1410 07/08/21  0953   Hemoglobin A1c 6.2 H 5.7 H       Radiology:        Assessment/Plan     Jacqueline M Goldberg is a 76 y.o.female with:    Hypothyroidism, unspecified type  -     TSH; Future; Expected date: 08/15/2022  -     T4, Free; Future; Expected date: 08/15/2022  -     levothyroxine (SYNTHROID) 50 MCG tablet; Take 1 tablet (50 mcg total) by mouth before breakfast.  Dispense: 30 tablet; Refill: 3    Hyperlipidemia, unspecified hyperlipidemia type  -     Lipid Panel; Future; Expected date: 08/15/2022    Pre-diabetes  -     Hemoglobin A1C; Future; Expected date: 08/15/2022    Skin tag  -     Ambulatory referral/consult to Dermatology; Future; Expected date: 08/22/2022    Colon cancer screening  -     Fecal Immunochemical Test (iFOBT); Future; Expected date: 08/15/2022    Back pain, unspecified back location, unspecified back pain laterality, unspecified chronicity  -     ibuprofen (ADVIL,MOTRIN) 800 MG tablet; Take 1 tablet (800 mg total) by mouth 3 (three) times daily as needed for Pain.  Dispense: 90 tablet; Refill: 1    Insomnia, unspecified type  -     temazepam (RESTORIL) 15 mg Cap; Take 1 capsule (15 mg total) by mouth nightly as needed (insomnia).  Dispense: 30 capsule; Refill: 1        As above, continue current medications and maintain follow up  with specialists.  Return to clinic as needed.    I spent 47 minutes on the day of this encounter for preparing, evaluating, examining, treating, and discussing plan of care with this patient.  Greater than 50% of this time was spent face to face with patient.  All questions were answered to patient's satisfaction.      Ronit Miller, NP-C  [facility]

## 2022-08-09 ENCOUNTER — OFFICE VISIT (OUTPATIENT)
Dept: CARDIOLOGY | Facility: CLINIC | Age: 77
End: 2022-08-09
Payer: MEDICARE

## 2022-08-09 VITALS
HEART RATE: 67 BPM | DIASTOLIC BLOOD PRESSURE: 68 MMHG | WEIGHT: 185.19 LBS | BODY MASS INDEX: 34.99 KG/M2 | OXYGEN SATURATION: 97 % | SYSTOLIC BLOOD PRESSURE: 142 MMHG

## 2022-08-09 DIAGNOSIS — M35.9 UNDIFFERENTIATED CONNECTIVE TISSUE DISEASE: ICD-10-CM

## 2022-08-09 DIAGNOSIS — I25.10 ATHEROSCLEROSIS OF NATIVE CORONARY ARTERY OF NATIVE HEART WITHOUT ANGINA PECTORIS: Primary | ICD-10-CM

## 2022-08-09 DIAGNOSIS — I50.21 ACUTE SYSTOLIC HEART FAILURE: ICD-10-CM

## 2022-08-09 DIAGNOSIS — E78.00 HYPERCHOLESTEREMIA: Chronic | ICD-10-CM

## 2022-08-09 PROCEDURE — 99999 PR PBB SHADOW E&M-EST. PATIENT-LVL III: CPT | Mod: PBBFAC,,, | Performed by: INTERNAL MEDICINE

## 2022-08-09 PROCEDURE — 1101F PR PT FALLS ASSESS DOC 0-1 FALLS W/OUT INJ PAST YR: ICD-10-PCS | Mod: CPTII,S$GLB,, | Performed by: INTERNAL MEDICINE

## 2022-08-09 PROCEDURE — 3077F PR MOST RECENT SYSTOLIC BLOOD PRESSURE >= 140 MM HG: ICD-10-PCS | Mod: CPTII,S$GLB,, | Performed by: INTERNAL MEDICINE

## 2022-08-09 PROCEDURE — 99215 PR OFFICE/OUTPT VISIT, EST, LEVL V, 40-54 MIN: ICD-10-PCS | Mod: S$GLB,,, | Performed by: INTERNAL MEDICINE

## 2022-08-09 PROCEDURE — 3288F PR FALLS RISK ASSESSMENT DOCUMENTED: ICD-10-PCS | Mod: CPTII,S$GLB,, | Performed by: INTERNAL MEDICINE

## 2022-08-09 PROCEDURE — 99499 RISK ADDL DX/OHS AUDIT: ICD-10-PCS | Mod: S$GLB,,, | Performed by: INTERNAL MEDICINE

## 2022-08-09 PROCEDURE — 3288F FALL RISK ASSESSMENT DOCD: CPT | Mod: CPTII,S$GLB,, | Performed by: INTERNAL MEDICINE

## 2022-08-09 PROCEDURE — 1159F PR MEDICATION LIST DOCUMENTED IN MEDICAL RECORD: ICD-10-PCS | Mod: CPTII,S$GLB,, | Performed by: INTERNAL MEDICINE

## 2022-08-09 PROCEDURE — 1126F AMNT PAIN NOTED NONE PRSNT: CPT | Mod: CPTII,S$GLB,, | Performed by: INTERNAL MEDICINE

## 2022-08-09 PROCEDURE — 1160F RVW MEDS BY RX/DR IN RCRD: CPT | Mod: CPTII,S$GLB,, | Performed by: INTERNAL MEDICINE

## 2022-08-09 PROCEDURE — 3077F SYST BP >= 140 MM HG: CPT | Mod: CPTII,S$GLB,, | Performed by: INTERNAL MEDICINE

## 2022-08-09 PROCEDURE — 3078F PR MOST RECENT DIASTOLIC BLOOD PRESSURE < 80 MM HG: ICD-10-PCS | Mod: CPTII,S$GLB,, | Performed by: INTERNAL MEDICINE

## 2022-08-09 PROCEDURE — 1126F PR PAIN SEVERITY QUANTIFIED, NO PAIN PRESENT: ICD-10-PCS | Mod: CPTII,S$GLB,, | Performed by: INTERNAL MEDICINE

## 2022-08-09 PROCEDURE — 1101F PT FALLS ASSESS-DOCD LE1/YR: CPT | Mod: CPTII,S$GLB,, | Performed by: INTERNAL MEDICINE

## 2022-08-09 PROCEDURE — 3078F DIAST BP <80 MM HG: CPT | Mod: CPTII,S$GLB,, | Performed by: INTERNAL MEDICINE

## 2022-08-09 PROCEDURE — 99999 PR PBB SHADOW E&M-EST. PATIENT-LVL III: ICD-10-PCS | Mod: PBBFAC,,, | Performed by: INTERNAL MEDICINE

## 2022-08-09 PROCEDURE — 99499 UNLISTED E&M SERVICE: CPT | Mod: S$GLB,,, | Performed by: INTERNAL MEDICINE

## 2022-08-09 PROCEDURE — 1159F MED LIST DOCD IN RCRD: CPT | Mod: CPTII,S$GLB,, | Performed by: INTERNAL MEDICINE

## 2022-08-09 PROCEDURE — 1160F PR REVIEW ALL MEDS BY PRESCRIBER/CLIN PHARMACIST DOCUMENTED: ICD-10-PCS | Mod: CPTII,S$GLB,, | Performed by: INTERNAL MEDICINE

## 2022-08-09 PROCEDURE — 99215 OFFICE O/P EST HI 40 MIN: CPT | Mod: S$GLB,,, | Performed by: INTERNAL MEDICINE

## 2022-08-09 RX ORDER — LOSARTAN POTASSIUM 25 MG/1
25 TABLET ORAL EVERY 12 HOURS
Qty: 180 TABLET | Refills: 3 | Status: SHIPPED | OUTPATIENT
Start: 2022-08-09 | End: 2023-01-14 | Stop reason: SDUPTHER

## 2022-08-09 RX ORDER — ATORVASTATIN CALCIUM 10 MG/1
10 TABLET, FILM COATED ORAL DAILY
Qty: 90 TABLET | Refills: 3 | Status: SHIPPED | OUTPATIENT
Start: 2022-08-09 | End: 2023-01-14 | Stop reason: SDUPTHER

## 2022-08-09 RX ORDER — FUROSEMIDE 40 MG/1
40 TABLET ORAL DAILY
Qty: 90 TABLET | Refills: 3 | Status: SHIPPED | OUTPATIENT
Start: 2022-08-09

## 2022-08-09 NOTE — PROGRESS NOTES
[facility]    Chief Complaint  Chief Complaint   Patient presents with    Coronary Artery Disease       HPI:    Has gained about 20 lb since February.  Taking Lasix every other day.  More ankle edema.  Denies exertional dyspnea but seems dyspneic walking into this appointment.    Medications  Current Outpatient Medications   Medication Sig Dispense Refill    aspirin (ECOTRIN) 81 MG EC tablet Take 81 mg by mouth once daily.      atorvastatin (LIPITOR) 10 MG tablet Take 1 tablet (10 mg total) by mouth once daily. 90 tablet 3    dorzolamide-timolol 2-0.5% (COSOPT) 22.3-6.8 mg/mL ophthalmic solution INSTILL 1 DROP IN THE LEFT EYE EVERY 12 HOURS      furosemide (LASIX) 40 MG tablet Take 1 tablet (40 mg total) by mouth once daily. 90 tablet 3    hydrOXYchloroQUINE (PLAQUENIL) 200 mg tablet Take 1 tablet (200 mg total) by mouth once daily. 90 tablet 1    ibuprofen (ADVIL,MOTRIN) 800 MG tablet   0    latanoprost 0.005 % ophthalmic solution INSTILL 1 DROP IN THE LEFT EYE EVERY EVENING. SEPARATE BY AT LEAST 10 MIN FROM OTHER EYE DROPS      levothyroxine (SYNTHROID) 50 MCG tablet Take 50 mcg by mouth.      losartan (COZAAR) 25 MG tablet Take 1 tablet (25 mg total) by mouth every 12 (twelve) hours. 180 tablet 3    prednisoLONE acetate (PRED FORTE) 1 % DrpS once daily. Left Eye      propylene glycol/peg 400/PF (SYSTANE, PF, OPHT) Apply to eye 2 (two) times daily. Right Eye      temazepam (RESTORIL) 15 mg Cap Take 15 mg by mouth nightly as needed.      tetrahydrozoline/polyethyl gly (EYE DROPS OPHT) Apply to eye.       Current Facility-Administered Medications   Medication Dose Route Frequency Provider Last Rate Last Admin    cyanocobalamin injection 1,000 mcg  1,000 mcg Subcutaneous Q30 Days Srinivasa Huffman MD   1,000 mcg at 06/10/20 1050        History  Past Medical History:   Diagnosis Date    Anemia, chronic disease 12/18/2019    Arthritis     Blind left eye     Chronic pain      Coronary atherosclerosis 06/20/2020    Eosinophilia 12/18/2019    Hypothyroidism     Insomnia     Leucocytosis 12/18/2019    Pneumonia     PONV (postoperative nausea and vomiting)     needs phenergan    Scoliosis     Undifferentiated connective tissue disease      Past Surgical History:   Procedure Laterality Date    ANKLE LIGAMENT RECONSTRUCTION Right     CATARACT EXTRACTION Bilateral     CHOLECYSTECTOMY      CORONARY ANGIOGRAPHY N/A 6/19/2020    Procedure: ANGIOGRAM, CORONARY ARTERY - right radial;  Surgeon: Fabrice Rodriguez MD;  Location: [facility] CATH LAB;  Service: Cardiology;  Laterality: N/A;    CORONARY STENT PLACEMENT  06/19/2020    pLAD Nathan 2.75 x 18 mm     Social History     Socioeconomic History    Marital status: Single   Occupational History    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   Tobacco Use    Smoking status: Former Smoker    Smokeless tobacco: Never Used   Substance and Sexual Activity    Alcohol use: Not Currently    Drug use: Never    Sexual activity: Yes     Partners: Male     Birth control/protection: None     Family History   Problem Relation Age of Onset    Diabetes Mother     Cancer Mother     Heart disease Father     Arthritis Sister         Allergies  Review of patient's allergies indicates:  No Known Allergies    Review of Systems   Review of Systems   Constitutional: Positive for weight gain. Negative for malaise/fatigue and weight loss.   Eyes: Negative for visual disturbance.   Cardiovascular: Positive for leg swelling. Negative for chest pain, claudication, cyanosis, dyspnea on exertion, irregular heartbeat, near-syncope, orthopnea, palpitations, paroxysmal nocturnal dyspnea and syncope.   Respiratory: Negative for cough, hemoptysis, shortness of breath, sleep disturbances due to breathing and wheezing.    Hematologic/Lymphatic: Negative for bleeding problem. Does not bruise/bleed easily.   Skin: Negative for poor wound healing.    Musculoskeletal: Negative for muscle cramps and myalgias.   Gastrointestinal: Negative for abdominal pain, anorexia, diarrhea, heartburn, hematemesis, hematochezia, melena, nausea and vomiting.   Genitourinary: Negative for hematuria and nocturia.   Neurological: Negative for excessive daytime sleepiness, dizziness, focal weakness, light-headedness and weakness.       Physical Exam  Vitals:    22 1132   BP: (!) 142/68   Pulse: 67     Wt Readings from Last 1 Encounters:   22 84 kg (185 lb 3.2 oz)     Physical Exam  Constitutional:       General: She is not in acute distress.     Appearance: She is not toxic-appearing or diaphoretic.   HENT:      Head: Normocephalic and atraumatic.   Eyes:      General: No scleral icterus.     Conjunctiva/sclera: Conjunctivae normal.   Neck:      Thyroid: No thyromegaly.      Vascular: JVD present. No carotid bruit.      Trachea: Trachea normal.   Cardiovascular:      Rate and Rhythm: Normal rate and regular rhythm.  No extrasystoles are present.     Chest Wall: PMI is not displaced.      Pulses:           Carotid pulses are 2+ on the right side and 2+ on the left side.       Radial pulses are 2+ on the right side and 2+ on the left side.        Dorsalis pedis pulses are 2+ on the right side and 2+ on the left side.        Posterior tibial pulses are 2+ on the right side and 2+ on the left side.      Heart sounds: S1 normal and S2 normal. No murmur heard.    No S3 or S4 sounds.   Pulmonary:      Effort: No accessory muscle usage or respiratory distress.      Breath sounds: No decreased breath sounds, wheezing, rhonchi or rales.   Abdominal:      General: Bowel sounds are normal. There is no abdominal bruit.      Palpations: Abdomen is soft. There is no hepatomegaly, splenomegaly or pulsatile mass.      Tenderness: There is no abdominal tenderness.   Musculoskeletal:         General: No tenderness or deformity.      Cervical back: No edema.      Right lower le+ Edema  present.      Left lower le+ Edema present.   Skin:     General: Skin is warm and dry.      Coloration: Skin is not cyanotic or pale.      Nails: There is no clubbing.   Neurological:      General: No focal deficit present.      Mental Status: She is alert and oriented to person, place, and time.   Psychiatric:         Speech: Speech normal.         Behavior: Behavior normal. Behavior is cooperative.         Labs  Clinical Support on 06/10/2022   Component Date Value Ref Range Status    Specimen UA 06/10/2022 Urine, Clean Catch   Final    Color, UA 06/10/2022 Yellow  Yellow, Straw, Rita Final    Appearance, UA 06/10/2022 Clear  Clear Final    pH, UA 06/10/2022 5.0  5.0 - 8.0 Final    Specific Gravity, UA 06/10/2022 1.015  1.005 - 1.030 Final    Protein, UA 06/10/2022 Negative  Negative Final    Comment: Recommend a 24 hour urine protein or a urine   protein/creatinine ratio if globulin induced proteinuria is  clinically suspected.      Glucose, UA 06/10/2022 Negative  Negative Final    Ketones, UA 06/10/2022 Negative  Negative Final    Bilirubin (UA) 06/10/2022 Negative  Negative Final    Occult Blood UA 06/10/2022 Negative  Negative Final    Nitrite, UA 06/10/2022 Negative  Negative Final    Leukocytes, UA 06/10/2022 Negative  Negative Final    RBC, UA 06/10/2022 0  0 - 4 /hpf Final    WBC, UA 06/10/2022 1  0 - 5 /hpf Final    Squam Epithel, UA 06/10/2022 0  /hpf Final    Microscopic Comment 06/10/2022 SEE COMMENT   Final    Comment: Other formed elements not mentioned in the report are not   present in the microscopic examination.      Lab Visit on 06/10/2022   Component Date Value Ref Range Status    WBC 06/10/2022 9.30  3.90 - 12.70 K/uL Final    RBC 06/10/2022 4.98  4.00 - 5.40 M/uL Final    Hemoglobin 06/10/2022 12.3  12.0 - 16.0 g/dL Final    Hematocrit 06/10/2022 40.2  37.0 - 48.5 % Final    MCV 06/10/2022 81 (A) 82 - 98 fL Final    MCH 06/10/2022 24.7 (A) 27.0 - 31.0 pg Final     MCHC 06/10/2022 30.6 (A) 32.0 - 36.0 g/dL Final    RDW 06/10/2022 16.0 (A) 11.5 - 14.5 % Final    Platelets 06/10/2022 261  150 - 450 K/uL Final    MPV 06/10/2022 10.4  9.2 - 12.9 fL Final    Immature Granulocytes 06/10/2022 0.2  0.0 - 0.5 % Final    Gran # (ANC) 06/10/2022 5.8  1.8 - 7.7 K/uL Final    Immature Grans (Abs) 06/10/2022 0.02  0.00 - 0.04 K/uL Final    Comment: Mild elevation in immature granulocytes is non specific and   can be seen in a variety of conditions including stress response,   acute inflammation, trauma and pregnancy. Correlation with other   laboratory and clinical findings is essential.      Lymph # 06/10/2022 2.3  1.0 - 4.8 K/uL Final    Mono # 06/10/2022 0.8  0.3 - 1.0 K/uL Final    Eos # 06/10/2022 0.3  0.0 - 0.5 K/uL Final    Baso # 06/10/2022 0.06  0.00 - 0.20 K/uL Final    nRBC 06/10/2022 0  0 /100 WBC Final    Gran % 06/10/2022 62.5  38.0 - 73.0 % Final    Lymph % 06/10/2022 25.1  18.0 - 48.0 % Final    Mono % 06/10/2022 8.6  4.0 - 15.0 % Final    Eosinophil % 06/10/2022 3.0  0.0 - 8.0 % Final    Basophil % 06/10/2022 0.6  0.0 - 1.9 % Final    Differential Method 06/10/2022 Automated   Final    Sodium 06/10/2022 139  136 - 145 mmol/L Final    Potassium 06/10/2022 4.3  3.5 - 5.1 mmol/L Final    Chloride 06/10/2022 102  95 - 110 mmol/L Final    CO2 06/10/2022 25  23 - 29 mmol/L Final    Glucose 06/10/2022 104  70 - 110 mg/dL Final    BUN 06/10/2022 28 (A) 8 - 23 mg/dL Final    Creatinine 06/10/2022 1.1  0.5 - 1.4 mg/dL Final    Calcium 06/10/2022 9.7  8.7 - 10.5 mg/dL Final    Total Protein 06/10/2022 7.3  6.0 - 8.4 g/dL Final    Albumin 06/10/2022 4.0  3.5 - 5.2 g/dL Final    Total Bilirubin 06/10/2022 0.4  0.1 - 1.0 mg/dL Final    Comment: For infants and newborns, interpretation of results should be based  on gestational age, weight and in agreement with clinical  observations.    Premature Infant recommended reference ranges:  Up to 24  hours.............<8.0 mg/dL  Up to 48 hours............<12.0 mg/dL  3-5 days..................<15.0 mg/dL  6-29 days.................<15.0 mg/dL      Alkaline Phosphatase 06/10/2022 131  55 - 135 U/L Final    AST 06/10/2022 19  10 - 40 U/L Final    ALT 06/10/2022 20  10 - 44 U/L Final    Anion Gap 06/10/2022 12  8 - 16 mmol/L Final    eGFR if  06/10/2022 55.6 (A) >60 mL/min/1.73 m^2 Final    eGFR if non  06/10/2022 48.2 (A) >60 mL/min/1.73 m^2 Final    Comment: Calculation used to obtain the estimated glomerular filtration  rate (eGFR) is the CKD-EPI equation.      Lab Visit on 02/28/2022   Component Date Value Ref Range Status    CPK 02/28/2022 35  20 - 180 U/L Final    WBC 02/28/2022 8.19  3.90 - 12.70 K/uL Final    RBC 02/28/2022 5.19  4.00 - 5.40 M/uL Final    Hemoglobin 02/28/2022 13.0  12.0 - 16.0 g/dL Final    Hematocrit 02/28/2022 41.8  37.0 - 48.5 % Final    MCV 02/28/2022 81 (A) 82 - 98 fL Final    MCH 02/28/2022 25.0 (A) 27.0 - 31.0 pg Final    MCHC 02/28/2022 31.1 (A) 32.0 - 36.0 g/dL Final    RDW 02/28/2022 17.4 (A) 11.5 - 14.5 % Final    Platelets 02/28/2022 306  150 - 450 K/uL Final    MPV 02/28/2022 10.9  9.2 - 12.9 fL Final    Sodium 02/28/2022 141  136 - 145 mmol/L Final    Potassium 02/28/2022 4.2  3.5 - 5.1 mmol/L Final    Chloride 02/28/2022 100  95 - 110 mmol/L Final    CO2 02/28/2022 29  23 - 29 mmol/L Final    Glucose 02/28/2022 106  70 - 110 mg/dL Final    BUN 02/28/2022 39 (A) 8 - 23 mg/dL Final    Creatinine 02/28/2022 1.0  0.5 - 1.4 mg/dL Final    Calcium 02/28/2022 9.8  8.7 - 10.5 mg/dL Final    Total Protein 02/28/2022 7.5  6.0 - 8.4 g/dL Final    Albumin 02/28/2022 4.1  3.5 - 5.2 g/dL Final    Total Bilirubin 02/28/2022 0.5  0.1 - 1.0 mg/dL Final    Comment: For infants and newborns, interpretation of results should be based  on gestational age, weight and in agreement with clinical  observations.    Premature Infant  recommended reference ranges:  Up to 24 hours.............<8.0 mg/dL  Up to 48 hours............<12.0 mg/dL  3-5 days..................<15.0 mg/dL  6-29 days.................<15.0 mg/dL      Alkaline Phosphatase 02/28/2022 140 (A) 55 - 135 U/L Final    AST 02/28/2022 25  10 - 40 U/L Final    ALT 02/28/2022 30  10 - 44 U/L Final    Anion Gap 02/28/2022 12  8 - 16 mmol/L Final    eGFR if African American 02/28/2022 >60.0  >60 mL/min/1.73 m^2 Final    eGFR if non  02/28/2022 54.1 (A) >60 mL/min/1.73 m^2 Final    Comment: Calculation used to obtain the estimated glomerular filtration  rate (eGFR) is the CKD-EPI equation.       CRP 02/28/2022 0.7  0.0 - 8.2 mg/L Final    Alkaline Phosphatase, Total 02/28/2022 139  37 - 153 U/L Final    Bone, Alk Phos 02/28/2022 45  28 - 66 % Final    Liver, Alk Phos 02/28/2022 44  25 - 69 % Final    Comment: Increased intestinal alkaline phosphatase can be seen in blood group O  and B secretors and after fatty meals.      Intestine, Alk Phos 02/28/2022 11  1 - 24 % Final    PLACENTAL ISOENZYME 02/28/2022 0  0 % Final    Comment: Test Performed at:  [facility] [employer] [facility]  [address] [place]  [address]     NINI Headley MD, PhD, ELHAM      Macrohepatic Isoenzyme 02/28/2022 Test Not Performed   Final    Alkaline Phosphatase Isoenzyme Int* 02/28/2022 Test Not Performed   Final    Taurine, Ur 02/28/2022 30  24 - 1531 nmol/mg Cr Final    Asparagine, Ur 02/28/2022 54  25 - 238 nmol/mg Cr Final    Serine, Urine 02/28/2022 141  97 - 540 nmol/mg Cr Final    Glycine, Ur 02/28/2022 475  229 - 2989 nmol/mg Cr Final    Glutamine, Ur 02/28/2022 219  93 - 686 nmol/mg Cr Final    Histidine, Ur 02/28/2022 94  81 - 1128 nmol/mg Cr Final    Threonine, Ur 02/28/2022 41  31 - 278 nmol/mg Cr Final    Citrulline, Ur 02/28/2022 3  <12 nmol/mg Cr Final    b-Alanine, Ur 02/28/2022 1  <52 nmol/mg Cr Final    Alanine, Ur 02/28/2022 92  56 -  518 nmol/mg Cr Final    Glutamic Acid, Ur 02/28/2022 6  <34 nmol/mg Cr Final    1-Methylhistidine, Ur 02/28/2022 575  23 - 1339 nmol/mg Cr Final    3-Methylhistidine, Ur 02/28/2022 149  70 - 246 nmol/mg Cr Final    Carnosine, Ur 02/28/2022 3  <35 nmol/mg Cr Final    Arginine, Ur 02/28/2022 12  <114 nmol/mg Cr Final    a-Aminoadipic Acid, Ur 02/28/2022 3  <47 nmol/mg Cr Final    B-Aminoisobutyr acid,Qn,Ur 02/28/2022 59  <301 nmol/mg Cr Final    a-Amino-N-buty acid,Ur 02/28/2022 4  <19 nmol/mg Cr Final    Proline, Ur 02/28/2022 4  <26 nmol/mg Cr Final    Ornithine, Ur 02/28/2022 7  <25 nmol/mg Cr Final    Cystathionine, Ur 02/28/2022 5  <30 nmol/mg Cr Final    Cystine 02/28/2022 20  10 - 98 nmol/mg Cr Final    Lysine, Ur 02/28/2022 30  15 - 271 nmol/mg Cr Final    Methionine, Ur 02/28/2022 7  <16 nmol/mg Cr Final    Valine, Ur 02/28/2022 22  11 - 61 nmol/mg Cr Final    Tyrosine, Ur 02/28/2022 22  15 - 115 nmol/mg Cr Final    Isoleucine, Ur 02/28/2022 6  <22 nmol/mg Cr Final    Leucine, Ur 02/28/2022 15  <51 nmol/mg Cr Final    Phenylalanine, Ur 02/28/2022 27  13 - 70 nmol/mg Cr Final    Phosphoserine 02/28/2022 0  <1 nmol/mg Cr Final    Phosphoethanolamine 02/28/2022 17  <48 nmol/mg Cr Final    Hydroxyproline 02/28/2022 1  <15 nmol/mg Cr Final    Aspartic Acid 02/28/2022 1  <10 nmol/mg Cr Final    Ethanolamine 02/28/2022 265  95 - 471 nmol/mg Cr Final    Sarcosine 02/28/2022 0  <3 nmol/mg Cr Final    Argininosuccinic Acid 02/28/2022 4  <15 nmol/mg Cr Final    Anserine 02/28/2022 2  <38 nmol/mg Cr Final    Homocitrulline 02/28/2022 8  <30 nmol/mg Cr Final    Gamma-Amino-n-Butyric Acid 02/28/2022 1  <5 nmol/mg Cr Final    Hydroxylysine 02/28/2022 1  <12 nmol/mg Cr Final    Tryptophan 02/28/2022 30  18 - 114 nmol/mg Cr Final    Allo-Isoleucine 02/28/2022 1  <7 nmol/mg Cr Final    Amino Acid Scrn, Ur Interp 02/28/2022 SEE BELOW   Final    Comment: In this sample, the amino acid profile was  normal.    -------------------ADDITIONAL INFORMATION-------------------  Liquid Chromatography-Tandem Mass Spectrometry (LC-MS/MS)  This test was developed and its performance characteristics   determined by [facility] in a manner consistent with CLIA   requirements. This test has not been cleared or approved by   the U.S. Food and Drug Administration.         Imaging  No results found.    Assessment  1. Atherosclerosis of native coronary artery of native heart without angina pectoris  Stable    2. Hypercholesteremia  Controlled    3. Undifferentiated connective tissue disease  Followed by rheumatology    4. Acute systolic heart failure  Decompensated  - Basic Metabolic Panel; Future  - Magnesium; Future      Plan and Discussion    Increase Lasix to daily dosing.  Reinforced sodium restriction.  Close follow-up with blood work.    The 10-year ASCVD risk score ([place] MICHAEL Jr., et al., 2013) is: 27.3%    Values used to calculate the score:      Age: 76 years      Sex: Female      Is Non- : No      Diabetic: No      Tobacco smoker: No      Systolic Blood Pressure: 142 mmHg      Is BP treated: No      HDL Cholesterol: 77 mg/dL      Total Cholesterol: 142 mg/dL     Follow Up  Follow up in about 10 days (around 8/19/2022).      Fabrice Rodriguez MD

## 2022-08-15 ENCOUNTER — OFFICE VISIT (OUTPATIENT)
Dept: PRIMARY CARE CLINIC | Facility: CLINIC | Age: 77
End: 2022-08-15
Payer: MEDICARE

## 2022-08-15 VITALS
WEIGHT: 182.75 LBS | SYSTOLIC BLOOD PRESSURE: 126 MMHG | HEIGHT: 61 IN | OXYGEN SATURATION: 95 % | TEMPERATURE: 98 F | BODY MASS INDEX: 34.5 KG/M2 | DIASTOLIC BLOOD PRESSURE: 70 MMHG | RESPIRATION RATE: 18 BRPM | HEART RATE: 73 BPM

## 2022-08-15 DIAGNOSIS — E03.9 HYPOTHYROIDISM, UNSPECIFIED TYPE: Primary | ICD-10-CM

## 2022-08-15 DIAGNOSIS — Z12.11 COLON CANCER SCREENING: ICD-10-CM

## 2022-08-15 DIAGNOSIS — G47.00 INSOMNIA, UNSPECIFIED TYPE: ICD-10-CM

## 2022-08-15 DIAGNOSIS — M54.9 BACK PAIN, UNSPECIFIED BACK LOCATION, UNSPECIFIED BACK PAIN LATERALITY, UNSPECIFIED CHRONICITY: ICD-10-CM

## 2022-08-15 DIAGNOSIS — L91.8 SKIN TAG: ICD-10-CM

## 2022-08-15 DIAGNOSIS — R73.03 PRE-DIABETES: ICD-10-CM

## 2022-08-15 DIAGNOSIS — E78.5 HYPERLIPIDEMIA, UNSPECIFIED HYPERLIPIDEMIA TYPE: ICD-10-CM

## 2022-08-15 PROCEDURE — 1101F PT FALLS ASSESS-DOCD LE1/YR: CPT | Mod: CPTII,S$GLB,, | Performed by: NURSE PRACTITIONER

## 2022-08-15 PROCEDURE — 1126F PR PAIN SEVERITY QUANTIFIED, NO PAIN PRESENT: ICD-10-PCS | Mod: CPTII,S$GLB,, | Performed by: NURSE PRACTITIONER

## 2022-08-15 PROCEDURE — 1101F PR PT FALLS ASSESS DOC 0-1 FALLS W/OUT INJ PAST YR: ICD-10-PCS | Mod: CPTII,S$GLB,, | Performed by: NURSE PRACTITIONER

## 2022-08-15 PROCEDURE — 1159F PR MEDICATION LIST DOCUMENTED IN MEDICAL RECORD: ICD-10-PCS | Mod: CPTII,S$GLB,, | Performed by: NURSE PRACTITIONER

## 2022-08-15 PROCEDURE — 1126F AMNT PAIN NOTED NONE PRSNT: CPT | Mod: CPTII,S$GLB,, | Performed by: NURSE PRACTITIONER

## 2022-08-15 PROCEDURE — 99999 PR PBB SHADOW E&M-EST. PATIENT-LVL V: CPT | Mod: PBBFAC,,, | Performed by: NURSE PRACTITIONER

## 2022-08-15 PROCEDURE — 1160F PR REVIEW ALL MEDS BY PRESCRIBER/CLIN PHARMACIST DOCUMENTED: ICD-10-PCS | Mod: CPTII,S$GLB,, | Performed by: NURSE PRACTITIONER

## 2022-08-15 PROCEDURE — 3288F PR FALLS RISK ASSESSMENT DOCUMENTED: ICD-10-PCS | Mod: CPTII,S$GLB,, | Performed by: NURSE PRACTITIONER

## 2022-08-15 PROCEDURE — 3078F DIAST BP <80 MM HG: CPT | Mod: CPTII,S$GLB,, | Performed by: NURSE PRACTITIONER

## 2022-08-15 PROCEDURE — 3074F SYST BP LT 130 MM HG: CPT | Mod: CPTII,S$GLB,, | Performed by: NURSE PRACTITIONER

## 2022-08-15 PROCEDURE — 3288F FALL RISK ASSESSMENT DOCD: CPT | Mod: CPTII,S$GLB,, | Performed by: NURSE PRACTITIONER

## 2022-08-15 PROCEDURE — 1159F MED LIST DOCD IN RCRD: CPT | Mod: CPTII,S$GLB,, | Performed by: NURSE PRACTITIONER

## 2022-08-15 PROCEDURE — 99204 OFFICE O/P NEW MOD 45 MIN: CPT | Mod: S$GLB,,, | Performed by: NURSE PRACTITIONER

## 2022-08-15 PROCEDURE — 99204 PR OFFICE/OUTPT VISIT, NEW, LEVL IV, 45-59 MIN: ICD-10-PCS | Mod: S$GLB,,, | Performed by: NURSE PRACTITIONER

## 2022-08-15 PROCEDURE — 1160F RVW MEDS BY RX/DR IN RCRD: CPT | Mod: CPTII,S$GLB,, | Performed by: NURSE PRACTITIONER

## 2022-08-15 PROCEDURE — 99999 PR PBB SHADOW E&M-EST. PATIENT-LVL V: ICD-10-PCS | Mod: PBBFAC,,, | Performed by: NURSE PRACTITIONER

## 2022-08-15 PROCEDURE — 3078F PR MOST RECENT DIASTOLIC BLOOD PRESSURE < 80 MM HG: ICD-10-PCS | Mod: CPTII,S$GLB,, | Performed by: NURSE PRACTITIONER

## 2022-08-15 PROCEDURE — 3074F PR MOST RECENT SYSTOLIC BLOOD PRESSURE < 130 MM HG: ICD-10-PCS | Mod: CPTII,S$GLB,, | Performed by: NURSE PRACTITIONER

## 2022-08-15 RX ORDER — LEVOTHYROXINE SODIUM 50 UG/1
50 TABLET ORAL
Qty: 30 TABLET | Refills: 3 | Status: SHIPPED | OUTPATIENT
Start: 2022-08-15 | End: 2023-01-14 | Stop reason: SDUPTHER

## 2022-08-15 RX ORDER — CHLORHEXIDINE GLUCONATE ORAL RINSE 1.2 MG/ML
SOLUTION DENTAL
COMMUNITY
Start: 2022-08-09 | End: 2023-12-08

## 2022-08-15 RX ORDER — TEMAZEPAM 15 MG/1
15 CAPSULE ORAL NIGHTLY PRN
Qty: 30 CAPSULE | Refills: 1 | Status: SHIPPED | OUTPATIENT
Start: 2022-08-15 | End: 2022-09-14

## 2022-08-15 RX ORDER — IBUPROFEN 800 MG/1
800 TABLET ORAL 3 TIMES DAILY PRN
Qty: 90 TABLET | Refills: 1 | Status: SHIPPED | OUTPATIENT
Start: 2022-08-15 | End: 2022-09-14

## 2022-08-17 ENCOUNTER — LAB VISIT (OUTPATIENT)
Dept: PRIMARY CARE CLINIC | Facility: CLINIC | Age: 77
End: 2022-08-17
Payer: MEDICARE

## 2022-08-17 DIAGNOSIS — E78.5 HYPERLIPIDEMIA, UNSPECIFIED HYPERLIPIDEMIA TYPE: ICD-10-CM

## 2022-08-17 DIAGNOSIS — R73.03 PRE-DIABETES: ICD-10-CM

## 2022-08-17 DIAGNOSIS — Z79.899 ENCOUNTER FOR LONG-TERM (CURRENT) USE OF MEDICATIONS: ICD-10-CM

## 2022-08-17 DIAGNOSIS — M35.9 UNDIFFERENTIATED CONNECTIVE TISSUE DISEASE: ICD-10-CM

## 2022-08-17 DIAGNOSIS — I50.21 ACUTE SYSTOLIC HEART FAILURE: ICD-10-CM

## 2022-08-17 DIAGNOSIS — E03.9 HYPOTHYROIDISM, UNSPECIFIED TYPE: ICD-10-CM

## 2022-08-17 LAB
ALBUMIN SERPL BCP-MCNC: 4.4 G/DL (ref 3.5–5.2)
ALP SERPL-CCNC: 153 U/L (ref 55–135)
ALT SERPL W/O P-5'-P-CCNC: 21 U/L (ref 10–44)
ANION GAP SERPL CALC-SCNC: 13 MMOL/L (ref 8–16)
ANION GAP SERPL CALC-SCNC: 13 MMOL/L (ref 8–16)
AST SERPL-CCNC: 22 U/L (ref 10–40)
BILIRUB SERPL-MCNC: 0.6 MG/DL (ref 0.1–1)
BUN SERPL-MCNC: 24 MG/DL (ref 8–23)
BUN SERPL-MCNC: 24 MG/DL (ref 8–23)
CALCIUM SERPL-MCNC: 10.2 MG/DL (ref 8.7–10.5)
CALCIUM SERPL-MCNC: 10.2 MG/DL (ref 8.7–10.5)
CHLORIDE SERPL-SCNC: 98 MMOL/L (ref 95–110)
CHLORIDE SERPL-SCNC: 98 MMOL/L (ref 95–110)
CHOLEST SERPL-MCNC: 172 MG/DL (ref 120–199)
CHOLEST/HDLC SERPL: 1.8 {RATIO} (ref 2–5)
CO2 SERPL-SCNC: 31 MMOL/L (ref 23–29)
CO2 SERPL-SCNC: 31 MMOL/L (ref 23–29)
CREAT SERPL-MCNC: 1.1 MG/DL (ref 0.5–1.4)
CREAT SERPL-MCNC: 1.1 MG/DL (ref 0.5–1.4)
CRP SERPL-MCNC: 1.4 MG/L (ref 0–8.2)
ERYTHROCYTE [DISTWIDTH] IN BLOOD BY AUTOMATED COUNT: 16.8 % (ref 11.5–14.5)
EST. GFR  (NO RACE VARIABLE): 51.4 ML/MIN/1.73 M^2
EST. GFR  (NO RACE VARIABLE): 51.4 ML/MIN/1.73 M^2
ESTIMATED AVG GLUCOSE: 117 MG/DL (ref 68–131)
GLUCOSE SERPL-MCNC: 111 MG/DL (ref 70–110)
GLUCOSE SERPL-MCNC: 111 MG/DL (ref 70–110)
HBA1C MFR BLD: 5.7 % (ref 4–5.6)
HCT VFR BLD AUTO: 40.6 % (ref 37–48.5)
HDLC SERPL-MCNC: 94 MG/DL (ref 40–75)
HDLC SERPL: 54.7 % (ref 20–50)
HGB BLD-MCNC: 12.8 G/DL (ref 12–16)
LDLC SERPL CALC-MCNC: 67.2 MG/DL (ref 63–159)
MAGNESIUM SERPL-MCNC: 2.6 MG/DL (ref 1.6–2.6)
MCH RBC QN AUTO: 24.8 PG (ref 27–31)
MCHC RBC AUTO-ENTMCNC: 31.5 G/DL (ref 32–36)
MCV RBC AUTO: 79 FL (ref 82–98)
NONHDLC SERPL-MCNC: 78 MG/DL
PLATELET # BLD AUTO: 318 K/UL (ref 150–450)
PMV BLD AUTO: 10.3 FL (ref 9.2–12.9)
POTASSIUM SERPL-SCNC: 4.6 MMOL/L (ref 3.5–5.1)
POTASSIUM SERPL-SCNC: 4.6 MMOL/L (ref 3.5–5.1)
PROT SERPL-MCNC: 8 G/DL (ref 6–8.4)
RBC # BLD AUTO: 5.17 M/UL (ref 4–5.4)
SODIUM SERPL-SCNC: 142 MMOL/L (ref 136–145)
SODIUM SERPL-SCNC: 142 MMOL/L (ref 136–145)
T4 FREE SERPL-MCNC: 1.06 NG/DL (ref 0.71–1.51)
TRIGL SERPL-MCNC: 54 MG/DL (ref 30–150)
TSH SERPL DL<=0.005 MIU/L-ACNC: 3.61 UIU/ML (ref 0.4–4)
WBC # BLD AUTO: 9.25 K/UL (ref 3.9–12.7)

## 2022-08-17 PROCEDURE — 36415 COLL VENOUS BLD VENIPUNCTURE: CPT | Performed by: INTERNAL MEDICINE

## 2022-08-17 PROCEDURE — 85027 COMPLETE CBC AUTOMATED: CPT | Performed by: INTERNAL MEDICINE

## 2022-08-17 PROCEDURE — 86140 C-REACTIVE PROTEIN: CPT | Performed by: INTERNAL MEDICINE

## 2022-08-17 PROCEDURE — 36415 PR COLLECTION VENOUS BLOOD,VENIPUNCTURE: ICD-10-PCS | Mod: S$GLB,,, | Performed by: INTERNAL MEDICINE

## 2022-08-17 PROCEDURE — 84439 ASSAY OF FREE THYROXINE: CPT | Performed by: NURSE PRACTITIONER

## 2022-08-17 PROCEDURE — 83735 ASSAY OF MAGNESIUM: CPT | Performed by: INTERNAL MEDICINE

## 2022-08-17 PROCEDURE — 83036 HEMOGLOBIN GLYCOSYLATED A1C: CPT | Performed by: NURSE PRACTITIONER

## 2022-08-17 PROCEDURE — 80053 COMPREHEN METABOLIC PANEL: CPT | Performed by: INTERNAL MEDICINE

## 2022-08-17 PROCEDURE — 80061 LIPID PANEL: CPT | Performed by: NURSE PRACTITIONER

## 2022-08-17 PROCEDURE — 84443 ASSAY THYROID STIM HORMONE: CPT | Performed by: NURSE PRACTITIONER

## 2022-08-17 PROCEDURE — 36415 COLL VENOUS BLD VENIPUNCTURE: CPT | Mod: S$GLB,,, | Performed by: INTERNAL MEDICINE

## 2022-08-18 ENCOUNTER — OFFICE VISIT (OUTPATIENT)
Dept: CARDIOLOGY | Facility: CLINIC | Age: 77
End: 2022-08-18
Payer: MEDICARE

## 2022-08-18 ENCOUNTER — LAB VISIT (OUTPATIENT)
Dept: LAB | Facility: OTHER | Age: 77
End: 2022-08-18
Attending: NURSE PRACTITIONER
Payer: MEDICARE

## 2022-08-18 VITALS
BODY MASS INDEX: 34.39 KG/M2 | WEIGHT: 182 LBS | DIASTOLIC BLOOD PRESSURE: 64 MMHG | SYSTOLIC BLOOD PRESSURE: 124 MMHG | OXYGEN SATURATION: 98 % | HEART RATE: 71 BPM

## 2022-08-18 DIAGNOSIS — I50.23 ACUTE ON CHRONIC SYSTOLIC HEART FAILURE: Primary | ICD-10-CM

## 2022-08-18 DIAGNOSIS — E78.00 HYPERCHOLESTEREMIA: ICD-10-CM

## 2022-08-18 DIAGNOSIS — I25.10 ATHEROSCLEROSIS OF NATIVE CORONARY ARTERY OF NATIVE HEART WITHOUT ANGINA PECTORIS: ICD-10-CM

## 2022-08-18 DIAGNOSIS — Z12.11 COLON CANCER SCREENING: ICD-10-CM

## 2022-08-18 PROCEDURE — 3288F PR FALLS RISK ASSESSMENT DOCUMENTED: ICD-10-PCS | Mod: CPTII,S$GLB,, | Performed by: INTERNAL MEDICINE

## 2022-08-18 PROCEDURE — 1159F PR MEDICATION LIST DOCUMENTED IN MEDICAL RECORD: ICD-10-PCS | Mod: CPTII,S$GLB,, | Performed by: INTERNAL MEDICINE

## 2022-08-18 PROCEDURE — 1126F AMNT PAIN NOTED NONE PRSNT: CPT | Mod: CPTII,S$GLB,, | Performed by: INTERNAL MEDICINE

## 2022-08-18 PROCEDURE — 99999 PR PBB SHADOW E&M-EST. PATIENT-LVL IV: ICD-10-PCS | Mod: PBBFAC,,, | Performed by: INTERNAL MEDICINE

## 2022-08-18 PROCEDURE — 99499 UNLISTED E&M SERVICE: CPT | Mod: S$GLB,,, | Performed by: INTERNAL MEDICINE

## 2022-08-18 PROCEDURE — 99214 OFFICE O/P EST MOD 30 MIN: CPT | Mod: S$GLB,,, | Performed by: INTERNAL MEDICINE

## 2022-08-18 PROCEDURE — 1126F PR PAIN SEVERITY QUANTIFIED, NO PAIN PRESENT: ICD-10-PCS | Mod: CPTII,S$GLB,, | Performed by: INTERNAL MEDICINE

## 2022-08-18 PROCEDURE — 99214 PR OFFICE/OUTPT VISIT, EST, LEVL IV, 30-39 MIN: ICD-10-PCS | Mod: S$GLB,,, | Performed by: INTERNAL MEDICINE

## 2022-08-18 PROCEDURE — 1159F MED LIST DOCD IN RCRD: CPT | Mod: CPTII,S$GLB,, | Performed by: INTERNAL MEDICINE

## 2022-08-18 PROCEDURE — 3078F PR MOST RECENT DIASTOLIC BLOOD PRESSURE < 80 MM HG: ICD-10-PCS | Mod: CPTII,S$GLB,, | Performed by: INTERNAL MEDICINE

## 2022-08-18 PROCEDURE — 1101F PT FALLS ASSESS-DOCD LE1/YR: CPT | Mod: CPTII,S$GLB,, | Performed by: INTERNAL MEDICINE

## 2022-08-18 PROCEDURE — 3074F SYST BP LT 130 MM HG: CPT | Mod: CPTII,S$GLB,, | Performed by: INTERNAL MEDICINE

## 2022-08-18 PROCEDURE — 3078F DIAST BP <80 MM HG: CPT | Mod: CPTII,S$GLB,, | Performed by: INTERNAL MEDICINE

## 2022-08-18 PROCEDURE — 1101F PR PT FALLS ASSESS DOC 0-1 FALLS W/OUT INJ PAST YR: ICD-10-PCS | Mod: CPTII,S$GLB,, | Performed by: INTERNAL MEDICINE

## 2022-08-18 PROCEDURE — 82274 ASSAY TEST FOR BLOOD FECAL: CPT | Performed by: NURSE PRACTITIONER

## 2022-08-18 PROCEDURE — 3074F PR MOST RECENT SYSTOLIC BLOOD PRESSURE < 130 MM HG: ICD-10-PCS | Mod: CPTII,S$GLB,, | Performed by: INTERNAL MEDICINE

## 2022-08-18 PROCEDURE — 3288F FALL RISK ASSESSMENT DOCD: CPT | Mod: CPTII,S$GLB,, | Performed by: INTERNAL MEDICINE

## 2022-08-18 PROCEDURE — 99499 RISK ADDL DX/OHS AUDIT: ICD-10-PCS | Mod: S$GLB,,, | Performed by: INTERNAL MEDICINE

## 2022-08-18 PROCEDURE — 99999 PR PBB SHADOW E&M-EST. PATIENT-LVL IV: CPT | Mod: PBBFAC,,, | Performed by: INTERNAL MEDICINE

## 2022-08-18 NOTE — PROGRESS NOTES
[facility]    Chief Complaint  Chief Complaint   Patient presents with    Congestive Heart Failure       HPI:    Reports significantly increased urine output with daily dose of Lasix.  But does not note much different in her symptoms now.  Reports that she was not feeling that bad last time she was here.  No orthostasis.    Medications  Current Outpatient Medications   Medication Sig Dispense Refill    aspirin (ECOTRIN) 81 MG EC tablet Take 81 mg by mouth once daily.      atorvastatin (LIPITOR) 10 MG tablet Take 1 tablet (10 mg total) by mouth once daily. 90 tablet 3    chlorhexidine (PERIDEX) 0.12 % solution SMARTSIG:By Mouth      dorzolamide-timolol 2-0.5% (COSOPT) 22.3-6.8 mg/mL ophthalmic solution INSTILL 1 DROP IN THE LEFT EYE EVERY 12 HOURS      furosemide (LASIX) 40 MG tablet Take 1 tablet (40 mg total) by mouth once daily. 90 tablet 3    hydrOXYchloroQUINE (PLAQUENIL) 200 mg tablet Take 1 tablet (200 mg total) by mouth once daily. 90 tablet 1    ibuprofen (ADVIL,MOTRIN) 800 MG tablet Take 1 tablet (800 mg total) by mouth 3 (three) times daily as needed for Pain. 90 tablet 1    latanoprost 0.005 % ophthalmic solution INSTILL 1 DROP IN THE LEFT EYE EVERY EVENING. SEPARATE BY AT LEAST 10 MIN FROM OTHER EYE DROPS      levothyroxine (SYNTHROID) 50 MCG tablet Take 1 tablet (50 mcg total) by mouth before breakfast. 30 tablet 3    losartan (COZAAR) 25 MG tablet Take 1 tablet (25 mg total) by mouth every 12 (twelve) hours. 180 tablet 3    prednisoLONE acetate (PRED FORTE) 1 % DrpS once daily. Left Eye      propylene glycol/peg 400/PF (SYSTANE, PF, OPHT) Apply to eye 2 (two) times daily. Right Eye      temazepam (RESTORIL) 15 mg Cap Take 1 capsule (15 mg total) by mouth nightly as needed (insomnia). 30 capsule 1    tetrahydrozoline/polyethyl gly (EYE DROPS OPHT) Apply to eye.       Current Facility-Administered Medications   Medication Dose Route Frequency Provider Last Rate Last Admin     cyanocobalamin injection 1,000 mcg  1,000 mcg Subcutaneous Q30 Days Srinivasa Huffman MD   1,000 mcg at 06/10/20 1050        History  Past Medical History:   Diagnosis Date    Anemia, chronic disease 12/18/2019    Arthritis     Blind left eye     Chronic pain     Coronary atherosclerosis 06/20/2020    Eosinophilia 12/18/2019    Hypothyroidism     Insomnia     Leucocytosis 12/18/2019    Pneumonia     PONV (postoperative nausea and vomiting)     needs phenergan    Scoliosis     Undifferentiated connective tissue disease      Past Surgical History:   Procedure Laterality Date    ANKLE LIGAMENT RECONSTRUCTION Right     CATARACT EXTRACTION Bilateral     CHOLECYSTECTOMY      CORONARY ANGIOGRAPHY N/A 6/19/2020    Procedure: ANGIOGRAM, CORONARY ARTERY - right radial;  Surgeon: Fabrice Rodriguez MD;  Location: [facility] CATH LAB;  Service: Cardiology;  Laterality: N/A;    CORONARY STENT PLACEMENT  06/19/2020    pLAD [place] 2.75 x 18 mm     Social History     Socioeconomic History    Marital status: Single   Occupational History    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   Tobacco Use    Smoking status: Former Smoker    Smokeless tobacco: Former User   Substance and Sexual Activity    Alcohol use: Not Currently    Drug use: Never    Sexual activity: Yes     Partners: Male     Birth control/protection: None     Family History   Problem Relation Age of Onset    Diabetes Mother     Cancer Mother     Heart disease Father     Arthritis Sister         Allergies  Review of patient's allergies indicates:  No Known Allergies    Review of Systems   Review of Systems   Constitutional: Negative for malaise/fatigue, weight gain and weight loss.   Eyes: Negative for visual disturbance.   Cardiovascular: Positive for leg swelling. Negative for chest pain, claudication, cyanosis, dyspnea on exertion, irregular heartbeat, near-syncope, orthopnea, palpitations, paroxysmal nocturnal dyspnea and syncope.    Respiratory: Negative for cough, hemoptysis, shortness of breath, sleep disturbances due to breathing and wheezing.    Hematologic/Lymphatic: Negative for bleeding problem. Does not bruise/bleed easily.   Skin: Negative for poor wound healing.   Musculoskeletal: Negative for muscle cramps and myalgias.   Gastrointestinal: Negative for abdominal pain, anorexia, diarrhea, heartburn, hematemesis, hematochezia, melena, nausea and vomiting.   Genitourinary: Negative for hematuria and nocturia.   Neurological: Negative for excessive daytime sleepiness, dizziness, focal weakness, light-headedness and weakness.       Physical Exam  Vitals:    08/18/22 1152   BP: 124/64   Pulse: 71     Wt Readings from Last 1 Encounters:   08/18/22 82.6 kg (182 lb)     Physical Exam  Constitutional:       General: She is not in acute distress.     Appearance: She is not toxic-appearing or diaphoretic.   HENT:      Head: Normocephalic and atraumatic.   Eyes:      General: No scleral icterus.     Conjunctiva/sclera: Conjunctivae normal.   Neck:      Thyroid: No thyromegaly.      Vascular: No carotid bruit, hepatojugular reflux or JVD.      Trachea: Trachea normal.   Cardiovascular:      Rate and Rhythm: Normal rate and regular rhythm.  No extrasystoles are present.     Chest Wall: PMI is not displaced.      Pulses:           Carotid pulses are 2+ on the right side and 2+ on the left side.       Radial pulses are 2+ on the right side and 2+ on the left side.        Dorsalis pedis pulses are 2+ on the right side and 2+ on the left side.        Posterior tibial pulses are 2+ on the right side and 2+ on the left side.      Heart sounds: S1 normal and S2 normal. No murmur heard.    No S3 or S4 sounds.   Pulmonary:      Effort: No accessory muscle usage or respiratory distress.      Breath sounds: No decreased breath sounds, wheezing, rhonchi or rales.   Abdominal:      General: Bowel sounds are normal. There is no abdominal bruit.       Palpations: Abdomen is soft. There is no hepatomegaly, splenomegaly or pulsatile mass.      Tenderness: There is no abdominal tenderness.   Musculoskeletal:         General: No tenderness or deformity.      Cervical back: No edema.      Right lower leg: No edema.      Left lower leg: No edema.   Skin:     General: Skin is warm and dry.      Coloration: Skin is not cyanotic or pale.      Nails: There is no clubbing.   Neurological:      General: No focal deficit present.      Mental Status: She is alert and oriented to person, place, and time.   Psychiatric:         Speech: Speech normal.         Behavior: Behavior normal. Behavior is cooperative.         Labs  Lab Visit on 08/17/2022   Component Date Value Ref Range Status    Sodium 08/17/2022 142  136 - 145 mmol/L Final    Potassium 08/17/2022 4.6  3.5 - 5.1 mmol/L Final    Chloride 08/17/2022 98  95 - 110 mmol/L Final    CO2 08/17/2022 31 (A) 23 - 29 mmol/L Final    Glucose 08/17/2022 111 (A) 70 - 110 mg/dL Final    BUN 08/17/2022 24 (A) 8 - 23 mg/dL Final    Creatinine 08/17/2022 1.1  0.5 - 1.4 mg/dL Final    Calcium 08/17/2022 10.2  8.7 - 10.5 mg/dL Final    Total Protein 08/17/2022 8.0  6.0 - 8.4 g/dL Final    Albumin 08/17/2022 4.4  3.5 - 5.2 g/dL Final    Total Bilirubin 08/17/2022 0.6  0.1 - 1.0 mg/dL Final    Comment: For infants and newborns, interpretation of results should be based  on gestational age, weight and in agreement with clinical  observations.    Premature Infant recommended reference ranges:  Up to 24 hours.............<8.0 mg/dL  Up to 48 hours............<12.0 mg/dL  3-5 days..................<15.0 mg/dL  6-29 days.................<15.0 mg/dL      Alkaline Phosphatase 08/17/2022 153 (A) 55 - 135 U/L Final    AST 08/17/2022 22  10 - 40 U/L Final    ALT 08/17/2022 21  10 - 44 U/L Final    Anion Gap 08/17/2022 13  8 - 16 mmol/L Final    eGFR 08/17/2022 51.4 (A) >60 mL/min/1.73 m^2 Final    CRP 08/17/2022 1.4  0.0 - 8.2 mg/L  Final    WBC 08/17/2022 9.25  3.90 - 12.70 K/uL Final    RBC 08/17/2022 5.17  4.00 - 5.40 M/uL Final    Hemoglobin 08/17/2022 12.8  12.0 - 16.0 g/dL Final    Hematocrit 08/17/2022 40.6  37.0 - 48.5 % Final    MCV 08/17/2022 79 (A) 82 - 98 fL Final    MCH 08/17/2022 24.8 (A) 27.0 - 31.0 pg Final    MCHC 08/17/2022 31.5 (A) 32.0 - 36.0 g/dL Final    RDW 08/17/2022 16.8 (A) 11.5 - 14.5 % Final    Platelets 08/17/2022 318  150 - 450 K/uL Final    MPV 08/17/2022 10.3  9.2 - 12.9 fL Final    Sodium 08/17/2022 142  136 - 145 mmol/L Final    Potassium 08/17/2022 4.6  3.5 - 5.1 mmol/L Final    Chloride 08/17/2022 98  95 - 110 mmol/L Final    CO2 08/17/2022 31 (A) 23 - 29 mmol/L Final    Glucose 08/17/2022 111 (A) 70 - 110 mg/dL Final    BUN 08/17/2022 24 (A) 8 - 23 mg/dL Final    Creatinine 08/17/2022 1.1  0.5 - 1.4 mg/dL Final    Calcium 08/17/2022 10.2  8.7 - 10.5 mg/dL Final    Anion Gap 08/17/2022 13  8 - 16 mmol/L Final    eGFR 08/17/2022 51.4 (A) >60 mL/min/1.73 m^2 Final    Magnesium 08/17/2022 2.6  1.6 - 2.6 mg/dL Final    Hemoglobin A1C 08/17/2022 5.7 (A) 4.0 - 5.6 % Final    Comment: ADA Screening Guidelines:  5.7-6.4%  Consistent with prediabetes  >or=6.5%  Consistent with diabetes    High levels of fetal hemoglobin interfere with the HbA1C  assay. Heterozygous hemoglobin variants (HbS, HgC, etc)do  not significantly interfere with this assay.   However, presence of multiple variants may affect accuracy.      Estimated Avg Glucose 08/17/2022 117  68 - 131 mg/dL Final    Cholesterol 08/17/2022 172  120 - 199 mg/dL Final    Comment: The National Cholesterol Education Program (NCEP) has set the  following guidelines (reference ranges) for Cholesterol:  Optimal.....................<200 mg/dL  Borderline High.............200-239 mg/dL  High........................> or = 240 mg/dL      Triglycerides 08/17/2022 54  30 - 150 mg/dL Final    Comment: The National Cholesterol Education Program (NCEP)  has set the  following guidelines (reference values) for triglycerides:  Normal......................<150 mg/dL  Borderline High.............150-199 mg/dL  High........................200-499 mg/dL      HDL 08/17/2022 94 (A) 40 - 75 mg/dL Final    Comment: The National Cholesterol Education Program (NCEP) has set the  following guidelines (reference values) for HDL Cholesterol:  Low...............<40 mg/dL  Optimal...........>60 mg/dL      LDL Cholesterol 08/17/2022 67.2  63.0 - 159.0 mg/dL Final    Comment: The National Cholesterol Education Program (NCEP) has set the  following guidelines (reference values) for LDL Cholesterol:  Optimal.......................<130 mg/dL  Borderline High...............130-159 mg/dL  High..........................160-189 mg/dL  Very High.....................>190 mg/dL      HDL/Cholesterol Ratio 08/17/2022 54.7 (A) 20.0 - 50.0 % Final    Total Cholesterol/HDL Ratio 08/17/2022 1.8 (A) 2.0 - 5.0 Final    Non-HDL Cholesterol 08/17/2022 78  mg/dL Final    Comment: Risk category and Non-HDL cholesterol goals:  Coronary heart disease (CHD)or equivalent (10-year risk of CHD >20%):  Non-HDL cholesterol goal     <130 mg/dL  Two or more CHD risk factors and 10-year risk of CHD <= 20%:  Non-HDL cholesterol goal     <160 mg/dL  0 to 1 CHD risk factor:  Non-HDL cholesterol goal     <190 mg/dL      TSH 08/17/2022 3.613  0.400 - 4.000 uIU/mL Final    Free T4 08/17/2022 1.06  0.71 - 1.51 ng/dL Final   Clinical Support on 06/10/2022   Component Date Value Ref Range Status    Specimen UA 06/10/2022 Urine, Clean Catch   Final    Color, UA 06/10/2022 Yellow  Yellow, Straw, Rita Final    Appearance, UA 06/10/2022 Clear  Clear Final    pH, UA 06/10/2022 5.0  5.0 - 8.0 Final    Specific Gravity, UA 06/10/2022 1.015  1.005 - 1.030 Final    Protein, UA 06/10/2022 Negative  Negative Final    Comment: Recommend a 24 hour urine protein or a urine   protein/creatinine ratio if globulin induced  proteinuria is  clinically suspected.      Glucose, UA 06/10/2022 Negative  Negative Final    Ketones, UA 06/10/2022 Negative  Negative Final    Bilirubin (UA) 06/10/2022 Negative  Negative Final    Occult Blood UA 06/10/2022 Negative  Negative Final    Nitrite, UA 06/10/2022 Negative  Negative Final    Leukocytes, UA 06/10/2022 Negative  Negative Final    RBC, UA 06/10/2022 0  0 - 4 /hpf Final    WBC, UA 06/10/2022 1  0 - 5 /hpf Final    Squam Epithel, UA 06/10/2022 0  /hpf Final    Microscopic Comment 06/10/2022 SEE COMMENT   Final    Comment: Other formed elements not mentioned in the report are not   present in the microscopic examination.      Lab Visit on 06/10/2022   Component Date Value Ref Range Status    WBC 06/10/2022 9.30  3.90 - 12.70 K/uL Final    RBC 06/10/2022 4.98  4.00 - 5.40 M/uL Final    Hemoglobin 06/10/2022 12.3  12.0 - 16.0 g/dL Final    Hematocrit 06/10/2022 40.2  37.0 - 48.5 % Final    MCV 06/10/2022 81 (A) 82 - 98 fL Final    MCH 06/10/2022 24.7 (A) 27.0 - 31.0 pg Final    MCHC 06/10/2022 30.6 (A) 32.0 - 36.0 g/dL Final    RDW 06/10/2022 16.0 (A) 11.5 - 14.5 % Final    Platelets 06/10/2022 261  150 - 450 K/uL Final    MPV 06/10/2022 10.4  9.2 - 12.9 fL Final    Immature Granulocytes 06/10/2022 0.2  0.0 - 0.5 % Final    Gran # (ANC) 06/10/2022 5.8  1.8 - 7.7 K/uL Final    Immature Grans (Abs) 06/10/2022 0.02  0.00 - 0.04 K/uL Final    Comment: Mild elevation in immature granulocytes is non specific and   can be seen in a variety of conditions including stress response,   acute inflammation, trauma and pregnancy. Correlation with other   laboratory and clinical findings is essential.      Lymph # 06/10/2022 2.3  1.0 - 4.8 K/uL Final    Mono # 06/10/2022 0.8  0.3 - 1.0 K/uL Final    Eos # 06/10/2022 0.3  0.0 - 0.5 K/uL Final    Baso # 06/10/2022 0.06  0.00 - 0.20 K/uL Final    nRBC 06/10/2022 0  0 /100 WBC Final    Gran % 06/10/2022 62.5  38.0 - 73.0 % Final     Lymph % 06/10/2022 25.1  18.0 - 48.0 % Final    Mono % 06/10/2022 8.6  4.0 - 15.0 % Final    Eosinophil % 06/10/2022 3.0  0.0 - 8.0 % Final    Basophil % 06/10/2022 0.6  0.0 - 1.9 % Final    Differential Method 06/10/2022 Automated   Final    Sodium 06/10/2022 139  136 - 145 mmol/L Final    Potassium 06/10/2022 4.3  3.5 - 5.1 mmol/L Final    Chloride 06/10/2022 102  95 - 110 mmol/L Final    CO2 06/10/2022 25  23 - 29 mmol/L Final    Glucose 06/10/2022 104  70 - 110 mg/dL Final    BUN 06/10/2022 28 (A) 8 - 23 mg/dL Final    Creatinine 06/10/2022 1.1  0.5 - 1.4 mg/dL Final    Calcium 06/10/2022 9.7  8.7 - 10.5 mg/dL Final    Total Protein 06/10/2022 7.3  6.0 - 8.4 g/dL Final    Albumin 06/10/2022 4.0  3.5 - 5.2 g/dL Final    Total Bilirubin 06/10/2022 0.4  0.1 - 1.0 mg/dL Final    Comment: For infants and newborns, interpretation of results should be based  on gestational age, weight and in agreement with clinical  observations.    Premature Infant recommended reference ranges:  Up to 24 hours.............<8.0 mg/dL  Up to 48 hours............<12.0 mg/dL  3-5 days..................<15.0 mg/dL  6-29 days.................<15.0 mg/dL      Alkaline Phosphatase 06/10/2022 131  55 - 135 U/L Final    AST 06/10/2022 19  10 - 40 U/L Final    ALT 06/10/2022 20  10 - 44 U/L Final    Anion Gap 06/10/2022 12  8 - 16 mmol/L Final    eGFR if  06/10/2022 55.6 (A) >60 mL/min/1.73 m^2 Final    eGFR if non  06/10/2022 48.2 (A) >60 mL/min/1.73 m^2 Final    Comment: Calculation used to obtain the estimated glomerular filtration  rate (eGFR) is the CKD-EPI equation.      Lab Visit on 02/28/2022   Component Date Value Ref Range Status    CPK 02/28/2022 35  20 - 180 U/L Final    WBC 02/28/2022 8.19  3.90 - 12.70 K/uL Final    RBC 02/28/2022 5.19  4.00 - 5.40 M/uL Final    Hemoglobin 02/28/2022 13.0  12.0 - 16.0 g/dL Final    Hematocrit 02/28/2022 41.8  37.0 - 48.5 % Final    MCV  02/28/2022 81 (A) 82 - 98 fL Final    MCH 02/28/2022 25.0 (A) 27.0 - 31.0 pg Final    MCHC 02/28/2022 31.1 (A) 32.0 - 36.0 g/dL Final    RDW 02/28/2022 17.4 (A) 11.5 - 14.5 % Final    Platelets 02/28/2022 306  150 - 450 K/uL Final    MPV 02/28/2022 10.9  9.2 - 12.9 fL Final    Sodium 02/28/2022 141  136 - 145 mmol/L Final    Potassium 02/28/2022 4.2  3.5 - 5.1 mmol/L Final    Chloride 02/28/2022 100  95 - 110 mmol/L Final    CO2 02/28/2022 29  23 - 29 mmol/L Final    Glucose 02/28/2022 106  70 - 110 mg/dL Final    BUN 02/28/2022 39 (A) 8 - 23 mg/dL Final    Creatinine 02/28/2022 1.0  0.5 - 1.4 mg/dL Final    Calcium 02/28/2022 9.8  8.7 - 10.5 mg/dL Final    Total Protein 02/28/2022 7.5  6.0 - 8.4 g/dL Final    Albumin 02/28/2022 4.1  3.5 - 5.2 g/dL Final    Total Bilirubin 02/28/2022 0.5  0.1 - 1.0 mg/dL Final    Comment: For infants and newborns, interpretation of results should be based  on gestational age, weight and in agreement with clinical  observations.    Premature Infant recommended reference ranges:  Up to 24 hours.............<8.0 mg/dL  Up to 48 hours............<12.0 mg/dL  3-5 days..................<15.0 mg/dL  6-29 days.................<15.0 mg/dL      Alkaline Phosphatase 02/28/2022 140 (A) 55 - 135 U/L Final    AST 02/28/2022 25  10 - 40 U/L Final    ALT 02/28/2022 30  10 - 44 U/L Final    Anion Gap 02/28/2022 12  8 - 16 mmol/L Final    eGFR if African American 02/28/2022 >60.0  >60 mL/min/1.73 m^2 Final    eGFR if non  02/28/2022 54.1 (A) >60 mL/min/1.73 m^2 Final    Comment: Calculation used to obtain the estimated glomerular filtration  rate (eGFR) is the CKD-EPI equation.       CRP 02/28/2022 0.7  0.0 - 8.2 mg/L Final    Alkaline Phosphatase, Total 02/28/2022 139  37 - 153 U/L Final    Bone, Alk Phos 02/28/2022 45  28 - 66 % Final    Liver, Alk Phos 02/28/2022 44  25 - 69 % Final    Comment: Increased intestinal alkaline phosphatase can be seen in blood  group O  and B secretors and after fatty meals.      Intestine, Alk Phos 02/28/2022 11  1 - 24 % Final    PLACENTAL ISOENZYME 02/28/2022 0  0 % Final    Comment: Test Performed at:  [facility] [address] [place]  [address]     NINI Headley MD, PhD, ELHAM      Macrohepatic Isoenzyme 02/28/2022 Test Not Performed   Final    Alkaline Phosphatase Isoenzyme Int* 02/28/2022 Test Not Performed   Final    Taurine, Ur 02/28/2022 30  24 - 1531 nmol/mg Cr Final    Asparagine, Ur 02/28/2022 54  25 - 238 nmol/mg Cr Final    Serine, Urine 02/28/2022 141  97 - 540 nmol/mg Cr Final    Glycine, Ur 02/28/2022 475  229 - 2989 nmol/mg Cr Final    Glutamine, Ur 02/28/2022 219  93 - 686 nmol/mg Cr Final    Histidine, Ur 02/28/2022 94  81 - 1128 nmol/mg Cr Final    Threonine, Ur 02/28/2022 41  31 - 278 nmol/mg Cr Final    Citrulline, Ur 02/28/2022 3  <12 nmol/mg Cr Final    b-Alanine, Ur 02/28/2022 1  <52 nmol/mg Cr Final    Alanine, Ur 02/28/2022 92  56 - 518 nmol/mg Cr Final    Glutamic Acid, Ur 02/28/2022 6  <34 nmol/mg Cr Final    1-Methylhistidine, Ur 02/28/2022 575  23 - 1339 nmol/mg Cr Final    3-Methylhistidine, Ur 02/28/2022 149  70 - 246 nmol/mg Cr Final    Carnosine, Ur 02/28/2022 3  <35 nmol/mg Cr Final    Arginine, Ur 02/28/2022 12  <114 nmol/mg Cr Final    a-Aminoadipic Acid, Ur 02/28/2022 3  <47 nmol/mg Cr Final    B-Aminoisobutyr acid,Qn,Ur 02/28/2022 59  <301 nmol/mg Cr Final    a-Amino-N-buty acid,Ur 02/28/2022 4  <19 nmol/mg Cr Final    Proline, Ur 02/28/2022 4  <26 nmol/mg Cr Final    Ornithine, Ur 02/28/2022 7  <25 nmol/mg Cr Final    Cystathionine, Ur 02/28/2022 5  <30 nmol/mg Cr Final    Cystine 02/28/2022 20  10 - 98 nmol/mg Cr Final    Lysine, Ur 02/28/2022 30  15 - 271 nmol/mg Cr Final    Methionine, Ur 02/28/2022 7  <16 nmol/mg Cr Final    Valine, Ur 02/28/2022 22  11 - 61 nmol/mg Cr Final    Tyrosine, Ur 02/28/2022 22  15 - 115  nmol/mg Cr Final    Isoleucine, Ur 02/28/2022 6  <22 nmol/mg Cr Final    Leucine, Ur 02/28/2022 15  <51 nmol/mg Cr Final    Phenylalanine, Ur 02/28/2022 27  13 - 70 nmol/mg Cr Final    Phosphoserine 02/28/2022 0  <1 nmol/mg Cr Final    Phosphoethanolamine 02/28/2022 17  <48 nmol/mg Cr Final    Hydroxyproline 02/28/2022 1  <15 nmol/mg Cr Final    Aspartic Acid 02/28/2022 1  <10 nmol/mg Cr Final    Ethanolamine 02/28/2022 265  95 - 471 nmol/mg Cr Final    Sarcosine 02/28/2022 0  <3 nmol/mg Cr Final    Argininosuccinic Acid 02/28/2022 4  <15 nmol/mg Cr Final    Anserine 02/28/2022 2  <38 nmol/mg Cr Final    Homocitrulline 02/28/2022 8  <30 nmol/mg Cr Final    Gamma-Amino-n-Butyric Acid 02/28/2022 1  <5 nmol/mg Cr Final    Hydroxylysine 02/28/2022 1  <12 nmol/mg Cr Final    Tryptophan 02/28/2022 30  18 - 114 nmol/mg Cr Final    Allo-Isoleucine 02/28/2022 1  <7 nmol/mg Cr Final    Amino Acid Scrn, Ur Interp 02/28/2022 SEE BELOW   Final    Comment: In this sample, the amino acid profile was normal.    -------------------ADDITIONAL INFORMATION-------------------  Liquid Chromatography-Tandem Mass Spectrometry (LC-MS/MS)  This test was developed and its performance characteristics   determined by [facility] in a manner consistent with CLIA   requirements. This test has not been cleared or approved by   the U.S. Food and Drug Administration.         Imaging  No results found.    Assessment  1. Acute on chronic systolic heart failure  Much improved    2. Atherosclerosis of native coronary artery of native heart without angina pectoris  Stable    3. Hypercholesteremia  Well controlled      Plan and Discussion    Volume status looks better.  Continue daily Lasix for another week then titrate as needed.    The 10-year ASCVD risk score (Soha RODRIGUEZ Jr., et al., 2013) is: 22.1%    Values used to calculate the score:      Age: 76 years      Sex: Female      Is Non- : No      Diabetic: No       Tobacco smoker: No      Systolic Blood Pressure: 124 mmHg      Is BP treated: No      HDL Cholesterol: 94 mg/dL      Total Cholesterol: 172 mg/dL     Follow Up  Follow up in about 3 months (around 11/18/2022).      Fabrice Rodriguez MD

## 2022-08-23 LAB — HEMOCCULT STL QL IA: NEGATIVE

## 2022-09-20 ENCOUNTER — PATIENT MESSAGE (OUTPATIENT)
Dept: ADMINISTRATIVE | Facility: OTHER | Age: 77
End: 2022-09-20
Payer: MEDICARE

## 2022-10-03 ENCOUNTER — LAB VISIT (OUTPATIENT)
Dept: PRIMARY CARE CLINIC | Facility: CLINIC | Age: 77
End: 2022-10-03
Payer: MEDICARE

## 2022-10-03 DIAGNOSIS — M35.9 UNDIFFERENTIATED CONNECTIVE TISSUE DISEASE: Primary | ICD-10-CM

## 2022-10-03 DIAGNOSIS — Z79.899 ENCOUNTER FOR LONG-TERM (CURRENT) USE OF MEDICATIONS: ICD-10-CM

## 2022-10-03 DIAGNOSIS — M35.9 UNDIFFERENTIATED CONNECTIVE TISSUE DISEASE: ICD-10-CM

## 2022-10-03 LAB
ALBUMIN SERPL BCP-MCNC: 3.7 G/DL (ref 3.5–5.2)
ALP SERPL-CCNC: 164 U/L (ref 55–135)
ALT SERPL W/O P-5'-P-CCNC: 19 U/L (ref 10–44)
ANION GAP SERPL CALC-SCNC: 12 MMOL/L (ref 8–16)
AST SERPL-CCNC: 18 U/L (ref 10–40)
BASOPHILS # BLD AUTO: 0.05 K/UL (ref 0–0.2)
BASOPHILS NFR BLD: 0.6 % (ref 0–1.9)
BILIRUB SERPL-MCNC: 0.5 MG/DL (ref 0.1–1)
BUN SERPL-MCNC: 28 MG/DL (ref 8–23)
CALCIUM SERPL-MCNC: 9.6 MG/DL (ref 8.7–10.5)
CHLORIDE SERPL-SCNC: 100 MMOL/L (ref 95–110)
CO2 SERPL-SCNC: 28 MMOL/L (ref 23–29)
CREAT SERPL-MCNC: 1 MG/DL (ref 0.5–1.4)
CRP SERPL-MCNC: 3.8 MG/L (ref 0–8.2)
DIFFERENTIAL METHOD: ABNORMAL
EOSINOPHIL # BLD AUTO: 0.3 K/UL (ref 0–0.5)
EOSINOPHIL NFR BLD: 3.8 % (ref 0–8)
ERYTHROCYTE [DISTWIDTH] IN BLOOD BY AUTOMATED COUNT: 16 % (ref 11.5–14.5)
EST. GFR  (NO RACE VARIABLE): 57.7 ML/MIN/1.73 M^2
GLUCOSE SERPL-MCNC: 130 MG/DL (ref 70–110)
HCT VFR BLD AUTO: 41.9 % (ref 37–48.5)
HGB BLD-MCNC: 12.8 G/DL (ref 12–16)
IMM GRANULOCYTES # BLD AUTO: 0.02 K/UL (ref 0–0.04)
IMM GRANULOCYTES NFR BLD AUTO: 0.2 % (ref 0–0.5)
LYMPHOCYTES # BLD AUTO: 1.8 K/UL (ref 1–4.8)
LYMPHOCYTES NFR BLD: 20.5 % (ref 18–48)
MCH RBC QN AUTO: 24.8 PG (ref 27–31)
MCHC RBC AUTO-ENTMCNC: 30.5 G/DL (ref 32–36)
MCV RBC AUTO: 81 FL (ref 82–98)
MONOCYTES # BLD AUTO: 0.7 K/UL (ref 0.3–1)
MONOCYTES NFR BLD: 8.7 % (ref 4–15)
NEUTROPHILS # BLD AUTO: 5.7 K/UL (ref 1.8–7.7)
NEUTROPHILS NFR BLD: 66.2 % (ref 38–73)
NRBC BLD-RTO: 0 /100 WBC
PLATELET # BLD AUTO: 266 K/UL (ref 150–450)
PMV BLD AUTO: 10.9 FL (ref 9.2–12.9)
POTASSIUM SERPL-SCNC: 4.3 MMOL/L (ref 3.5–5.1)
PROT SERPL-MCNC: 7.1 G/DL (ref 6–8.4)
RBC # BLD AUTO: 5.17 M/UL (ref 4–5.4)
SODIUM SERPL-SCNC: 140 MMOL/L (ref 136–145)
WBC # BLD AUTO: 8.53 K/UL (ref 3.9–12.7)

## 2022-10-03 PROCEDURE — 80053 COMPREHEN METABOLIC PANEL: CPT | Performed by: INTERNAL MEDICINE

## 2022-10-03 PROCEDURE — 36415 COLL VENOUS BLD VENIPUNCTURE: CPT | Performed by: INTERNAL MEDICINE

## 2022-10-03 PROCEDURE — 86140 C-REACTIVE PROTEIN: CPT | Performed by: INTERNAL MEDICINE

## 2022-10-03 PROCEDURE — 85025 COMPLETE CBC W/AUTO DIFF WBC: CPT | Performed by: INTERNAL MEDICINE

## 2022-10-13 ENCOUNTER — OFFICE VISIT (OUTPATIENT)
Dept: RHEUMATOLOGY | Facility: CLINIC | Age: 77
End: 2022-10-13
Payer: MEDICARE

## 2022-10-13 ENCOUNTER — TELEPHONE (OUTPATIENT)
Dept: RHEUMATOLOGY | Facility: CLINIC | Age: 77
End: 2022-10-13

## 2022-10-13 VITALS
DIASTOLIC BLOOD PRESSURE: 65 MMHG | BODY MASS INDEX: 34.54 KG/M2 | SYSTOLIC BLOOD PRESSURE: 122 MMHG | WEIGHT: 182.81 LBS

## 2022-10-13 DIAGNOSIS — Z79.899 ENCOUNTER FOR LONG-TERM (CURRENT) USE OF MEDICATIONS: ICD-10-CM

## 2022-10-13 DIAGNOSIS — M35.9 UNDIFFERENTIATED CONNECTIVE TISSUE DISEASE: Primary | ICD-10-CM

## 2022-10-13 PROCEDURE — 1160F RVW MEDS BY RX/DR IN RCRD: CPT | Mod: CPTII,S$GLB,, | Performed by: INTERNAL MEDICINE

## 2022-10-13 PROCEDURE — 99213 PR OFFICE/OUTPT VISIT, EST, LEVL III, 20-29 MIN: ICD-10-PCS | Mod: S$GLB,,, | Performed by: INTERNAL MEDICINE

## 2022-10-13 PROCEDURE — 1159F PR MEDICATION LIST DOCUMENTED IN MEDICAL RECORD: ICD-10-PCS | Mod: CPTII,S$GLB,, | Performed by: INTERNAL MEDICINE

## 2022-10-13 PROCEDURE — 1160F PR REVIEW ALL MEDS BY PRESCRIBER/CLIN PHARMACIST DOCUMENTED: ICD-10-PCS | Mod: CPTII,S$GLB,, | Performed by: INTERNAL MEDICINE

## 2022-10-13 PROCEDURE — 1159F MED LIST DOCD IN RCRD: CPT | Mod: CPTII,S$GLB,, | Performed by: INTERNAL MEDICINE

## 2022-10-13 PROCEDURE — 3074F PR MOST RECENT SYSTOLIC BLOOD PRESSURE < 130 MM HG: ICD-10-PCS | Mod: CPTII,S$GLB,, | Performed by: INTERNAL MEDICINE

## 2022-10-13 PROCEDURE — 3288F FALL RISK ASSESSMENT DOCD: CPT | Mod: CPTII,S$GLB,, | Performed by: INTERNAL MEDICINE

## 2022-10-13 PROCEDURE — 1125F AMNT PAIN NOTED PAIN PRSNT: CPT | Mod: CPTII,S$GLB,, | Performed by: INTERNAL MEDICINE

## 2022-10-13 PROCEDURE — 3078F DIAST BP <80 MM HG: CPT | Mod: CPTII,S$GLB,, | Performed by: INTERNAL MEDICINE

## 2022-10-13 PROCEDURE — 99213 OFFICE O/P EST LOW 20 MIN: CPT | Mod: S$GLB,,, | Performed by: INTERNAL MEDICINE

## 2022-10-13 PROCEDURE — 3288F PR FALLS RISK ASSESSMENT DOCUMENTED: ICD-10-PCS | Mod: CPTII,S$GLB,, | Performed by: INTERNAL MEDICINE

## 2022-10-13 PROCEDURE — 1101F PT FALLS ASSESS-DOCD LE1/YR: CPT | Mod: CPTII,S$GLB,, | Performed by: INTERNAL MEDICINE

## 2022-10-13 PROCEDURE — 3074F SYST BP LT 130 MM HG: CPT | Mod: CPTII,S$GLB,, | Performed by: INTERNAL MEDICINE

## 2022-10-13 PROCEDURE — 3078F PR MOST RECENT DIASTOLIC BLOOD PRESSURE < 80 MM HG: ICD-10-PCS | Mod: CPTII,S$GLB,, | Performed by: INTERNAL MEDICINE

## 2022-10-13 PROCEDURE — 1125F PR PAIN SEVERITY QUANTIFIED, PAIN PRESENT: ICD-10-PCS | Mod: CPTII,S$GLB,, | Performed by: INTERNAL MEDICINE

## 2022-10-13 PROCEDURE — 1101F PR PT FALLS ASSESS DOC 0-1 FALLS W/OUT INJ PAST YR: ICD-10-PCS | Mod: CPTII,S$GLB,, | Performed by: INTERNAL MEDICINE

## 2022-10-13 RX ORDER — HYDROXYCHLOROQUINE SULFATE 200 MG/1
200 TABLET, FILM COATED ORAL DAILY
Qty: 90 TABLET | Refills: 3 | Status: SHIPPED | OUTPATIENT
Start: 2022-10-13 | End: 2023-10-24 | Stop reason: SDUPTHER

## 2022-10-13 NOTE — PROGRESS NOTES
[facility] RHEUMATOLOGY           Follow-up visit    Notes dictated to M*Modal. Please forgive any unintended errors.  Subjective:       Patient ID:   NAME: Jacqueline M Goldberg : 1945     76 y.o. female    Referring Doc: No ref. provider found  Other Physicians:    Chief Complaint:  Undifferentiated connective tissue disease      HPI/Interval History:  The patient is doing very well.  She has no complaints chest pain or shortness of breath.    ROS:   GEN:    no fever, night sweats or weight loss  SKIN:   no rashes, bruising, or swelling, no Raynauds, no photosensitivity  HEENT: no changes in vision, no mouth ulcers, no sicca symptoms, no scalp tenderness, no jaw claudication.  CV:      no CP, PND, COTA, orthopnea, no palpitations  PULM: no SOB, cough, hemoptysis, sputum or pleuritic pain  GI:        no dysphagia, no GERD, no hematemesis, no abdominal pain, nausea, vomiting, constipation, diarrhea, melanotic stools, hematochezia  :      no hematuria, dysuria  NEURO: no paresthesias, headaches, seizures  MUSCULOSKELETAL:  No joint pain or swelling  PSYCH:   No increased insomnia, no increased anxiety, no increased depression    Past Medical/Surgical History:  Past Medical History:   Diagnosis Date    Anemia, chronic disease 2019    Arthritis     Blind left eye     Chronic pain     Coronary atherosclerosis 2020    Eosinophilia 2019    Hypothyroidism     Insomnia     Leucocytosis 2019    Pneumonia     PONV (postoperative nausea and vomiting)     needs phenergan    Scoliosis     Undifferentiated connective tissue disease      Past Surgical History:   Procedure Laterality Date    ANKLE LIGAMENT RECONSTRUCTION Right     CATARACT EXTRACTION Bilateral     CHOLECYSTECTOMY      CORONARY ANGIOGRAPHY N/A 2020    Procedure: ANGIOGRAM, CORONARY ARTERY - right radial;  Surgeon: Fabrice Rodriguez MD;  Location: [facility] CATH LAB;  Service: Cardiology;  Laterality: N/A;    CORONARY STENT PLACEMENT   06/19/2020    pLAD [place] 2.75 x 18 mm       Allergies:  Review of patient's allergies indicates:  No Known Allergies    Social/Family History:  Social History     Socioeconomic History    Marital status: Single   Occupational History    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   Tobacco Use    Smoking status: Former    Smokeless tobacco: Former   Substance and Sexual Activity    Alcohol use: Not Currently    Drug use: Never    Sexual activity: Yes     Partners: Male     Birth control/protection: None     Family History   Problem Relation Age of Onset    Diabetes Mother     Cancer Mother     Heart disease Father     Arthritis Sister      FAMILY HISTORY: negative for Connective Tissue Disease      Medications:    Current Outpatient Medications:     aspirin (ECOTRIN) 81 MG EC tablet, Take 81 mg by mouth once daily., Disp: , Rfl:     atorvastatin (LIPITOR) 10 MG tablet, Take 1 tablet (10 mg total) by mouth once daily., Disp: 90 tablet, Rfl: 3    chlorhexidine (PERIDEX) 0.12 % solution, SMARTSIG:By Mouth, Disp: , Rfl:     dorzolamide-timolol 2-0.5% (COSOPT) 22.3-6.8 mg/mL ophthalmic solution, INSTILL 1 DROP IN THE LEFT EYE EVERY 12 HOURS, Disp: , Rfl:     furosemide (LASIX) 40 MG tablet, Take 1 tablet (40 mg total) by mouth once daily., Disp: 90 tablet, Rfl: 3    latanoprost 0.005 % ophthalmic solution, INSTILL 1 DROP IN THE LEFT EYE EVERY EVENING. SEPARATE BY AT LEAST 10 MIN FROM OTHER EYE DROPS, Disp: , Rfl:     levothyroxine (SYNTHROID) 50 MCG tablet, Take 1 tablet (50 mcg total) by mouth before breakfast., Disp: 30 tablet, Rfl: 3    losartan (COZAAR) 25 MG tablet, Take 1 tablet (25 mg total) by mouth every 12 (twelve) hours., Disp: 180 tablet, Rfl: 3    prednisoLONE acetate (PRED FORTE) 1 % DrpS, once daily. Left Eye, Disp: , Rfl:     propylene glycol/peg 400/PF (SYSTANE, PF, OPHT), Apply to eye 2 (two) times daily. Right Eye, Disp: , Rfl:     tetrahydrozoline/polyethyl gly (EYE DROPS OPHT), Apply to  eye., Disp: , Rfl:     hydrOXYchloroQUINE (PLAQUENIL) 200 mg tablet, Take 1 tablet (200 mg total) by mouth once daily., Disp: 90 tablet, Rfl: 3    Current Facility-Administered Medications:     cyanocobalamin injection 1,000 mcg, 1,000 mcg, Subcutaneous, Q30 Days, Srinivasa Huffman MD, 1,000 mcg at 06/10/20 1050      Objective:     Vitals:  Blood pressure 122/65, weight 82.9 kg (182 lb 12.8 oz).    Physical Examination:   GEN: wn/wd female in no apparent distress  SKIN: no rashes, no sclerodactyly, no Raynaud's, no periungual erythema, no digital tip ulcerations, no nailbed pitting  HEAD: no alopecia, no scalp tenderness, no temporal artery tenderness or induration.  EYES: no pallor, no icterus, PERRLA  ENT:  no thrush, no mucosal dryness or ulcerations, adequate oral hygiene & dentition.  NECK: supple x 6, no masses, no thyromegaly, no lymphadenopathy.  CV:   S1 and S2 regular, no murmurs, gallop or rubs  CHEST: Normal respiratory effort;  normal breath sounds/no adventitious sounds. No signs of consolidation.  ABD: non-tender and non-distended; soft; normal bowel sounds; no rebound/guarding or tenderness. No hepatosplenomegaly.  Musculoskeletal:  No evidence of inflammatory arthritis  EXTREM: no clubbing, cyanosis or edema. normal pulses.  NEURO:  grossly intact; motor/sensory WNL; no tremors  PSYCH:  normal mood, affect and behavior    Labs:   Lab Results   Component Value Date    WBC 8.53 10/03/2022    HGB 12.8 10/03/2022    HCT 41.9 10/03/2022    MCV 81 (L) 10/03/2022     10/03/2022   CMP@  Sodium   Date Value Ref Range Status   10/03/2022 140 136 - 145 mmol/L Final     Potassium   Date Value Ref Range Status   10/03/2022 4.3 3.5 - 5.1 mmol/L Final     Chloride   Date Value Ref Range Status   10/03/2022 100 95 - 110 mmol/L Final     CO2   Date Value Ref Range Status   10/03/2022 28 23 - 29 mmol/L Final     Glucose   Date Value Ref Range Status   10/03/2022 130 (H) 70 - 110 mg/dL Final     BUN   Date  Value Ref Range Status   10/03/2022 28 (H) 8 - 23 mg/dL Final     Creatinine   Date Value Ref Range Status   10/03/2022 1.0 0.5 - 1.4 mg/dL Final     Calcium   Date Value Ref Range Status   10/03/2022 9.6 8.7 - 10.5 mg/dL Final     Total Protein   Date Value Ref Range Status   10/03/2022 7.1 6.0 - 8.4 g/dL Final     Albumin   Date Value Ref Range Status   10/03/2022 3.7 3.5 - 5.2 g/dL Final     Total Bilirubin   Date Value Ref Range Status   10/03/2022 0.5 0.1 - 1.0 mg/dL Final     Comment:     For infants and newborns, interpretation of results should be based  on gestational age, weight and in agreement with clinical  observations.    Premature Infant recommended reference ranges:  Up to 24 hours.............<8.0 mg/dL  Up to 48 hours............<12.0 mg/dL  3-5 days..................<15.0 mg/dL  6-29 days.................<15.0 mg/dL       Alkaline Phosphatase   Date Value Ref Range Status   10/03/2022 164 (H) 55 - 135 U/L Final     AST   Date Value Ref Range Status   10/03/2022 18 10 - 40 U/L Final     ALT   Date Value Ref Range Status   10/03/2022 19 10 - 44 U/L Final     CPK   Date Value Ref Range Status   02/28/2022 35 20 - 180 U/L Final     CRP   Date Value Ref Range Status   10/03/2022 3.8 0.0 - 8.2 mg/L Final     OBINNA   Date Value Ref Range Status   12/04/2019 POSITIVE (A) NEGATIVE Final     Comment:     OBINNA IFA is a first line screen for detecting the  presence of up to approximately 150 autoantibodies in  various autoimmune diseases. A positive OBINNA IFA result  is suggestive of autoimmune disease and reflexes to  titer and pattern. Further laboratory testing may be  considered if clinically indicated.     For additional information, please refer to  [email or web address]  (This link is being provided for informational/  educational purposes only.)             Radiology/Diagnostic Studies:    None    Assessment/Discussion/Plan:   76 y.o. female with UCTD-controlled on Plaquenil 200  mg daily    PLAN:  I renewed her medication.  I have encouraged her to see a rheumatologist early next year.  I will send a consult request to [facility].  Routine follow-up blood testing was ordered and should be done at the end of this year    RTC:  I will see her back on an as-needed basis through December        Electronically signed by Refugio Owens MD      Patient notified that this office will be closing December 22, 2022. They should begin looking for another rheumatologist as soon as possible.  A list with the names and contact details of rheumatologists in the surrounding area was provided.

## 2022-11-18 ENCOUNTER — OFFICE VISIT (OUTPATIENT)
Dept: CARDIOLOGY | Facility: CLINIC | Age: 77
End: 2022-11-18
Payer: MEDICARE

## 2022-11-18 VITALS
HEART RATE: 74 BPM | DIASTOLIC BLOOD PRESSURE: 64 MMHG | WEIGHT: 185.69 LBS | OXYGEN SATURATION: 97 % | SYSTOLIC BLOOD PRESSURE: 126 MMHG | BODY MASS INDEX: 35.09 KG/M2

## 2022-11-18 DIAGNOSIS — E66.01 SEVERE OBESITY: ICD-10-CM

## 2022-11-18 DIAGNOSIS — I50.22 CHRONIC SYSTOLIC HEART FAILURE: Primary | ICD-10-CM

## 2022-11-18 DIAGNOSIS — I25.10 ATHEROSCLEROSIS OF NATIVE CORONARY ARTERY OF NATIVE HEART WITHOUT ANGINA PECTORIS: ICD-10-CM

## 2022-11-18 DIAGNOSIS — E78.00 HYPERCHOLESTEREMIA: ICD-10-CM

## 2022-11-18 PROCEDURE — 1160F PR REVIEW ALL MEDS BY PRESCRIBER/CLIN PHARMACIST DOCUMENTED: ICD-10-PCS | Mod: CPTII,S$GLB,, | Performed by: INTERNAL MEDICINE

## 2022-11-18 PROCEDURE — 1159F MED LIST DOCD IN RCRD: CPT | Mod: CPTII,S$GLB,, | Performed by: INTERNAL MEDICINE

## 2022-11-18 PROCEDURE — 99999 PR PBB SHADOW E&M-EST. PATIENT-LVL III: ICD-10-PCS | Mod: PBBFAC,,, | Performed by: INTERNAL MEDICINE

## 2022-11-18 PROCEDURE — 1160F RVW MEDS BY RX/DR IN RCRD: CPT | Mod: CPTII,S$GLB,, | Performed by: INTERNAL MEDICINE

## 2022-11-18 PROCEDURE — 3288F PR FALLS RISK ASSESSMENT DOCUMENTED: ICD-10-PCS | Mod: CPTII,S$GLB,, | Performed by: INTERNAL MEDICINE

## 2022-11-18 PROCEDURE — 99999 PR PBB SHADOW E&M-EST. PATIENT-LVL III: CPT | Mod: PBBFAC,,, | Performed by: INTERNAL MEDICINE

## 2022-11-18 PROCEDURE — 1101F PT FALLS ASSESS-DOCD LE1/YR: CPT | Mod: CPTII,S$GLB,, | Performed by: INTERNAL MEDICINE

## 2022-11-18 PROCEDURE — 1126F AMNT PAIN NOTED NONE PRSNT: CPT | Mod: CPTII,S$GLB,, | Performed by: INTERNAL MEDICINE

## 2022-11-18 PROCEDURE — 99214 PR OFFICE/OUTPT VISIT, EST, LEVL IV, 30-39 MIN: ICD-10-PCS | Mod: S$GLB,,, | Performed by: INTERNAL MEDICINE

## 2022-11-18 PROCEDURE — 3078F PR MOST RECENT DIASTOLIC BLOOD PRESSURE < 80 MM HG: ICD-10-PCS | Mod: CPTII,S$GLB,, | Performed by: INTERNAL MEDICINE

## 2022-11-18 PROCEDURE — 1126F PR PAIN SEVERITY QUANTIFIED, NO PAIN PRESENT: ICD-10-PCS | Mod: CPTII,S$GLB,, | Performed by: INTERNAL MEDICINE

## 2022-11-18 PROCEDURE — 1159F PR MEDICATION LIST DOCUMENTED IN MEDICAL RECORD: ICD-10-PCS | Mod: CPTII,S$GLB,, | Performed by: INTERNAL MEDICINE

## 2022-11-18 PROCEDURE — 3074F PR MOST RECENT SYSTOLIC BLOOD PRESSURE < 130 MM HG: ICD-10-PCS | Mod: CPTII,S$GLB,, | Performed by: INTERNAL MEDICINE

## 2022-11-18 PROCEDURE — 3078F DIAST BP <80 MM HG: CPT | Mod: CPTII,S$GLB,, | Performed by: INTERNAL MEDICINE

## 2022-11-18 PROCEDURE — 3074F SYST BP LT 130 MM HG: CPT | Mod: CPTII,S$GLB,, | Performed by: INTERNAL MEDICINE

## 2022-11-18 PROCEDURE — 1101F PR PT FALLS ASSESS DOC 0-1 FALLS W/OUT INJ PAST YR: ICD-10-PCS | Mod: CPTII,S$GLB,, | Performed by: INTERNAL MEDICINE

## 2022-11-18 PROCEDURE — 3288F FALL RISK ASSESSMENT DOCD: CPT | Mod: CPTII,S$GLB,, | Performed by: INTERNAL MEDICINE

## 2022-11-18 PROCEDURE — 99214 OFFICE O/P EST MOD 30 MIN: CPT | Mod: S$GLB,,, | Performed by: INTERNAL MEDICINE

## 2022-11-18 NOTE — PROGRESS NOTES
[facility]    Chief Complaint  Chief Complaint   Patient presents with    Congestive Heart Failure       HPI:    Completed her road trip without difficulty.  Went from [place] to [place] to [place] to [place] across the [facility] to [place] then [place] and back to [place].  Ate in too many diners.  Too much sodium.  Taking Lasix every day.  Gained weight.  In January, she is leaving on an around the world cruise.    Medications  Current Outpatient Medications   Medication Sig Dispense Refill    aspirin (ECOTRIN) 81 MG EC tablet Take 81 mg by mouth once daily.      atorvastatin (LIPITOR) 10 MG tablet Take 1 tablet (10 mg total) by mouth once daily. 90 tablet 3    chlorhexidine (PERIDEX) 0.12 % solution SMARTSIG:By Mouth      dorzolamide-timolol 2-0.5% (COSOPT) 22.3-6.8 mg/mL ophthalmic solution INSTILL 1 DROP IN THE LEFT EYE EVERY 12 HOURS      furosemide (LASIX) 40 MG tablet Take 1 tablet (40 mg total) by mouth once daily. 90 tablet 3    hydrOXYchloroQUINE (PLAQUENIL) 200 mg tablet Take 1 tablet (200 mg total) by mouth once daily. 90 tablet 3    latanoprost 0.005 % ophthalmic solution INSTILL 1 DROP IN THE LEFT EYE EVERY EVENING. SEPARATE BY AT LEAST 10 MIN FROM OTHER EYE DROPS      losartan (COZAAR) 25 MG tablet Take 1 tablet (25 mg total) by mouth every 12 (twelve) hours. 180 tablet 3    prednisoLONE acetate (PRED FORTE) 1 % DrpS once daily. Left Eye      propylene glycol/peg 400/PF (SYSTANE, PF, OPHT) Apply to eye 2 (two) times daily. Right Eye      tetrahydrozoline/polyethyl gly (EYE DROPS OPHT) Apply to eye.      levothyroxine (SYNTHROID) 50 MCG tablet Take 1 tablet (50 mcg total) by mouth before breakfast. 30 tablet 3     No current facility-administered medications for this visit.        History  Past Medical History:   Diagnosis Date    Anemia, chronic disease 12/18/2019    Arthritis     Blind left eye     Chronic pain     Coronary atherosclerosis 06/20/2020     Eosinophilia 12/18/2019    Hypothyroidism     Insomnia     Leucocytosis 12/18/2019    Pneumonia     PONV (postoperative nausea and vomiting)     needs phenergan    Scoliosis     Undifferentiated connective tissue disease      Past Surgical History:   Procedure Laterality Date    ANKLE LIGAMENT RECONSTRUCTION Right     CATARACT EXTRACTION Bilateral     CHOLECYSTECTOMY      CORONARY ANGIOGRAPHY N/A 6/19/2020    Procedure: ANGIOGRAM, CORONARY ARTERY - right radial;  Surgeon: Fabrice Rodriguez MD;  Location: [facility] CATH LAB;  Service: Cardiology;  Laterality: N/A;    CORONARY STENT PLACEMENT  06/19/2020    pLAD Anthan 2.75 x 18 mm     Social History     Socioeconomic History    Marital status: Single   Occupational History    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   Tobacco Use    Smoking status: Former    Smokeless tobacco: Former   Substance and Sexual Activity    Alcohol use: Not Currently    Drug use: Never    Sexual activity: Yes     Partners: Male     Birth control/protection: None     Family History   Problem Relation Age of Onset    Diabetes Mother     Cancer Mother     Heart disease Father     Arthritis Sister         Allergies  Review of patient's allergies indicates:  No Known Allergies    Review of Systems   Review of Systems   Constitutional: Positive for weight gain. Negative for malaise/fatigue and weight loss.   Eyes:  Negative for visual disturbance.   Cardiovascular:  Negative for chest pain, claudication, cyanosis, dyspnea on exertion, irregular heartbeat, leg swelling, near-syncope, orthopnea, palpitations, paroxysmal nocturnal dyspnea and syncope.   Respiratory:  Negative for cough, hemoptysis, shortness of breath, sleep disturbances due to breathing and wheezing.    Hematologic/Lymphatic: Negative for bleeding problem. Does not bruise/bleed easily.   Skin:  Negative for poor wound healing.   Musculoskeletal:  Negative for muscle cramps and myalgias.   Gastrointestinal:  Negative for  abdominal pain, anorexia, diarrhea, heartburn, hematemesis, hematochezia, melena, nausea and vomiting.   Genitourinary:  Negative for hematuria and nocturia.   Neurological:  Negative for excessive daytime sleepiness, dizziness, focal weakness, light-headedness and weakness.     Physical Exam  Vitals:    11/18/22 1021   BP: 126/64   Pulse: 74     Wt Readings from Last 1 Encounters:   11/18/22 84.2 kg (185 lb 11.2 oz)     Physical Exam  Constitutional:       General: She is not in acute distress.     Appearance: She is not toxic-appearing or diaphoretic.   HENT:      Head: Normocephalic and atraumatic.   Eyes:      General: No scleral icterus.     Conjunctiva/sclera: Conjunctivae normal.   Neck:      Thyroid: No thyromegaly.      Vascular: No carotid bruit, hepatojugular reflux or JVD.      Trachea: Trachea normal.   Cardiovascular:      Rate and Rhythm: Normal rate and regular rhythm. No extrasystoles are present.     Chest Wall: PMI is not displaced.      Pulses:           Carotid pulses are 2+ on the right side and 2+ on the left side.       Radial pulses are 2+ on the right side and 2+ on the left side.        Dorsalis pedis pulses are 2+ on the right side and 2+ on the left side.        Posterior tibial pulses are 2+ on the right side and 2+ on the left side.      Heart sounds: S1 normal and S2 normal. No murmur heard.    No S3 or S4 sounds.   Pulmonary:      Effort: No accessory muscle usage or respiratory distress.      Breath sounds: No decreased breath sounds, wheezing, rhonchi or rales.   Abdominal:      General: Bowel sounds are normal. There is no abdominal bruit.      Palpations: Abdomen is soft. There is no hepatomegaly, splenomegaly or pulsatile mass.      Tenderness: There is no abdominal tenderness.   Musculoskeletal:         General: No tenderness or deformity.      Cervical back: No edema.      Right lower leg: No edema.      Left lower leg: No edema.   Skin:     General: Skin is warm and dry.       Coloration: Skin is not cyanotic or pale.      Nails: There is no clubbing.   Neurological:      General: No focal deficit present.      Mental Status: She is alert and oriented to person, place, and time.   Psychiatric:         Speech: Speech normal.         Behavior: Behavior normal. Behavior is cooperative.       Labs  Lab Visit on 10/03/2022   Component Date Value Ref Range Status    CRP 10/03/2022 3.8  0.0 - 8.2 mg/L Final    WBC 10/03/2022 8.53  3.90 - 12.70 K/uL Final    RBC 10/03/2022 5.17  4.00 - 5.40 M/uL Final    Hemoglobin 10/03/2022 12.8  12.0 - 16.0 g/dL Final    Hematocrit 10/03/2022 41.9  37.0 - 48.5 % Final    MCV 10/03/2022 81 (L)  82 - 98 fL Final    MCH 10/03/2022 24.8 (L)  27.0 - 31.0 pg Final    MCHC 10/03/2022 30.5 (L)  32.0 - 36.0 g/dL Final    RDW 10/03/2022 16.0 (H)  11.5 - 14.5 % Final    Platelets 10/03/2022 266  150 - 450 K/uL Final    MPV 10/03/2022 10.9  9.2 - 12.9 fL Final    Immature Granulocytes 10/03/2022 0.2  0.0 - 0.5 % Final    Gran # (ANC) 10/03/2022 5.7  1.8 - 7.7 K/uL Final    Immature Grans (Abs) 10/03/2022 0.02  0.00 - 0.04 K/uL Final    Comment: Mild elevation in immature granulocytes is non specific and   can be seen in a variety of conditions including stress response,   acute inflammation, trauma and pregnancy. Correlation with other   laboratory and clinical findings is essential.      Lymph # 10/03/2022 1.8  1.0 - 4.8 K/uL Final    Mono # 10/03/2022 0.7  0.3 - 1.0 K/uL Final    Eos # 10/03/2022 0.3  0.0 - 0.5 K/uL Final    Baso # 10/03/2022 0.05  0.00 - 0.20 K/uL Final    nRBC 10/03/2022 0  0 /100 WBC Final    Gran % 10/03/2022 66.2  38.0 - 73.0 % Final    Lymph % 10/03/2022 20.5  18.0 - 48.0 % Final    Mono % 10/03/2022 8.7  4.0 - 15.0 % Final    Eosinophil % 10/03/2022 3.8  0.0 - 8.0 % Final    Basophil % 10/03/2022 0.6  0.0 - 1.9 % Final    Differential Method 10/03/2022 Automated   Final    Sodium 10/03/2022 140  136 - 145 mmol/L Final    Potassium 10/03/2022 4.3   3.5 - 5.1 mmol/L Final    Chloride 10/03/2022 100  95 - 110 mmol/L Final    CO2 10/03/2022 28  23 - 29 mmol/L Final    Glucose 10/03/2022 130 (H)  70 - 110 mg/dL Final    BUN 10/03/2022 28 (H)  8 - 23 mg/dL Final    Creatinine 10/03/2022 1.0  0.5 - 1.4 mg/dL Final    Calcium 10/03/2022 9.6  8.7 - 10.5 mg/dL Final    Total Protein 10/03/2022 7.1  6.0 - 8.4 g/dL Final    Albumin 10/03/2022 3.7  3.5 - 5.2 g/dL Final    Total Bilirubin 10/03/2022 0.5  0.1 - 1.0 mg/dL Final    Comment: For infants and newborns, interpretation of results should be based  on gestational age, weight and in agreement with clinical  observations.    Premature Infant recommended reference ranges:  Up to 24 hours.............<8.0 mg/dL  Up to 48 hours............<12.0 mg/dL  3-5 days..................<15.0 mg/dL  6-29 days.................<15.0 mg/dL      Alkaline Phosphatase 10/03/2022 164 (H)  55 - 135 U/L Final    AST 10/03/2022 18  10 - 40 U/L Final    ALT 10/03/2022 19  10 - 44 U/L Final    Anion Gap 10/03/2022 12  8 - 16 mmol/L Final    eGFR 10/03/2022 57.7 (A)  >60 mL/min/1.73 m^2 Final   Lab Visit on 08/18/2022   Component Date Value Ref Range Status    Fecal Immunochemical Test (iFOBT) 08/18/2022 Negative  Negative Final   Lab Visit on 08/17/2022   Component Date Value Ref Range Status    Sodium 08/17/2022 142  136 - 145 mmol/L Final    Potassium 08/17/2022 4.6  3.5 - 5.1 mmol/L Final    Chloride 08/17/2022 98  95 - 110 mmol/L Final    CO2 08/17/2022 31 (H)  23 - 29 mmol/L Final    Glucose 08/17/2022 111 (H)  70 - 110 mg/dL Final    BUN 08/17/2022 24 (H)  8 - 23 mg/dL Final    Creatinine 08/17/2022 1.1  0.5 - 1.4 mg/dL Final    Calcium 08/17/2022 10.2  8.7 - 10.5 mg/dL Final    Total Protein 08/17/2022 8.0  6.0 - 8.4 g/dL Final    Albumin 08/17/2022 4.4  3.5 - 5.2 g/dL Final    Total Bilirubin 08/17/2022 0.6  0.1 - 1.0 mg/dL Final    Comment: For infants and newborns, interpretation of results should be based  on gestational age,  weight and in agreement with clinical  observations.    Premature Infant recommended reference ranges:  Up to 24 hours.............<8.0 mg/dL  Up to 48 hours............<12.0 mg/dL  3-5 days..................<15.0 mg/dL  6-29 days.................<15.0 mg/dL      Alkaline Phosphatase 08/17/2022 153 (H)  55 - 135 U/L Final    AST 08/17/2022 22  10 - 40 U/L Final    ALT 08/17/2022 21  10 - 44 U/L Final    Anion Gap 08/17/2022 13  8 - 16 mmol/L Final    eGFR 08/17/2022 51.4 (A)  >60 mL/min/1.73 m^2 Final    CRP 08/17/2022 1.4  0.0 - 8.2 mg/L Final    WBC 08/17/2022 9.25  3.90 - 12.70 K/uL Final    RBC 08/17/2022 5.17  4.00 - 5.40 M/uL Final    Hemoglobin 08/17/2022 12.8  12.0 - 16.0 g/dL Final    Hematocrit 08/17/2022 40.6  37.0 - 48.5 % Final    MCV 08/17/2022 79 (L)  82 - 98 fL Final    MCH 08/17/2022 24.8 (L)  27.0 - 31.0 pg Final    MCHC 08/17/2022 31.5 (L)  32.0 - 36.0 g/dL Final    RDW 08/17/2022 16.8 (H)  11.5 - 14.5 % Final    Platelets 08/17/2022 318  150 - 450 K/uL Final    MPV 08/17/2022 10.3  9.2 - 12.9 fL Final    Sodium 08/17/2022 142  136 - 145 mmol/L Final    Potassium 08/17/2022 4.6  3.5 - 5.1 mmol/L Final    Chloride 08/17/2022 98  95 - 110 mmol/L Final    CO2 08/17/2022 31 (H)  23 - 29 mmol/L Final    Glucose 08/17/2022 111 (H)  70 - 110 mg/dL Final    BUN 08/17/2022 24 (H)  8 - 23 mg/dL Final    Creatinine 08/17/2022 1.1  0.5 - 1.4 mg/dL Final    Calcium 08/17/2022 10.2  8.7 - 10.5 mg/dL Final    Anion Gap 08/17/2022 13  8 - 16 mmol/L Final    eGFR 08/17/2022 51.4 (A)  >60 mL/min/1.73 m^2 Final    Magnesium 08/17/2022 2.6  1.6 - 2.6 mg/dL Final    Hemoglobin A1C 08/17/2022 5.7 (H)  4.0 - 5.6 % Final    Comment: ADA Screening Guidelines:  5.7-6.4%  Consistent with prediabetes  >or=6.5%  Consistent with diabetes    High levels of fetal hemoglobin interfere with the HbA1C  assay. Heterozygous hemoglobin variants (HbS, HgC, etc)do  not significantly interfere with this assay.   However, presence of  multiple variants may affect accuracy.      Estimated Avg Glucose 08/17/2022 117  68 - 131 mg/dL Final    Cholesterol 08/17/2022 172  120 - 199 mg/dL Final    Comment: The National Cholesterol Education Program (NCEP) has set the  following guidelines (reference ranges) for Cholesterol:  Optimal.....................<200 mg/dL  Borderline High.............200-239 mg/dL  High........................> or = 240 mg/dL      Triglycerides 08/17/2022 54  30 - 150 mg/dL Final    Comment: The National Cholesterol Education Program (NCEP) has set the  following guidelines (reference values) for triglycerides:  Normal......................<150 mg/dL  Borderline High.............150-199 mg/dL  High........................200-499 mg/dL      HDL 08/17/2022 94 (H)  40 - 75 mg/dL Final    Comment: The National Cholesterol Education Program (NCEP) has set the  following guidelines (reference values) for HDL Cholesterol:  Low...............<40 mg/dL  Optimal...........>60 mg/dL      LDL Cholesterol 08/17/2022 67.2  63.0 - 159.0 mg/dL Final    Comment: The National Cholesterol Education Program (NCEP) has set the  following guidelines (reference values) for LDL Cholesterol:  Optimal.......................<130 mg/dL  Borderline High...............130-159 mg/dL  High..........................160-189 mg/dL  Very High.....................>190 mg/dL      HDL/Cholesterol Ratio 08/17/2022 54.7 (H)  20.0 - 50.0 % Final    Total Cholesterol/HDL Ratio 08/17/2022 1.8 (L)  2.0 - 5.0 Final    Non-HDL Cholesterol 08/17/2022 78  mg/dL Final    Comment: Risk category and Non-HDL cholesterol goals:  Coronary heart disease (CHD)or equivalent (10-year risk of CHD >20%):  Non-HDL cholesterol goal     <130 mg/dL  Two or more CHD risk factors and 10-year risk of CHD <= 20%:  Non-HDL cholesterol goal     <160 mg/dL  0 to 1 CHD risk factor:  Non-HDL cholesterol goal     <190 mg/dL      TSH 08/17/2022 3.613  0.400 - 4.000 uIU/mL Final    Free T4 08/17/2022  1.06  0.71 - 1.51 ng/dL Final   Clinical Support on 06/10/2022   Component Date Value Ref Range Status    Specimen UA 06/10/2022 Urine, Clean Catch   Final    Color, UA 06/10/2022 Yellow  Yellow, Straw, Rita Final    Appearance, UA 06/10/2022 Clear  Clear Final    pH, UA 06/10/2022 5.0  5.0 - 8.0 Final    Specific Gravity, UA 06/10/2022 1.015  1.005 - 1.030 Final    Protein, UA 06/10/2022 Negative  Negative Final    Comment: Recommend a 24 hour urine protein or a urine   protein/creatinine ratio if globulin induced proteinuria is  clinically suspected.      Glucose, UA 06/10/2022 Negative  Negative Final    Ketones, UA 06/10/2022 Negative  Negative Final    Bilirubin (UA) 06/10/2022 Negative  Negative Final    Occult Blood UA 06/10/2022 Negative  Negative Final    Nitrite, UA 06/10/2022 Negative  Negative Final    Leukocytes, UA 06/10/2022 Negative  Negative Final    RBC, UA 06/10/2022 0  0 - 4 /hpf Final    WBC, UA 06/10/2022 1  0 - 5 /hpf Final    Squam Epithel, UA 06/10/2022 0  /hpf Final    Microscopic Comment 06/10/2022 SEE COMMENT   Final    Comment: Other formed elements not mentioned in the report are not   present in the microscopic examination.      Lab Visit on 06/10/2022   Component Date Value Ref Range Status    WBC 06/10/2022 9.30  3.90 - 12.70 K/uL Final    RBC 06/10/2022 4.98  4.00 - 5.40 M/uL Final    Hemoglobin 06/10/2022 12.3  12.0 - 16.0 g/dL Final    Hematocrit 06/10/2022 40.2  37.0 - 48.5 % Final    MCV 06/10/2022 81 (L)  82 - 98 fL Final    MCH 06/10/2022 24.7 (L)  27.0 - 31.0 pg Final    MCHC 06/10/2022 30.6 (L)  32.0 - 36.0 g/dL Final    RDW 06/10/2022 16.0 (H)  11.5 - 14.5 % Final    Platelets 06/10/2022 261  150 - 450 K/uL Final    MPV 06/10/2022 10.4  9.2 - 12.9 fL Final    Immature Granulocytes 06/10/2022 0.2  0.0 - 0.5 % Final    Gran # (ANC) 06/10/2022 5.8  1.8 - 7.7 K/uL Final    Immature Grans (Abs) 06/10/2022 0.02  0.00 - 0.04 K/uL Final    Comment: Mild elevation in immature  granulocytes is non specific and   can be seen in a variety of conditions including stress response,   acute inflammation, trauma and pregnancy. Correlation with other   laboratory and clinical findings is essential.      Lymph # 06/10/2022 2.3  1.0 - 4.8 K/uL Final    Mono # 06/10/2022 0.8  0.3 - 1.0 K/uL Final    Eos # 06/10/2022 0.3  0.0 - 0.5 K/uL Final    Baso # 06/10/2022 0.06  0.00 - 0.20 K/uL Final    nRBC 06/10/2022 0  0 /100 WBC Final    Gran % 06/10/2022 62.5  38.0 - 73.0 % Final    Lymph % 06/10/2022 25.1  18.0 - 48.0 % Final    Mono % 06/10/2022 8.6  4.0 - 15.0 % Final    Eosinophil % 06/10/2022 3.0  0.0 - 8.0 % Final    Basophil % 06/10/2022 0.6  0.0 - 1.9 % Final    Differential Method 06/10/2022 Automated   Final    Sodium 06/10/2022 139  136 - 145 mmol/L Final    Potassium 06/10/2022 4.3  3.5 - 5.1 mmol/L Final    Chloride 06/10/2022 102  95 - 110 mmol/L Final    CO2 06/10/2022 25  23 - 29 mmol/L Final    Glucose 06/10/2022 104  70 - 110 mg/dL Final    BUN 06/10/2022 28 (H)  8 - 23 mg/dL Final    Creatinine 06/10/2022 1.1  0.5 - 1.4 mg/dL Final    Calcium 06/10/2022 9.7  8.7 - 10.5 mg/dL Final    Total Protein 06/10/2022 7.3  6.0 - 8.4 g/dL Final    Albumin 06/10/2022 4.0  3.5 - 5.2 g/dL Final    Total Bilirubin 06/10/2022 0.4  0.1 - 1.0 mg/dL Final    Comment: For infants and newborns, interpretation of results should be based  on gestational age, weight and in agreement with clinical  observations.    Premature Infant recommended reference ranges:  Up to 24 hours.............<8.0 mg/dL  Up to 48 hours............<12.0 mg/dL  3-5 days..................<15.0 mg/dL  6-29 days.................<15.0 mg/dL      Alkaline Phosphatase 06/10/2022 131  55 - 135 U/L Final    AST 06/10/2022 19  10 - 40 U/L Final    ALT 06/10/2022 20  10 - 44 U/L Final    Anion Gap 06/10/2022 12  8 - 16 mmol/L Final    eGFR if African American 06/10/2022 55.6 (A)  >60 mL/min/1.73 m^2 Final    eGFR if non African American  06/10/2022 48.2 (A)  >60 mL/min/1.73 m^2 Final    Comment: Calculation used to obtain the estimated glomerular filtration  rate (eGFR) is the CKD-EPI equation.          Imaging  No results found.    Assessment  1. Chronic systolic heart failure  Well compensated in spite of dietary indiscretions  - Echo; Future    2. Atherosclerosis of native coronary artery of native heart without angina pectoris  Stable    3. Hypercholesteremia  Controlled    4. Severe obesity  Gained weight.  Will try to do so by eating healthy during her cruise.      Plan and Discussion    Continue current guideline directed medical therapy.    The 10-year ASCVD risk score (Shantanu DK, et al., 2019) is: 22.8%    Values used to calculate the score:      Age: 76 years      Sex: Female      Is Non- : No      Diabetic: No      Tobacco smoker: No      Systolic Blood Pressure: 126 mmHg      Is BP treated: No      HDL Cholesterol: 94 mg/dL      Total Cholesterol: 172 mg/dL     Follow Up  Follow up in about 6 months (around 5/18/2023).      Fabrice Rodriguez MD

## 2022-11-21 ENCOUNTER — OFFICE VISIT (OUTPATIENT)
Dept: OTOLARYNGOLOGY | Facility: CLINIC | Age: 77
End: 2022-11-21
Payer: MEDICARE

## 2022-11-21 ENCOUNTER — OFFICE VISIT (OUTPATIENT)
Dept: URGENT CARE | Facility: CLINIC | Age: 77
End: 2022-11-21
Payer: MEDICARE

## 2022-11-21 VITALS
BODY MASS INDEX: 34.93 KG/M2 | TEMPERATURE: 98 F | HEIGHT: 61 IN | WEIGHT: 185 LBS | DIASTOLIC BLOOD PRESSURE: 61 MMHG | SYSTOLIC BLOOD PRESSURE: 146 MMHG | OXYGEN SATURATION: 97 % | RESPIRATION RATE: 19 BRPM | HEART RATE: 70 BPM

## 2022-11-21 VITALS — BODY MASS INDEX: 34.66 KG/M2 | WEIGHT: 183.44 LBS

## 2022-11-21 DIAGNOSIS — H92.21 EAR BLEEDING, RIGHT: ICD-10-CM

## 2022-11-21 DIAGNOSIS — H92.21 EAR BLEEDING, RIGHT: Primary | ICD-10-CM

## 2022-11-21 DIAGNOSIS — H60.501 ACUTE OTITIS EXTERNA OF RIGHT EAR, UNSPECIFIED TYPE: Primary | ICD-10-CM

## 2022-11-21 PROCEDURE — 99213 PR OFFICE/OUTPT VISIT, EST, LEVL III, 20-29 MIN: ICD-10-PCS | Mod: S$GLB,,, | Performed by: FAMILY MEDICINE

## 2022-11-21 PROCEDURE — 3078F PR MOST RECENT DIASTOLIC BLOOD PRESSURE < 80 MM HG: ICD-10-PCS | Mod: CPTII,S$GLB,, | Performed by: FAMILY MEDICINE

## 2022-11-21 PROCEDURE — 3288F PR FALLS RISK ASSESSMENT DOCUMENTED: ICD-10-PCS | Mod: CPTII,S$GLB,, | Performed by: OTOLARYNGOLOGY

## 2022-11-21 PROCEDURE — 1126F AMNT PAIN NOTED NONE PRSNT: CPT | Mod: CPTII,S$GLB,, | Performed by: FAMILY MEDICINE

## 2022-11-21 PROCEDURE — 1160F PR REVIEW ALL MEDS BY PRESCRIBER/CLIN PHARMACIST DOCUMENTED: ICD-10-PCS | Mod: CPTII,S$GLB,, | Performed by: FAMILY MEDICINE

## 2022-11-21 PROCEDURE — 99999 PR PBB SHADOW E&M-EST. PATIENT-LVL III: ICD-10-PCS | Mod: PBBFAC,,, | Performed by: OTOLARYNGOLOGY

## 2022-11-21 PROCEDURE — 1126F PR PAIN SEVERITY QUANTIFIED, NO PAIN PRESENT: ICD-10-PCS | Mod: CPTII,S$GLB,, | Performed by: FAMILY MEDICINE

## 2022-11-21 PROCEDURE — 1160F RVW MEDS BY RX/DR IN RCRD: CPT | Mod: CPTII,S$GLB,, | Performed by: FAMILY MEDICINE

## 2022-11-21 PROCEDURE — 99999 PR PBB SHADOW E&M-EST. PATIENT-LVL III: CPT | Mod: PBBFAC,,, | Performed by: OTOLARYNGOLOGY

## 2022-11-21 PROCEDURE — 1126F AMNT PAIN NOTED NONE PRSNT: CPT | Mod: CPTII,S$GLB,, | Performed by: OTOLARYNGOLOGY

## 2022-11-21 PROCEDURE — 3288F FALL RISK ASSESSMENT DOCD: CPT | Mod: CPTII,S$GLB,, | Performed by: OTOLARYNGOLOGY

## 2022-11-21 PROCEDURE — 1101F PR PT FALLS ASSESS DOC 0-1 FALLS W/OUT INJ PAST YR: ICD-10-PCS | Mod: CPTII,S$GLB,, | Performed by: OTOLARYNGOLOGY

## 2022-11-21 PROCEDURE — 99203 PR OFFICE/OUTPT VISIT, NEW, LEVL III, 30-44 MIN: ICD-10-PCS | Mod: S$GLB,,, | Performed by: OTOLARYNGOLOGY

## 2022-11-21 PROCEDURE — 99203 OFFICE O/P NEW LOW 30 MIN: CPT | Mod: S$GLB,,, | Performed by: OTOLARYNGOLOGY

## 2022-11-21 PROCEDURE — 99213 OFFICE O/P EST LOW 20 MIN: CPT | Mod: S$GLB,,, | Performed by: FAMILY MEDICINE

## 2022-11-21 PROCEDURE — 3078F DIAST BP <80 MM HG: CPT | Mod: CPTII,S$GLB,, | Performed by: FAMILY MEDICINE

## 2022-11-21 PROCEDURE — 1101F PT FALLS ASSESS-DOCD LE1/YR: CPT | Mod: CPTII,S$GLB,, | Performed by: OTOLARYNGOLOGY

## 2022-11-21 PROCEDURE — 1159F MED LIST DOCD IN RCRD: CPT | Mod: CPTII,S$GLB,, | Performed by: FAMILY MEDICINE

## 2022-11-21 PROCEDURE — 3077F SYST BP >= 140 MM HG: CPT | Mod: CPTII,S$GLB,, | Performed by: FAMILY MEDICINE

## 2022-11-21 PROCEDURE — 1126F PR PAIN SEVERITY QUANTIFIED, NO PAIN PRESENT: ICD-10-PCS | Mod: CPTII,S$GLB,, | Performed by: OTOLARYNGOLOGY

## 2022-11-21 PROCEDURE — 3077F PR MOST RECENT SYSTOLIC BLOOD PRESSURE >= 140 MM HG: ICD-10-PCS | Mod: CPTII,S$GLB,, | Performed by: FAMILY MEDICINE

## 2022-11-21 PROCEDURE — 1159F PR MEDICATION LIST DOCUMENTED IN MEDICAL RECORD: ICD-10-PCS | Mod: CPTII,S$GLB,, | Performed by: FAMILY MEDICINE

## 2022-11-21 RX ORDER — CIPROFLOXACIN AND DEXAMETHASONE 3; 1 MG/ML; MG/ML
4 SUSPENSION/ DROPS AURICULAR (OTIC) 2 TIMES DAILY
Qty: 7.5 ML | Refills: 0 | Status: SHIPPED | OUTPATIENT
Start: 2022-11-21 | End: 2022-11-21

## 2022-11-21 RX ORDER — NEOMYCIN SULFATE, POLYMYXIN B SULFATE AND HYDROCORTISONE 10; 3.5; 1 MG/ML; MG/ML; [USP'U]/ML
3 SUSPENSION/ DROPS AURICULAR (OTIC) 4 TIMES DAILY
Qty: 10 ML | Refills: 0 | Status: SHIPPED | OUTPATIENT
Start: 2022-11-21 | End: 2022-11-26

## 2022-11-21 NOTE — PROGRESS NOTES
"Subjective:       Patient ID: Jacqueline M Goldberg is a 76 y.o. female.    Vitals:  height is 5' 1" (1.549 m) and weight is 83.9 kg (185 lb). Her temperature is 98.2 °F (36.8 °C). Her blood pressure is 146/61 (abnormal) and her pulse is 70. Her respiration is 19 and oxygen saturation is 97%.     Chief Complaint: Ear Drainage (RT EAR bleeding )     Patient presents with right ear bleeding since this a.m. Denies injury. Denies fever, HA, cough, sore throat, running nose.     Ear Drainage   There is pain in the right ear. This is a new problem. The current episode started today (started at 3am this morning). The problem occurs constantly. The problem has been gradually worsening. There has been no fever. The pain is at a severity of 0/10. The patient is experiencing no pain. Pertinent negatives include no abdominal pain, diarrhea, rash, rhinorrhea, sore throat or vomiting. She has tried nothing for the symptoms. There is no history of a chronic ear infection, hearing loss or a tympanostomy tube.     HENT:  Negative for sore throat.    Gastrointestinal:  Negative for abdominal pain, vomiting and diarrhea.   Skin:  Negative for rash.     Objective:      Physical Exam   Constitutional: She is oriented to person, place, and time. She appears well-developed. She is cooperative.  Non-toxic appearance. She does not appear ill. No distress.   HENT:   Head: Normocephalic and atraumatic.   Ears:   Right Ear: Hearing, tympanic membrane and external ear normal. impacted cerumen  Left Ear: Hearing, tympanic membrane, external ear and ear canal normal. impacted cerumen     Comments:   Right ear canal  Inflamed.  Mild fresh blood noted.   Nose: Nose normal. No mucosal edema, rhinorrhea, nasal deformity or congestion. No epistaxis. Right sinus exhibits no maxillary sinus tenderness and no frontal sinus tenderness. Left sinus exhibits no maxillary sinus tenderness and no frontal sinus tenderness.   Mouth/Throat: Uvula is midline, " oropharynx is clear and moist and mucous membranes are normal. No trismus in the jaw. Normal dentition. No uvula swelling. No oropharyngeal exudate, posterior oropharyngeal edema or posterior oropharyngeal erythema.   Eyes: Conjunctivae and lids are normal. No scleral icterus.   Neck: Trachea normal and phonation normal. Neck supple. No edema present. No erythema present. No neck rigidity present.   Cardiovascular: Normal rate, regular rhythm, normal heart sounds and normal pulses.   No murmur heard.  Pulmonary/Chest: Effort normal and breath sounds normal. No stridor. No respiratory distress. She has no decreased breath sounds. She has no wheezes. She has no rhonchi. She has no rales.   Abdominal: Normal appearance.   Musculoskeletal: Normal range of motion.         General: No deformity. Normal range of motion.      Cervical back: She exhibits no tenderness.   Lymphadenopathy:     She has no cervical adenopathy.   Neurological: She is alert and oriented to person, place, and time. She exhibits normal muscle tone. Coordination normal.   Skin: Skin is warm, dry, intact, not diaphoretic and not pale.   Psychiatric: Her speech is normal and behavior is normal. Judgment and thought content normal.   Nursing note and vitals reviewed.      Assessment:       1. Acute otitis externa of right ear, unspecified type    2. Ear bleeding, right          Plan:      Gentle ear lavage done to clear blood. Packed with 2x2 neto with Mupirocin cream. Will refer to ENT for further eval.     Acute otitis externa of right ear, unspecified type    Ear bleeding, right  -     Ambulatory referral/consult to ENT    Other orders  -     Discontinue: ciprofloxacin-dexAMETHasone 0.3-0.1% (CIPRODEX) 0.3-0.1 % DrpS; Place 4 drops into the right ear 2 (two) times daily. for 7 days  Dispense: 7.5 mL; Refill: 0  -     neomycin-polymyxin-hydrocortisone (CORTISPORIN) 3.5-10,000-1 mg/mL-unit/mL-% otic suspension; Place 3 drops into the right ear 4  (four) times daily. for 5 days  Dispense: 10 mL; Refill: 0

## 2022-11-21 NOTE — PROGRESS NOTES
Otology/Neurotology History and Physical     CC: Bleeding Ear AD    HPI:  Jacqueline M Goldberg is a 76 y.o. female who was referred to me by Tin Lyman in consultation for ear bleeding.    Patient reports getting water in her ear yesterday when shampooing her hair. Used q tip to dry it up. Then awoke in the middle of the night with blood coming from the right ear. She again placed a q tip and had blood on the q tip. Seen by , started on cipro-dex and referred here. No prior similar episodes.      Past Medical History  She has a past medical history of Anemia, chronic disease, Arthritis, Blind left eye, Chronic pain, Coronary atherosclerosis, Eosinophilia, Hypothyroidism, Insomnia, Leucocytosis, Pneumonia, PONV (postoperative nausea and vomiting), Scoliosis, and Undifferentiated connective tissue disease.    Past Surgical History  She has a past surgical history that includes Cholecystectomy; Cataract extraction (Bilateral); Ankle ligament reconstruction (Right); Coronary angiography (N/A, 6/19/2020); and Coronary stent placement (06/19/2020).    Family History  Her family history includes Arthritis in her sister; Cancer in her mother; Diabetes in her mother; Heart disease in her father.    Social History  She reports that she has quit smoking. She has quit using smokeless tobacco. She reports that she does not currently use alcohol. She reports that she does not use drugs.    Allergies  She has No Known Allergies.    Medications  She has a current medication list which includes the following prescription(s): aspirin, atorvastatin, chlorhexidine, dorzolamide-timolol 2-0.5%, furosemide, hydroxychloroquine, latanoprost, losartan, neomycin-polymyxin-hydrocortisone, prednisolone acetate, propylene glycol/peg 400/pf, tetrahydrozoline/polyethyl gly, and levothyroxine.    Review of Systems   Constitutional:  Negative for chills and fever.   HENT:  Positive for ear discharge (bloody). Negative for ear pain, hearing  loss and tinnitus.    Eyes: Negative.    Respiratory: Negative.     Cardiovascular: Negative.    Gastrointestinal: Negative.    Neurological:  Negative for dizziness, light-headedness and headaches.        Objective:     Wt 83.2 kg (183 lb 6.8 oz)   BMI 34.66 kg/m²    Physical Exam  Constitutional: Well appearing/communicating. Voice clear. NAD.  Eyes: EOM I Bilaterally  Head/Face: Normocephalic.  House Brackmann I Bilaterally.  Right Ear: External Auditory Canal with maceration of anterior canal skin,TM w/o masses/lesions/perforations.  Auricle WNL.  Left Ear: External Auditory Canal WNL,TM w/o masses/lesions/perforations. Auricle WNL.  Nose: No gross nasal septal deviation. Inferior Turbinates 2+ bilaterally. No septal perforation. No masses/lesions. External nasal skin without masses/lesions.  Oral Cavity: Gingiva/lips WNL. Oral Tongue mobile. Hard Palate WNL.   Oropharynx: Tonsillar fossa/pharyngeal wall without lesions. Posterior oropharynx WNL.  Soft palate without masses. Midline uvula.   Neck/Lymphatic: No LAD I-VI bilaterally.  No thyromegaly.  No masses noted on exam.  Neuro/Psychiatric: AOx3.  Normal mood and affect.   Respiratory: Normal respiratory effort, no stridor/stertor, no retractions noted.      Procedure    None        Data Reviewed           Assessment:     1. Ear bleeding, right         Plan:   Cipro drops applied in clinic today. Advised dry ear precautions.

## 2022-11-30 ENCOUNTER — TELEPHONE (OUTPATIENT)
Dept: DERMATOLOGY | Facility: CLINIC | Age: 77
End: 2022-11-30
Payer: MEDICARE

## 2022-11-30 NOTE — TELEPHONE ENCOUNTER
Pt will didn't answer. Provider will be out of the office. Canceled pt appt. She can give the office a call back to reschedule.

## 2022-11-30 NOTE — TELEPHONE ENCOUNTER
Called pt regarding appt due to provider will be out of clinic. Received no answer, left message.    JOSIANE

## 2022-12-02 ENCOUNTER — OFFICE VISIT (OUTPATIENT)
Dept: DERMATOLOGY | Facility: CLINIC | Age: 77
End: 2022-12-02
Payer: MEDICARE

## 2022-12-02 ENCOUNTER — PROCEDURE VISIT (OUTPATIENT)
Dept: DERMATOLOGY | Facility: CLINIC | Age: 77
End: 2022-12-02
Payer: MEDICARE

## 2022-12-02 DIAGNOSIS — Z12.83 SKIN EXAM, SCREENING FOR CANCER: Primary | ICD-10-CM

## 2022-12-02 DIAGNOSIS — L91.8 SKIN TAG: ICD-10-CM

## 2022-12-02 DIAGNOSIS — L82.1 SK (SEBORRHEIC KERATOSIS): ICD-10-CM

## 2022-12-02 DIAGNOSIS — D22.9 MULTIPLE BENIGN NEVI: ICD-10-CM

## 2022-12-02 DIAGNOSIS — L81.4 LENTIGO: ICD-10-CM

## 2022-12-02 DIAGNOSIS — L90.5 SCAR: ICD-10-CM

## 2022-12-02 DIAGNOSIS — L82.1 SK (SEBORRHEIC KERATOSIS): Primary | ICD-10-CM

## 2022-12-02 PROCEDURE — 1160F RVW MEDS BY RX/DR IN RCRD: CPT | Mod: CPTII,S$GLB,, | Performed by: DERMATOLOGY

## 2022-12-02 PROCEDURE — 17110 DESTRUCTION B9 LES UP TO 14: CPT | Mod: CSM,S$GLB,, | Performed by: DERMATOLOGY

## 2022-12-02 PROCEDURE — 1159F PR MEDICATION LIST DOCUMENTED IN MEDICAL RECORD: ICD-10-PCS | Mod: CPTII,S$GLB,, | Performed by: DERMATOLOGY

## 2022-12-02 PROCEDURE — 99999 PR PBB SHADOW E&M-EST. PATIENT-LVL III: CPT | Mod: PBBFAC,,, | Performed by: DERMATOLOGY

## 2022-12-02 PROCEDURE — 1126F AMNT PAIN NOTED NONE PRSNT: CPT | Mod: CPTII,S$GLB,, | Performed by: DERMATOLOGY

## 2022-12-02 PROCEDURE — 99999 PR PBB SHADOW E&M-EST. PATIENT-LVL III: ICD-10-PCS | Mod: PBBFAC,,, | Performed by: DERMATOLOGY

## 2022-12-02 PROCEDURE — 1126F PR PAIN SEVERITY QUANTIFIED, NO PAIN PRESENT: ICD-10-PCS | Mod: CPTII,S$GLB,, | Performed by: DERMATOLOGY

## 2022-12-02 PROCEDURE — 17110 PR DESTRUCTION BENIGN LESIONS UP TO 14: ICD-10-PCS | Mod: CSM,S$GLB,, | Performed by: DERMATOLOGY

## 2022-12-02 PROCEDURE — 1159F MED LIST DOCD IN RCRD: CPT | Mod: CPTII,S$GLB,, | Performed by: DERMATOLOGY

## 2022-12-02 PROCEDURE — 1160F PR REVIEW ALL MEDS BY PRESCRIBER/CLIN PHARMACIST DOCUMENTED: ICD-10-PCS | Mod: CPTII,S$GLB,, | Performed by: DERMATOLOGY

## 2022-12-02 PROCEDURE — 3288F PR FALLS RISK ASSESSMENT DOCUMENTED: ICD-10-PCS | Mod: CPTII,S$GLB,, | Performed by: DERMATOLOGY

## 2022-12-02 PROCEDURE — 1101F PR PT FALLS ASSESS DOC 0-1 FALLS W/OUT INJ PAST YR: ICD-10-PCS | Mod: CPTII,S$GLB,, | Performed by: DERMATOLOGY

## 2022-12-02 PROCEDURE — 99203 PR OFFICE/OUTPT VISIT, NEW, LEVL III, 30-44 MIN: ICD-10-PCS | Mod: S$GLB,,, | Performed by: DERMATOLOGY

## 2022-12-02 PROCEDURE — 3288F FALL RISK ASSESSMENT DOCD: CPT | Mod: CPTII,S$GLB,, | Performed by: DERMATOLOGY

## 2022-12-02 PROCEDURE — 1101F PT FALLS ASSESS-DOCD LE1/YR: CPT | Mod: CPTII,S$GLB,, | Performed by: DERMATOLOGY

## 2022-12-02 PROCEDURE — 99203 OFFICE O/P NEW LOW 30 MIN: CPT | Mod: S$GLB,,, | Performed by: DERMATOLOGY

## 2022-12-02 NOTE — PROGRESS NOTES
Subjective:       Patient ID:  Jacqueline M Goldberg is a 76 y.o. female who presents for   Chief Complaint   Patient presents with    Skin Tags     Left breast, x 9mo, growing, no tx     Skin Check     Ubse      Skin Tags - Initial  Affected locations: chest  Signs / symptoms: asymptomatic  Severity: mild  Timing: constant  Aggravated by: friction  Relieving factors/Treatments tried: nothing    Review of Systems   Constitutional:  Negative for fever and chills.   HENT:  Negative for sore throat.    Respiratory:  Negative for cough.       Objective:    Physical Exam   Constitutional: She appears well-developed and well-nourished. No distress.   Eyes: No conjunctival no injection.   Neurological: She is alert and oriented to person, place, and time. She is not disoriented.   Psychiatric: She has a normal mood and affect.   Skin:   Areas Examined (abnormalities noted in diagram):   Scalp / Hair Palpated and Inspected  Head / Face Inspection Performed  Neck Inspection Performed  Chest / Axilla Inspection Performed  Abdomen Inspection Performed  Back Inspection Performed  RUE Inspected  LUE Inspection Performed                 Diagram Legend     Erythematous scaling macule/papule c/w actinic keratosis       Vascular papule c/w angioma      Pigmented verrucoid papule/plaque c/w seborrheic keratosis      Yellow umbilicated papule c/w sebaceous hyperplasia      Irregularly shaped tan macule c/w lentigo     1-2 mm smooth white papules consistent with Milia      Movable subcutaneous cyst with punctum c/w epidermal inclusion cyst      Subcutaneous movable cyst c/w pilar cyst      Firm pink to brown papule c/w dermatofibroma      Pedunculated fleshy papule(s) c/w skin tag(s)      Evenly pigmented macule c/w junctional nevus     Mildly variegated pigmented, slightly irregular-bordered macule c/w mildly atypical nevus      Flesh colored to evenly pigmented papule c/w intradermal nevus       Pink pearly papule/plaque c/w basal  cell carcinoma      Erythematous hyperkeratotic cursted plaque c/w SCC      Surgical scar with no sign of skin cancer recurrence      Open and closed comedones      Inflammatory papules and pustules      Verrucoid papule consistent consistent with wart     Erythematous eczematous patches and plaques     Dystrophic onycholytic nail with subungual debris c/w onychomycosis     Umbilicated papule    Erythematous-base heme-crusted tan verrucoid plaque consistent with inflamed seborrheic keratosis     Erythematous Silvery Scaling Plaque c/w Psoriasis     See annotation      Assessment / Plan:        Skin exam, screening for cancer  Waist up examination performed today.  No suspicious lesions were noted unless otherwise documented in this note.    Patient did not want a waist down exam today.  No other seriously suspicious lesions noted for body areas examined today.  Patient to inform of us if they notice any dark or changing or suspicious spots in areas not examined today.  Follow up for routine monitoring recommended to patient.    Instructed patient to watch out for dark spots, bleeding spots, crusty spots, sores that break out repeatedly in the same spot.  These characteristics are risk factors for skin cancer, and patient is to notify us if they experience any of these symptoms.  Patient can monitor these lesions or lesion with their primary care provider during routine visits and notify us if there are any changes for reevaluation.  At a minimum they should keep their annual visits with their primary for check ups.    Lentigo  Discussed with patient the benign nature of these lesions and that no treatment is indicated.  Patient instructed in importance in daily sun protection. Sun avoidance and topical protection discussed.     Patient encouraged to wear hat for all outdoor exposure.     Also discussed sun protective clothing.  Offered 4% hq but pt wishes to leave face alone.    Multiple benign nevi  Discussed all  of the following with the patient.    Patient to check their breasts (if applicable), buttocks, and groin with a mirror.  Patient deferred our examination of these areas today.    Each month, check your body for any spots such as freckles, age spots, and moles.  Watch for color changes and shape changes and growth in size.    Have your batres or  pay attention to any dark color changes to moles of the scalp.    Moles, also called nevi, are small, colored (pigmented) marks on the skin. They have no known purpose. Many moles appear before age 30, but they also increase frequently as people age. Moles most often are not cancer (benign) and are harmless. But some become cancerous (malignant). Thats why you need to watch the moles on your body and tell your healthcare provider about any that concern you.  Brochure given for patient education.    Scar  Discussed with patient the benign nature of these lesions and that no treatment is indicated.    SK (seborrheic keratosis)  Discussed with patient the benign nature of these lesions and that no treatment is indicated.  Instructed patient to use petroleum jelly at least daily on affected areas.  Prn cosmetic hyfrecation of sk on the l brast.  Costs $150.  Scar and recurrence reviewed.  Brochure given for patient education.           Follow up if symptoms worsen or fail to improve.

## 2022-12-02 NOTE — PROGRESS NOTES
Cosmetic treatment today performed for destruction of 1 sk of the l breast.  Risks of scar and recurrence reviewed.  $150.

## 2023-01-14 DIAGNOSIS — E03.9 HYPOTHYROIDISM, UNSPECIFIED TYPE: ICD-10-CM

## 2023-01-17 RX ORDER — LEVOTHYROXINE SODIUM 50 UG/1
50 TABLET ORAL
Qty: 30 TABLET | Refills: 3 | Status: SHIPPED | OUTPATIENT
Start: 2023-01-17 | End: 2023-07-05 | Stop reason: SDUPTHER

## 2023-05-23 ENCOUNTER — PATIENT MESSAGE (OUTPATIENT)
Dept: RESEARCH | Facility: HOSPITAL | Age: 78
End: 2023-05-23
Payer: MEDICARE

## 2023-06-16 ENCOUNTER — OFFICE VISIT (OUTPATIENT)
Dept: CARDIOLOGY | Facility: CLINIC | Age: 78
End: 2023-06-16
Payer: MEDICARE

## 2023-06-16 VITALS
SYSTOLIC BLOOD PRESSURE: 154 MMHG | OXYGEN SATURATION: 98 % | WEIGHT: 185.44 LBS | DIASTOLIC BLOOD PRESSURE: 82 MMHG | BODY MASS INDEX: 35.03 KG/M2 | HEART RATE: 61 BPM

## 2023-06-16 DIAGNOSIS — M35.9 UNDIFFERENTIATED CONNECTIVE TISSUE DISEASE: ICD-10-CM

## 2023-06-16 DIAGNOSIS — I50.33 ACUTE ON CHRONIC DIASTOLIC HEART FAILURE: ICD-10-CM

## 2023-06-16 DIAGNOSIS — E66.01 SEVERE OBESITY: ICD-10-CM

## 2023-06-16 DIAGNOSIS — I25.10 ATHEROSCLEROSIS OF NATIVE CORONARY ARTERY OF NATIVE HEART WITHOUT ANGINA PECTORIS: Primary | ICD-10-CM

## 2023-06-16 DIAGNOSIS — E78.00 HYPERCHOLESTEREMIA: ICD-10-CM

## 2023-06-16 PROCEDURE — 3079F PR MOST RECENT DIASTOLIC BLOOD PRESSURE 80-89 MM HG: ICD-10-PCS | Mod: CPTII,S$GLB,, | Performed by: INTERNAL MEDICINE

## 2023-06-16 PROCEDURE — 99999 PR PBB SHADOW E&M-EST. PATIENT-LVL III: CPT | Mod: PBBFAC,,, | Performed by: INTERNAL MEDICINE

## 2023-06-16 PROCEDURE — 3077F PR MOST RECENT SYSTOLIC BLOOD PRESSURE >= 140 MM HG: ICD-10-PCS | Mod: CPTII,S$GLB,, | Performed by: INTERNAL MEDICINE

## 2023-06-16 PROCEDURE — 1159F MED LIST DOCD IN RCRD: CPT | Mod: CPTII,S$GLB,, | Performed by: INTERNAL MEDICINE

## 2023-06-16 PROCEDURE — 3077F SYST BP >= 140 MM HG: CPT | Mod: CPTII,S$GLB,, | Performed by: INTERNAL MEDICINE

## 2023-06-16 PROCEDURE — 1160F RVW MEDS BY RX/DR IN RCRD: CPT | Mod: CPTII,S$GLB,, | Performed by: INTERNAL MEDICINE

## 2023-06-16 PROCEDURE — 3288F FALL RISK ASSESSMENT DOCD: CPT | Mod: CPTII,S$GLB,, | Performed by: INTERNAL MEDICINE

## 2023-06-16 PROCEDURE — 99214 PR OFFICE/OUTPT VISIT, EST, LEVL IV, 30-39 MIN: ICD-10-PCS | Mod: S$GLB,,, | Performed by: INTERNAL MEDICINE

## 2023-06-16 PROCEDURE — 99999 PR PBB SHADOW E&M-EST. PATIENT-LVL III: ICD-10-PCS | Mod: PBBFAC,,, | Performed by: INTERNAL MEDICINE

## 2023-06-16 PROCEDURE — 3079F DIAST BP 80-89 MM HG: CPT | Mod: CPTII,S$GLB,, | Performed by: INTERNAL MEDICINE

## 2023-06-16 PROCEDURE — 1101F PR PT FALLS ASSESS DOC 0-1 FALLS W/OUT INJ PAST YR: ICD-10-PCS | Mod: CPTII,S$GLB,, | Performed by: INTERNAL MEDICINE

## 2023-06-16 PROCEDURE — 1160F PR REVIEW ALL MEDS BY PRESCRIBER/CLIN PHARMACIST DOCUMENTED: ICD-10-PCS | Mod: CPTII,S$GLB,, | Performed by: INTERNAL MEDICINE

## 2023-06-16 PROCEDURE — 3288F PR FALLS RISK ASSESSMENT DOCUMENTED: ICD-10-PCS | Mod: CPTII,S$GLB,, | Performed by: INTERNAL MEDICINE

## 2023-06-16 PROCEDURE — 1126F AMNT PAIN NOTED NONE PRSNT: CPT | Mod: CPTII,S$GLB,, | Performed by: INTERNAL MEDICINE

## 2023-06-16 PROCEDURE — 1126F PR PAIN SEVERITY QUANTIFIED, NO PAIN PRESENT: ICD-10-PCS | Mod: CPTII,S$GLB,, | Performed by: INTERNAL MEDICINE

## 2023-06-16 PROCEDURE — 99214 OFFICE O/P EST MOD 30 MIN: CPT | Mod: S$GLB,,, | Performed by: INTERNAL MEDICINE

## 2023-06-16 PROCEDURE — 1101F PT FALLS ASSESS-DOCD LE1/YR: CPT | Mod: CPTII,S$GLB,, | Performed by: INTERNAL MEDICINE

## 2023-06-16 PROCEDURE — 1159F PR MEDICATION LIST DOCUMENTED IN MEDICAL RECORD: ICD-10-PCS | Mod: CPTII,S$GLB,, | Performed by: INTERNAL MEDICINE

## 2023-06-16 NOTE — PROGRESS NOTES
[facility]    Chief Complaint  Chief Complaint   Patient presents with    Coronary Artery Disease       HPI:    She has been doing well.  No complaints.  Continues to travel.  Takes Lasix 2-3 times per week depending on ankle swelling.  So she would normally take some today but did not because of this appointment.    Medications  Current Outpatient Medications   Medication Sig Dispense Refill    aspirin (ECOTRIN) 81 MG EC tablet Take 81 mg by mouth once daily.      atorvastatin (LIPITOR) 10 MG tablet Take 1 tablet (10 mg total) by mouth once daily. 90 tablet 3    chlorhexidine (PERIDEX) 0.12 % solution SMARTSIG:By Mouth      dorzolamide-timolol 2-0.5% (COSOPT) 22.3-6.8 mg/mL ophthalmic solution INSTILL 1 DROP IN THE LEFT EYE EVERY 12 HOURS      furosemide (LASIX) 40 MG tablet Take 1 tablet (40 mg total) by mouth once daily. (Patient taking differently: Take 40 mg by mouth daily as needed.) 90 tablet 3    hydrOXYchloroQUINE (PLAQUENIL) 200 mg tablet Take 1 tablet (200 mg total) by mouth once daily. 90 tablet 3    losartan (COZAAR) 25 MG tablet TAKE 1 TABLET(25 MG) BY MOUTH EVERY 12 HOURS 180 tablet 3    tetrahydrozoline/polyethyl gly (EYE DROPS OPHT) Apply to eye.      levothyroxine (SYNTHROID) 50 MCG tablet Take 1 tablet (50 mcg total) by mouth before breakfast. 30 tablet 3     No current facility-administered medications for this visit.        History  Past Medical History:   Diagnosis Date    Anemia, chronic disease 12/18/2019    Arthritis     Blind left eye     Chronic pain     Coronary atherosclerosis 06/20/2020    Eosinophilia 12/18/2019    Hypothyroidism     Insomnia     Leucocytosis 12/18/2019    Pneumonia     PONV (postoperative nausea and vomiting)     needs phenergan    Scoliosis     Undifferentiated connective tissue disease      Past Surgical History:   Procedure Laterality Date    ANKLE LIGAMENT RECONSTRUCTION Right     CATARACT EXTRACTION Bilateral     CHOLECYSTECTOMY      CORONARY  ANGIOGRAPHY N/A 6/19/2020    Procedure: ANGIOGRAM, CORONARY ARTERY - right radial;  Surgeon: Fabrice Rodriguez MD;  [place] [facility] by [facility] C[facility];  Service: Cardiology;  Laterality: N/A;    CORONARY STENT PLACEMENT  06/19/2020    pLAD Nathan 2.75 x 18 mm     Social History     Socioeconomic History    Marital status: Single   Occupational History    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   Tobacco Use    Smoking status: Former    Smokeless tobacco: Former   Substance and Sexual Activity    Alcohol use: Not Currently    Drug use: Never    Sexual activity: Yes     Partners: Male     Birth control/protection: None     Family History   Problem Relation Age of Onset    Diabetes Mother     Cancer Mother     Heart disease Father     Arthritis Sister         Allergies  Review of patient's allergies indicates:  No Known Allergies    Review of Systems   Review of Systems   Constitutional: Negative for malaise/fatigue, weight gain and weight loss.   Eyes:  Negative for visual disturbance.   Cardiovascular:  Positive for leg swelling. Negative for chest pain, claudication, cyanosis, dyspnea on exertion, irregular heartbeat, near-syncope, orthopnea, palpitations, paroxysmal nocturnal dyspnea and syncope.   Respiratory:  Negative for cough, hemoptysis, shortness of breath, sleep disturbances due to breathing and wheezing.    Hematologic/Lymphatic: Negative for bleeding problem. Does not bruise/bleed easily.   Skin:  Negative for poor wound healing.   Musculoskeletal:  Negative for muscle cramps and myalgias.   Gastrointestinal:  Negative for abdominal pain, anorexia, diarrhea, heartburn, hematemesis, hematochezia, melena, nausea and vomiting.   Genitourinary:  Negative for hematuria and nocturia.   Neurological:  Negative for excessive daytime sleepiness, dizziness, focal weakness, light-headedness and weakness.     Physical Exam  Vitals:    06/16/23 1016   BP: (!) 154/82   Pulse: 61     Wt Readings from Last 1 Encounters:    23 84.1 kg (185 lb 6.5 oz)     Physical Exam  Constitutional:       General: She is not in acute distress.     Appearance: She is not toxic-appearing or diaphoretic.   HENT:      Head: Normocephalic and atraumatic.   Eyes:      General: No scleral icterus.     Conjunctiva/sclera: Conjunctivae normal.   Neck:      Thyroid: No thyromegaly.      Vascular: Hepatojugular reflux present. No carotid bruit or JVD.      Trachea: Trachea normal.   Cardiovascular:      Rate and Rhythm: Normal rate and regular rhythm. No extrasystoles are present.     Chest Wall: PMI is not displaced.      Pulses:           Carotid pulses are 2+ on the right side and 2+ on the left side.       Radial pulses are 2+ on the right side and 2+ on the left side.        Dorsalis pedis pulses are 2+ on the right side and 2+ on the left side.        Posterior tibial pulses are 2+ on the right side and 2+ on the left side.      Heart sounds: S1 normal and S2 normal. No murmur heard.    No S3 or S4 sounds.   Pulmonary:      Effort: No accessory muscle usage or respiratory distress.      Breath sounds: No decreased breath sounds, wheezing, rhonchi or rales.   Abdominal:      General: Bowel sounds are normal. There is no abdominal bruit.      Palpations: Abdomen is soft. There is no hepatomegaly, splenomegaly or pulsatile mass.      Tenderness: There is no abdominal tenderness.   Musculoskeletal:         General: No tenderness or deformity.      Cervical back: No edema.      Right lower le+ Edema (ankla) present.      Left lower le+ Edema (ankle) present.   Skin:     General: Skin is warm and dry.      Coloration: Skin is not cyanotic or pale.      Nails: There is no clubbing.   Neurological:      General: No focal deficit present.      Mental Status: She is alert and oriented to person, place, and time.   Psychiatric:         Speech: Speech normal.         Behavior: Behavior normal. Behavior is cooperative.       Labs  Hospital Outpatient  Visit on 06/13/2023   Component Date Value Ref Range Status    BSA 06/13/2023 1.89  m2 Final    TDI SEPTAL 06/13/2023 0.07  m/s Final    LV LATERAL E/E' RATIO 06/13/2023 10.13  m/s Final    LV SEPTAL E/E' RATIO 06/13/2023 11.57  m/s Final    IVC diameter 06/13/2023 2.15  cm Final    Left Ventricular Outflow Tract Rachael* 06/13/2023 0.60  cm/s Final    Left Ventricular Outflow Tract Rachael* 06/13/2023 1.59  mmHg Final    AORTIC VALVE CUSP SEPERATION 06/13/2023 1.82  cm Final    TDI LATERAL 06/13/2023 0.08  m/s Final    PV PEAK VELOCITY 06/13/2023 0.96  cm/s Final    LVIDd 06/13/2023 3.06 (A)  3.5 - 6.0 cm Final    IVS 06/13/2023 1.30 (A)  0.6 - 1.1 cm Final    Posterior Wall 06/13/2023 1.16 (A)  0.6 - 1.1 cm Final    Ao root annulus 06/13/2023 2.54  cm Final    LVIDs 06/13/2023 2.40  2.1 - 4.0 cm Final    FS 06/13/2023 22  28 - 44 % Final    LV mass 06/13/2023 116.70  g Final    RVDD 06/13/2023 2.03  cm Final    Left Ventricle Relative Wall Thick* 06/13/2023 0.76  cm Final    AV mean gradient 06/13/2023 4  mmHg Final    AV valve area 06/13/2023 1.07  cm2 Final    AV Velocity Ratio 06/13/2023 0.62   Final    AV index (prosthetic) 06/13/2023 0.58   Final    MV valve area p 1/2 method 06/13/2023 3.19  cm2 Final    E/A ratio 06/13/2023 0.73   Final    Mean e' 06/13/2023 0.08  m/s Final    E wave deceleration time 06/13/2023 238.76  msec Final    LVOT diameter 06/13/2023 1.54  cm Final    LVOT area 06/13/2023 1.9  cm2 Final    LVOT peak reema 06/13/2023 0.84  m/s Final    LVOT peak VTI 06/13/2023 19.50  cm Final    Ao peak reema 06/13/2023 1.36  m/s Final    Ao VTI 06/13/2023 33.8  cm Final    Mr max reema 06/13/2023 5.91  m/s Final    LVOT stroke volume 06/13/2023 36.30  cm3 Final    AV peak gradient 06/13/2023 7  mmHg Final    E/E' ratio 06/13/2023 10.80  m/s Final    MV Peak E Reema 06/13/2023 0.81  m/s Final    TR Max Reema 06/13/2023 3.75  m/s Final    MV stenosis pressure 1/2 time 06/13/2023 68.87  ms Final    MV Peak A Reema  06/13/2023 1.11  m/s Final    LV Systolic Volume 06/13/2023 20.12  mL Final    LV Systolic Volume Index 06/13/2023 11.1  mL/m2 Final    LV Diastolic Volume 06/13/2023 36.76  mL Final    LV Diastolic Volume Index 06/13/2023 20.20  mL/m2 Final    LV Mass Index 06/13/2023 64  g/m2 Final    RA Major Axis 06/13/2023 4.54  cm Final    Left Atrium Minor Axis 06/13/2023 3.17  cm Final    Left Atrium Major Axis 06/13/2023 4.58  cm Final    Triscuspid Valve Regurgitation Pea* 06/13/2023 56  mmHg Final    LA Volume Index (Mod) 06/13/2023 21.0  mL/m2 Final    LA volume (mod) 06/13/2023 38.20  cm3 Final    RA Width 06/13/2023 2.25  cm Final    Right Atrial Pressure (from IVC) 06/13/2023 3  mmHg Final    EF 06/13/2023 60  % Final    TV rest pulmonary artery pressure 06/13/2023 59  mmHg Final       Imaging  Echo    Result Date: 6/13/2023  · The left ventricle is normal in size with concentric hypertrophy and normal systolic function. · The estimated ejection fraction is 60%. · Grade I left ventricular diastolic dysfunction. · Normal right ventricular size with normal right ventricular systolic function. · There is moderate pulmonary hypertension.        Assessment  1. Atherosclerosis of native coronary artery of native heart without angina pectoris  Stable and free of angina    2. Hypercholesteremia  Controlled.  Due for reassessment.  - Comprehensive Metabolic Panel; Future  - Lipid Panel; Future    3. Severe obesity  Unchanged    4. Undifferentiated connective tissue disease  Stable on therapy    5. Acute on chronic diastolic heart failure   Mildly volume overloaded.  This likely also contributes to today's elevated blood pressure.        Plan and Discussion    Lasix for the next couple of days and resume as needed.  Continue other guideline directed medical therapy.  Reassess lipids prior to next visit.    The 10-year ASCVD risk score (Shantanu WHITE, et al., 2019) is: 35.6%    Values used to calculate the score:      Age: 77  years      Sex: Female      Is Non- : No      Diabetic: No      Tobacco smoker: No      Systolic Blood Pressure: 154 mmHg      Is BP treated: No      HDL Cholesterol: 94 mg/dL      Total Cholesterol: 172 mg/dL     Follow Up  Follow up in about 6 months (around 12/16/2023).      Fabrice Rodriguez MD

## 2023-07-05 ENCOUNTER — OFFICE VISIT (OUTPATIENT)
Dept: PRIMARY CARE CLINIC | Facility: CLINIC | Age: 78
End: 2023-07-05
Payer: MEDICARE

## 2023-07-05 VITALS
SYSTOLIC BLOOD PRESSURE: 136 MMHG | TEMPERATURE: 98 F | BODY MASS INDEX: 35.01 KG/M2 | RESPIRATION RATE: 18 BRPM | WEIGHT: 185.44 LBS | OXYGEN SATURATION: 96 % | HEIGHT: 61 IN | HEART RATE: 84 BPM | DIASTOLIC BLOOD PRESSURE: 74 MMHG

## 2023-07-05 DIAGNOSIS — Z76.89 ENCOUNTER TO ESTABLISH CARE WITH NEW DOCTOR: Primary | ICD-10-CM

## 2023-07-05 DIAGNOSIS — Z11.59 NEED FOR HEPATITIS C SCREENING TEST: ICD-10-CM

## 2023-07-05 DIAGNOSIS — Z78.0 ASYMPTOMATIC MENOPAUSE: ICD-10-CM

## 2023-07-05 DIAGNOSIS — E78.5 HYPERLIPIDEMIA, UNSPECIFIED HYPERLIPIDEMIA TYPE: ICD-10-CM

## 2023-07-05 DIAGNOSIS — E21.1 HYPERPARATHYROIDISM DUE TO VITAMIN D DEFICIENCY: ICD-10-CM

## 2023-07-05 DIAGNOSIS — N18.31 CHRONIC KIDNEY DISEASE, STAGE 3A: ICD-10-CM

## 2023-07-05 DIAGNOSIS — R19.5 POSITIVE COLORECTAL CANCER SCREENING USING COLOGUARD TEST: ICD-10-CM

## 2023-07-05 DIAGNOSIS — E66.01 SEVERE OBESITY (BMI 35.0-39.9) WITH COMORBIDITY: ICD-10-CM

## 2023-07-05 DIAGNOSIS — M35.9 UNDIFFERENTIATED CONNECTIVE TISSUE DISEASE: ICD-10-CM

## 2023-07-05 DIAGNOSIS — R73.03 PRE-DIABETES: Chronic | ICD-10-CM

## 2023-07-05 DIAGNOSIS — E03.9 ACQUIRED HYPOTHYROIDISM: Chronic | ICD-10-CM

## 2023-07-05 DIAGNOSIS — F51.01 PRIMARY INSOMNIA: Chronic | ICD-10-CM

## 2023-07-05 DIAGNOSIS — Z23 NEED FOR VACCINATION: ICD-10-CM

## 2023-07-05 DIAGNOSIS — E55.9 VITAMIN D DEFICIENCY: Chronic | ICD-10-CM

## 2023-07-05 PROBLEM — D50.9 MICROCYTIC ANEMIA: Status: RESOLVED | Noted: 2019-12-18 | Resolved: 2023-07-05

## 2023-07-05 PROBLEM — M85.89 OSTEOPENIA OF MULTIPLE SITES: Status: ACTIVE | Noted: 2023-07-05

## 2023-07-05 PROBLEM — D72.10 EOSINOPHILIA: Status: RESOLVED | Noted: 2019-12-18 | Resolved: 2023-07-05

## 2023-07-05 PROBLEM — D72.829 LEUCOCYTOSIS: Status: RESOLVED | Noted: 2019-12-18 | Resolved: 2023-07-05

## 2023-07-05 PROBLEM — D64.9 ANEMIA, UNSPECIFIED: Status: RESOLVED | Noted: 2019-12-18 | Resolved: 2023-07-05

## 2023-07-05 PROBLEM — D63.8 ANEMIA, CHRONIC DISEASE: Status: RESOLVED | Noted: 2019-12-18 | Resolved: 2023-07-05

## 2023-07-05 PROCEDURE — 99999 PR PBB SHADOW E&M-EST. PATIENT-LVL V: CPT | Mod: PBBFAC,,, | Performed by: FAMILY MEDICINE

## 2023-07-05 PROCEDURE — 3078F PR MOST RECENT DIASTOLIC BLOOD PRESSURE < 80 MM HG: ICD-10-PCS | Mod: CPTII,S$GLB,, | Performed by: FAMILY MEDICINE

## 2023-07-05 PROCEDURE — G0009 ADMIN PNEUMOCOCCAL VACCINE: HCPCS | Mod: S$GLB,,, | Performed by: FAMILY MEDICINE

## 2023-07-05 PROCEDURE — G0009 PNEUMOCOCCAL POLYSACCHARIDE VACCINE 23-VALENT =>2YO SQ IM: ICD-10-PCS | Mod: S$GLB,,, | Performed by: FAMILY MEDICINE

## 2023-07-05 PROCEDURE — 3078F DIAST BP <80 MM HG: CPT | Mod: CPTII,S$GLB,, | Performed by: FAMILY MEDICINE

## 2023-07-05 PROCEDURE — 3288F FALL RISK ASSESSMENT DOCD: CPT | Mod: CPTII,S$GLB,, | Performed by: FAMILY MEDICINE

## 2023-07-05 PROCEDURE — 1101F PR PT FALLS ASSESS DOC 0-1 FALLS W/OUT INJ PAST YR: ICD-10-PCS | Mod: CPTII,S$GLB,, | Performed by: FAMILY MEDICINE

## 2023-07-05 PROCEDURE — 1159F MED LIST DOCD IN RCRD: CPT | Mod: CPTII,S$GLB,, | Performed by: FAMILY MEDICINE

## 2023-07-05 PROCEDURE — 3075F PR MOST RECENT SYSTOLIC BLOOD PRESS GE 130-139MM HG: ICD-10-PCS | Mod: CPTII,S$GLB,, | Performed by: FAMILY MEDICINE

## 2023-07-05 PROCEDURE — 1159F PR MEDICATION LIST DOCUMENTED IN MEDICAL RECORD: ICD-10-PCS | Mod: CPTII,S$GLB,, | Performed by: FAMILY MEDICINE

## 2023-07-05 PROCEDURE — 1160F PR REVIEW ALL MEDS BY PRESCRIBER/CLIN PHARMACIST DOCUMENTED: ICD-10-PCS | Mod: CPTII,S$GLB,, | Performed by: FAMILY MEDICINE

## 2023-07-05 PROCEDURE — 1160F RVW MEDS BY RX/DR IN RCRD: CPT | Mod: CPTII,S$GLB,, | Performed by: FAMILY MEDICINE

## 2023-07-05 PROCEDURE — 3288F PR FALLS RISK ASSESSMENT DOCUMENTED: ICD-10-PCS | Mod: CPTII,S$GLB,, | Performed by: FAMILY MEDICINE

## 2023-07-05 PROCEDURE — 99999 PR PBB SHADOW E&M-EST. PATIENT-LVL V: ICD-10-PCS | Mod: PBBFAC,,, | Performed by: FAMILY MEDICINE

## 2023-07-05 PROCEDURE — 90732 PPSV23 VACC 2 YRS+ SUBQ/IM: CPT | Mod: S$GLB,,, | Performed by: FAMILY MEDICINE

## 2023-07-05 PROCEDURE — 1126F PR PAIN SEVERITY QUANTIFIED, NO PAIN PRESENT: ICD-10-PCS | Mod: CPTII,S$GLB,, | Performed by: FAMILY MEDICINE

## 2023-07-05 PROCEDURE — 1126F AMNT PAIN NOTED NONE PRSNT: CPT | Mod: CPTII,S$GLB,, | Performed by: FAMILY MEDICINE

## 2023-07-05 PROCEDURE — 1101F PT FALLS ASSESS-DOCD LE1/YR: CPT | Mod: CPTII,S$GLB,, | Performed by: FAMILY MEDICINE

## 2023-07-05 PROCEDURE — 3075F SYST BP GE 130 - 139MM HG: CPT | Mod: CPTII,S$GLB,, | Performed by: FAMILY MEDICINE

## 2023-07-05 PROCEDURE — 99204 OFFICE O/P NEW MOD 45 MIN: CPT | Mod: S$GLB,,, | Performed by: FAMILY MEDICINE

## 2023-07-05 PROCEDURE — 99204 PR OFFICE/OUTPT VISIT, NEW, LEVL IV, 45-59 MIN: ICD-10-PCS | Mod: S$GLB,,, | Performed by: FAMILY MEDICINE

## 2023-07-05 PROCEDURE — 90732 PNEUMOCOCCAL POLYSACCHARIDE VACCINE 23-VALENT =>2YO SQ IM: ICD-10-PCS | Mod: S$GLB,,, | Performed by: FAMILY MEDICINE

## 2023-07-05 RX ORDER — LEVOTHYROXINE SODIUM 50 UG/1
50 TABLET ORAL
Qty: 90 TABLET | Refills: 3 | Status: SHIPPED | OUTPATIENT
Start: 2023-07-05

## 2023-07-05 RX ORDER — ZOSTER VACCINE RECOMBINANT, ADJUVANTED 50 MCG/0.5
0.5 KIT INTRAMUSCULAR ONCE
Qty: 1 EACH | Refills: 1 | Status: SHIPPED | OUTPATIENT
Start: 2023-07-05 | End: 2023-07-05

## 2023-07-05 RX ORDER — TEMAZEPAM 15 MG/1
15 CAPSULE ORAL NIGHTLY PRN
Qty: 30 CAPSULE | Refills: 1 | Status: SHIPPED | OUTPATIENT
Start: 2023-07-05

## 2023-07-05 RX ORDER — IBUPROFEN 800 MG/1
800 TABLET ORAL EVERY 6 HOURS PRN
COMMUNITY
End: 2023-07-05 | Stop reason: SDUPTHER

## 2023-07-05 RX ORDER — METHOCARBAMOL 500 MG/1
500 TABLET, FILM COATED ORAL 4 TIMES DAILY
COMMUNITY
End: 2023-07-05 | Stop reason: SDUPTHER

## 2023-07-05 RX ORDER — TEMAZEPAM 15 MG/1
15 CAPSULE ORAL NIGHTLY PRN
COMMUNITY
End: 2023-07-05 | Stop reason: SDUPTHER

## 2023-07-05 RX ORDER — IBUPROFEN 800 MG/1
800 TABLET ORAL 3 TIMES DAILY PRN
Qty: 30 TABLET | Refills: 2 | Status: SHIPPED | OUTPATIENT
Start: 2023-07-05

## 2023-07-05 RX ORDER — METHOCARBAMOL 500 MG/1
500 TABLET, FILM COATED ORAL 3 TIMES DAILY PRN
Qty: 30 TABLET | Refills: 2 | Status: SHIPPED | OUTPATIENT
Start: 2023-07-05

## 2023-07-05 NOTE — PROGRESS NOTES
"Subjective:       Patient ID: Jacqueline M Goldberg is a 77 y.o. female.    Chief Complaint: Establish Care    Here to establish care.  Followed regularly by her cardiologist.  Underwent coronary angiography with stent placement in 2020.  No recent angina.  Compliant with all medications.  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  Her rheumatologist recently retired, has not yet established with a new provider.  Sees her ophthalmologist regularly.    Review of Systems   Constitutional:  Negative for fever.   HENT:  Negative for trouble swallowing.    Respiratory:  Negative for shortness of breath.    Cardiovascular:  Negative for chest pain.   Gastrointestinal:  Negative for nausea and vomiting.   Musculoskeletal:  Negative for arthralgias.   Allergic/Immunologic: Negative for immunocompromised state.   Neurological:  Negative for dizziness and light-headedness.   Psychiatric/Behavioral:  Negative for agitation and confusion.      Objective:      Vitals:    07/05/23 1040   BP: 136/74   BP Location: Right arm   Patient Position: Sitting   BP Method: Medium (Manual)   Pulse: 84   Resp: 18   Temp: 97.9 °F (36.6 °C)   TempSrc: Temporal   SpO2: 96%   Weight: 84.1 kg (185 lb 6.5 oz)   Height: 5' 1" (1.549 m)     BP Readings from Last 5 Encounters:   07/05/23 136/74   06/16/23 (!) 154/82   11/21/22 (!) 146/61   11/18/22 126/64   10/13/22 122/65     Wt Readings from Last 5 Encounters:   07/05/23 84.1 kg (185 lb 6.5 oz)   06/16/23 84.1 kg (185 lb 6.5 oz)   06/13/23 83.2 kg (183 lb 6.8 oz)   11/21/22 83.2 kg (183 lb 6.8 oz)   11/21/22 83.9 kg (185 lb)     Physical Exam  Vitals and nursing note reviewed.   Constitutional:       General: She is not in acute distress.     Appearance: Normal appearance. She is well-developed.   HENT:      Head: Normocephalic and atraumatic.   Cardiovascular:      Rate and Rhythm: Normal rate and " regular rhythm.      Heart sounds: Normal heart sounds.   Pulmonary:      Effort: Pulmonary effort is normal.      Breath sounds: Normal breath sounds.   Abdominal:      General: There is no distension.      Tenderness: There is no abdominal tenderness.   Musculoskeletal:      Right lower leg: No edema.      Left lower leg: No edema.   Skin:     General: Skin is warm and dry.   Neurological:      Mental Status: She is alert and oriented to person, place, and time.   Psychiatric:         Mood and Affect: Mood normal.         Behavior: Behavior normal.       Lab Results   Component Value Date    WBC 8.53 10/03/2022    HGB 12.8 10/03/2022    HCT 41.9 10/03/2022     10/03/2022    CHOL 172 08/17/2022    TRIG 54 08/17/2022    HDL 94 (H) 08/17/2022    ALT 19 10/03/2022    AST 18 10/03/2022     10/03/2022    K 4.3 10/03/2022     10/03/2022    CREATININE 1.0 10/03/2022    BUN 28 (H) 10/03/2022    CO2 28 10/03/2022    TSH 3.613 08/17/2022    INR 1.0 06/17/2020    HGBA1C 5.7 (H) 08/17/2022      Assessment:       1. Encounter to establish care with new doctor    2. Hyperlipidemia, unspecified hyperlipidemia type    3. Severe obesity (BMI 35.0-39.9) with comorbidity    4. Pre-diabetes    5. Acquired hypothyroidism    6. Chronic kidney disease, stage 3a    7. Undifferentiated connective tissue disease    8. Need for hepatitis C screening test    9. Positive colorectal cancer screening using Cologuard test    10. Need for vaccination    11. Vitamin D deficiency    12. Hyperparathyroidism due to vitamin D deficiency    13. Asymptomatic menopause    14. Primary insomnia        Plan:       Encounter to establish care with new doctor    Hyperlipidemia, unspecified hyperlipidemia type  Continue statin  Severe obesity (BMI 35.0-39.9) with comorbidity  Lifestyle modification  Pre-diabetes  -     Hemoglobin A1C; Future; Expected date: 07/05/2023    Acquired hypothyroidism  -     TSH; Future; Expected date: 07/05/2023  -      levothyroxine (SYNTHROID) 50 MCG tablet; Take 1 tablet (50 mcg total) by mouth before breakfast.  Dispense: 90 tablet; Refill: 3    Chronic kidney disease, stage 3a  -     CBC Auto Differential; Future; Expected date: 07/05/2023  Avoid nephrotoxins, limit use of NSAIDs  Undifferentiated connective tissue disease  -     Ambulatory referral/consult to Rheumatology; Future; Expected date: 07/12/2023  -     C-Reactive Protein; Future; Expected date: 07/05/2023  -     methocarbamoL (ROBAXIN) 500 MG Tab; Take 1 tablet (500 mg total) by mouth 3 (three) times daily as needed (muscle spasm).  Dispense: 30 tablet; Refill: 2  -     ibuprofen (ADVIL,MOTRIN) 800 MG tablet; Take 1 tablet (800 mg total) by mouth 3 (three) times daily as needed for Pain.  Dispense: 30 tablet; Refill: 2  Stable on current regimen, needs to get established with new provider  Need for hepatitis C screening test  -     Hepatitis C Antibody; Future; Expected date: 07/05/2023    Positive colorectal cancer screening using Cologuard test  Patient declined any further testing, including colonoscopy  Need for vaccination  -     varicella-zoster [id number], PF, (SHINGRIX, PF,) 50 mcg/0.5 mL injection; Inject 0.5 mLs into the muscle once. for 1 dose  Dispense: 1 each; Refill: 1  -     Pneumococcal Polysaccharide Vaccine (23 Valent) (SQ/IM)    Vitamin D deficiency  -     Vitamin D; Future; Expected date: 07/05/2023  -     PTH, intact; Future; Expected date: 07/05/2023    Hyperparathyroidism due to vitamin D deficiency  -     Vitamin D; Future; Expected date: 07/05/2023  -     PTH, intact; Future; Expected date: 07/05/2023    Asymptomatic menopause  -     DXA Bone Density Axial Skeleton 1 or more sites; Future; Expected date: 07/12/2023    Primary insomnia  -     temazepam (RESTORIL) 15 mg Cap; Take 1 capsule (15 mg total) by mouth nightly as needed (insomnia).  Dispense: 30 capsule; Refill: 1      Medication List with Changes/Refills   New Medications     VARICELLA-ZOSTER [id number], PF, (SHINGRIX, PF,) 50 MCG/0.5 ML INJECTION    Inject 0.5 mLs into the muscle once. for 1 dose   Current Medications    ASPIRIN (ECOTRIN) 81 MG EC TABLET    Take 81 mg by mouth once daily.    ATORVASTATIN (LIPITOR) 10 MG TABLET    Take 1 tablet (10 mg total) by mouth once daily.    CHLORHEXIDINE (PERIDEX) 0.12 % SOLUTION    SMARTSIG:By Mouth    DORZOLAMIDE-TIMOLOL 2-0.5% (COSOPT) 22.3-6.8 MG/ML OPHTHALMIC SOLUTION    INSTILL 1 DROP IN THE LEFT EYE EVERY 12 HOURS    FUROSEMIDE (LASIX) 40 MG TABLET    Take 1 tablet (40 mg total) by mouth once daily.    HYDROXYCHLOROQUINE (PLAQUENIL) 200 MG TABLET    Take 1 tablet (200 mg total) by mouth once daily.    LOSARTAN (COZAAR) 25 MG TABLET    TAKE 1 TABLET(25 MG) BY MOUTH EVERY 12 HOURS    TETRAHYDROZOLINE/POLYETHYL GLY (EYE DROPS OPHT)    Apply to eye.   Changed and/or Refilled Medications    Modified Medication Previous Medication    IBUPROFEN (ADVIL,MOTRIN) 800 MG TABLET ibuprofen (ADVIL,MOTRIN) 800 MG tablet       Take 1 tablet (800 mg total) by mouth 3 (three) times daily as needed for Pain.    Take 800 mg by mouth every 6 (six) hours as needed for Pain.    LEVOTHYROXINE (SYNTHROID) 50 MCG TABLET levothyroxine (SYNTHROID) 50 MCG tablet       Take 1 tablet (50 mcg total) by mouth before breakfast.    Take 1 tablet (50 mcg total) by mouth before breakfast.    METHOCARBAMOL (ROBAXIN) 500 MG TAB methocarbamoL (ROBAXIN) 500 MG Tab       Take 1 tablet (500 mg total) by mouth 3 (three) times daily as needed (muscle spasm).    Take 500 mg by mouth 4 (four) times daily.    TEMAZEPAM (RESTORIL) 15 MG CAP temazepam (RESTORIL) 15 mg Cap       Take 1 capsule (15 mg total) by mouth nightly as needed (insomnia).    Take 15 mg by mouth nightly as needed.

## 2023-07-05 NOTE — PROGRESS NOTES
Verified pt by name and . NKDA. Per physician orders pt was administered Pneumovax 23 IM to right deltoid  using aseptic technique. Pt tolerated well. No adverse effects or pain reported. MD notified.  \

## 2023-07-06 ENCOUNTER — TELEPHONE (OUTPATIENT)
Dept: RHEUMATOLOGY | Facility: CLINIC | Age: 78
End: 2023-07-06
Payer: MEDICARE

## 2023-07-06 RX ORDER — ERGOCALCIFEROL 1.25 MG/1
50000 CAPSULE ORAL
Qty: 12 CAPSULE | Refills: 3 | Status: SHIPPED | OUTPATIENT
Start: 2023-07-06 | End: 2023-12-08

## 2023-07-14 ENCOUNTER — PES CALL (OUTPATIENT)
Dept: ADMINISTRATIVE | Facility: CLINIC | Age: 78
End: 2023-07-14
Payer: MEDICARE

## 2023-09-18 ENCOUNTER — PATIENT MESSAGE (OUTPATIENT)
Dept: PRIMARY CARE CLINIC | Facility: CLINIC | Age: 78
End: 2023-09-18
Payer: MEDICARE

## 2023-10-18 ENCOUNTER — PATIENT MESSAGE (OUTPATIENT)
Dept: CARDIOLOGY | Facility: CLINIC | Age: 78
End: 2023-10-18
Payer: MEDICARE

## 2023-10-24 ENCOUNTER — OFFICE VISIT (OUTPATIENT)
Dept: RHEUMATOLOGY | Facility: CLINIC | Age: 78
End: 2023-10-24
Payer: MEDICARE

## 2023-10-24 ENCOUNTER — LAB VISIT (OUTPATIENT)
Dept: LAB | Facility: HOSPITAL | Age: 78
End: 2023-10-24
Attending: INTERNAL MEDICINE
Payer: MEDICARE

## 2023-10-24 ENCOUNTER — TELEPHONE (OUTPATIENT)
Dept: RHEUMATOLOGY | Facility: CLINIC | Age: 78
End: 2023-10-24
Payer: MEDICARE

## 2023-10-24 VITALS
BODY MASS INDEX: 35.41 KG/M2 | WEIGHT: 187.38 LBS | DIASTOLIC BLOOD PRESSURE: 75 MMHG | HEART RATE: 67 BPM | SYSTOLIC BLOOD PRESSURE: 170 MMHG

## 2023-10-24 DIAGNOSIS — Z79.899 LONG-TERM USE OF PLAQUENIL: ICD-10-CM

## 2023-10-24 DIAGNOSIS — M35.9 UNDIFFERENTIATED CONNECTIVE TISSUE DISEASE: ICD-10-CM

## 2023-10-24 DIAGNOSIS — M35.9 UNDIFFERENTIATED CONNECTIVE TISSUE DISEASE: Primary | ICD-10-CM

## 2023-10-24 LAB
ALBUMIN SERPL BCP-MCNC: 4 G/DL (ref 3.5–5.2)
ALP SERPL-CCNC: 124 U/L (ref 55–135)
ALT SERPL W/O P-5'-P-CCNC: 14 U/L (ref 10–44)
ANION GAP SERPL CALC-SCNC: 10 MMOL/L (ref 8–16)
AST SERPL-CCNC: 17 U/L (ref 10–40)
BASOPHILS # BLD AUTO: 0.06 K/UL (ref 0–0.2)
BASOPHILS NFR BLD: 0.7 % (ref 0–1.9)
BILIRUB SERPL-MCNC: 0.4 MG/DL (ref 0.1–1)
BUN SERPL-MCNC: 29 MG/DL (ref 8–23)
C3 SERPL-MCNC: 138 MG/DL (ref 50–180)
C4 SERPL-MCNC: 51 MG/DL (ref 11–44)
CALCIUM SERPL-MCNC: 9.9 MG/DL (ref 8.7–10.5)
CHLORIDE SERPL-SCNC: 105 MMOL/L (ref 95–110)
CK SERPL-CCNC: 52 U/L (ref 20–180)
CO2 SERPL-SCNC: 26 MMOL/L (ref 23–29)
CREAT SERPL-MCNC: 0.9 MG/DL (ref 0.5–1.4)
CRP SERPL-MCNC: 0.9 MG/L (ref 0–8.2)
DIFFERENTIAL METHOD: ABNORMAL
EOSINOPHIL # BLD AUTO: 0.3 K/UL (ref 0–0.5)
EOSINOPHIL NFR BLD: 3.3 % (ref 0–8)
ERYTHROCYTE [DISTWIDTH] IN BLOOD BY AUTOMATED COUNT: 17.2 % (ref 11.5–14.5)
ERYTHROCYTE [SEDIMENTATION RATE] IN BLOOD BY PHOTOMETRIC METHOD: 18 MM/HR (ref 0–36)
EST. GFR  (NO RACE VARIABLE): >60 ML/MIN/1.73 M^2
GLUCOSE SERPL-MCNC: 104 MG/DL (ref 70–110)
HCT VFR BLD AUTO: 41.2 % (ref 37–48.5)
HGB BLD-MCNC: 12.4 G/DL (ref 12–16)
IMM GRANULOCYTES # BLD AUTO: 0.02 K/UL (ref 0–0.04)
IMM GRANULOCYTES NFR BLD AUTO: 0.2 % (ref 0–0.5)
LYMPHOCYTES # BLD AUTO: 1.9 K/UL (ref 1–4.8)
LYMPHOCYTES NFR BLD: 21.2 % (ref 18–48)
MCH RBC QN AUTO: 24.4 PG (ref 27–31)
MCHC RBC AUTO-ENTMCNC: 30.1 G/DL (ref 32–36)
MCV RBC AUTO: 81 FL (ref 82–98)
MONOCYTES # BLD AUTO: 0.6 K/UL (ref 0.3–1)
MONOCYTES NFR BLD: 7.2 % (ref 4–15)
NEUTROPHILS # BLD AUTO: 6 K/UL (ref 1.8–7.7)
NEUTROPHILS NFR BLD: 67.4 % (ref 38–73)
NRBC BLD-RTO: 0 /100 WBC
PLATELET # BLD AUTO: 284 K/UL (ref 150–450)
PMV BLD AUTO: 10.5 FL (ref 9.2–12.9)
POTASSIUM SERPL-SCNC: 4.7 MMOL/L (ref 3.5–5.1)
PROT SERPL-MCNC: 7.5 G/DL (ref 6–8.4)
RBC # BLD AUTO: 5.08 M/UL (ref 4–5.4)
SODIUM SERPL-SCNC: 141 MMOL/L (ref 136–145)
WBC # BLD AUTO: 8.83 K/UL (ref 3.9–12.7)

## 2023-10-24 PROCEDURE — 85025 COMPLETE CBC W/AUTO DIFF WBC: CPT | Performed by: INTERNAL MEDICINE

## 2023-10-24 PROCEDURE — 86160 COMPLEMENT ANTIGEN: CPT | Mod: 59 | Performed by: INTERNAL MEDICINE

## 2023-10-24 PROCEDURE — 3078F PR MOST RECENT DIASTOLIC BLOOD PRESSURE < 80 MM HG: ICD-10-PCS | Mod: CPTII,S$GLB,, | Performed by: INTERNAL MEDICINE

## 2023-10-24 PROCEDURE — 1160F PR REVIEW ALL MEDS BY PRESCRIBER/CLIN PHARMACIST DOCUMENTED: ICD-10-PCS | Mod: CPTII,S$GLB,, | Performed by: INTERNAL MEDICINE

## 2023-10-24 PROCEDURE — 3288F PR FALLS RISK ASSESSMENT DOCUMENTED: ICD-10-PCS | Mod: CPTII,S$GLB,, | Performed by: INTERNAL MEDICINE

## 2023-10-24 PROCEDURE — 1160F RVW MEDS BY RX/DR IN RCRD: CPT | Mod: CPTII,S$GLB,, | Performed by: INTERNAL MEDICINE

## 2023-10-24 PROCEDURE — 86160 COMPLEMENT ANTIGEN: CPT | Performed by: INTERNAL MEDICINE

## 2023-10-24 PROCEDURE — 3077F PR MOST RECENT SYSTOLIC BLOOD PRESSURE >= 140 MM HG: ICD-10-PCS | Mod: CPTII,S$GLB,, | Performed by: INTERNAL MEDICINE

## 2023-10-24 PROCEDURE — 1125F PR PAIN SEVERITY QUANTIFIED, PAIN PRESENT: ICD-10-PCS | Mod: CPTII,S$GLB,, | Performed by: INTERNAL MEDICINE

## 2023-10-24 PROCEDURE — 99999 PR PBB SHADOW E&M-EST. PATIENT-LVL III: ICD-10-PCS | Mod: PBBFAC,,, | Performed by: INTERNAL MEDICINE

## 2023-10-24 PROCEDURE — 82550 ASSAY OF CK (CPK): CPT | Performed by: INTERNAL MEDICINE

## 2023-10-24 PROCEDURE — 85652 RBC SED RATE AUTOMATED: CPT | Performed by: INTERNAL MEDICINE

## 2023-10-24 PROCEDURE — 99204 PR OFFICE/OUTPT VISIT, NEW, LEVL IV, 45-59 MIN: ICD-10-PCS | Mod: S$GLB,,, | Performed by: INTERNAL MEDICINE

## 2023-10-24 PROCEDURE — 86140 C-REACTIVE PROTEIN: CPT | Performed by: INTERNAL MEDICINE

## 2023-10-24 PROCEDURE — 99999 PR PBB SHADOW E&M-EST. PATIENT-LVL III: CPT | Mod: PBBFAC,,, | Performed by: INTERNAL MEDICINE

## 2023-10-24 PROCEDURE — 1101F PR PT FALLS ASSESS DOC 0-1 FALLS W/OUT INJ PAST YR: ICD-10-PCS | Mod: CPTII,S$GLB,, | Performed by: INTERNAL MEDICINE

## 2023-10-24 PROCEDURE — 3078F DIAST BP <80 MM HG: CPT | Mod: CPTII,S$GLB,, | Performed by: INTERNAL MEDICINE

## 2023-10-24 PROCEDURE — 1101F PT FALLS ASSESS-DOCD LE1/YR: CPT | Mod: CPTII,S$GLB,, | Performed by: INTERNAL MEDICINE

## 2023-10-24 PROCEDURE — 86225 DNA ANTIBODY NATIVE: CPT | Performed by: INTERNAL MEDICINE

## 2023-10-24 PROCEDURE — 3288F FALL RISK ASSESSMENT DOCD: CPT | Mod: CPTII,S$GLB,, | Performed by: INTERNAL MEDICINE

## 2023-10-24 PROCEDURE — 1159F MED LIST DOCD IN RCRD: CPT | Mod: CPTII,S$GLB,, | Performed by: INTERNAL MEDICINE

## 2023-10-24 PROCEDURE — 1125F AMNT PAIN NOTED PAIN PRSNT: CPT | Mod: CPTII,S$GLB,, | Performed by: INTERNAL MEDICINE

## 2023-10-24 PROCEDURE — 36415 COLL VENOUS BLD VENIPUNCTURE: CPT | Performed by: INTERNAL MEDICINE

## 2023-10-24 PROCEDURE — 99204 OFFICE O/P NEW MOD 45 MIN: CPT | Mod: S$GLB,,, | Performed by: INTERNAL MEDICINE

## 2023-10-24 PROCEDURE — 3077F SYST BP >= 140 MM HG: CPT | Mod: CPTII,S$GLB,, | Performed by: INTERNAL MEDICINE

## 2023-10-24 PROCEDURE — 1159F PR MEDICATION LIST DOCUMENTED IN MEDICAL RECORD: ICD-10-PCS | Mod: CPTII,S$GLB,, | Performed by: INTERNAL MEDICINE

## 2023-10-24 PROCEDURE — 80053 COMPREHEN METABOLIC PANEL: CPT | Performed by: INTERNAL MEDICINE

## 2023-10-24 RX ORDER — HYDROXYCHLOROQUINE SULFATE 200 MG/1
200 TABLET, FILM COATED ORAL DAILY
Qty: 90 TABLET | Refills: 3 | Status: SHIPPED | OUTPATIENT
Start: 2023-10-24

## 2023-10-24 ASSESSMENT — ROUTINE ASSESSMENT OF PATIENT INDEX DATA (RAPID3)
AM STIFFNESS SCORE: 0, NO
TOTAL RAPID3 SCORE: 0.33
FATIGUE SCORE: 2
PSYCHOLOGICAL DISTRESS SCORE: 2.2
PAIN SCORE: 0
MDHAQ FUNCTION SCORE: 0.3
PATIENT GLOBAL ASSESSMENT SCORE: 0

## 2023-10-24 NOTE — PROGRESS NOTES
Subjective:       Patient ID: Jacqueline M Goldberg is a 77 y.o. female.    Chief Complaint: Undifferentiated Connective Tissue Disease    HPI:  Jacqueline M Goldberg is a 77 y.o. 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  Dr. Owens did not feel this was giant cell arteritis per the chart.   She recalls taking prednisone orally but it was stopped.   Dr. Owens diagnosed her with undifferentiated connective tissue disease 2019 and treated her with Plaquenil 200 mg daily and prednisone 10 mg daily.  She stopped without taper prednisone after 3 months and continued on Plaquenil.   She last saw Dr. Owens in 2022.     She went on 2,500 mile road trip 3 weeks and plans to go again this afternoon or tomorrow on a 2,200 mile road trip.               Review of Systems   Constitutional:  Positive for fatigue.   HENT: Negative.     Eyes: Negative.    Respiratory: Negative.     Cardiovascular: Negative.    Gastrointestinal: Negative.    Endocrine: Negative.    Genitourinary: Negative.    Musculoskeletal: Negative.    Skin: Negative.    Allergic/Immunologic: Negative.    Neurological: Negative.    Hematological:  Bruises/bleeds easily.   Psychiatric/Behavioral:  Positive for sleep disturbance.          Objective:   BP (!) 170/75   Pulse 67   Wt 85 kg (187 lb 6.3 oz)   BMI 35.41 kg/m²      Physical Exam   Constitutional: She is oriented to person, place, and time. normal appearance.   HENT:   Head: Normocephalic and atraumatic.   Eyes: Conjunctivae are normal.   Cardiovascular: Normal rate and regular rhythm.   Pulmonary/Chest: Effort normal and breath sounds normal.   Abdominal: Soft. Bowel sounds are normal.   Musculoskeletal:      Cervical back: Normal range of motion and neck supple.      Comments: 28 joint count: 0 swollen and 0 tender   Neurological: She is alert and oriented to person, place, and  time.   Skin: No rash noted.   Bruising of hands   Psychiatric: Her behavior is normal. Mood normal.          Assessment:       Undifferentiated Connective Tissue Disease  Long-term Plaquenil  Osteopenia FRAX 25% risk of major osteoporotic fracture and 15% risk of hip fracture.  Patient declined treatment despite risk  Elevated BP.  Patient reports rushing and stress of parking.  Usually not high per patient.  She will follow with primary.    Plan:       Labs, obtain records from eye doctor

## 2023-10-24 NOTE — TELEPHONE ENCOUNTER
Reached out to patient to see to check on her. Patient was no show. Patient didn't answer call. Patient will have to reschedule.

## 2023-10-25 ENCOUNTER — PATIENT MESSAGE (OUTPATIENT)
Dept: RHEUMATOLOGY | Facility: CLINIC | Age: 78
End: 2023-10-25
Payer: MEDICARE

## 2023-10-25 LAB — DSDNA AB SER-ACNC: NORMAL [IU]/ML

## 2023-12-08 ENCOUNTER — OFFICE VISIT (OUTPATIENT)
Dept: CARDIOLOGY | Facility: CLINIC | Age: 78
End: 2023-12-08
Payer: MEDICARE

## 2023-12-08 VITALS
HEART RATE: 68 BPM | OXYGEN SATURATION: 98 % | DIASTOLIC BLOOD PRESSURE: 78 MMHG | SYSTOLIC BLOOD PRESSURE: 148 MMHG | WEIGHT: 189.13 LBS | BODY MASS INDEX: 35.74 KG/M2

## 2023-12-08 DIAGNOSIS — E78.00 HYPERCHOLESTEREMIA: ICD-10-CM

## 2023-12-08 DIAGNOSIS — I25.10 ATHEROSCLEROSIS OF NATIVE CORONARY ARTERY OF NATIVE HEART WITHOUT ANGINA PECTORIS: Primary | ICD-10-CM

## 2023-12-08 DIAGNOSIS — I10 PRIMARY HYPERTENSION: ICD-10-CM

## 2023-12-08 PROCEDURE — 99214 PR OFFICE/OUTPT VISIT, EST, LEVL IV, 30-39 MIN: ICD-10-PCS | Mod: S$GLB,,, | Performed by: INTERNAL MEDICINE

## 2023-12-08 PROCEDURE — 1160F PR REVIEW ALL MEDS BY PRESCRIBER/CLIN PHARMACIST DOCUMENTED: ICD-10-PCS | Mod: CPTII,S$GLB,, | Performed by: INTERNAL MEDICINE

## 2023-12-08 PROCEDURE — 99999 PR PBB SHADOW E&M-EST. PATIENT-LVL III: ICD-10-PCS | Mod: PBBFAC,,, | Performed by: INTERNAL MEDICINE

## 2023-12-08 PROCEDURE — 1159F PR MEDICATION LIST DOCUMENTED IN MEDICAL RECORD: ICD-10-PCS | Mod: CPTII,S$GLB,, | Performed by: INTERNAL MEDICINE

## 2023-12-08 PROCEDURE — 3078F PR MOST RECENT DIASTOLIC BLOOD PRESSURE < 80 MM HG: ICD-10-PCS | Mod: CPTII,S$GLB,, | Performed by: INTERNAL MEDICINE

## 2023-12-08 PROCEDURE — 3288F PR FALLS RISK ASSESSMENT DOCUMENTED: ICD-10-PCS | Mod: CPTII,S$GLB,, | Performed by: INTERNAL MEDICINE

## 2023-12-08 PROCEDURE — 3077F SYST BP >= 140 MM HG: CPT | Mod: CPTII,S$GLB,, | Performed by: INTERNAL MEDICINE

## 2023-12-08 PROCEDURE — 1160F RVW MEDS BY RX/DR IN RCRD: CPT | Mod: CPTII,S$GLB,, | Performed by: INTERNAL MEDICINE

## 2023-12-08 PROCEDURE — 1101F PT FALLS ASSESS-DOCD LE1/YR: CPT | Mod: CPTII,S$GLB,, | Performed by: INTERNAL MEDICINE

## 2023-12-08 PROCEDURE — 3078F DIAST BP <80 MM HG: CPT | Mod: CPTII,S$GLB,, | Performed by: INTERNAL MEDICINE

## 2023-12-08 PROCEDURE — 99214 OFFICE O/P EST MOD 30 MIN: CPT | Mod: S$GLB,,, | Performed by: INTERNAL MEDICINE

## 2023-12-08 PROCEDURE — 1101F PR PT FALLS ASSESS DOC 0-1 FALLS W/OUT INJ PAST YR: ICD-10-PCS | Mod: CPTII,S$GLB,, | Performed by: INTERNAL MEDICINE

## 2023-12-08 PROCEDURE — 3077F PR MOST RECENT SYSTOLIC BLOOD PRESSURE >= 140 MM HG: ICD-10-PCS | Mod: CPTII,S$GLB,, | Performed by: INTERNAL MEDICINE

## 2023-12-08 PROCEDURE — 1126F PR PAIN SEVERITY QUANTIFIED, NO PAIN PRESENT: ICD-10-PCS | Mod: CPTII,S$GLB,, | Performed by: INTERNAL MEDICINE

## 2023-12-08 PROCEDURE — 3288F FALL RISK ASSESSMENT DOCD: CPT | Mod: CPTII,S$GLB,, | Performed by: INTERNAL MEDICINE

## 2023-12-08 PROCEDURE — 1159F MED LIST DOCD IN RCRD: CPT | Mod: CPTII,S$GLB,, | Performed by: INTERNAL MEDICINE

## 2023-12-08 PROCEDURE — 1126F AMNT PAIN NOTED NONE PRSNT: CPT | Mod: CPTII,S$GLB,, | Performed by: INTERNAL MEDICINE

## 2023-12-08 PROCEDURE — 99999 PR PBB SHADOW E&M-EST. PATIENT-LVL III: CPT | Mod: PBBFAC,,, | Performed by: INTERNAL MEDICINE

## 2023-12-08 RX ORDER — LOSARTAN POTASSIUM 50 MG/1
50 TABLET ORAL 2 TIMES DAILY
Qty: 180 TABLET | Refills: 3 | Status: SHIPPED | OUTPATIENT
Start: 2023-12-08 | End: 2024-12-07

## 2023-12-08 RX ORDER — LOSARTAN POTASSIUM 50 MG/1
50 TABLET ORAL 2 TIMES DAILY
Qty: 180 TABLET | Refills: 3 | Status: SHIPPED | OUTPATIENT
Start: 2023-12-08 | End: 2023-12-08 | Stop reason: SDUPTHER

## 2023-12-08 NOTE — PROGRESS NOTES
[facility]    Chief Complaint  Chief Complaint   Patient presents with    Coronary Artery Disease       HPI:    She has been doing well.  Active.  Continues to travel.  No problems with exertional dyspnea or chest discomfort.  Takes Lasix a few days per week based on ankle swelling.    Medications  Current Outpatient Medications   Medication Sig Dispense Refill    aspirin (ECOTRIN) 81 MG EC tablet Take 81 mg by mouth once daily.      atorvastatin (LIPITOR) 10 MG tablet Take 1 tablet (10 mg total) by mouth once daily. 90 tablet 3    dorzolamide-timolol 2-0.5% (COSOPT) 22.3-6.8 mg/mL ophthalmic solution INSTILL 1 DROP IN THE LEFT EYE EVERY 12 HOURS      furosemide (LASIX) 40 MG tablet Take 1 tablet (40 mg total) by mouth once daily. (Patient taking differently: Take 40 mg by mouth daily as needed.) 90 tablet 3    hydroxychloroquine (PLAQUENIL) 200 mg tablet Take 1 tablet (200 mg total) by mouth once daily. 90 tablet 3    ibuprofen (ADVIL,MOTRIN) 800 MG tablet Take 1 tablet (800 mg total) by mouth 3 (three) times daily as needed for Pain. 30 tablet 2    levothyroxine (SYNTHROID) 50 MCG tablet Take 1 tablet (50 mcg total) by mouth before breakfast. 90 tablet 3    losartan (COZAAR) 50 MG tablet Take 1 tablet (50 mg total) by mouth 2 (two) times a day. 180 tablet 3    methocarbamoL (ROBAXIN) 500 MG Tab Take 1 tablet (500 mg total) by mouth 3 (three) times daily as needed (muscle spasm). 30 tablet 2    temazepam (RESTORIL) 15 mg Cap Take 1 capsule (15 mg total) by mouth nightly as needed (insomnia). 30 capsule 1     No current facility-administered medications for this visit.        History  Past Medical History:   Diagnosis Date    Anemia, chronic disease 12/18/2019    Arthritis     Blind left eye     Chronic pain     Coronary atherosclerosis 06/20/2020    Eosinophilia 12/18/2019    Hypothyroidism     Insomnia     Leucocytosis 12/18/2019    Pneumonia     PONV (postoperative nausea and vomiting)     needs  phenergan    Scoliosis     Undifferentiated connective tissue disease      Past Surgical History:   Procedure Laterality Date    ANKLE LIGAMENT RECONSTRUCTION Right     CATARACT EXTRACTION Bilateral     CHOLECYSTECTOMY      CORONARY ANGIOGRAPHY N/A 6/19/2020    Procedure: ANGIOGRAM, CORONARY ARTERY - right radial;  Surgeon: Fabrice Rodriguez MD;  Location: [facility] CATH LAB;  Service: Cardiology;  Laterality: N/A;    CORONARY STENT PLACEMENT  06/19/2020    pLAD Nathan 2.75 x 18 mm     Social History     Socioeconomic History    Marital status: Single   Occupational History    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   Tobacco Use    Smoking status: Former    Smokeless tobacco: Former   Substance and Sexual Activity    Alcohol use: Not Currently    Drug use: Never    Sexual activity: Yes     Partners: Male     Birth control/protection: None     Family History   Problem Relation Age of Onset    Diabetes Mother     Cancer Mother     Heart disease Father     Arthritis Sister     Cancer Sister     Lupus Neg Hx     Rheum arthritis Neg Hx     Inflammatory bowel disease Neg Hx         Allergies  Review of patient's allergies indicates:  No Known Allergies    Review of Systems   Review of Systems   Constitutional: Negative for malaise/fatigue, weight gain and weight loss.   Eyes:  Negative for visual disturbance.   Cardiovascular:  Negative for chest pain, claudication, cyanosis, dyspnea on exertion, irregular heartbeat, leg swelling, near-syncope, orthopnea, palpitations, paroxysmal nocturnal dyspnea and syncope.   Respiratory:  Negative for cough, hemoptysis, shortness of breath, sleep disturbances due to breathing and wheezing.    Hematologic/Lymphatic: Negative for bleeding problem. Does not bruise/bleed easily.   Skin:  Negative for poor wound healing.   Musculoskeletal:  Negative for muscle cramps and myalgias.   Gastrointestinal:  Negative for abdominal pain, anorexia, diarrhea, heartburn, hematemesis,  hematochezia, melena, nausea and vomiting.   Genitourinary:  Negative for hematuria and nocturia.   Neurological:  Negative for excessive daytime sleepiness, dizziness, focal weakness, light-headedness and weakness.       Physical Exam  Vitals:    12/08/23 1021   BP: (!) 148/78   Pulse: 68     Wt Readings from Last 1 Encounters:   12/08/23 85.8 kg (189 lb 2.5 oz)     Physical Exam  Constitutional:       General: She is not in acute distress.     Appearance: She is not toxic-appearing or diaphoretic.   HENT:      Head: Normocephalic and atraumatic.   Eyes:      General: No scleral icterus.     Conjunctiva/sclera: Conjunctivae normal.   Neck:      Thyroid: No thyromegaly.      Vascular: No carotid bruit, hepatojugular reflux or JVD.      Trachea: Trachea normal.   Cardiovascular:      Rate and Rhythm: Normal rate and regular rhythm. No extrasystoles are present.     Chest Wall: PMI is not displaced.      Pulses:           Carotid pulses are 2+ on the right side and 2+ on the left side.       Radial pulses are 2+ on the right side and 2+ on the left side.        Dorsalis pedis pulses are 2+ on the right side and 2+ on the left side.        Posterior tibial pulses are 2+ on the right side and 2+ on the left side.      Heart sounds: S1 normal and S2 normal. No murmur heard.     No S3 or S4 sounds.   Pulmonary:      Effort: No accessory muscle usage or respiratory distress.      Breath sounds: No decreased breath sounds, wheezing, rhonchi or rales.   Abdominal:      General: Bowel sounds are normal. There is no abdominal bruit.      Palpations: Abdomen is soft. There is no hepatomegaly, splenomegaly or pulsatile mass.      Tenderness: There is no abdominal tenderness.   Musculoskeletal:         General: No tenderness or deformity.      Cervical back: No edema.      Right lower leg: No edema.      Left lower leg: No edema.   Skin:     General: Skin is warm and dry.      Coloration: Skin is not cyanotic or pale.      Nails:  There is no clubbing.   Neurological:      General: No focal deficit present.      Mental Status: She is alert and oriented to person, place, and time.   Psychiatric:         Speech: Speech normal.         Behavior: Behavior normal. Behavior is cooperative.         Labs  Lab Visit on 10/24/2023   Component Date Value Ref Range Status    Specimen UA 10/24/2023 Urine, Clean Catch   Final    Color, UA 10/24/2023 Yellow  Yellow, Straw, Rita Final    Appearance, UA 10/24/2023 Hazy (A)  Clear Final    pH, UA 10/24/2023 5.0  5.0 - 8.0 Final    Specific Gravity, UA 10/24/2023 1.025  1.005 - 1.030 Final    Protein, UA 10/24/2023 Negative  Negative Final    Comment: Recommend a 24 hour urine protein or a urine   protein/creatinine ratio if globulin induced proteinuria is  clinically suspected.      Glucose, UA 10/24/2023 Negative  Negative Final    Ketones, UA 10/24/2023 Negative  Negative Final    Bilirubin (UA) 10/24/2023 Negative  Negative Final    Occult Blood UA 10/24/2023 Negative  Negative Final    Nitrite, UA 10/24/2023 Negative  Negative Final    Leukocytes, UA 10/24/2023 2+ (A)  Negative Final    Protein, Urine Random 10/24/2023 14  0 - 15 mg/dL Final    Creatinine, Urine 10/24/2023 141.0  15.0 - 325.0 mg/dL Final    Prot/Creat Ratio, Urine 10/24/2023 0.10  0.00 - 0.20 Final    RBC, UA 10/24/2023 8 (H)  0 - 4 /hpf Final    WBC, UA 10/24/2023 43 (H)  0 - 5 /hpf Final    Bacteria 10/24/2023 Few (A)  None-Occ /hpf Final    Squam Epithel, UA 10/24/2023 7  /hpf Final    Non-Squam Epith 10/24/2023 <1  <1/hpf /hpf Final    Microscopic Comment 10/24/2023 SEE COMMENT   Final    Comment: Other formed elements not mentioned in the report are not   present in the microscopic examination.      Lab Visit on 10/24/2023   Component Date Value Ref Range Status    Complement (C-4) 10/24/2023 51 (H)  11 - 44 mg/dL Final    Complement (C-3) 10/24/2023 138  50 - 180 mg/dL Final    WBC 10/24/2023 8.83  3.90 - 12.70 K/uL Final    RBC  10/24/2023 5.08  4.00 - 5.40 M/uL Final    Hemoglobin 10/24/2023 12.4  12.0 - 16.0 g/dL Final    Hematocrit 10/24/2023 41.2  37.0 - 48.5 % Final    MCV 10/24/2023 81 (L)  82 - 98 fL Final    MCH 10/24/2023 24.4 (L)  27.0 - 31.0 pg Final    MCHC 10/24/2023 30.1 (L)  32.0 - 36.0 g/dL Final    RDW 10/24/2023 17.2 (H)  11.5 - 14.5 % Final    Platelets 10/24/2023 284  150 - 450 K/uL Final    MPV 10/24/2023 10.5  9.2 - 12.9 fL Final    Immature Granulocytes 10/24/2023 0.2  0.0 - 0.5 % Final    Gran # (ANC) 10/24/2023 6.0  1.8 - 7.7 K/uL Final    Immature Grans (Abs) 10/24/2023 0.02  0.00 - 0.04 K/uL Final    Comment: Mild elevation in immature granulocytes is non specific and   can be seen in a variety of conditions including stress response,   acute inflammation, trauma and pregnancy. Correlation with other   laboratory and clinical findings is essential.      Lymph # 10/24/2023 1.9  1.0 - 4.8 K/uL Final    Mono # 10/24/2023 0.6  0.3 - 1.0 K/uL Final    Eos # 10/24/2023 0.3  0.0 - 0.5 K/uL Final    Baso # 10/24/2023 0.06  0.00 - 0.20 K/uL Final    nRBC 10/24/2023 0  0 /100 WBC Final    Gran % 10/24/2023 67.4  38.0 - 73.0 % Final    Lymph % 10/24/2023 21.2  18.0 - 48.0 % Final    Mono % 10/24/2023 7.2  4.0 - 15.0 % Final    Eosinophil % 10/24/2023 3.3  0.0 - 8.0 % Final    Basophil % 10/24/2023 0.7  0.0 - 1.9 % Final    Differential Method 10/24/2023 Automated   Final    CPK 10/24/2023 52  20 - 180 U/L Final    Sodium 10/24/2023 141  136 - 145 mmol/L Final    Potassium 10/24/2023 4.7  3.5 - 5.1 mmol/L Final    Chloride 10/24/2023 105  95 - 110 mmol/L Final    CO2 10/24/2023 26  23 - 29 mmol/L Final    Glucose 10/24/2023 104  70 - 110 mg/dL Final    BUN 10/24/2023 29 (H)  8 - 23 mg/dL Final    Creatinine 10/24/2023 0.9  0.5 - 1.4 mg/dL Final    Calcium 10/24/2023 9.9  8.7 - 10.5 mg/dL Final    Total Protein 10/24/2023 7.5  6.0 - 8.4 g/dL Final    Albumin 10/24/2023 4.0  3.5 - 5.2 g/dL Final    Total Bilirubin 10/24/2023  0.4  0.1 - 1.0 mg/dL Final    Comment: For infants and newborns, interpretation of results should be based  on gestational age, weight and in agreement with clinical  observations.    Premature Infant recommended reference ranges:  Up to 24 hours.............<8.0 mg/dL  Up to 48 hours............<12.0 mg/dL  3-5 days..................<15.0 mg/dL  6-29 days.................<15.0 mg/dL      Alkaline Phosphatase 10/24/2023 124  55 - 135 U/L Final    AST 10/24/2023 17  10 - 40 U/L Final    ALT 10/24/2023 14  10 - 44 U/L Final    eGFR 10/24/2023 >60.0  >60 mL/min/1.73 m^2 Final    Anion Gap 10/24/2023 10  8 - 16 mmol/L Final    ds DNA Ab 10/24/2023 Negative 1:10  Negative 1:10 Final    Performed by fluorescent crithidia assay.    CRP 10/24/2023 0.9  0.0 - 8.2 mg/L Final    Sed Rate 10/24/2023 18  0 - 36 mm/Hr Final   Lab Visit on 07/05/2023   Component Date Value Ref Range Status    Sodium 07/05/2023 141  136 - 145 mmol/L Final    Potassium 07/05/2023 4.1  3.5 - 5.1 mmol/L Final    Chloride 07/05/2023 102  95 - 110 mmol/L Final    CO2 07/05/2023 28  23 - 29 mmol/L Final    Glucose 07/05/2023 112 (H)  70 - 110 mg/dL Final    BUN 07/05/2023 33 (H)  8 - 23 mg/dL Final    Creatinine 07/05/2023 1.0  0.5 - 1.4 mg/dL Final    Calcium 07/05/2023 10.0  8.7 - 10.5 mg/dL Final    Total Protein 07/05/2023 7.7  6.0 - 8.4 g/dL Final    Albumin 07/05/2023 4.0  3.5 - 5.2 g/dL Final    Total Bilirubin 07/05/2023 0.4  0.1 - 1.0 mg/dL Final    Comment: For infants and newborns, interpretation of results should be based  on gestational age, weight and in agreement with clinical  observations.    Premature Infant recommended reference ranges:  Up to 24 hours.............<8.0 mg/dL  Up to 48 hours............<12.0 mg/dL  3-5 days..................<15.0 mg/dL  6-29 days.................<15.0 mg/dL      Alkaline Phosphatase 07/05/2023 149 (H)  55 - 135 U/L Final    AST 07/05/2023 16  10 - 40 U/L Final    ALT 07/05/2023 11  10 - 44 U/L Final     eGFR 07/05/2023 57.3 (A)  >60 mL/min/1.73 m^2 Final    Anion Gap 07/05/2023 11  8 - 16 mmol/L Final    Cholesterol 07/05/2023 156  120 - 199 mg/dL Final    Comment: The National Cholesterol Education Program (NCEP) has set the  following guidelines (reference ranges) for Cholesterol:  Optimal.....................<200 mg/dL  Borderline High.............200-239 mg/dL  High........................> or = 240 mg/dL      Triglycerides 07/05/2023 73  30 - 150 mg/dL Final    Comment: The National Cholesterol Education Program (NCEP) has set the  following guidelines (reference values) for triglycerides:  Normal......................<150 mg/dL  Borderline High.............150-199 mg/dL  High........................200-499 mg/dL      HDL 07/05/2023 74  40 - 75 mg/dL Final    Comment: The National Cholesterol Education Program (NCEP) has set the  following guidelines (reference values) for HDL Cholesterol:  Low...............<40 mg/dL  Optimal...........>60 mg/dL      LDL Cholesterol 07/05/2023 67.4  63.0 - 159.0 mg/dL Final    Comment: The National Cholesterol Education Program (NCEP) has set the  following guidelines (reference values) for LDL Cholesterol:  Optimal.......................<130 mg/dL  Borderline High...............130-159 mg/dL  High..........................160-189 mg/dL  Very High.....................>190 mg/dL      HDL/Cholesterol Ratio 07/05/2023 47.4  20.0 - 50.0 % Final    Total Cholesterol/HDL Ratio 07/05/2023 2.1  2.0 - 5.0 Final    Non-HDL Cholesterol 07/05/2023 82  mg/dL Final    Comment: Risk category and Non-HDL cholesterol goals:  Coronary heart disease (CHD)or equivalent (10-year risk of CHD >20%):  Non-HDL cholesterol goal     <130 mg/dL  Two or more CHD risk factors and 10-year risk of CHD <= 20%:  Non-HDL cholesterol goal     <160 mg/dL  0 to 1 CHD risk factor:  Non-HDL cholesterol goal     <190 mg/dL      WBC 07/05/2023 9.00  3.90 - 12.70 K/uL Final    RBC 07/05/2023 5.16  4.00 - 5.40 M/uL  Final    Hemoglobin 07/05/2023 12.2  12.0 - 16.0 g/dL Final    Hematocrit 07/05/2023 40.4  37.0 - 48.5 % Final    MCV 07/05/2023 78 (L)  82 - 98 fL Final    MCH 07/05/2023 23.6 (L)  27.0 - 31.0 pg Final    MCHC 07/05/2023 30.2 (L)  32.0 - 36.0 g/dL Final    RDW 07/05/2023 17.0 (H)  11.5 - 14.5 % Final    Platelets 07/05/2023 291  150 - 450 K/uL Final    MPV 07/05/2023 9.7  9.2 - 12.9 fL Final    Immature Granulocytes 07/05/2023 0.2  0.0 - 0.5 % Final    Gran # (ANC) 07/05/2023 6.3  1.8 - 7.7 K/uL Final    Immature Grans (Abs) 07/05/2023 0.02  0.00 - 0.04 K/uL Final    Comment: Mild elevation in immature granulocytes is non specific and   can be seen in a variety of conditions including stress response,   acute inflammation, trauma and pregnancy. Correlation with other   laboratory and clinical findings is essential.      Lymph # 07/05/2023 1.7  1.0 - 4.8 K/uL Final    Mono # 07/05/2023 0.7  0.3 - 1.0 K/uL Final    Eos # 07/05/2023 0.2  0.0 - 0.5 K/uL Final    Baso # 07/05/2023 0.05  0.00 - 0.20 K/uL Final    nRBC 07/05/2023 0  0 /100 WBC Final    Gran % 07/05/2023 70.0  38.0 - 73.0 % Final    Lymph % 07/05/2023 18.9  18.0 - 48.0 % Final    Mono % 07/05/2023 7.7  4.0 - 15.0 % Final    Eosinophil % 07/05/2023 2.6  0.0 - 8.0 % Final    Basophil % 07/05/2023 0.6  0.0 - 1.9 % Final    Differential Method 07/05/2023 Automated   Final    TSH 07/05/2023 2.313  0.400 - 4.000 uIU/mL Final    CRP 07/05/2023 4.4  0.0 - 8.2 mg/L Final    Hemoglobin A1C 07/05/2023 5.8 (H)  4.0 - 5.6 % Final    Comment: ADA Screening Guidelines:  5.7-6.4%  Consistent with prediabetes  >or=6.5%  Consistent with diabetes    High levels of fetal hemoglobin interfere with the HbA1C  assay. Heterozygous hemoglobin variants (HbS, HgC, etc)do  not significantly interfere with this assay.   However, presence of multiple variants may affect accuracy.      Estimated Avg Glucose 07/05/2023 120  68 - 131 mg/dL Final    Hepatitis C Ab 07/05/2023 Non-reactive   Non-reactive Final    Vit D, 25-Hydroxy 07/05/2023 17 (L)  30 - 96 ng/mL Final    Comment: Vitamin D deficiency.........<10 ng/mL                              Vitamin D insufficiency......10-29 ng/mL       Vitamin D sufficiency........> or equal to 30 ng/mL  Vitamin D toxicity............>100 ng/mL      PTH, Intact 07/05/2023 156.7 (H)  9.0 - 77.0 pg/mL Final   Hospital Outpatient Visit on 06/13/2023   Component Date Value Ref Range Status    BSA 06/13/2023 1.89  m2 Final    TDI SEPTAL 06/13/2023 0.07  m/s Final    LV LATERAL E/E' RATIO 06/13/2023 10.13  m/s Final    LV SEPTAL E/E' RATIO 06/13/2023 11.57  m/s Final    IVC diameter 06/13/2023 2.15  cm Final    Left Ventricular Outflow Tract Rachael* 06/13/2023 0.60  cm/s Final    Left Ventricular Outflow Tract Rachael* 06/13/2023 1.59  mmHg Final    AORTIC VALVE CUSP SEPERATION 06/13/2023 1.82  cm Final    TDI LATERAL 06/13/2023 0.08  m/s Final    PV PEAK VELOCITY 06/13/2023 0.96  cm/s Final    LVIDd 06/13/2023 3.06 (A)  3.5 - 6.0 cm Final    IVS 06/13/2023 1.30 (A)  0.6 - 1.1 cm Final    Posterior Wall 06/13/2023 1.16 (A)  0.6 - 1.1 cm Final    Ao root annulus 06/13/2023 2.54  cm Final    LVIDs 06/13/2023 2.40  2.1 - 4.0 cm Final    FS 06/13/2023 22  28 - 44 % Final    LV mass 06/13/2023 116.70  g Final    RVDD 06/13/2023 2.03  cm Final    Left Ventricle Relative Wall Thick* 06/13/2023 0.76  cm Final    AV mean gradient 06/13/2023 4  mmHg Final    AV valve area 06/13/2023 1.07  cm2 Final    AV Velocity Ratio 06/13/2023 0.62   Final    AV index (prosthetic) 06/13/2023 0.58   Final    MV valve area p 1/2 method 06/13/2023 3.19  cm2 Final    E/A ratio 06/13/2023 0.73   Final    Mean e' 06/13/2023 0.08  m/s Final    E wave deceleration time 06/13/2023 238.76  msec Final    LVOT diameter 06/13/2023 1.54  cm Final    LVOT area 06/13/2023 1.9  cm2 Final    LVOT peak reema 06/13/2023 0.84  m/s Final    LVOT peak VTI 06/13/2023 19.50  cm Final    Ao peak reema 06/13/2023 1.36  m/s Final     Ao VTI 06/13/2023 33.8  cm Final    Mr max dino 06/13/2023 5.91  m/s Final    LVOT stroke volume 06/13/2023 36.30  cm3 Final    AV peak gradient 06/13/2023 7  mmHg Final    E/E' ratio 06/13/2023 10.80  m/s Final    MV Peak E Dino 06/13/2023 0.81  m/s Final    TR Max Dino 06/13/2023 3.75  m/s Final    MV stenosis pressure 1/2 time 06/13/2023 68.87  ms Final    MV Peak A Dino 06/13/2023 1.11  m/s Final    LV Systolic Volume 06/13/2023 20.12  mL Final    LV Systolic Volume Index 06/13/2023 11.1  mL/m2 Final    LV Diastolic Volume 06/13/2023 36.76  mL Final    LV Diastolic Volume Index 06/13/2023 20.20  mL/m2 Final    LV Mass Index 06/13/2023 64  g/m2 Final    RA Major Axis 06/13/2023 4.54  cm Final    Left Atrium Minor Axis 06/13/2023 3.17  cm Final    Left Atrium Major Axis 06/13/2023 4.58  cm Final    Triscuspid Valve Regurgitation Pea* 06/13/2023 56  mmHg Final    LA Volume Index (Mod) 06/13/2023 21.0  mL/m2 Final    LA volume (mod) 06/13/2023 38.20  cm3 Final    RA Width 06/13/2023 2.25  cm Final    Right Atrial Pressure (from IVC) 06/13/2023 3  mmHg Final    EF 06/13/2023 60  % Final    TV resting pulmonary artery pressu* 06/13/2023 59  mmHg Final       Imaging  No results found.    Assessment  1. Atherosclerosis of native coronary artery of native heart without angina pectoris  Stable and free of angina    2. Hypercholesteremia  Controlled    3. Primary hypertension   Still running high.  Will increase losartan.      Plan and Discussion    No change to current guideline directed medical therapy.    The 10-year ASCVD risk score (Shantanu DK, et al., 2019) is: 32.5%    Values used to calculate the score:      Age: 77 years      Sex: Female      Is Non- : No      Diabetic: No      Tobacco smoker: No      Systolic Blood Pressure: 148 mmHg      Is BP treated: No      HDL Cholesterol: 74 mg/dL      Total Cholesterol: 156 mg/dL     Follow Up  Follow up in about 6 months (around 6/8/2024).      Fabrice  KARMEN Rodriguez MD

## 2024-01-25 ENCOUNTER — PATIENT MESSAGE (OUTPATIENT)
Dept: RHEUMATOLOGY | Facility: CLINIC | Age: 79
End: 2024-01-25
Payer: MEDICARE

## 2024-01-25 DIAGNOSIS — M35.9 UNDIFFERENTIATED CONNECTIVE TISSUE DISEASE: Primary | ICD-10-CM

## 2024-01-25 DIAGNOSIS — N18.31 CHRONIC KIDNEY DISEASE, STAGE 3A: ICD-10-CM

## 2024-01-25 DIAGNOSIS — R31.9 HEMATURIA, UNSPECIFIED TYPE: ICD-10-CM

## 2024-01-25 DIAGNOSIS — R82.998 URINE WBC INCREASED: ICD-10-CM

## 2024-02-06 ENCOUNTER — TELEPHONE (OUTPATIENT)
Dept: NEPHROLOGY | Facility: CLINIC | Age: 79
End: 2024-02-06
Payer: MEDICARE

## 2024-02-07 ENCOUNTER — LAB VISIT (OUTPATIENT)
Dept: LAB | Facility: HOSPITAL | Age: 79
End: 2024-02-07
Attending: INTERNAL MEDICINE
Payer: MEDICARE

## 2024-02-07 ENCOUNTER — OFFICE VISIT (OUTPATIENT)
Dept: NEPHROLOGY | Facility: CLINIC | Age: 79
End: 2024-02-07
Payer: MEDICARE

## 2024-02-07 VITALS
OXYGEN SATURATION: 90 % | HEART RATE: 108 BPM | WEIGHT: 189.81 LBS | SYSTOLIC BLOOD PRESSURE: 155 MMHG | BODY MASS INDEX: 35.87 KG/M2 | DIASTOLIC BLOOD PRESSURE: 65 MMHG

## 2024-02-07 DIAGNOSIS — R82.998 URINE WBC INCREASED: ICD-10-CM

## 2024-02-07 DIAGNOSIS — E66.01 SEVERE OBESITY (BMI 35.0-39.9) WITH COMORBIDITY: ICD-10-CM

## 2024-02-07 DIAGNOSIS — D50.8 OTHER IRON DEFICIENCY ANEMIA: Primary | ICD-10-CM

## 2024-02-07 DIAGNOSIS — N39.0 URINARY TRACT INFECTION WITHOUT HEMATURIA, SITE UNSPECIFIED: ICD-10-CM

## 2024-02-07 DIAGNOSIS — M35.9 UNDIFFERENTIATED CONNECTIVE TISSUE DISEASE: ICD-10-CM

## 2024-02-07 DIAGNOSIS — I10 PRIMARY HYPERTENSION: ICD-10-CM

## 2024-02-07 DIAGNOSIS — R31.9 HEMATURIA, UNSPECIFIED TYPE: ICD-10-CM

## 2024-02-07 PROCEDURE — 87086 URINE CULTURE/COLONY COUNT: CPT | Performed by: INTERNAL MEDICINE

## 2024-02-07 PROCEDURE — 99999 PR PBB SHADOW E&M-EST. PATIENT-LVL III: CPT | Mod: PBBFAC,,, | Performed by: INTERNAL MEDICINE

## 2024-02-07 PROCEDURE — 87077 CULTURE AEROBIC IDENTIFY: CPT | Performed by: INTERNAL MEDICINE

## 2024-02-07 PROCEDURE — 87088 URINE BACTERIA CULTURE: CPT | Performed by: INTERNAL MEDICINE

## 2024-02-07 PROCEDURE — 81001 URINALYSIS AUTO W/SCOPE: CPT | Performed by: INTERNAL MEDICINE

## 2024-02-07 PROCEDURE — 99204 OFFICE O/P NEW MOD 45 MIN: CPT | Mod: S$GLB,,, | Performed by: INTERNAL MEDICINE

## 2024-02-07 PROCEDURE — 87186 SC STD MICRODIL/AGAR DIL: CPT | Performed by: INTERNAL MEDICINE

## 2024-02-07 NOTE — PROGRESS NOTES
CHIEF COMPLAINT/HPI: Marimar is a 78 y.o. female for evaluation of Chronic Kidney Disease      Clinical Course:  I have seen Ms. Minaya today for assessment of her renal function.  She was diagnosed with Connective tissue disease and followed with Rheumatology. She had hypothyroidism., Hyperlipidemia and HTN. Her renal function is normal with S. Creatinine of 0.9mg /dl. with eGFR > 60 ml/min. She had no urinary symptoms. Her urine analysis showed WBC, bacteria and Ca oxalate. She drinks  2 cups of coffee and 2 cups of tea daily.  I have explained to her that the kidney function is normal, but we will need to repeat a proper collected urine analysis with Culture. Will determine the need for any specific management according to the results.     Past Medical History:   Diagnosis Date    Anemia, chronic disease 12/18/2019    Arthritis     Blind left eye     Chronic pain     Coronary atherosclerosis 06/20/2020    Eosinophilia 12/18/2019    Hypothyroidism     Insomnia     Leucocytosis 12/18/2019    Pneumonia     PONV (postoperative nausea and vomiting)     needs phenergan    Scoliosis     Undifferentiated connective tissue disease        Marimar reports that she has quit smoking. She has quit using smokeless tobacco. She reports that she does not currently use alcohol. She reports that she does not use drugs.    Family History   Problem Relation Age of Onset    Diabetes Mother     Cancer Mother     Heart disease Father     Arthritis Sister     Cancer Sister     Lupus Neg Hx     Rheum arthritis Neg Hx     Inflammatory bowel disease Neg Hx        ROS:    General: No fever, chills, change in appetite or weight loss  Skin: No rashes or pruritus  Head: No headaches or recent head trauma  Eyes: No changes in vision or eye pain  Nodes: No swollen glands  Pulmonary: No dyspnea, wheezes, cough or sputum production  Cardiovascular: No chest pain, edema, PND, orthopnea or reduced exercise tolerance  Abdomen: No abdominal  pain, nausea, vomiting, constipation or diarrhea  Urinary: No flank pain, dysuria or hematuria  Peripheral Vascular: No claudication or cyanosis  Musculoskeletal: No joint stiffness, swelling or back pain  Neurologic: No history of seizures, tremors, forcal weakness or numbness    PE:    Vitals:    02/07/24 1539   BP: (!) 155/65   Pulse: 108   SpO2: (!) 90%   Weight: 86.1 kg (189 lb 13.1 oz)       HEENT: Normocephalic, atraumatic, EOMI, PERRLA, normal conjunctive and lids, supple neck with no thyromeglay or masses, normal oropharynx, hearing intact  Cardiovascular: Regular without murmur, gallop or rub, no edema  Respiratory: Clear bilaterally without rales, rhonchi, wheezes with normal effort  Abdomen: Soft, nontender/nondistended, positive bowel sounds, no hepatospenomegaly  Extremities: No cyanosis, clubbing, normal gait  Skin: No rashes, ulcers, induration  Psych: Oriented x 3 with normal mood and effect    Impression/Plan:    Labs:  UA and Cx.     Impression and plan:  Connective tissue disease with Hydroxychloroquine  HTN will need to check home BP.  CAD on medications.   Hyperlipidemia with Statin  Hypothyroid with replacement.   Obesity will address diet.   Osteopenia.    NICOLETTE JONES.Rodríguez. MD. JOSE DAVID. FACP.  , [employer] / [employer] ([place]  Nephrology Consultant. [facility].   [address]. [address]th [place] [address].    email: [email or web address]  Tel: Office: [phone]

## 2024-02-08 PROBLEM — N18.31 CHRONIC KIDNEY DISEASE, STAGE 3A: Status: RESOLVED | Noted: 2023-07-05 | Resolved: 2024-02-08

## 2024-02-08 LAB
BACTERIA #/AREA URNS AUTO: ABNORMAL /HPF
BILIRUB UR QL STRIP: NEGATIVE
CLARITY UR REFRACT.AUTO: CLEAR
COLOR UR AUTO: ABNORMAL
GLUCOSE UR QL STRIP: NEGATIVE
HGB UR QL STRIP: NEGATIVE
KETONES UR QL STRIP: NEGATIVE
LEUKOCYTE ESTERASE UR QL STRIP: ABNORMAL
MICROSCOPIC COMMENT: ABNORMAL
NITRITE UR QL STRIP: NEGATIVE
PH UR STRIP: 5 [PH] (ref 5–8)
PROT UR QL STRIP: NEGATIVE
RBC #/AREA URNS AUTO: 1 /HPF (ref 0–4)
SP GR UR STRIP: 1.01 (ref 1–1.03)
SQUAMOUS #/AREA URNS AUTO: 2 /HPF
URN SPEC COLLECT METH UR: ABNORMAL
WBC #/AREA URNS AUTO: 24 /HPF (ref 0–5)

## 2024-02-09 ENCOUNTER — PATIENT MESSAGE (OUTPATIENT)
Dept: RHEUMATOLOGY | Facility: CLINIC | Age: 79
End: 2024-02-09
Payer: MEDICARE

## 2024-02-09 ENCOUNTER — PATIENT MESSAGE (OUTPATIENT)
Dept: NEPHROLOGY | Facility: CLINIC | Age: 79
End: 2024-02-09
Payer: MEDICARE

## 2024-02-10 LAB — BACTERIA UR CULT: ABNORMAL

## 2024-02-12 ENCOUNTER — TELEPHONE (OUTPATIENT)
Dept: NEPHROLOGY | Facility: CLINIC | Age: 79
End: 2024-02-12
Payer: MEDICARE

## 2024-03-05 DIAGNOSIS — E78.00 HYPERCHOLESTEREMIA: Primary | ICD-10-CM

## 2024-03-05 RX ORDER — ATORVASTATIN CALCIUM 10 MG/1
10 TABLET, FILM COATED ORAL DAILY
Qty: 90 TABLET | Refills: 3 | Status: SHIPPED | OUTPATIENT
Start: 2024-03-05 | End: 2024-06-14 | Stop reason: SDUPTHER

## 2024-03-05 NOTE — TELEPHONE ENCOUNTER
----- Message from Ángela Marsh sent at 3/5/2024 11:31 AM CST -----  Regarding: call back  Name of caller: alberta       What is the requesting detail: pt is requesting a refill for atorvastatin (LIPITOR) 10 MG tablet. Please advise       [id number] - [place], [place] - [address] AT [facility] OF [address]          Can the clinic reply by [facility]:       What number to call back:[phone]

## 2024-04-09 ENCOUNTER — LAB VISIT (OUTPATIENT)
Dept: LAB | Facility: HOSPITAL | Age: 79
End: 2024-04-09
Attending: INTERNAL MEDICINE
Payer: MEDICARE

## 2024-04-09 ENCOUNTER — OFFICE VISIT (OUTPATIENT)
Dept: RHEUMATOLOGY | Facility: CLINIC | Age: 79
End: 2024-04-09
Payer: MEDICARE

## 2024-04-09 VITALS
HEIGHT: 61 IN | WEIGHT: 185.19 LBS | HEART RATE: 78 BPM | SYSTOLIC BLOOD PRESSURE: 155 MMHG | BODY MASS INDEX: 34.96 KG/M2 | DIASTOLIC BLOOD PRESSURE: 76 MMHG

## 2024-04-09 DIAGNOSIS — Z87.440 HISTORY OF UTI: ICD-10-CM

## 2024-04-09 DIAGNOSIS — Z79.899 LONG-TERM USE OF PLAQUENIL: ICD-10-CM

## 2024-04-09 DIAGNOSIS — N18.31 CHRONIC KIDNEY DISEASE, STAGE 3A: ICD-10-CM

## 2024-04-09 DIAGNOSIS — M35.9 UNDIFFERENTIATED CONNECTIVE TISSUE DISEASE: ICD-10-CM

## 2024-04-09 DIAGNOSIS — M35.9 UNDIFFERENTIATED CONNECTIVE TISSUE DISEASE: Primary | ICD-10-CM

## 2024-04-09 LAB
BACTERIA #/AREA URNS AUTO: NORMAL /HPF
BILIRUB UR QL STRIP: NEGATIVE
CLARITY UR REFRACT.AUTO: CLEAR
COLOR UR AUTO: YELLOW
CREAT UR-MCNC: 132 MG/DL (ref 15–325)
GLUCOSE UR QL STRIP: NEGATIVE
HGB UR QL STRIP: NEGATIVE
KETONES UR QL STRIP: NEGATIVE
LEUKOCYTE ESTERASE UR QL STRIP: ABNORMAL
MICROSCOPIC COMMENT: NORMAL
NITRITE UR QL STRIP: NEGATIVE
PH UR STRIP: 6 [PH] (ref 5–8)
PROT UR QL STRIP: NEGATIVE
PROT UR-MCNC: 8 MG/DL (ref 0–15)
PROT/CREAT UR: 0.06 MG/G{CREAT} (ref 0–0.2)
RBC #/AREA URNS AUTO: 0 /HPF (ref 0–4)
SP GR UR STRIP: 1.02 (ref 1–1.03)
SQUAMOUS #/AREA URNS AUTO: 1 /HPF
URN SPEC COLLECT METH UR: ABNORMAL
WBC #/AREA URNS AUTO: 4 /HPF (ref 0–5)

## 2024-04-09 PROCEDURE — 3078F DIAST BP <80 MM HG: CPT | Mod: CPTII,S$GLB,, | Performed by: INTERNAL MEDICINE

## 2024-04-09 PROCEDURE — 1126F AMNT PAIN NOTED NONE PRSNT: CPT | Mod: CPTII,S$GLB,, | Performed by: INTERNAL MEDICINE

## 2024-04-09 PROCEDURE — 3288F FALL RISK ASSESSMENT DOCD: CPT | Mod: CPTII,S$GLB,, | Performed by: INTERNAL MEDICINE

## 2024-04-09 PROCEDURE — 99214 OFFICE O/P EST MOD 30 MIN: CPT | Mod: S$GLB,,, | Performed by: INTERNAL MEDICINE

## 2024-04-09 PROCEDURE — 3077F SYST BP >= 140 MM HG: CPT | Mod: CPTII,S$GLB,, | Performed by: INTERNAL MEDICINE

## 2024-04-09 PROCEDURE — 1159F MED LIST DOCD IN RCRD: CPT | Mod: CPTII,S$GLB,, | Performed by: INTERNAL MEDICINE

## 2024-04-09 PROCEDURE — 1101F PT FALLS ASSESS-DOCD LE1/YR: CPT | Mod: CPTII,S$GLB,, | Performed by: INTERNAL MEDICINE

## 2024-04-09 PROCEDURE — 81001 URINALYSIS AUTO W/SCOPE: CPT | Performed by: INTERNAL MEDICINE

## 2024-04-09 PROCEDURE — 82570 ASSAY OF URINE CREATININE: CPT | Performed by: INTERNAL MEDICINE

## 2024-04-09 PROCEDURE — 1160F RVW MEDS BY RX/DR IN RCRD: CPT | Mod: CPTII,S$GLB,, | Performed by: INTERNAL MEDICINE

## 2024-04-09 PROCEDURE — 99999 PR PBB SHADOW E&M-EST. PATIENT-LVL III: CPT | Mod: PBBFAC,,, | Performed by: INTERNAL MEDICINE

## 2024-04-09 ASSESSMENT — ROUTINE ASSESSMENT OF PATIENT INDEX DATA (RAPID3)
PSYCHOLOGICAL DISTRESS SCORE: 1.1
FATIGUE SCORE: 0
PAIN SCORE: 0
PATIENT GLOBAL ASSESSMENT SCORE: 0
MDHAQ FUNCTION SCORE: 0.1
TOTAL RAPID3 SCORE: 0.11
AM STIFFNESS SCORE: 0, NO

## 2024-04-09 NOTE — PROGRESS NOTES
"Subjective:       Patient ID: Jacqueline M Goldberg is a 78 y.o. female.    Chief Complaint: Undifferentiated Connective Tissue Disease    HPI:  Jacqueline M Goldberg is a 78 y.o. 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  Dr. Owens did not feel this was giant cell arteritis per the chart.   Dr. 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   She recalls taking prednisone orally but it was stopped.   Dr. Owens diagnosed her with undifferentiated connective tissue disease 2019 and treated her with Plaquenil 200 mg daily and prednisone 10 mg daily.  She stopped without taper prednisone after 3 months and continued on Plaquenil.   She last saw Dr. Owens in 2022.     She went on 2,500 mile road trip 3 weeks and plans to go again this afternoon or tomorrow on a 2,200 mile road trip.      INTERVAL HISTORY:    Saw nephrologist who did not find any issues.  She believes crystals in urine were from baby powder used for hygiene.   Dealing with eye issues involving left eye and see Dr. Tristan Christianson ().  She will be travelling to [place] and then cruise.            Review of Systems   Constitutional:  Positive for fatigue.   HENT: Negative.     Eyes: Negative.    Respiratory: Negative.     Cardiovascular: Negative.    Gastrointestinal: Negative.    Endocrine: Negative.    Genitourinary: Negative.    Musculoskeletal: Negative.    Skin: Negative.    Allergic/Immunologic: Negative.    Neurological: Negative.    Hematological:  Bruises/bleeds easily.   Psychiatric/Behavioral:  Positive for sleep disturbance.          Objective:   BP (!) 155/76   Pulse 78   Ht 5' 1" (1.549 m)   Wt 84 kg (185 lb 3 oz)   BMI 34.99 kg/m²      Physical Exam   Constitutional: She is oriented to person, place, and time. normal appearance.   HENT:   Head: " Normocephalic and atraumatic.   Eyes: Conjunctivae are normal.   Cardiovascular: Normal rate and regular rhythm.   Pulmonary/Chest: Effort normal and breath sounds normal.   Abdominal: Soft. Bowel sounds are normal.   Musculoskeletal:      Cervical back: Normal range of motion and neck supple.      Comments: 28 joint count: 0 swollen and 0 tender   Neurological: She is alert and oriented to person, place, and time.   Skin: No rash noted.   Bruising of hands (less prominent)   Psychiatric: Her behavior is normal. Mood normal.       LABS    Component      Latest Ref Rng 1/25/2024 2/7/2024   WBC      3.90 - 12.70 K/uL 8.84     RBC      4.00 - 5.40 M/uL 4.92     Hemoglobin      12.0 - 16.0 g/dL 11.9 (L)     Hematocrit      37.0 - 48.5 % 39.4     MCV      82 - 98 fL 80 (L)     MCH      27.0 - 31.0 pg 24.2 (L)     MCHC      32.0 - 36.0 g/dL 30.2 (L)     RDW      11.5 - 14.5 % 17.0 (H)     Platelet Count      150 - 450 K/uL 298     MPV      9.2 - 12.9 fL 10.3     Immature Granulocytes      0.0 - 0.5 % 0.1     Gran # (ANC)      1.8 - 7.7 K/uL 6.0     Immature Grans (Abs)      0.00 - 0.04 K/uL 0.01     Lymph #      1.0 - 4.8 K/uL 1.8     Mono #      0.3 - 1.0 K/uL 0.7     Eos #      0.0 - 0.5 K/uL 0.3     Baso #      0.00 - 0.20 K/uL 0.05     nRBC      0 /100 WBC 0     Gran %      38.0 - 73.0 % 68.2     Lymph %      18.0 - 48.0 % 20.7     Mono %      4.0 - 15.0 % 7.6     Eos %      0.0 - 8.0 % 2.8     Basophil %      0.0 - 1.9 % 0.6     Differential Method Automated     Sodium      136 - 145 mmol/L 142     Potassium      3.5 - 5.1 mmol/L 4.7     Chloride      95 - 110 mmol/L 105     CO2      23 - 29 mmol/L 27     Glucose      70 - 110 mg/dL 106     BUN      8 - 23 mg/dL 34 (H)     Creatinine      0.5 - 1.4 mg/dL 0.9     Calcium      8.7 - 10.5 mg/dL 9.7     PROTEIN TOTAL      6.0 - 8.4 g/dL 7.3     Albumin      3.5 - 5.2 g/dL 3.8     BILIRUBIN TOTAL      0.1 - 1.0 mg/dL 0.6     ALP      55 - 135 U/L 123     AST      10 - 40  U/L 17     ALT      10 - 44 U/L 14     eGFR      >60 mL/min/1.73 m^2 >60.0     Anion Gap      8 - 16 mmol/L 10     Specimen UA Urine, Clean Catch  Urine, Clean Catch    Color, UA      Yellow, Straw, Rita  Yellow  Straw    Appearance, UA      Clear  Hazy !  Clear    pH, UA      5.0 - 8.0  6.0  5.0    Specific Gravity, UA      1.005 - 1.030  >1.030 !  1.010    Protein, UA      Negative  1+ !  Negative    Glucose, UA      Negative  Negative  Negative    Ketones, UA      Negative  Negative  Negative    Bilirubin (UA)      Negative  Negative  Negative    Blood, UA      Negative  Negative  Negative    NITRITE UA      Negative  Negative  Negative    UROBILINOGEN UA      Negative EU/dL Negative     Leukocyte Esterase, UA      Negative  3+ !  2+ !    RBC, UA      0 - 4 /hpf 9 (H)  1    WBC, UA      0 - 5 /hpf 94 (H)  24 (H)    Bacteria, UA      None-Occ /hpf Rare  Rare    Squam Epithel, UA      /hpf 20  2    NON-SQUAM EPITH      <1/hpf /hpf 2 !     Hyaline Casts, UA      0-1/lpf /lpf 0     Microscopic Comment SEE COMMENT  SEE COMMENT    Urine Protein      0 - 15 mg/dL 22 (H)     Urine Creatinine      15.0 - 325.0 mg/dL 201.0     Prot/Creat Ratio, Urine      0.00 - 0.20  0.11     Complement (C-4)      11 - 44 mg/dL 54 (H)     Complement (C-3)      50 - 180 mg/dL 144     CPK      20 - 180 U/L 43     ds DNA Ab      Negative 1:10  Negative 1:10     CRP      0.0 - 8.2 mg/L 2.1     Sed Rate      0 - 20 mm/Hr 31 (H)        Legend:  (L) Low  (H) High  ! Abnormal     Assessment:       Undifferentiated Connective Tissue Disease  Long-term Plaquenil  Osteopenia FRAX 25% risk of major osteoporotic fracture and 15% risk of hip fracture.  Patient declined treatment despite risk  Elevated BP.  Patient reports rushing and stress of parking.  Usually not high per patient.  She will follow with primary.    Plan:       Labs, obtain updated records from eye doctor Dr. Christianson. RTO 6 months/prn

## 2024-04-10 ENCOUNTER — PATIENT MESSAGE (OUTPATIENT)
Dept: RHEUMATOLOGY | Facility: CLINIC | Age: 79
End: 2024-04-10
Payer: MEDICARE

## 2024-05-14 ENCOUNTER — TELEPHONE (OUTPATIENT)
Dept: PRIMARY CARE CLINIC | Facility: CLINIC | Age: 79
End: 2024-05-14

## 2024-05-14 ENCOUNTER — OFFICE VISIT (OUTPATIENT)
Dept: PRIMARY CARE CLINIC | Facility: CLINIC | Age: 79
End: 2024-05-14
Payer: MEDICARE

## 2024-05-14 VITALS
TEMPERATURE: 98 F | HEIGHT: 61 IN | SYSTOLIC BLOOD PRESSURE: 132 MMHG | BODY MASS INDEX: 33.68 KG/M2 | WEIGHT: 178.38 LBS | HEART RATE: 81 BPM | RESPIRATION RATE: 18 BRPM | DIASTOLIC BLOOD PRESSURE: 60 MMHG | OXYGEN SATURATION: 97 %

## 2024-05-14 DIAGNOSIS — R06.09 EXERTIONAL DYSPNEA: ICD-10-CM

## 2024-05-14 DIAGNOSIS — E21.1 HYPERPARATHYROIDISM DUE TO VITAMIN D DEFICIENCY: ICD-10-CM

## 2024-05-14 DIAGNOSIS — J18.9 COMMUNITY ACQUIRED PNEUMONIA OF LEFT UPPER LOBE OF LUNG: Primary | ICD-10-CM

## 2024-05-14 DIAGNOSIS — I50.32 CHRONIC DIASTOLIC HEART FAILURE: ICD-10-CM

## 2024-05-14 DIAGNOSIS — R93.89 ABNORMAL CHEST X-RAY: ICD-10-CM

## 2024-05-14 LAB
OHS QRS DURATION: 126 MS
OHS QTC CALCULATION: 459 MS

## 2024-05-14 PROCEDURE — 93005 ELECTROCARDIOGRAM TRACING: CPT | Mod: S$GLB,,, | Performed by: FAMILY MEDICINE

## 2024-05-14 PROCEDURE — 1126F AMNT PAIN NOTED NONE PRSNT: CPT | Mod: CPTII,S$GLB,, | Performed by: FAMILY MEDICINE

## 2024-05-14 PROCEDURE — 99999 PR PBB SHADOW E&M-EST. PATIENT-LVL V: CPT | Mod: PBBFAC,,, | Performed by: FAMILY MEDICINE

## 2024-05-14 PROCEDURE — 99214 OFFICE O/P EST MOD 30 MIN: CPT | Mod: S$GLB,,, | Performed by: FAMILY MEDICINE

## 2024-05-14 PROCEDURE — 1160F RVW MEDS BY RX/DR IN RCRD: CPT | Mod: CPTII,S$GLB,, | Performed by: FAMILY MEDICINE

## 2024-05-14 PROCEDURE — 3078F DIAST BP <80 MM HG: CPT | Mod: CPTII,S$GLB,, | Performed by: FAMILY MEDICINE

## 2024-05-14 PROCEDURE — 1159F MED LIST DOCD IN RCRD: CPT | Mod: CPTII,S$GLB,, | Performed by: FAMILY MEDICINE

## 2024-05-14 PROCEDURE — 3288F FALL RISK ASSESSMENT DOCD: CPT | Mod: CPTII,S$GLB,, | Performed by: FAMILY MEDICINE

## 2024-05-14 PROCEDURE — 93010 ELECTROCARDIOGRAM REPORT: CPT | Mod: S$GLB,,, | Performed by: INTERNAL MEDICINE

## 2024-05-14 PROCEDURE — 1101F PT FALLS ASSESS-DOCD LE1/YR: CPT | Mod: CPTII,S$GLB,, | Performed by: FAMILY MEDICINE

## 2024-05-14 PROCEDURE — 3075F SYST BP GE 130 - 139MM HG: CPT | Mod: CPTII,S$GLB,, | Performed by: FAMILY MEDICINE

## 2024-05-14 RX ORDER — LEVOFLOXACIN 500 MG/1
500 TABLET, FILM COATED ORAL DAILY
Qty: 7 TABLET | Refills: 0 | Status: SHIPPED | OUTPATIENT
Start: 2024-05-14 | End: 2024-05-21

## 2024-05-14 RX ORDER — BENZONATATE 200 MG/1
200 CAPSULE ORAL 3 TIMES DAILY PRN
Qty: 30 CAPSULE | Refills: 1 | Status: SHIPPED | OUTPATIENT
Start: 2024-05-14 | End: 2024-06-11

## 2024-05-14 RX ORDER — ALBUTEROL SULFATE 90 UG/1
2 AEROSOL, METERED RESPIRATORY (INHALATION) EVERY 4 HOURS PRN
Qty: 18 G | Refills: 1 | Status: SHIPPED | OUTPATIENT
Start: 2024-05-14

## 2024-05-14 NOTE — PROGRESS NOTES
"Subjective:       Patient ID: Jacqueline M Goldberg is a 78 y.o. female.    Chief Complaint: Cough (Started 1 week ago), Dizziness (Started 2 weeks ago), and Fatigue    Cough  This is a new problem. The current episode started in the past 7 days. The problem has been unchanged. The problem occurs every few minutes. The cough is Non-productive. Associated symptoms include headaches, myalgias and shortness of breath. Pertinent negatives include no chest pain, chills, fever, hemoptysis, sweats or wheezing. The symptoms are aggravated by exercise and lying down. Risk factors for lung disease include travel and smoking/tobacco exposure (just got back from cruise to [place] ([place], [place] She has tried nothing for the symptoms. There is no history of bronchitis, COPD or pneumonia.   Dizziness:    Associated symptoms: headaches.no fever and no chest pain.  Fatigue  Associated symptoms include coughing, fatigue, headaches and myalgias. Pertinent negatives include no chest pain, chills or fever.     Review of Systems   Constitutional:  Positive for fatigue. Negative for chills and fever.   Respiratory:  Positive for cough and shortness of breath. Negative for hemoptysis and wheezing.    Cardiovascular:  Negative for chest pain and leg swelling.   Musculoskeletal:  Positive for back pain and myalgias.   Neurological:  Positive for dizziness and headaches.   Psychiatric/Behavioral:  Negative for agitation and confusion.        Objective:      Vitals:    05/14/24 0859   BP: 132/60   BP Location: Right arm   Patient Position: Sitting   BP Method: Medium (Manual)   Pulse: 81   Resp: 18   Temp: 97.5 °F (36.4 °C)   TempSrc: Temporal   SpO2: 97%   Weight: 80.9 kg (178 lb 5.6 oz)   Height: 5' 1" (1.549 m)     BP Readings from Last 5 Encounters:   05/14/24 132/60   04/09/24 (!) 155/76   02/07/24 (!) 155/65   12/08/23 (!) 148/78   10/24/23 (!) 170/75     Wt Readings from Last 5 Encounters:   05/14/24 80.9 kg (178 lb 5.6 " oz)   04/09/24 84 kg (185 lb 3 oz)   02/07/24 86.1 kg (189 lb 13.1 oz)   12/08/23 85.8 kg (189 lb 2.5 oz)   10/24/23 85 kg (187 lb 6.3 oz)     Physical Exam  Vitals and nursing note reviewed.   Constitutional:       General: She is not in acute distress.     Appearance: Normal appearance. She is well-developed.   HENT:      Head: Normocephalic and atraumatic.      Right Ear: Tympanic membrane is erythematous.      Mouth/Throat:      Mouth: Mucous membranes are moist.      Pharynx: Oropharynx is clear.   Cardiovascular:      Rate and Rhythm: Normal rate and regular rhythm.      Heart sounds: Normal heart sounds.   Pulmonary:      Effort: Pulmonary effort is normal. No respiratory distress.      Breath sounds: Examination of the right-middle field reveals wheezing. Examination of the left-middle field reveals wheezing. Examination of the right-lower field reveals wheezing. Examination of the left-lower field reveals wheezing. Wheezing present. No rales.   Musculoskeletal:      Cervical back: Neck supple.      Right lower leg: No edema.      Left lower leg: No edema.   Skin:     General: Skin is warm and dry.   Neurological:      Mental Status: She is alert and oriented to person, place, and time.   Psychiatric:         Mood and Affect: Mood normal.         Behavior: Behavior normal.         Lab Results   Component Value Date    WBC 9.36 04/09/2024    HGB 12.3 04/09/2024    HCT 40.9 04/09/2024     04/09/2024    CHOL 156 07/05/2023    TRIG 73 07/05/2023    HDL 74 07/05/2023    ALT 13 04/09/2024    AST 14 04/09/2024     04/09/2024    K 5.1 04/09/2024     04/09/2024    CREATININE 0.9 04/09/2024    BUN 21 04/09/2024    CO2 30 (H) 04/09/2024    TSH 2.313 07/05/2023    INR 1.0 06/17/2020    HGBA1C 5.8 (H) 07/05/2023    X-Ray Chest PA And Lateral  Order: [id number]  Status: Final result       Visible to patient: Yes (not seen)       Next appt: 06/14/2024 at 10:20 AM in Cardiology (Fabrice Rodriguez MD)        Dx: Exertional dyspnea; Acute bronchitis,...    1 Result Note  Details    Reading Physician Reading Date Result Priority   Brandon Vega MD  [phone] 5/14/2024 Routine     Narrative & Impression  EXAMINATION:  XR CHEST PA AND LATERAL     CLINICAL HISTORY:  Other forms of dyspnea     TECHNIQUE:  PA and lateral views of the chest were performed.     COMPARISON:  05/26/2020     FINDINGS:  Questionable new opacity at the left lung apex.  No pleural effusion or pneumothorax.  Note made of perihilar interstitial prominence.  Cardiac silhouette is stable.  Advanced thoracolumbar scoliosis noted.     Impression:     1. Questionable new opacity at the left lung apex.  Recommend further evaluation with noncontrast chest CT.  2. Perihilar interstitial prominence.  3. Advanced scoliosis.  This report was flagged in Epic as abnormal.        Electronically signed by:Brandon Vega MD  Date:                                            05/14/2024  Time:                                           10:25     Assessment:       1. Community acquired pneumonia of left upper lobe of lung    2. Chronic diastolic heart failure    3. Hyperparathyroidism due to vitamin D deficiency    4. Exertional dyspnea    5. Abnormal chest x-ray        Plan:       Community acquired pneumonia of left upper lobe of lung  -     X-Ray Chest PA And Lateral; Future; Expected date: 05/14/2024  -     levoFLOXacin (LEVAQUIN) 500 MG tablet; Take 1 tablet (500 mg total) by mouth once daily. for 7 days  Dispense: 7 tablet; Refill: 0  -     benzonatate (TESSALON) 200 MG capsule; Take 1 capsule (200 mg total) by mouth 3 (three) times daily as needed for Cough.  Dispense: 30 capsule; Refill: 1  -     albuterol (PROVENTIL/VENTOLIN HFA) 90 mcg/actuation inhaler; Inhale 2 puffs into the lungs every 4 (four) hours as needed for Wheezing or Shortness of Breath. Rescue  Dispense: 18 g; Refill: 1  -     CT Chest Without Contrast; Future; Expected date:  05/21/2024    Chronic diastolic heart failure  -     EKG 12-lead  -     CBC Auto Differential; Future; Expected date: 05/14/2024  -     Comprehensive Metabolic Panel; Future; Expected date: 05/14/2024  -     BNP; Future; Expected date: 05/14/2024    Hyperparathyroidism due to vitamin D deficiency    Exertional dyspnea  -     EKG 12-lead  -     X-Ray Chest PA And Lateral; Future; Expected date: 05/14/2024  -     CBC Auto Differential; Future; Expected date: 05/14/2024  -     Comprehensive Metabolic Panel; Future; Expected date: 05/14/2024  -     BNP; Future; Expected date: 05/14/2024    Abnormal chest x-ray  -     CT Chest Without Contrast; Future; Expected date: 05/21/2024      Medication List with Changes/Refills   New Medications    ALBUTEROL (PROVENTIL/VENTOLIN HFA) 90 MCG/ACTUATION INHALER    Inhale 2 puffs into the lungs every 4 (four) hours as needed for Wheezing or Shortness of Breath. Rescue    BENZONATATE (TESSALON) 200 MG CAPSULE    Take 1 capsule (200 mg total) by mouth 3 (three) times daily as needed for Cough.    LEVOFLOXACIN (LEVAQUIN) 500 MG TABLET    Take 1 tablet (500 mg total) by mouth once daily. for 7 days   Current Medications    ASPIRIN (ECOTRIN) 81 MG EC TABLET    Take 81 mg by mouth once daily.    ATORVASTATIN (LIPITOR) 10 MG TABLET    Take 1 tablet (10 mg total) by mouth once daily.    DORZOLAMIDE-TIMOLOL 2-0.5% (COSOPT) 22.3-6.8 MG/ML OPHTHALMIC SOLUTION    INSTILL 1 DROP IN THE LEFT EYE EVERY 12 HOURS    FUROSEMIDE (LASIX) 40 MG TABLET    Take 1 tablet (40 mg total) by mouth once daily.    HYDROXYCHLOROQUINE (PLAQUENIL) 200 MG TABLET    Take 1 tablet (200 mg total) by mouth once daily.    IBUPROFEN (ADVIL,MOTRIN) 800 MG TABLET    Take 1 tablet (800 mg total) by mouth 3 (three) times daily as needed for Pain.    LEVOTHYROXINE (SYNTHROID) 50 MCG TABLET    Take 1 tablet (50 mcg total) by mouth before breakfast.    LOSARTAN (COZAAR) 50 MG TABLET    Take 1 tablet (50 mg total) by mouth 2 (two)  times a day.    METHOCARBAMOL (ROBAXIN) 500 MG TAB    Take 1 tablet (500 mg total) by mouth 3 (three) times daily as needed (muscle spasm).    TEMAZEPAM (RESTORIL) 15 MG CAP    Take 1 capsule (15 mg total) by mouth nightly as needed (insomnia).

## 2024-05-14 NOTE — TELEPHONE ENCOUNTER
----- Message from Dorothy Eng sent at 5/14/2024 10:52 AM CDT -----  Contact: Patient, [phone]  Patient is returning a phone call.  Who left a message for the patient: Magi  Does patient know what this is regarding:  Xray results  Would you like a call back, or a response through your [facility] portal?:   Call back  Comments: Missed your call, please call her back. Thanks.

## 2024-05-23 ENCOUNTER — TELEPHONE (OUTPATIENT)
Dept: PRIMARY CARE CLINIC | Facility: CLINIC | Age: 79
End: 2024-05-23
Payer: MEDICARE

## 2024-05-23 ENCOUNTER — TELEPHONE (OUTPATIENT)
Dept: OTOLARYNGOLOGY | Facility: CLINIC | Age: 79
End: 2024-05-23
Payer: MEDICARE

## 2024-05-23 ENCOUNTER — PATIENT MESSAGE (OUTPATIENT)
Dept: PRIMARY CARE CLINIC | Facility: CLINIC | Age: 79
End: 2024-05-23
Payer: MEDICARE

## 2024-05-23 DIAGNOSIS — H93.8X9 SENSATION OF PLUGGED EAR, UNSPECIFIED LATERALITY: Primary | ICD-10-CM

## 2024-05-23 NOTE — TELEPHONE ENCOUNTER
----- Message from Olga Carroll sent at 5/23/2024 12:11 PM CDT -----  Contact: [phone]@[email or web address]  Good afternoon patient would like a call back to discuss getting a earlier apt than the one she has on 8/16 patient says she can't wait that long. Please call patient to advise  [phone]

## 2024-05-23 NOTE — TELEPHONE ENCOUNTER
Called pt back. Apologized and let her know that she is scheduled for the soonest available appointment at the [facility] location, 8/16/24. Pt states that she cannot wait that long to be seen. States that her ear is very clogged and she needs to see someone now. Pt has seen Dr. Ferro in 2022. Advised pt that I could send his office staff a message to contact her to see if he could see her sooner than August. Gave pt Dr. Ferro's office number, [phone] to call in case she does not hear from his staff soon. Thanks, Clarita

## 2024-05-23 NOTE — TELEPHONE ENCOUNTER
----- Message from Christina Colton sent at 5/23/2024 12:17 PM CDT -----  Contact: [phone]  Caller is requesting an earlier appointment then we can schedule.  Caller is requesting a message be sent to the provider.  If this is for urgent care symptoms, did you offer other providers at this location, providers at other locations, or [facility]? (yes, no, n/a):  no  If this is for the patients physical, did you offer to schedule next available and put on wait list, or to see NP or PA for their physical?  (yes, no, n/a):  no  When is the next available appointment with their provider:  August 2024  Reason for the appointment:  pt feels that she can't wait until August, she would prefer to be seen something next week  Patient preference of timeframe to be scheduled: asap   Would the patient like a call back, or a response through their [facility] portal?:  Call back     Please call pt and advise

## 2024-05-23 NOTE — TELEPHONE ENCOUNTER
Called pt regarding message. Pt was recently seen by PCP on 05/14. Pt stated her ears still feel clogged. Pt asked what are PCP recommendations or should she see ENT.

## 2024-05-23 NOTE — TELEPHONE ENCOUNTER
----- Message from Olga Carroll sent at 5/23/2024  8:47 AM CDT -----  Contact: [phone]@patient  Good morning patient would like a  call back to discuss what she needs to do next because her ear has not popped yet. Please call patient to advise  [phone]

## 2024-05-27 NOTE — PROGRESS NOTES
Patient called. Patient is running a fever of a 100.6. Patient is congested, sore throat, ear issues and headache, since Tuesday.    Subjective:       Patient ID: Jacqueline M Goldberg is a 78 y.o. female.    Chief Complaint: No chief complaint on file.      The patient is coming in for evaluation of blockage in her right ear, that has been present for almost a month.  She does not have tinnitus, otalgia or otorrhea.  She has some pre-existing hearing loss that is significantly worse since this occurred.  She went on a vacation to [place] and returned  in early May.  Shortly after her return she started having a cough.  She was evaluated by her primary care physician who diagnosed her with a community-acquired pneumonia am placed her on antibiotics.  The cough has improved.  He has blockage in the ear that will not resolve.      In the last month she has had 2 episodes of falling.  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  The 2nd occurred on May 15th when she walked off a curb.  She does not recall being imbalance store having vertigo with either episode.  She does have a undifferentiated connective tissue disorder.          Review of Systems     Constitutional: Negative for appetite change, chills, fatigue, fever and unexpected weight loss.      HENT: Positive for hearing loss.  Negative for ear discharge, ear infection, ear pain, facial swelling, mouth sores, nosebleeds, postnasal drip, ringing in the ears, runny nose, sinus infection, sinus pressure, sore throat, stuffy nose, tonsil infection, dental problems, trouble swallowing and voice change.      Eyes:  Negative for change in eyesight, eye drainage, eye itching and photophobia.     Respiratory:  Negative for cough, shortness of breath, sleep apnea, snoring and wheezing.      Cardiovascular:  Negative for chest pain, foot swelling, irregular heartbeat and swollen veins.     Gastrointestinal:  Negative for abdominal pain, acid reflux, constipation, diarrhea, heartburn and vomiting.     Genitourinary: Negative for difficulty urinating, sexual problems and  frequent urination.     Musc: Positive for aching joints, aching muscles, back pain and neck pain.     Skin: Negative for rash.     Allergy: Negative for food allergies and seasonal allergies.     Endocrine: Negative for cold intolerance and heat intolerance.   Osteopenia of multiple sites  Hypothyroidism    Neurological: Positive for light-headedness. Negative for dizziness, headaches, seizures and tremors.      Hematologic: Negative for bruises/bleeds easily and swollen glands.      Psychiatric: Negative for decreased concentration, depression, nervous/anxious and sleep disturbance.                Objective:      Physical Exam  Vitals and nursing note reviewed.   Constitutional:       General: She is awake.      Appearance: Normal appearance. She is well-developed, well-groomed and overweight.   HENT:      Head: Normocephalic.      Jaw: There is normal jaw occlusion.      Salivary Glands: Right salivary gland is not diffusely enlarged or tender. Left salivary gland is not diffusely enlarged or tender.      Right Ear: Hearing, ear canal and external ear normal. A middle ear effusion is present. Tympanic membrane is retracted. Tympanic membrane has decreased mobility.      Left Ear: Hearing, ear canal and external ear normal.  No middle ear effusion. Tympanic membrane is retracted. Tympanic membrane has decreased mobility.      Ears:      Sinclair exam findings: Lateralizes right.     Right Rinne: BC > AC.     Left Rinne: AC > BC.     Nose: Septal deviation (to the left), mucosal edema (cyanotic, boggy inferior turbinates bilaterally) and rhinorrhea (clear mucus bilaterally) present. No nasal deformity. Rhinorrhea is clear.      Right Turbinates: Enlarged and pale.      Left Turbinates: Enlarged and pale.      Mouth/Throat:      Lips: No lesions.      Mouth: Mucous membranes are moist. No oral lesions.      Dentition: No gum lesions.      Tongue: No lesions.      Palate: No mass and lesions.      Pharynx: Oropharynx is  clear. Uvula midline.   Eyes:      General: Lids are normal. Vision grossly intact.         Right eye: No discharge.         Left eye: No discharge.      Extraocular Movements: Extraocular movements intact.      Conjunctiva/sclera: Conjunctivae normal.      Pupils: Pupils are equal, round, and reactive to light.   Neck:      Thyroid: No thyroid mass or thyromegaly.      Trachea: Trachea normal. No tracheal deviation.   Cardiovascular:      Rate and Rhythm: Normal rate and regular rhythm.      Pulses: Normal pulses.      Heart sounds: Normal heart sounds.   Pulmonary:      Effort: Pulmonary effort is normal.      Breath sounds: Normal breath sounds. No stridor. No decreased breath sounds, wheezing, rhonchi or rales.   Abdominal:      General: Bowel sounds are normal.      Palpations: Abdomen is soft.      Tenderness: There is no abdominal tenderness.   Musculoskeletal:         General: Normal range of motion.      Cervical back: Normal range of motion. No muscular tenderness.   Lymphadenopathy:      Head:      Right side of head: No submental, submandibular, preauricular, posterior auricular or occipital adenopathy.      Left side of head: No submental, submandibular, preauricular, posterior auricular or occipital adenopathy.      Cervical: No cervical adenopathy.   Skin:     General: Skin is warm and dry.      Findings: No petechiae or rash.      Nails: There is no clubbing.   Neurological:      Mental Status: She is alert and oriented to person, place, and time.      Cranial Nerves: No cranial nerve deficit.      Sensory: No sensory deficit.      Gait: Gait normal.   Psychiatric:         Speech: Speech normal.         Behavior: Behavior normal. Behavior is cooperative.         Thought Content: Thought content normal.         Judgment: Judgment normal.         Assessment:       1. Chronic mucoid otitis media of right ear    2. Abnormal auditory perception of both ears    3. Nasal septal deviation    4. History of  falling        Plan:       I am patient the patient on Augmentin 500 mg twice daily to be taken with food.  Additionally I will place her on loratadine 10 mg daily.  She needs to be scheduled for comprehensive auditory evaluation.  I will recheck her in 2 weeks.  If her imbalance continues she will need a VNG when the fluid clears from her right ear.                DISCLAIMER: This note was prepared with [email or web address] voice recognition transcription software. Garbled syntax, mangled pronouns, and other bizarre constructions may be attributed to that software system. While efforts were made to correct any mistakes made by this voice recognition program, some errors and/or omissions may remain in the note that were missed when the note was originally created.

## 2024-05-28 ENCOUNTER — OFFICE VISIT (OUTPATIENT)
Dept: OTOLARYNGOLOGY | Facility: CLINIC | Age: 79
End: 2024-05-28
Payer: MEDICARE

## 2024-05-28 VITALS
WEIGHT: 181.56 LBS | HEART RATE: 80 BPM | OXYGEN SATURATION: 95 % | BODY MASS INDEX: 34.3 KG/M2 | SYSTOLIC BLOOD PRESSURE: 149 MMHG | DIASTOLIC BLOOD PRESSURE: 67 MMHG

## 2024-05-28 DIAGNOSIS — H93.293 ABNORMAL AUDITORY PERCEPTION OF BOTH EARS: ICD-10-CM

## 2024-05-28 DIAGNOSIS — J34.2 NASAL SEPTAL DEVIATION: ICD-10-CM

## 2024-05-28 DIAGNOSIS — Z91.81 HISTORY OF FALLING: ICD-10-CM

## 2024-05-28 DIAGNOSIS — H65.31 CHRONIC MUCOID OTITIS MEDIA OF RIGHT EAR: Primary | ICD-10-CM

## 2024-05-28 PROCEDURE — 1160F RVW MEDS BY RX/DR IN RCRD: CPT | Mod: CPTII,S$GLB,, | Performed by: SPECIALIST

## 2024-05-28 PROCEDURE — 3077F SYST BP >= 140 MM HG: CPT | Mod: CPTII,S$GLB,, | Performed by: SPECIALIST

## 2024-05-28 PROCEDURE — 3078F DIAST BP <80 MM HG: CPT | Mod: CPTII,S$GLB,, | Performed by: SPECIALIST

## 2024-05-28 PROCEDURE — 1159F MED LIST DOCD IN RCRD: CPT | Mod: CPTII,S$GLB,, | Performed by: SPECIALIST

## 2024-05-28 PROCEDURE — 99999 PR PBB SHADOW E&M-EST. PATIENT-LVL III: CPT | Mod: PBBFAC,,, | Performed by: SPECIALIST

## 2024-05-28 PROCEDURE — 1126F AMNT PAIN NOTED NONE PRSNT: CPT | Mod: CPTII,S$GLB,, | Performed by: SPECIALIST

## 2024-05-28 PROCEDURE — 3288F FALL RISK ASSESSMENT DOCD: CPT | Mod: CPTII,S$GLB,, | Performed by: SPECIALIST

## 2024-05-28 PROCEDURE — 1101F PT FALLS ASSESS-DOCD LE1/YR: CPT | Mod: CPTII,S$GLB,, | Performed by: SPECIALIST

## 2024-05-28 PROCEDURE — 99214 OFFICE O/P EST MOD 30 MIN: CPT | Mod: S$GLB,,, | Performed by: SPECIALIST

## 2024-05-28 RX ORDER — AMOXICILLIN AND CLAVULANATE POTASSIUM 500; 125 MG/1; MG/1
1 TABLET, FILM COATED ORAL 3 TIMES DAILY
Qty: 28 TABLET | Refills: 0 | Status: SHIPPED | OUTPATIENT
Start: 2024-05-28

## 2024-05-28 RX ORDER — LORATADINE 10 MG/1
10 TABLET ORAL DAILY
Start: 2024-05-28 | End: 2025-05-28

## 2024-06-04 ENCOUNTER — CLINICAL SUPPORT (OUTPATIENT)
Dept: AUDIOLOGY | Facility: CLINIC | Age: 79
End: 2024-06-04
Payer: MEDICARE

## 2024-06-04 DIAGNOSIS — H65.31 CHRONIC MUCOID OTITIS MEDIA OF RIGHT EAR: ICD-10-CM

## 2024-06-04 DIAGNOSIS — H93.293 ABNORMAL AUDITORY PERCEPTION OF BOTH EARS: ICD-10-CM

## 2024-06-04 DIAGNOSIS — H90.3 SENSORINEURAL HEARING LOSS, BILATERAL: Primary | ICD-10-CM

## 2024-06-04 PROCEDURE — 92567 TYMPANOMETRY: CPT | Mod: S$GLB,,, | Performed by: AUDIOLOGIST

## 2024-06-04 PROCEDURE — 99999 PR PBB SHADOW E&M-EST. PATIENT-LVL I: CPT | Mod: PBBFAC,,, | Performed by: AUDIOLOGIST

## 2024-06-04 PROCEDURE — 92557 COMPREHENSIVE HEARING TEST: CPT | Mod: S$GLB,,, | Performed by: AUDIOLOGIST

## 2024-06-09 NOTE — PROGRESS NOTES
Subjective:       Patient ID: Jacqueline M Goldberg is a 78 y.o. female.    Chief Complaint: No chief complaint on file.      The patient is returning for follow-up visit.  There are multiple issues to discuss:  1. Chronic serous otitis media right ear:  The patient is returning to discuss results of her hearing testing that was recently done.  She has an appointment with the [facility] audiologist at [facility] tomorrow to discuss hearing aids.  2. Recurring falling:  The patient has had no further falls since her last visit          Review of Systems     Constitutional: Negative for appetite change, chills, fatigue, fever and unexpected weight loss.      HENT: Positive for hearing loss.  Negative for ear discharge, ear infection, ear pain, facial swelling, mouth sores, nosebleeds, postnasal drip, ringing in the ears, runny nose, sinus infection, sinus pressure, sore throat, stuffy nose, tonsil infection, dental problems, trouble swallowing and voice change.      Eyes:  Negative for change in eyesight, eye drainage, eye itching and photophobia.     Respiratory:  Negative for cough, shortness of breath, sleep apnea, snoring and wheezing.      Cardiovascular:  Negative for chest pain, foot swelling, irregular heartbeat and swollen veins.     Gastrointestinal:  Negative for abdominal pain, acid reflux, constipation, diarrhea, heartburn and vomiting.     Genitourinary: Negative for difficulty urinating, sexual problems and frequent urination.     Musc: Positive for aching joints, aching muscles, back pain and neck pain.     Skin: Negative for rash.     Allergy: Negative for food allergies and seasonal allergies.     Endocrine: Negative for cold intolerance and heat intolerance.   Osteopenia of multiple sites  Hypothyroidism    Neurological: Positive for light-headedness. Negative for dizziness, headaches, seizures and tremors.      Hematologic: Negative for bruises/bleeds easily and swollen glands.      Psychiatric:  Negative for decreased concentration, depression, nervous/anxious and sleep disturbance.                Objective:      Physical Exam  Vitals and nursing note reviewed.   Constitutional:       General: She is awake.      Appearance: Normal appearance. She is well-developed, well-groomed and overweight.   HENT:      Head: Normocephalic.      Jaw: There is normal jaw occlusion.      Salivary Glands: Right salivary gland is not diffusely enlarged or tender. Left salivary gland is not diffusely enlarged or tender.      Right Ear: Ear canal and external ear normal. Decreased hearing noted. Tympanic membrane is retracted. Tympanic membrane has decreased mobility.      Left Ear: Ear canal and external ear normal. Decreased hearing noted. Tympanic membrane is retracted. Tympanic membrane has decreased mobility.      Ears:      Sinclair exam findings: Lateralizes right.     Right Rinne: BC > AC.     Left Rinne: AC > BC.     Nose: Septal deviation (to the left), mucosal edema (cyanotic, boggy inferior turbinates bilaterally) and rhinorrhea (clear mucus bilaterally) present. No nasal deformity. Rhinorrhea is clear.      Right Turbinates: Enlarged and pale.      Left Turbinates: Enlarged and pale.      Mouth/Throat:      Lips: No lesions.      Mouth: Mucous membranes are moist. No oral lesions.      Dentition: No gum lesions.      Tongue: No lesions.      Palate: No mass and lesions.      Pharynx: Oropharynx is clear. Uvula midline.   Eyes:      General: Lids are normal. Vision grossly intact.         Right eye: No discharge.         Left eye: No discharge.      Extraocular Movements: Extraocular movements intact.      Conjunctiva/sclera: Conjunctivae normal.      Pupils: Pupils are equal, round, and reactive to light.   Neck:      Thyroid: No thyroid mass or thyromegaly.      Trachea: Trachea normal. No tracheal deviation.   Cardiovascular:      Rate and Rhythm: Normal rate and regular rhythm.      Pulses: Normal pulses.       Heart sounds: Normal heart sounds.   Pulmonary:      Effort: Pulmonary effort is normal.      Breath sounds: Normal breath sounds. No stridor. No decreased breath sounds, wheezing, rhonchi or rales.   Abdominal:      General: Bowel sounds are normal.      Palpations: Abdomen is soft.      Tenderness: There is no abdominal tenderness.   Musculoskeletal:         General: Normal range of motion.      Cervical back: Normal range of motion. No muscular tenderness.   Lymphadenopathy:      Head:      Right side of head: No submental, submandibular, preauricular, posterior auricular or occipital adenopathy.      Left side of head: No submental, submandibular, preauricular, posterior auricular or occipital adenopathy.      Cervical: No cervical adenopathy.   Skin:     General: Skin is warm and dry.      Findings: No petechiae or rash.      Nails: There is no clubbing.   Neurological:      Mental Status: She is alert and oriented to person, place, and time.      Cranial Nerves: No cranial nerve deficit.      Sensory: No sensory deficit.      Gait: Gait normal.   Psychiatric:         Speech: Speech normal.         Behavior: Behavior normal. Behavior is cooperative.         Thought Content: Thought content normal.         Judgment: Judgment normal.             I personally reviewed the above audiogram and discussed in full detail with the patient during her visit.  Pertinent findings:  Moderate sensorineural hearing loss left ear, moderate mixed hearing loss right ear with normal or near normal auditory discrimination bilaterally it elevated speech levels and  type A audiogram on the left and TypeAs audiogram on the left   Assessment:       1. Mixed conductive and sensorineural hearing loss of right ear with restricted hearing of left ear    2. Dysfunction of right eustachian tube    3. Nasal septal deviation    4. History of falling          Plan:       I am recommending that the patient obtain bilateral amplification.  She  has an appointment with audiologist tomorrow.  I will have the patient continue with the daily use of loratadine and am adding fluticasone nasal spray in an effort to improve her Eustachian tube functions.  I will recheck her in 6 months, or sooner on an as-needed basis.                DISCLAIMER: This note was prepared with [employer] voice recognition transcription software. Garbled syntax, mangled pronouns, and other bizarre constructions may be attributed to that software system. While efforts were made to correct any mistakes made by this voice recognition program, some errors and/or omissions may remain in the note that were missed when the note was originally created.

## 2024-06-10 ENCOUNTER — OFFICE VISIT (OUTPATIENT)
Dept: OTOLARYNGOLOGY | Facility: CLINIC | Age: 79
End: 2024-06-10
Payer: MEDICARE

## 2024-06-10 VITALS
SYSTOLIC BLOOD PRESSURE: 136 MMHG | DIASTOLIC BLOOD PRESSURE: 62 MMHG | BODY MASS INDEX: 34.32 KG/M2 | HEART RATE: 75 BPM | OXYGEN SATURATION: 95 % | WEIGHT: 181.69 LBS

## 2024-06-10 DIAGNOSIS — Z91.81 HISTORY OF FALLING: ICD-10-CM

## 2024-06-10 DIAGNOSIS — J34.2 NASAL SEPTAL DEVIATION: ICD-10-CM

## 2024-06-10 DIAGNOSIS — H90.A31 MIXED CONDUCTIVE AND SENSORINEURAL HEARING LOSS OF RIGHT EAR WITH RESTRICTED HEARING OF LEFT EAR: Primary | ICD-10-CM

## 2024-06-10 DIAGNOSIS — H69.91 DYSFUNCTION OF RIGHT EUSTACHIAN TUBE: ICD-10-CM

## 2024-06-10 PROCEDURE — 3075F SYST BP GE 130 - 139MM HG: CPT | Mod: CPTII,S$GLB,, | Performed by: SPECIALIST

## 2024-06-10 PROCEDURE — 1100F PTFALLS ASSESS-DOCD GE2>/YR: CPT | Mod: CPTII,S$GLB,, | Performed by: SPECIALIST

## 2024-06-10 PROCEDURE — 99999 PR PBB SHADOW E&M-EST. PATIENT-LVL III: CPT | Mod: PBBFAC,,, | Performed by: SPECIALIST

## 2024-06-10 PROCEDURE — 1126F AMNT PAIN NOTED NONE PRSNT: CPT | Mod: CPTII,S$GLB,, | Performed by: SPECIALIST

## 2024-06-10 PROCEDURE — 1160F RVW MEDS BY RX/DR IN RCRD: CPT | Mod: CPTII,S$GLB,, | Performed by: SPECIALIST

## 2024-06-10 PROCEDURE — 1159F MED LIST DOCD IN RCRD: CPT | Mod: CPTII,S$GLB,, | Performed by: SPECIALIST

## 2024-06-10 PROCEDURE — 3288F FALL RISK ASSESSMENT DOCD: CPT | Mod: CPTII,S$GLB,, | Performed by: SPECIALIST

## 2024-06-10 PROCEDURE — 3078F DIAST BP <80 MM HG: CPT | Mod: CPTII,S$GLB,, | Performed by: SPECIALIST

## 2024-06-10 PROCEDURE — 99214 OFFICE O/P EST MOD 30 MIN: CPT | Mod: S$GLB,,, | Performed by: SPECIALIST

## 2024-06-11 ENCOUNTER — CLINICAL SUPPORT (OUTPATIENT)
Dept: AUDIOLOGY | Facility: CLINIC | Age: 79
End: 2024-06-11
Payer: MEDICARE

## 2024-06-11 DIAGNOSIS — H90.3 SENSORINEURAL HEARING LOSS, BILATERAL: Primary | ICD-10-CM

## 2024-06-11 RX ORDER — ERGOCALCIFEROL 1.25 1/1
50000 CAPSULE ORAL
Qty: 12 CAPSULE | Refills: 3 | Status: SHIPPED | OUTPATIENT
Start: 2024-06-11

## 2024-06-11 NOTE — PROGRESS NOTES
HEARING AID CONSULTATION    Jacqueline M Goldberg was seen today for a hearing aid consultation. We reviewed her hearing test results and discussed different levels of technology of hearing aids. Ms. Goldberg was informed of all pricing and service options available through [facility] ([facility]). She was also advised to verify what, if any, discounts or benefits are available with her insurance. Ms. Goldberg understood that [facility] is not in network with any insurance payor.     Ms. Goldberg was informed of the non-refundable $250.00 deposit required to order and that the remaining balance is due the day of .     [employer] L70-RL hearing aids in velvet black were selected. Impressions for custom ear molds were taken without incident. Ms. Goldberg measured for a length 1  in both ears. I will call her upon receipt of the hearing aids to schedule a fitting.

## 2024-06-11 NOTE — TELEPHONE ENCOUNTER
Refill Routing Note   Medication(s) are not appropriate for processing by [facility] for the following reason(s):        Outside of protocol    [facility] action(s):  Route               Appointments  past 12m or future 3m with PCP    Date Provider   Last Visit   5/14/2024 Ras Sharma MD   Next Visit   Visit date not found Ras Sharma MD   ED visits in past 90 days: 0        Note composed:7:44 AM 06/11/2024

## 2024-06-11 NOTE — TELEPHONE ENCOUNTER
No care due was identified.  [facility] Embedded Care Due Messages. Reference number: [id number].   6/10/2024 10:18:15 PM CDT

## 2024-06-12 RX ORDER — FLUTICASONE PROPIONATE 50 MCG
2 SPRAY, SUSPENSION (ML) NASAL DAILY
Qty: 54.6 ML | Refills: 3 | Status: SHIPPED | OUTPATIENT
Start: 2024-06-12

## 2024-06-14 ENCOUNTER — OFFICE VISIT (OUTPATIENT)
Dept: CARDIOLOGY | Facility: CLINIC | Age: 79
End: 2024-06-14
Payer: MEDICARE

## 2024-06-14 VITALS
BODY MASS INDEX: 33.97 KG/M2 | DIASTOLIC BLOOD PRESSURE: 74 MMHG | OXYGEN SATURATION: 96 % | HEART RATE: 79 BPM | WEIGHT: 179.81 LBS | SYSTOLIC BLOOD PRESSURE: 130 MMHG

## 2024-06-14 DIAGNOSIS — I10 PRIMARY HYPERTENSION: ICD-10-CM

## 2024-06-14 DIAGNOSIS — I70.0 AORTIC ATHEROSCLEROSIS: ICD-10-CM

## 2024-06-14 DIAGNOSIS — I25.10 ATHEROSCLEROSIS OF NATIVE CORONARY ARTERY OF NATIVE HEART WITHOUT ANGINA PECTORIS: Primary | ICD-10-CM

## 2024-06-14 DIAGNOSIS — E78.00 HYPERCHOLESTEREMIA: ICD-10-CM

## 2024-06-14 PROCEDURE — 1126F AMNT PAIN NOTED NONE PRSNT: CPT | Mod: CPTII,S$GLB,, | Performed by: INTERNAL MEDICINE

## 2024-06-14 PROCEDURE — 1100F PTFALLS ASSESS-DOCD GE2>/YR: CPT | Mod: CPTII,S$GLB,, | Performed by: INTERNAL MEDICINE

## 2024-06-14 PROCEDURE — 3078F DIAST BP <80 MM HG: CPT | Mod: CPTII,S$GLB,, | Performed by: INTERNAL MEDICINE

## 2024-06-14 PROCEDURE — 3288F FALL RISK ASSESSMENT DOCD: CPT | Mod: CPTII,S$GLB,, | Performed by: INTERNAL MEDICINE

## 2024-06-14 PROCEDURE — 3075F SYST BP GE 130 - 139MM HG: CPT | Mod: CPTII,S$GLB,, | Performed by: INTERNAL MEDICINE

## 2024-06-14 PROCEDURE — 99214 OFFICE O/P EST MOD 30 MIN: CPT | Mod: S$GLB,,, | Performed by: INTERNAL MEDICINE

## 2024-06-14 PROCEDURE — 99999 PR PBB SHADOW E&M-EST. PATIENT-LVL III: CPT | Mod: PBBFAC,,, | Performed by: INTERNAL MEDICINE

## 2024-06-14 PROCEDURE — 1159F MED LIST DOCD IN RCRD: CPT | Mod: CPTII,S$GLB,, | Performed by: INTERNAL MEDICINE

## 2024-06-14 RX ORDER — ATORVASTATIN CALCIUM 10 MG/1
10 TABLET, FILM COATED ORAL DAILY
Qty: 90 TABLET | Refills: 3 | Status: SHIPPED | OUTPATIENT
Start: 2024-06-14 | End: 2025-06-14

## 2024-06-14 RX ORDER — LOSARTAN POTASSIUM 50 MG/1
50 TABLET ORAL 2 TIMES DAILY
Qty: 180 TABLET | Refills: 3 | Status: SHIPPED | OUTPATIENT
Start: 2024-06-14 | End: 2025-06-14

## 2024-06-14 RX ORDER — FUROSEMIDE 40 MG/1
40 TABLET ORAL DAILY PRN
Qty: 90 TABLET | Refills: 3 | Status: SHIPPED | OUTPATIENT
Start: 2024-06-14

## 2024-06-14 RX ORDER — PREDNISOLONE ACETATE 10 MG/ML
1 SUSPENSION/ DROPS OPHTHALMIC
COMMUNITY
Start: 2024-04-08

## 2024-06-14 NOTE — PROGRESS NOTES
[facility]    Chief Complaint  Chief Complaint   Patient presents with    Coronary Artery Disease       HPI:    She has been doing well.  Remains active.  Traveling.  Had a couple falls but seems have been related to inner ear issues associated with bronchitis.  No problems with exertional dyspnea or chest discomfort.    Medications  Current Outpatient Medications   Medication Sig Dispense Refill    albuterol (PROVENTIL/VENTOLIN HFA) 90 mcg/actuation inhaler Inhale 2 puffs into the lungs every 4 (four) hours as needed for Wheezing or Shortness of Breath. Rescue 18 g 1    aspirin (ECOTRIN) 81 MG EC tablet Take 81 mg by mouth once daily.      fluticasone propionate (FLONASE) 50 mcg/actuation nasal spray 2 sprays (100 mcg total) by Each Nostril route once daily. 54.6 mL 3    hydroxychloroquine (PLAQUENIL) 200 mg tablet Take 1 tablet (200 mg total) by mouth once daily. 90 tablet 3    ibuprofen (ADVIL,MOTRIN) 800 MG tablet Take 1 tablet (800 mg total) by mouth 3 (three) times daily as needed for Pain. 30 tablet 2    levothyroxine (SYNTHROID) 50 MCG tablet Take 1 tablet (50 mcg total) by mouth before breakfast. 90 tablet 0    loratadine (CLARITIN) 10 mg tablet Take 1 tablet (10 mg total) by mouth once daily.      methocarbamoL (ROBAXIN) 500 MG Tab Take 1 tablet (500 mg total) by mouth 3 (three) times daily as needed (muscle spasm). 30 tablet 2    prednisoLONE acetate (PRED FORTE) 1 % DrpS Place 1 drop into the left eye.      temazepam (RESTORIL) 15 mg Cap Take 1 capsule (15 mg total) by mouth nightly as needed (insomnia). 30 capsule 1    VITAMIN D2 1,250 mcg (50,000 unit) capsule TAKE 1 CAPSULE BY MOUTH EVERY 7  DAYS 12 capsule 3    amoxicillin-clavulanate 500-125mg (AUGMENTIN) 500-125 mg Tab Take 1 tablet (500 mg total) by mouth 3 (three) times daily. (Patient not taking: Reported on 6/14/2024) 28 tablet 0    atorvastatin (LIPITOR) 10 MG tablet Take 1 tablet (10 mg total) by mouth once daily. 90 tablet 3     furosemide (LASIX) 40 MG tablet Take 1 tablet (40 mg total) by mouth daily as needed (shortness of breath or swelling). 90 tablet 3    losartan (COZAAR) 50 MG tablet Take 1 tablet (50 mg total) by mouth 2 (two) times a day. 180 tablet 3     No current facility-administered medications for this visit.        History  Past Medical History:   Diagnosis Date    Anemia, chronic disease 12/18/2019    Arthritis     Blind left eye     Chronic pain     Coronary atherosclerosis 06/20/2020    Eosinophilia 12/18/2019    Hypothyroidism     Insomnia     Leucocytosis 12/18/2019    Pneumonia     PONV (postoperative nausea and vomiting)     needs phenergan    Scoliosis     Undifferentiated connective tissue disease      Past Surgical History:   Procedure Laterality Date    ANKLE LIGAMENT RECONSTRUCTION Right     CATARACT EXTRACTION Bilateral     CHOLECYSTECTOMY      CORONARY ANGIOGRAPHY N/A 6/19/2020    Procedure: ANGIOGRAM, CORONARY ARTERY - right radial;  Surgeon: Fabrice Rodriguez MD;  Location: [facility] CATH LAB;  Service: Cardiology;  Laterality: N/A;    CORONARY STENT PLACEMENT  06/19/2020    pLAD Nathan 2.75 x 18 mm     Social History     Socioeconomic History    Marital status: Single   Occupational History    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   Tobacco Use    Smoking status: Former    Smokeless tobacco: Former   Substance and Sexual Activity    Alcohol use: Not Currently    Drug use: Never    Sexual activity: Yes     Partners: Male     Birth control/protection: None     Family History   Problem Relation Name Age of Onset    Diabetes Mother      Cancer Mother      Heart disease Father      Arthritis Sister      Cancer Sister      Lupus Neg Hx      Rheum arthritis Neg Hx      Inflammatory bowel disease Neg Hx          Allergies  Review of patient's allergies indicates:  No Known Allergies    Review of Systems   Review of Systems   Constitutional: Negative for malaise/fatigue, weight gain and weight loss.    Eyes:  Negative for visual disturbance.   Cardiovascular:  Negative for chest pain, claudication, cyanosis, dyspnea on exertion, irregular heartbeat, leg swelling, near-syncope, orthopnea, palpitations, paroxysmal nocturnal dyspnea and syncope.   Respiratory:  Negative for cough, hemoptysis, shortness of breath, sleep disturbances due to breathing and wheezing.    Hematologic/Lymphatic: Negative for bleeding problem. Does not bruise/bleed easily.   Skin:  Negative for poor wound healing.   Musculoskeletal:  Negative for muscle cramps and myalgias.   Gastrointestinal:  Negative for abdominal pain, anorexia, diarrhea, heartburn, hematemesis, hematochezia, melena, nausea and vomiting.   Genitourinary:  Negative for hematuria and nocturia.   Neurological:  Negative for excessive daytime sleepiness, dizziness, focal weakness, light-headedness and weakness.       Physical Exam  Vitals:    06/14/24 1036   BP: 130/74   Pulse: 79     Wt Readings from Last 1 Encounters:   06/14/24 81.5 kg (179 lb 12.6 oz)     Physical Exam  Constitutional:       General: She is not in acute distress.     Appearance: She is not toxic-appearing or diaphoretic.   HENT:      Head: Normocephalic and atraumatic.   Eyes:      General: No scleral icterus.     Conjunctiva/sclera: Conjunctivae normal.   Neck:      Thyroid: No thyromegaly.      Vascular: No carotid bruit, hepatojugular reflux or JVD.      Trachea: Trachea normal.   Cardiovascular:      Rate and Rhythm: Normal rate and regular rhythm. No extrasystoles are present.     Chest Wall: PMI is not displaced.      Pulses:           Carotid pulses are 2+ on the right side and 2+ on the left side.       Radial pulses are 2+ on the right side and 2+ on the left side.        Dorsalis pedis pulses are 2+ on the right side and 2+ on the left side.        Posterior tibial pulses are 2+ on the right side and 2+ on the left side.      Heart sounds: S1 normal and S2 normal. No murmur heard.     No S3 or  S4 sounds.   Pulmonary:      Effort: No accessory muscle usage or respiratory distress.      Breath sounds: No decreased breath sounds, wheezing, rhonchi or rales.   Abdominal:      General: Bowel sounds are normal. There is no abdominal bruit.      Palpations: Abdomen is soft. There is no hepatomegaly, splenomegaly or pulsatile mass.      Tenderness: There is no abdominal tenderness.   Musculoskeletal:         General: No tenderness or deformity.      Cervical back: No edema.      Right lower leg: No edema.      Left lower leg: No edema.   Skin:     General: Skin is warm and dry.      Coloration: Skin is not cyanotic or pale.      Nails: There is no clubbing.   Neurological:      General: No focal deficit present.      Mental Status: She is alert and oriented to person, place, and time.   Psychiatric:         Speech: Speech normal.         Behavior: Behavior normal. Behavior is cooperative.         Labs  Lab Visit on 05/14/2024   Component Date Value Ref Range Status    WBC 05/14/2024 11.39  3.90 - 12.70 K/uL Final    RBC 05/14/2024 4.95  4.00 - 5.40 M/uL Final    Hemoglobin 05/14/2024 12.3  12.0 - 16.0 g/dL Final    Hematocrit 05/14/2024 40.4  37.0 - 48.5 % Final    MCV 05/14/2024 82  82 - 98 fL Final    MCH 05/14/2024 24.8 (L)  27.0 - 31.0 pg Final    MCHC 05/14/2024 30.4 (L)  32.0 - 36.0 g/dL Final    RDW 05/14/2024 16.7 (H)  11.5 - 14.5 % Final    Platelets 05/14/2024 297  150 - 450 K/uL Final    MPV 05/14/2024 10.1  9.2 - 12.9 fL Final    Immature Granulocytes 05/14/2024 0.2  0.0 - 0.5 % Final    Gran # (ANC) 05/14/2024 8.3 (H)  1.8 - 7.7 K/uL Final    Immature Grans (Abs) 05/14/2024 0.02  0.00 - 0.04 K/uL Final    Comment: Mild elevation in immature granulocytes is non specific and   can be seen in a variety of conditions including stress response,   acute inflammation, trauma and pregnancy. Correlation with other   laboratory and clinical findings is essential.      Lymph # 05/14/2024 1.3  1.0 - 4.8 K/uL  Final    Mono # 05/14/2024 1.5 (H)  0.3 - 1.0 K/uL Final    Eos # 05/14/2024 0.2  0.0 - 0.5 K/uL Final    Baso # 05/14/2024 0.07  0.00 - 0.20 K/uL Final    nRBC 05/14/2024 0  0 /100 WBC Final    Gran % 05/14/2024 72.8  38.0 - 73.0 % Final    Lymph % 05/14/2024 11.6 (L)  18.0 - 48.0 % Final    Mono % 05/14/2024 13.0  4.0 - 15.0 % Final    Eosinophil % 05/14/2024 1.8  0.0 - 8.0 % Final    Basophil % 05/14/2024 0.6  0.0 - 1.9 % Final    Differential Method 05/14/2024 Automated   Final    Sodium 05/14/2024 140  136 - 145 mmol/L Final    Potassium 05/14/2024 3.8  3.5 - 5.1 mmol/L Final    Chloride 05/14/2024 107  95 - 110 mmol/L Final    CO2 05/14/2024 22 (L)  23 - 29 mmol/L Final    Glucose 05/14/2024 123 (H)  70 - 110 mg/dL Final    BUN 05/14/2024 20  8 - 23 mg/dL Final    Creatinine 05/14/2024 1.1  0.5 - 1.4 mg/dL Final    Calcium 05/14/2024 9.7  8.7 - 10.5 mg/dL Final    Total Protein 05/14/2024 8.0  6.0 - 8.4 g/dL Final    Albumin 05/14/2024 3.9  3.5 - 5.2 g/dL Final    Total Bilirubin 05/14/2024 0.5  0.1 - 1.0 mg/dL Final    Comment: For infants and newborns, interpretation of results should be based  on gestational age, weight and in agreement with clinical  observations.    Premature Infant recommended reference ranges:  Up to 24 hours.............<8.0 mg/dL  Up to 48 hours............<12.0 mg/dL  3-5 days..................<15.0 mg/dL  6-29 days.................<15.0 mg/dL      Alkaline Phosphatase 05/14/2024 112  55 - 135 U/L Final    AST 05/14/2024 13  10 - 40 U/L Final    ALT 05/14/2024 8 (L)  10 - 44 U/L Final    eGFR 05/14/2024 50.8 (A)  >60 mL/min/1.73 m^2 Final    Anion Gap 05/14/2024 11  8 - 16 mmol/L Final    BNP 05/14/2024 26  0 - 99 pg/mL Final    Values of less than 100 pg/ml are consistent with non-CHF populations.   Office Visit on 05/14/2024   Component Date Value Ref Range Status    QRS Duration 05/14/2024 126  ms Final    [facility] QTC Calculation 05/14/2024 459  ms Final   Lab Visit on 04/09/2024    Component Date Value Ref Range Status    Complement (C-4) 04/09/2024 53 (H)  11 - 44 mg/dL Final    Complement (C-3) 04/09/2024 148  50 - 180 mg/dL Final    WBC 04/09/2024 9.36  3.90 - 12.70 K/uL Final    RBC 04/09/2024 5.01  4.00 - 5.40 M/uL Final    Hemoglobin 04/09/2024 12.3  12.0 - 16.0 g/dL Final    Hematocrit 04/09/2024 40.9  37.0 - 48.5 % Final    MCV 04/09/2024 82  82 - 98 fL Final    MCH 04/09/2024 24.6 (L)  27.0 - 31.0 pg Final    MCHC 04/09/2024 30.1 (L)  32.0 - 36.0 g/dL Final    RDW 04/09/2024 17.0 (H)  11.5 - 14.5 % Final    Platelets 04/09/2024 287  150 - 450 K/uL Final    MPV 04/09/2024 10.0  9.2 - 12.9 fL Final    Immature Granulocytes 04/09/2024 0.3  0.0 - 0.5 % Final    Gran # (ANC) 04/09/2024 6.7  1.8 - 7.7 K/uL Final    Immature Grans (Abs) 04/09/2024 0.03  0.00 - 0.04 K/uL Final    Comment: Mild elevation in immature granulocytes is non specific and   can be seen in a variety of conditions including stress response,   acute inflammation, trauma and pregnancy. Correlation with other   laboratory and clinical findings is essential.      Lymph # 04/09/2024 1.7  1.0 - 4.8 K/uL Final    Mono # 04/09/2024 0.7  0.3 - 1.0 K/uL Final    Eos # 04/09/2024 0.2  0.0 - 0.5 K/uL Final    Baso # 04/09/2024 0.06  0.00 - 0.20 K/uL Final    nRBC 04/09/2024 0  0 /100 WBC Final    Gran % 04/09/2024 71.4  38.0 - 73.0 % Final    Lymph % 04/09/2024 17.9 (L)  18.0 - 48.0 % Final    Mono % 04/09/2024 7.9  4.0 - 15.0 % Final    Eosinophil % 04/09/2024 1.9  0.0 - 8.0 % Final    Basophil % 04/09/2024 0.6  0.0 - 1.9 % Final    Differential Method 04/09/2024 Automated   Final    CPK 04/09/2024 45  20 - 180 U/L Final    Sodium 04/09/2024 139  136 - 145 mmol/L Final    Potassium 04/09/2024 5.1  3.5 - 5.1 mmol/L Final    Chloride 04/09/2024 101  95 - 110 mmol/L Final    CO2 04/09/2024 30 (H)  23 - 29 mmol/L Final    Glucose 04/09/2024 107  70 - 110 mg/dL Final    BUN 04/09/2024 21  8 - 23 mg/dL Final    Creatinine 04/09/2024  0.9  0.5 - 1.4 mg/dL Final    Calcium 04/09/2024 9.9  8.7 - 10.5 mg/dL Final    Total Protein 04/09/2024 7.4  6.0 - 8.4 g/dL Final    Albumin 04/09/2024 4.0  3.5 - 5.2 g/dL Final    Total Bilirubin 04/09/2024 0.5  0.1 - 1.0 mg/dL Final    Comment: For infants and newborns, interpretation of results should be based  on gestational age, weight and in agreement with clinical  observations.    Premature Infant recommended reference ranges:  Up to 24 hours.............<8.0 mg/dL  Up to 48 hours............<12.0 mg/dL  3-5 days..................<15.0 mg/dL  6-29 days.................<15.0 mg/dL      Alkaline Phosphatase 04/09/2024 114  55 - 135 U/L Final    AST 04/09/2024 14  10 - 40 U/L Final    ALT 04/09/2024 13  10 - 44 U/L Final    eGFR 04/09/2024 >60.0  >60 mL/min/1.73 m^2 Final    Anion Gap 04/09/2024 8  8 - 16 mmol/L Final    ds DNA Ab 04/09/2024 Negative 1:10  Negative 1:10 Final    Performed by fluorescent crithidia assay.    CRP 04/09/2024 0.8  0.0 - 8.2 mg/L Final    Sed Rate 04/09/2024 29  0 - 36 mm/Hr Final   Lab Visit on 04/09/2024   Component Date Value Ref Range Status    Specimen UA 04/09/2024 Urine, Clean Catch   Final    Color, UA 04/09/2024 Yellow  Yellow, Straw, Rita Final    Appearance, UA 04/09/2024 Clear  Clear Final    pH, UA 04/09/2024 6.0  5.0 - 8.0 Final    Specific Gravity, UA 04/09/2024 1.020  1.005 - 1.030 Final    Protein, UA 04/09/2024 Negative  Negative Final    Comment: Recommend a 24 hour urine protein or a urine   protein/creatinine ratio if globulin induced proteinuria is  clinically suspected.      Glucose, UA 04/09/2024 Negative  Negative Final    Ketones, UA 04/09/2024 Negative  Negative Final    Bilirubin (UA) 04/09/2024 Negative  Negative Final    Occult Blood UA 04/09/2024 Negative  Negative Final    Nitrite, UA 04/09/2024 Negative  Negative Final    Leukocytes, UA 04/09/2024 Trace (A)  Negative Final    Protein, Urine Random 04/09/2024 8  0 - 15 mg/dL Final    Creatinine,  Urine 04/09/2024 132.0  15.0 - 325.0 mg/dL Final    Prot/Creat Ratio, Urine 04/09/2024 0.06  0.00 - 0.20 Final    RBC, UA 04/09/2024 0  0 - 4 /hpf Final    WBC, UA 04/09/2024 4  0 - 5 /hpf Final    Bacteria 04/09/2024 Rare  None-Occ /hpf Final    Squam Epithel, UA 04/09/2024 1  /hpf Final    Microscopic Comment 04/09/2024 SEE COMMENT   Final    Comment: Other formed elements not mentioned in the report are not   present in the microscopic examination.      Lab Visit on 02/26/2024   Component Date Value Ref Range Status    Urine Culture, Routine 02/26/2024 No growth   Final   Lab Visit on 02/07/2024   Component Date Value Ref Range Status    Specimen UA 02/07/2024 Urine, Clean Catch   Final    Color, UA 02/07/2024 Straw  Yellow, Straw, Rita Final    Appearance, UA 02/07/2024 Clear  Clear Final    pH, UA 02/07/2024 5.0  5.0 - 8.0 Final    Specific Gravity, UA 02/07/2024 1.010  1.005 - 1.030 Final    Protein, UA 02/07/2024 Negative  Negative Final    Comment: Recommend a 24 hour urine protein or a urine   protein/creatinine ratio if globulin induced proteinuria is  clinically suspected.      Glucose, UA 02/07/2024 Negative  Negative Final    Ketones, UA 02/07/2024 Negative  Negative Final    Bilirubin (UA) 02/07/2024 Negative  Negative Final    Occult Blood UA 02/07/2024 Negative  Negative Final    Nitrite, UA 02/07/2024 Negative  Negative Final    Leukocytes, UA 02/07/2024 2+ (A)  Negative Final    Urine Culture, Routine 02/07/2024  (A)   Final                    Value:ESCHERICHIA COLI  >100,000 cfu/ml      RBC, UA 02/07/2024 1  0 - 4 /hpf Final    WBC, UA 02/07/2024 24 (H)  0 - 5 /hpf Final    Bacteria 02/07/2024 Rare  None-Occ /hpf Final    Squam Epithel, UA 02/07/2024 2  /hpf Final    Microscopic Comment 02/07/2024 SEE COMMENT   Final    Comment: Other formed elements not mentioned in the report are not   present in the microscopic examination.      Lab Visit on 01/25/2024   Component Date Value Ref Range Status     Specimen UA 01/25/2024 Urine, Clean Catch   Final    Color, UA 01/25/2024 Yellow  Yellow, Straw, Rita Final    Appearance, UA 01/25/2024 Hazy (A)  Clear Final    pH, UA 01/25/2024 6.0  5.0 - 8.0 Final    Specific Gravity, UA 01/25/2024 >1.030 (A)  1.005 - 1.030 Final    Protein, UA 01/25/2024 1+ (A)  Negative Final    Comment: Recommend a 24 hour urine protein or a urine   protein/creatinine ratio if globulin induced proteinuria is  clinically suspected.      Glucose, UA 01/25/2024 Negative  Negative Final    Ketones, UA 01/25/2024 Negative  Negative Final    Bilirubin (UA) 01/25/2024 Negative  Negative Final    Occult Blood UA 01/25/2024 Negative  Negative Final    Nitrite, UA 01/25/2024 Negative  Negative Final    Urobilinogen, UA 01/25/2024 Negative  Negative EU/dL Final    Leukocytes, UA 01/25/2024 3+ (A)  Negative Final    Protein, Urine Random 01/25/2024 22 (H)  0 - 15 mg/dL Final    Creatinine, Urine 01/25/2024 201.0  15.0 - 325.0 mg/dL Final    Prot/Creat Ratio, Urine 01/25/2024 0.11  0.00 - 0.20 Final    RBC, UA 01/25/2024 9 (H)  0 - 4 /hpf Final    WBC, UA 01/25/2024 94 (H)  0 - 5 /hpf Final    Bacteria 01/25/2024 Rare  None-Occ /hpf Final    Squam Epithel, UA 01/25/2024 20  /hpf Final    Non-Squam Epith 01/25/2024 2 (A)  <1/hpf /hpf Final    Hyaline Casts, UA 01/25/2024 0  0-1/lpf /lpf Final    Microscopic Comment 01/25/2024 SEE COMMENT   Final    Comment: Other formed elements not mentioned in the report are not   present in the microscopic examination.      Lab Visit on 01/25/2024   Component Date Value Ref Range Status    Complement (C-4) 01/25/2024 54 (H)  11 - 44 mg/dL Final    Complement (C-3) 01/25/2024 144  50 - 180 mg/dL Final    WBC 01/25/2024 8.84  3.90 - 12.70 K/uL Final    RBC 01/25/2024 4.92  4.00 - 5.40 M/uL Final    Hemoglobin 01/25/2024 11.9 (L)  12.0 - 16.0 g/dL Final    Hematocrit 01/25/2024 39.4  37.0 - 48.5 % Final    MCV 01/25/2024 80 (L)  82 - 98 fL Final    MCH 01/25/2024 24.2  (L)  27.0 - 31.0 pg Final    MCHC 01/25/2024 30.2 (L)  32.0 - 36.0 g/dL Final    RDW 01/25/2024 17.0 (H)  11.5 - 14.5 % Final    Platelets 01/25/2024 298  150 - 450 K/uL Final    MPV 01/25/2024 10.3  9.2 - 12.9 fL Final    Immature Granulocytes 01/25/2024 0.1  0.0 - 0.5 % Final    Gran # (ANC) 01/25/2024 6.0  1.8 - 7.7 K/uL Final    Immature Grans (Abs) 01/25/2024 0.01  0.00 - 0.04 K/uL Final    Comment: Mild elevation in immature granulocytes is non specific and   can be seen in a variety of conditions including stress response,   acute inflammation, trauma and pregnancy. Correlation with other   laboratory and clinical findings is essential.      Lymph # 01/25/2024 1.8  1.0 - 4.8 K/uL Final    Mono # 01/25/2024 0.7  0.3 - 1.0 K/uL Final    Eos # 01/25/2024 0.3  0.0 - 0.5 K/uL Final    Baso # 01/25/2024 0.05  0.00 - 0.20 K/uL Final    nRBC 01/25/2024 0  0 /100 WBC Final    Gran % 01/25/2024 68.2  38.0 - 73.0 % Final    Lymph % 01/25/2024 20.7  18.0 - 48.0 % Final    Mono % 01/25/2024 7.6  4.0 - 15.0 % Final    Eosinophil % 01/25/2024 2.8  0.0 - 8.0 % Final    Basophil % 01/25/2024 0.6  0.0 - 1.9 % Final    Differential Method 01/25/2024 Automated   Final    CPK 01/25/2024 43  20 - 180 U/L Final    Sodium 01/25/2024 142  136 - 145 mmol/L Final    Potassium 01/25/2024 4.7  3.5 - 5.1 mmol/L Final    Chloride 01/25/2024 105  95 - 110 mmol/L Final    CO2 01/25/2024 27  23 - 29 mmol/L Final    Glucose 01/25/2024 106  70 - 110 mg/dL Final    BUN 01/25/2024 34 (H)  8 - 23 mg/dL Final    Creatinine 01/25/2024 0.9  0.5 - 1.4 mg/dL Final    Calcium 01/25/2024 9.7  8.7 - 10.5 mg/dL Final    Total Protein 01/25/2024 7.3  6.0 - 8.4 g/dL Final    Albumin 01/25/2024 3.8  3.5 - 5.2 g/dL Final    Total Bilirubin 01/25/2024 0.6  0.1 - 1.0 mg/dL Final    Comment: For infants and newborns, interpretation of results should be based  on gestational age, weight and in agreement with clinical  observations.    Premature Infant recommended  reference ranges:  Up to 24 hours.............<8.0 mg/dL  Up to 48 hours............<12.0 mg/dL  3-5 days..................<15.0 mg/dL  6-29 days.................<15.0 mg/dL      Alkaline Phosphatase 01/25/2024 123  55 - 135 U/L Final    AST 01/25/2024 17  10 - 40 U/L Final    ALT 01/25/2024 14  10 - 44 U/L Final    eGFR 01/25/2024 >60.0  >60 mL/min/1.73 m^2 Final    Anion Gap 01/25/2024 10  8 - 16 mmol/L Final    ds DNA Ab 01/25/2024 Negative 1:10  Negative 1:10 Final    Performed by fluorescent crithidia assay.    CRP 01/25/2024 2.1  0.0 - 8.2 mg/L Final    Sed Rate 01/25/2024 31 (H)  0 - 20 mm/Hr Final       Imaging  CT Chest Without Contrast    Result Date: 5/21/2024  EXAMINATION: CT CHEST WITHOUT CONTRAST CLINICAL HISTORY: Pneumonia, unresolved; Pneumonia, unspecified organism TECHNIQUE: Low dose axial images, sagittal and coronal reformations were obtained from the thoracic inlet to the lung bases. Contrast was not administered. COMPARISON: Chest radiograph dated 05/14/2024 FINDINGS: Structures at the base of the neck are unremarkable.  No intrathoracic adenopathy.  No anterior pericardial effusion.  Scattered aortic atherosclerosis and prior coronary stent.  The trachea is clear. Lungs are symmetrically expanded with incidental right azygous fissure.  Mild upper lobe centrilobular emphysema.  Somewhat band-like, irregular subsegmental density at the anterior left apex suggesting atelectasis or scar.  Additional subsegmental atelectasis or scar at the lingula and to a lesser extent the more central right middle lobe.  Clustered areas of grouped micro-nodularity at the bilateral upper lobes and lower lobes.  Next no pleural effusion or pneumothorax. Limited images through the unenhanced upper abdomen demonstrate prior cholecystectomy.  Tiny fat attenuating 0.5 cm focus at the left kidney suggesting small angiomyolipoma.  No aggressive osseous lesion.  Spine scoliosis with multilevel DJD.     Clustered areas of  pulmonary micro-nodularity at the bilateral upper lobes and lower lobes with appearance suggesting infectious or non-infectious small airways disease.  Subsequent follow-up can be obtained as warranted to assess for improvement or resolution. Prior cholecystectomy and additional findings as above. Electronically signed by: Guillermo Bullock Date:    05/21/2024 Time:    17:49      Assessment  1. Atherosclerosis of native coronary artery of native heart without angina pectoris  Stable and free of angina    2. Hypercholesteremia  Due for reassessment  - atorvastatin (LIPITOR) 10 MG tablet; Take 1 tablet (10 mg total) by mouth once daily.  Dispense: 90 tablet; Refill: 3  - Comprehensive Metabolic Panel; Future  - Lipid Panel; Future    3. Primary hypertension  Controlled  - furosemide (LASIX) 40 MG tablet; Take 1 tablet (40 mg total) by mouth daily as needed (shortness of breath or swelling).  Dispense: 90 tablet; Refill: 3  - losartan (COZAAR) 50 MG tablet; Take 1 tablet (50 mg total) by mouth 2 (two) times a day.  Dispense: 180 tablet; Refill: 3    4. Aortic atherosclerosis  Noted on prior imaging.  Continue with risk factor modification efforts.      Plan and Discussion    No change to current guideline directed medical therapy.    The ASCVD Risk score ([place] DK, et al., 2019) failed to calculate for the following reasons:    The 2019 ASCVD risk score is only valid for ages 40 to 79     Follow Up  Follow up in about 6 months (around 12/14/2024).      Fabrice Rodriguez MD

## 2024-06-24 ENCOUNTER — TELEPHONE (OUTPATIENT)
Dept: AUDIOLOGY | Facility: CLINIC | Age: 79
End: 2024-06-24
Payer: MEDICARE

## 2024-06-24 NOTE — TELEPHONE ENCOUNTER
Called patient to schedule hearing aid fitting; hearing aids arrived from . She selected Friday at 11 am.

## 2024-06-25 ENCOUNTER — DOCUMENTATION ONLY (OUTPATIENT)
Dept: AUDIOLOGY | Facility: CLINIC | Age: 79
End: 2024-06-25
Payer: MEDICARE

## 2024-06-25 NOTE — PROGRESS NOTES
Hearing aids arrived from ; devices were assembled and charged in preparation for fitting. Patient was called to schedule fitting.     Hearing Aid Details      & Model: [employer] Games[employer] L70-RL  Color: velvet black  Rt SN: [id number]  Lt SN: [id number]  Battery: LI Rechargeable  Rt  and Dome: 1M [id number] ACC: 370 [id number]   Lt  and Dome: 1M [id number] ACC: 365 673  [employer] Warranty Exp: 10/18/2024  Repair Warranty Exp: 07/20/2027  L&D Warranty Exp: 07/20/2027   SN: [id number]

## 2024-06-28 ENCOUNTER — CLINICAL SUPPORT (OUTPATIENT)
Dept: AUDIOLOGY | Facility: CLINIC | Age: 79
End: 2024-06-28
Payer: MEDICARE

## 2024-06-28 DIAGNOSIS — H90.3 SENSORINEURAL HEARING LOSS, BILATERAL: Primary | ICD-10-CM

## 2024-06-28 NOTE — PROGRESS NOTES
HEARING AID FITTING    Jacqueline M Goldberg was seen today for a hearing aid fitting. Real ear and feedback measures were taken and hearing aid was fit to patient's satisfaction.  We discussed realistic expectations of hearing soft sounds in the environment and as well as her own voice. Counseled patient on daily wear and maintenance of the hearing aid.  Ms. Goldberg acknowledged that she understood.      Custom molds were not comfortable to the patient and vent was too occluding to allow for comfortable sound of her own voice. Real ear was completed using medium level receivers and medium power domes. Receivers were changed to standard level receivers with power domes and patient reported improvement but was not able to tolerate her own voice. Soft gain reduction was increased in all programs with little to no improvement. Low frequency gain was decreased with some improvement but not enough for patient to tolerate wearing the hearing aids. She enjoyed the quality of my voice but could not tolerate her own voice. She is leaving for out of town the 20th of July and would like to try to resolve her issue before then.    Of note: Widex SmartRIC's were trialed in office, and patient was more satisfied with the sound of the [employer] hearing aids more.     I will send her file to [facility] and call her next week with next steps.    Hearing Aid Details      & Model: Phonak Audeo L70-RL  Color: velvet black  Rt SN: [id number]  Lt SN: [id number]  Battery: LI Rechargeable  Rt  and Dome: 1M [id number] ACC: 370 080   Lt  and Dome: 1M [id number] ACC: 365 673  [employer] Warranty Exp: 10/18/2024  Repair Warranty Exp: 07/20/2027  L&D Warranty Exp: 07/20/2027   SN: [id number]

## 2024-07-02 DIAGNOSIS — E03.9 ACQUIRED HYPOTHYROIDISM: Chronic | ICD-10-CM

## 2024-07-02 RX ORDER — LEVOTHYROXINE SODIUM 50 UG/1
50 TABLET ORAL
Qty: 90 TABLET | Refills: 0 | Status: SHIPPED | OUTPATIENT
Start: 2024-07-02

## 2024-07-02 NOTE — TELEPHONE ENCOUNTER
Provider Staff:  Action required for this patient    Requires labs      Please see care gap opportunities below in Care Due Message.    Thanks!  [facility]     Appointments      Date Provider   Last Visit   5/14/2024 Ras Sharma MD   Next Visit   Visit date not found Ras Sharma MD     Refill Decision Note   Jacqueline Goldberg  is requesting a refill authorization.  Brief Assessment and Rationale for Refill:  Approve     Medication Therapy Plan:         Comments:     Note composed:3:06 AM 07/02/2024

## 2024-07-02 NOTE — TELEPHONE ENCOUNTER
Care Due:                  Date            Visit Type   Department     Provider  --------------------------------------------------------------------------------                                EP -                              PRIMARY      [facility]  Last Visit: 05-      CARE ([facility])   PRIMARY CARE   Ras Sharma  Next Visit: None Scheduled  None         None Found                                                            Last  Test          Frequency    Reason                     Performed    Due Date  --------------------------------------------------------------------------------    TSH.........  12 months..  levothyroxine............  07- 06-    Vitamin D...  12 months..  VITAMIN..................  07- 06-    [employer] Embedded Care Due Messages. Reference number: [id number].   7/02/2024 12:21:05 AM CDT

## 2024-07-10 ENCOUNTER — CLINICAL SUPPORT (OUTPATIENT)
Dept: AUDIOLOGY | Facility: CLINIC | Age: 79
End: 2024-07-10
Payer: MEDICARE

## 2024-07-10 DIAGNOSIS — H90.3 SENSORINEURAL HEARING LOSS, BILATERAL: Primary | ICD-10-CM

## 2024-07-10 NOTE — PROGRESS NOTES
Jacqueline M Goldberg was seen today for a second attempt at a hearing aid fitting. She was unhappy with the sound of her voice using the hearing aids and decided to step away for a moment. Today she was happy with the changes made and decided to proceed with her trial.   Feedback measures were taken using open domes and hearing aid was fit to patient's satisfaction.    Counseled patient on daily wear and maintenance of the hearing aid. All parts of the hearing aid, including battery, domes, wax filters, and microphones, were discussed with the patient.  Battery charging was also reviewed and practiced with the patient.  Ms. Goldberg acknowledged that she understood.   The hearing aids were connected to the patient's phone.  Bluetooth settings were tested successfully in office.   The purchase agreement, 30-day trial period, and warranties were also reviewed with patient.  It is recommended Ms. Goldberg is scheduled to return to clinic on July 18th prior to leaving for her vacation. At this visit her right  will be changed to use a cerustop filter instead of cerushield.    Itemized: 10/8/2024    Hearing Aid Details     & Model: Phonak Audeo L70-RL  Color: velvet black  Rt SN: [id number]  Lt SN: [id number]  Battery: LI Rechargeable  Rt  and Dome: 1S; large open  Lt  and Dome: 1S; large open  Repair Warranty Exp: 07/20/2027  L&D Warranty Exp: 07/20/2027   SN: [id number]

## 2024-08-14 DIAGNOSIS — M35.9 UNDIFFERENTIATED CONNECTIVE TISSUE DISEASE: ICD-10-CM

## 2024-08-15 RX ORDER — HYDROXYCHLOROQUINE SULFATE 200 MG/1
200 TABLET, FILM COATED ORAL
Qty: 90 TABLET | Refills: 1 | Status: SHIPPED | OUTPATIENT
Start: 2024-08-15

## 2024-08-22 ENCOUNTER — CLINICAL SUPPORT (OUTPATIENT)
Dept: AUDIOLOGY | Facility: CLINIC | Age: 79
End: 2024-08-22
Payer: MEDICARE

## 2024-08-22 DIAGNOSIS — H90.3 SENSORINEURAL HEARING LOSS, BILATERAL: Primary | ICD-10-CM

## 2024-08-22 PROCEDURE — 99499 UNLISTED E&M SERVICE: CPT | Mod: S$GLB,,, | Performed by: AUDIOLOGIST

## 2024-08-22 NOTE — PROGRESS NOTES
HEARING AID FOLLOW-UP    Jacqueline M Goldberg was seen today for a hearing aid follow-up visit. She reported that she has adjusted to sound well with the hearing aids. She was happy to be able to hear the cross conversations when at a dinner table. There were two gentlemen at two different scenarios that she could not understand. Jennifer reported that she does not wear the hearing aids when home alone/watching television. She noted that she can hear the television fine. We discussed the benefits of wearing the hearing aids as many hours as possible to improve speech understanding. Hearing aids were connected to software and data logging revealed just shy of an average of 5 hours a day of usage. Gain at all frequencies was increased 3 clicks. We reviewed volume control both on the rocker switch as well as on the adilia.     Jennifer reported that when she refreshes the junk mail folder in her phone mailbox, the sound is too loud. Sound settings for mail were turned off. All other sounds like the phone ringing are fine.     It was recommended Jennifer return to clinic in 2 months to increase gain again and possibly turn on auto-acclimating gain increases.

## 2024-09-03 PROBLEM — I27.20 MODERATE PULMONARY HYPERTENSION: Status: ACTIVE | Noted: 2024-09-03

## 2024-09-03 PROBLEM — H40.52X4: Status: ACTIVE | Noted: 2024-09-03

## 2024-09-03 PROBLEM — H90.3 SENSORINEURAL HEARING LOSS (SNHL), BILATERAL: Status: ACTIVE | Noted: 2024-09-03

## 2024-09-03 PROBLEM — H65.31 CHRONIC MUCOID OTITIS MEDIA OF RIGHT EAR: Status: ACTIVE | Noted: 2024-09-03

## 2024-09-03 PROBLEM — M85.80 OSTEOPENIA WITH HIGH RISK OF FRACTURE: Status: ACTIVE | Noted: 2023-07-05

## 2024-09-03 PROBLEM — Z79.899 LONG-TERM USE OF PLAQUENIL: Status: ACTIVE | Noted: 2024-09-03

## 2024-09-03 PROBLEM — H35.3111 EARLY DRY STAGE NONEXUDATIVE AGE-RELATED MACULAR DEGENERATION OF RIGHT EYE: Status: ACTIVE | Noted: 2024-09-03

## 2024-09-12 ENCOUNTER — TELEPHONE (OUTPATIENT)
Dept: ADMINISTRATIVE | Facility: CLINIC | Age: 79
End: 2024-09-12
Payer: MEDICARE

## 2024-09-13 ENCOUNTER — OFFICE VISIT (OUTPATIENT)
Dept: INTERNAL MEDICINE | Facility: CLINIC | Age: 79
End: 2024-09-13
Payer: MEDICARE

## 2024-09-13 DIAGNOSIS — E55.9 VITAMIN D DEFICIENCY: Chronic | ICD-10-CM

## 2024-09-13 DIAGNOSIS — I25.10 ATHEROSCLEROSIS OF NATIVE CORONARY ARTERY OF NATIVE HEART WITHOUT ANGINA PECTORIS: ICD-10-CM

## 2024-09-13 DIAGNOSIS — I27.20 MODERATE PULMONARY HYPERTENSION: ICD-10-CM

## 2024-09-13 DIAGNOSIS — H35.3111 EARLY DRY STAGE NONEXUDATIVE AGE-RELATED MACULAR DEGENERATION OF RIGHT EYE: ICD-10-CM

## 2024-09-13 DIAGNOSIS — N18.31 CKD STAGE 3A, GFR 45-59 ML/MIN: ICD-10-CM

## 2024-09-13 DIAGNOSIS — Z00.00 ENCOUNTER FOR MEDICARE ANNUAL WELLNESS EXAM: Primary | ICD-10-CM

## 2024-09-13 DIAGNOSIS — E21.1 HYPERPARATHYROIDISM DUE TO VITAMIN D DEFICIENCY: ICD-10-CM

## 2024-09-13 DIAGNOSIS — M85.80 OSTEOPENIA WITH HIGH RISK OF FRACTURE: ICD-10-CM

## 2024-09-13 DIAGNOSIS — M35.9 UNDIFFERENTIATED CONNECTIVE TISSUE DISEASE: ICD-10-CM

## 2024-09-13 DIAGNOSIS — R73.03 PRE-DIABETES: Chronic | ICD-10-CM

## 2024-09-13 DIAGNOSIS — H40.52X4 GLAUCOMA SECONDARY TO OTHER EYE DISORDERS, LEFT EYE, INDETERMINATE STAGE: ICD-10-CM

## 2024-09-13 DIAGNOSIS — I50.32 CHRONIC DIASTOLIC HEART FAILURE: ICD-10-CM

## 2024-09-13 DIAGNOSIS — H90.3 SENSORINEURAL HEARING LOSS (SNHL), BILATERAL: ICD-10-CM

## 2024-09-13 DIAGNOSIS — E78.5 HYPERLIPIDEMIA, UNSPECIFIED HYPERLIPIDEMIA TYPE: ICD-10-CM

## 2024-09-13 DIAGNOSIS — E66.01 SEVERE OBESITY (BMI 35.0-39.9) WITH COMORBIDITY: ICD-10-CM

## 2024-09-13 DIAGNOSIS — E03.9 ACQUIRED HYPOTHYROIDISM: Chronic | ICD-10-CM

## 2024-09-13 DIAGNOSIS — I70.0 AORTIC ATHEROSCLEROSIS: ICD-10-CM

## 2024-09-13 DIAGNOSIS — F51.01 PRIMARY INSOMNIA: Chronic | ICD-10-CM

## 2024-09-13 DIAGNOSIS — H65.31 CHRONIC MUCOID OTITIS MEDIA OF RIGHT EAR: ICD-10-CM

## 2024-09-13 RX ORDER — BRINZOLAMIDE/BRIMONIDINE TARTRATE 10; 2 MG/ML; MG/ML
SUSPENSION/ DROPS OPHTHALMIC
COMMUNITY
Start: 2024-07-19

## 2024-09-13 NOTE — PATIENT INSTRUCTIONS
Counseling and Referral of Other Preventative  (Italic type indicates deductible and co-insurance are waived)    Patient Name: Jacqueline Goldberg  Today's Date: 9/13/2024    Health Maintenance       Date Due Completion Date    TETANUS VACCINE Never done ---    Shingles Vaccine (2 of 2) 01/15/2024 11/20/2023    Hemoglobin A1c (Prediabetes) 07/05/2024 7/5/2023    Influenza Vaccine (1) 09/01/2024 9/29/2022    COVID-19 Vaccine (7 - 2023-24 season) 09/01/2024 11/20/2023    Aspirin/Antiplatelet Therapy 06/14/2025 6/14/2024    DEXA Scan 07/05/2026 7/5/2023    Override on 5/16/2018: Declined    Lipid Panel 06/14/2029 6/14/2024        Orders Placed This Encounter   Procedures    HEMOGLOBIN A1C     The following information is provided to all patients.  This information is to help you find resources for any of the problems found today that may be affecting your health:                  Living healthy guide: www.[facility].[place].gov      Understanding Diabetes: www.diabetes.org      Eating healthy: www.cdc.gov/healthyweight      CDC home safety checklist: www.cdc.gov/steadi/patient.html      Agency on Aging: www.goea.[place].gov      Alcoholics anonymous (AA): www.aa.org      Physical Activity: www.ramón.nih.gov/hd4yvfb      Tobacco use: www.quitwithusla.org

## 2024-09-13 NOTE — PROGRESS NOTES
The patient location is: [place]  The chief complaint leading to consultation is: Medicare eAWV    Visit type: audiovisual    Face to Face time with patient: 31 minutes  60 minutes of total time spent on the encounter, which includes face to face time and non-face to face time preparing to see the patient (eg, review of tests), Obtaining and/or reviewing separately obtained history, Documenting clinical information in the electronic or other health record, Independently interpreting results (not separately reported) and communicating results to the patient/family/caregiver, or Care coordination (not separately reported).         Each patient to whom he or she provides medical services by telemedicine is:  (1) informed of the relationship between the physician and patient and the respective role of any other health care provider with respect to management of the patient; and (2) notified that he or she may decline to receive medical services by telemedicine and may withdraw from such care at any time.    Notes:       Jacqueline Goldberg presented for a Medicare AWV today. The following components were reviewed and updated:    Medical history  Family History  Social history  Allergies and Current Medications  Health Risk Assessment  Health Maintenance  Care Team    **See Completed Assessments for Annual Wellness visit with in the encounter summary    The following assessments were completed:  Depression Screening  Cognitive function Screening    Opioid documentation:      Patient does not have a current opioid prescription.          There were no vitals filed for this visit.  There is no height or weight on file to calculate BMI.       Physical Exam  Constitutional:       Appearance: Normal appearance.   HENT:      Head: Normocephalic and atraumatic.   Pulmonary:      Effort: Pulmonary effort is normal.   Neurological:      General: No focal deficit present.      Mental Status: She is alert and oriented to person,  place, and time.   Psychiatric:         Mood and Affect: Mood normal.         Behavior: Behavior normal.         Thought Content: Thought content normal.         Judgment: Judgment normal.       Diagnoses and health risks identified today and associated recommendations/orders:  1. Encounter for Medicare annual wellness exam  Reviewed and discussed preventive health screenings and vaccinations with the patient.   Hemoglobin A1c discussed, ordered. Patient will plan to complete next time she is at the lab     2. Undifferentiated connective tissue disease  On Plaquenil. Stable. Continue current treatment plan as previously prescribed with your Rheumatologist.     3. Moderate pulmonary hypertension  Stable, on Lasix PRN for fluid-volume management. Continue current treatment plan as previously prescribed with your Cardiologist and PCP     4. Aortic atherosclerosis  On ASA and statin. Stable. Continue current treatment plan as previously prescribed with your Cardiologist.     5. Atherosclerosis of native coronary artery of native heart without angina pectoris  On ASA and statin. Stable. Continue current treatment plan as previously prescribed with your Cardiologist.     6. Chronic diastolic heart failure  On Lasix for fluid volume management. Stable. Continue current treatment plan as previously prescribed with your Cardiologist.     7. Severe obesity (BMI 35.0-39.9) with comorbidity  Discussed comprehensive lifestyle interventions for obesity, including focusing on a diet that is low-fat/low-calorie with emphasis on fresh vegetables, fruits, and lean meats, as well as a physical activity goal of at least 150 minutes of moderate intensity activity per week as tolerated.     8. CKD stage 3a, GFR 45-59 ml/min  Stable, evaluated by Nephrology earlier this year. Continue current treatment plan as previously prescribed with Nephrology and your PCP     9. Hyperparathyroidism due to vitamin D deficiency  Stable. Continue  current treatment plan as previously prescribed with your PCP and Nephrologist.     10. Vitamin D deficiency  On supplement. Stable. Continue current treatment plan as previously prescribed with your PCP    11. Hyperlipidemia, unspecified hyperlipidemia type  On Lipitor. Stable. Continue current treatment plan as previously prescribed with your PCP     Lab Results   Component Value Date    CHOL 150 06/14/2024    CHOL 156 07/05/2023    CHOL 172 08/17/2022     Lab Results   Component Value Date    HDL 64 06/14/2024    HDL 74 07/05/2023    HDL 94 (H) 08/17/2022     Lab Results   Component Value Date    LDLCALC 66.2 06/14/2024    LDLCALC 67.4 07/05/2023    LDLCALC 67.2 08/17/2022     Lab Results   Component Value Date    TRIG 99 06/14/2024    TRIG 73 07/05/2023    TRIG 54 08/17/2022       Lab Results   Component Value Date    CHOLHDL 42.7 06/14/2024    CHOLHDL 47.4 07/05/2023    CHOLHDL 54.7 (H) 08/17/2022         12. Acquired hypothyroidism  On levothyroxine. Stable. Continue current treatment plan as previously prescribed with your PCP     Lab Results   Component Value Date    TSH 2.313 07/05/2023    FREET4 1.06 08/17/2022     14. Osteopenia with high risk of fracture  Stable, on vitamin D. Continue current treatment plan as previously prescribed with your Rheumatologist.     15. Primary insomnia  Using Restoril PRN. Stable. Continue current treatment plan as previously prescribed with your PCP     16. 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 Early dry stage nonexudative age-related macular degeneration of right eye  Stable. Continue current treatment plan as previously prescribed with your Ophthalmologist.     18. Chronic mucoid otitis media of right ear  Improved with claritin, flonase. No further episodes of dizziness. Continue current treatment plan as previously prescribed with your ENT     19. Sensorineural hearing loss (SNHL), bilateral  Stable with hearing aids. Continue  current treatment plan as previously prescribed with your audiologist and ENT       Provided Marimar with a 5-10 year written screening schedule and personal prevention plan. Recommendations were developed using the USPSTF age appropriate recommendations. Education, counseling, and referrals were provided as needed.  After Visit Summary printed and given to patient which includes a list of additional screenings\tests needed.    Follow up in about 1 year (around 9/13/2025).      Chyna Adame NP    I offered to discuss advanced care planning, including how to pick a person who would make decisions for you if you were unable to make them for yourself, called a health care power of , and what kind of decisions you might make such as use of life sustaining treatments such as ventilators and tube feeding when faced with a life limiting illness recorded on a living will that they will need to know. (How you want to be cared for as you near the end of your natural life)     X  Patient has advanced directives written and agrees to provide copies to the institution.

## 2024-09-19 ENCOUNTER — PATIENT MESSAGE (OUTPATIENT)
Dept: PRIMARY CARE CLINIC | Facility: CLINIC | Age: 79
End: 2024-09-19
Payer: MEDICARE

## 2024-09-27 DIAGNOSIS — E03.9 ACQUIRED HYPOTHYROIDISM: Chronic | ICD-10-CM

## 2024-09-28 NOTE — TELEPHONE ENCOUNTER
Care Due:                  Date            Visit Type   Department     Provider  --------------------------------------------------------------------------------                                EP -                              PRIMARY      [facility]  Last Visit: 05-      CARE ([facility])   PRIMARY CARE   Ras Sharma  Next Visit: None Scheduled  None         None Found                                                            Last  Test          Frequency    Reason                     Performed    Due Date  --------------------------------------------------------------------------------    TSH.........  12 months..  levothyroxine............  07- 06-    Vitamin D...  12 months..  VITAMIN..................  07- 06-    [employer] Embedded Care Due Messages. Reference number: [id number].   9/27/2024 9:25:54 PM CDT

## 2024-09-28 NOTE — TELEPHONE ENCOUNTER
Refill Routing Note   Medication(s) are not appropriate for processing by [facility] for the following reason(s):        Required labs outdated    [facility] action(s):  Defer   Requires labs : Yes             Appointments  past 12m or future 3m with PCP    Date Provider   Last Visit   5/14/2024 Ras Sharma MD   Next Visit   Visit date not found Ras Sharma MD   ED visits in past 90 days: 0        Note composed:3:47 PM 09/28/2024

## 2024-09-29 RX ORDER — LEVOTHYROXINE SODIUM 50 UG/1
50 TABLET ORAL
Qty: 90 TABLET | Refills: 3 | Status: SHIPPED | OUTPATIENT
Start: 2024-09-29

## 2024-10-24 ENCOUNTER — PATIENT MESSAGE (OUTPATIENT)
Dept: GASTROENTEROLOGY | Facility: CLINIC | Age: 79
End: 2024-10-24
Payer: MEDICARE

## 2024-10-24 ENCOUNTER — CLINICAL SUPPORT (OUTPATIENT)
Dept: AUDIOLOGY | Facility: CLINIC | Age: 79
End: 2024-10-24
Payer: MEDICARE

## 2024-10-24 DIAGNOSIS — H90.3 SENSORINEURAL HEARING LOSS, BILATERAL: Primary | ICD-10-CM

## 2024-10-24 PROCEDURE — 99499 UNLISTED E&M SERVICE: CPT | Mod: S$GLB,,, | Performed by: AUDIOLOGIST

## 2024-10-24 NOTE — PROGRESS NOTES
HEARING AID FOLLOW-UP    Jacqueline M Goldberg was seen today for a hearing aid follow-up visit.     Jennifer reported she is doing well with the hearing aids and having no problems. Data logging revealed a decrease in wear time from 4.8 hours a day to 1.2 hours a day. We discussed improving wear time of hearing aids. She did not want any changes to her settings. She would like to return to clinic as needed or at her annual audiogram/hearing aid check.

## 2024-12-13 ENCOUNTER — OFFICE VISIT (OUTPATIENT)
Dept: CARDIOLOGY | Facility: CLINIC | Age: 79
End: 2024-12-13
Payer: MEDICARE

## 2024-12-13 VITALS
HEART RATE: 86 BPM | WEIGHT: 186.19 LBS | DIASTOLIC BLOOD PRESSURE: 69 MMHG | OXYGEN SATURATION: 94 % | SYSTOLIC BLOOD PRESSURE: 159 MMHG | HEIGHT: 61 IN | BODY MASS INDEX: 35.15 KG/M2

## 2024-12-13 DIAGNOSIS — E78.00 HYPERCHOLESTEREMIA: ICD-10-CM

## 2024-12-13 DIAGNOSIS — I25.10 ATHEROSCLEROSIS OF NATIVE CORONARY ARTERY OF NATIVE HEART WITHOUT ANGINA PECTORIS: Primary | ICD-10-CM

## 2024-12-13 DIAGNOSIS — I10 PRIMARY HYPERTENSION: ICD-10-CM

## 2024-12-13 PROCEDURE — 99999 PR PBB SHADOW E&M-EST. PATIENT-LVL III: CPT | Mod: PBBFAC,,, | Performed by: INTERNAL MEDICINE

## 2024-12-13 NOTE — PROGRESS NOTES
[facility]    Chief Complaint  Chief Complaint   Patient presents with    Coronary Artery Disease       HPI:    She has been doing well.  Active without exertional dyspnea or chest discomfort.  She is now fully retired.    Medications  Current Outpatient Medications   Medication Sig Dispense Refill    aspirin (ECOTRIN) 81 MG EC tablet Take 81 mg by mouth once daily.      atorvastatin (LIPITOR) 10 MG tablet Take 1 tablet (10 mg total) by mouth once daily. 90 tablet 3    furosemide (LASIX) 40 MG tablet Take 1 tablet (40 mg total) by mouth daily as needed (shortness of breath or swelling). 90 tablet 3    hydroxychloroquine (PLAQUENIL) 200 mg tablet TAKE 1 TABLET BY MOUTH DAILY 90 tablet 1    ibuprofen (ADVIL,MOTRIN) 800 MG tablet Take 1 tablet (800 mg total) by mouth 3 (three) times daily as needed for Pain. 30 tablet 2    levothyroxine (SYNTHROID) 50 MCG tablet TAKE 1 TABLET BY MOUTH BEFORE  BREAKFAST 90 tablet 3    losartan (COZAAR) 50 MG tablet Take 1 tablet (50 mg total) by mouth 2 (two) times a day. 180 tablet 3    methocarbamoL (ROBAXIN) 500 MG Tab Take 1 tablet (500 mg total) by mouth 3 (three) times daily as needed (muscle spasm). 30 tablet 2    prednisoLONE acetate (PRED FORTE) 1 % DrpS Place 1 drop into the left eye.      SIMBRINZA 1-0.2 % DrpS       temazepam (RESTORIL) 15 mg Cap Take 1 capsule (15 mg total) by mouth nightly as needed (insomnia). 30 capsule 1    vit A/vit C/vit E/zinc/copper (ICAPS AREDS ORAL) Take 1 capsule by mouth 2 (two) times a day.      VITAMIN D2 1,250 mcg (50,000 unit) capsule TAKE 1 CAPSULE BY MOUTH EVERY 7  DAYS 12 capsule 3     No current facility-administered medications for this visit.        History  Past Medical History:   Diagnosis Date    Anemia, chronic disease 12/18/2019    Arthritis     Blind left eye     Chronic pain     Coronary atherosclerosis 06/20/2020    Disorder of kidney and ureter     Eosinophilia 12/18/2019    Glaucoma     Hyperlipidemia      Hypothyroidism     Insomnia     Leucocytosis 2019    Pneumonia     PONV (postoperative nausea and vomiting)     needs phenergan    Scoliosis     Undifferentiated connective tissue disease      Past Surgical History:   Procedure Laterality Date    ANKLE LIGAMENT RECONSTRUCTION Right     CATARACT EXTRACTION Bilateral     CHOLECYSTECTOMY      CORONARY ANGIOGRAPHY N/A 2020    Procedure: ANGIOGRAM, CORONARY ARTERY - right radial;  Surgeon: Fabrice Rodriguez MD;  Location: [facility] CATH LAB;  Service: Cardiology;  Laterality: N/A;    CORONARY STENT PLACEMENT  2020    pLAD [place] 2.75 x 18 mm    EYE SURGERY  ?    Cataract in both eyes . [facility] [place]     Social History     Socioeconomic History    Marital status: Single   Occupational History    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   Tobacco Use    Smoking status: Former     Current packs/day: 0.00     Average packs/day: 1 pack/day for 24.0 years (24.0 ttl pk-yrs)     Types: Cigarettes     Start date: 1961     Quit date: 1985     Years since quittin.9    Smokeless tobacco: Former   Substance and Sexual Activity    Alcohol use: Not Currently    Drug use: Never    Sexual activity: Not Currently     Partners: Male     Birth control/protection: Post-menopausal, None     Social Drivers of Health     Financial Resource Strain: Low Risk  (2024)    Overall Financial Resource Strain (CARDIA)     Difficulty of Paying Living Expenses: Not hard at all   Food Insecurity: No Food Insecurity (2024)    Hunger Vital Sign     Worried About Running Out of Food in the Last Year: Never true     Ran Out of Food in the Last Year: Never true   Transportation Needs: No Transportation Needs (2024)    PRAPARE - Transportation     Lack of Transportation (Medical): No     Lack of Transportation (Non-Medical): No   Physical Activity: Insufficiently Active (2024)    Exercise Vital Sign     Days of Exercise per Week: 3 days      Minutes of Exercise per Session: 10 min   Stress: No Stress Concern Present (9/13/2024)    [employer] - Occupational Stress Questionnaire     Feeling of Stress : Not at all   Housing Stability: Low Risk  (9/13/2024)    Housing Stability Vital Sign     Unable to Pay for Housing in the Last Year: No     Homeless in the Last Year: No     Family History   Problem Relation Name Age of Onset    Diabetes Mother Mae Webman Goldberg     Cancer Mother Mae Webman Goldberg     Vision loss Mother Mae Webman Goldberg     Heart disease Father Max Goldberg     Arthritis Sister      Cancer Sister Linda Goldberg Drucke     Hearing loss Sister Linda Goldberg Drucke     Lupus Neg Hx      Rheum arthritis Neg Hx      Inflammatory bowel disease Neg Hx          Allergies  Review of patient's allergies indicates:  No Known Allergies    Review of Systems   Review of Systems   Constitutional: Negative for malaise/fatigue, weight gain and weight loss.   Eyes:  Negative for visual disturbance.   Cardiovascular:  Negative for chest pain, claudication, cyanosis, dyspnea on exertion, irregular heartbeat, leg swelling, near-syncope, orthopnea, palpitations, paroxysmal nocturnal dyspnea and syncope.   Respiratory:  Negative for cough, hemoptysis, shortness of breath, sleep disturbances due to breathing and wheezing.    Hematologic/Lymphatic: Negative for bleeding problem. Does not bruise/bleed easily.   Skin:  Negative for poor wound healing.   Musculoskeletal:  Negative for muscle cramps and myalgias.   Gastrointestinal:  Negative for abdominal pain, anorexia, diarrhea, heartburn, hematemesis, hematochezia, melena, nausea and vomiting.   Genitourinary:  Negative for hematuria and nocturia.   Neurological:  Negative for excessive daytime sleepiness, dizziness, focal weakness, light-headedness and weakness.       Physical Exam  Vitals:    12/13/24 1537   BP: (!) 159/69   Pulse: 86     Wt Readings from Last 1 Encounters:    12/13/24 84.5 kg (186 lb 2.9 oz)     Physical Exam  Constitutional:       General: She is not in acute distress.     Appearance: She is not toxic-appearing or diaphoretic.   HENT:      Head: Normocephalic and atraumatic.   Eyes:      General: No scleral icterus.     Conjunctiva/sclera: Conjunctivae normal.   Neck:      Thyroid: No thyromegaly.      Vascular: No carotid bruit, hepatojugular reflux or JVD.      Trachea: Trachea normal.   Cardiovascular:      Rate and Rhythm: Normal rate and regular rhythm. No extrasystoles are present.     Chest Wall: PMI is not displaced.      Pulses:           Carotid pulses are 2+ on the right side and 2+ on the left side.       Radial pulses are 2+ on the right side and 2+ on the left side.        Dorsalis pedis pulses are 2+ on the right side and 2+ on the left side.        Posterior tibial pulses are 2+ on the right side and 2+ on the left side.      Heart sounds: S1 normal and S2 normal. No murmur heard.     No S3 or S4 sounds.   Pulmonary:      Effort: No accessory muscle usage or respiratory distress.      Breath sounds: No decreased breath sounds, wheezing, rhonchi or rales.   Abdominal:      General: Bowel sounds are normal. There is no abdominal bruit.      Palpations: Abdomen is soft. There is no hepatomegaly, splenomegaly or pulsatile mass.      Tenderness: There is no abdominal tenderness.   Musculoskeletal:         General: No tenderness or deformity.      Cervical back: No edema.      Right lower leg: No edema.      Left lower leg: No edema.   Skin:     General: Skin is warm and dry.      Coloration: Skin is not cyanotic or pale.      Nails: There is no clubbing.   Neurological:      General: No focal deficit present.      Mental Status: She is alert and oriented to person, place, and time.   Psychiatric:         Speech: Speech normal.         Behavior: Behavior normal. Behavior is cooperative.         Labs  Lab Visit on 06/14/2024   Component Date Value Ref Range  Status    Sodium 06/14/2024 141  136 - 145 mmol/L Final    Potassium 06/14/2024 4.8  3.5 - 5.1 mmol/L Final    Chloride 06/14/2024 104  95 - 110 mmol/L Final    CO2 06/14/2024 24  23 - 29 mmol/L Final    Glucose 06/14/2024 104  70 - 110 mg/dL Final    BUN 06/14/2024 26 (H)  8 - 23 mg/dL Final    Creatinine 06/14/2024 1.0  0.5 - 1.4 mg/dL Final    Calcium 06/14/2024 9.5  8.7 - 10.5 mg/dL Final    Total Protein 06/14/2024 7.5  6.0 - 8.4 g/dL Final    Albumin 06/14/2024 3.9  3.5 - 5.2 g/dL Final    Total Bilirubin 06/14/2024 0.5  0.1 - 1.0 mg/dL Final    Comment: For infants and newborns, interpretation of results should be based  on gestational age, weight and in agreement with clinical  observations.    Premature Infant recommended reference ranges:  Up to 24 hours.............<8.0 mg/dL  Up to 48 hours............<12.0 mg/dL  3-5 days..................<15.0 mg/dL  6-29 days.................<15.0 mg/dL      Alkaline Phosphatase 06/14/2024 122  55 - 135 U/L Final    AST 06/14/2024 20  10 - 40 U/L Final    ALT 06/14/2024 16  10 - 44 U/L Final    eGFR 06/14/2024 57.0 (A)  >60 mL/min/1.73 m^2 Final    Anion Gap 06/14/2024 13  8 - 16 mmol/L Final    Cholesterol 06/14/2024 150  120 - 199 mg/dL Final    Comment: The National Cholesterol Education Program (NCEP) has set the  following guidelines (reference ranges) for Cholesterol:  Optimal.....................<200 mg/dL  Borderline High.............200-239 mg/dL  High........................> or = 240 mg/dL      Triglycerides 06/14/2024 99  30 - 150 mg/dL Final    Comment: The National Cholesterol Education Program (NCEP) has set the  following guidelines (reference values) for triglycerides:  Normal......................<150 mg/dL  Borderline High.............150-199 mg/dL  High........................200-499 mg/dL      HDL 06/14/2024 64  40 - 75 mg/dL Final    Comment: The National Cholesterol Education Program (NCEP) has set the  following guidelines (reference values)  for HDL Cholesterol:  Low...............<40 mg/dL  Optimal...........>60 mg/dL      LDL Cholesterol 06/14/2024 66.2  63.0 - 159.0 mg/dL Final    Comment: The National Cholesterol Education Program (NCEP) has set the  following guidelines (reference values) for LDL Cholesterol:  Optimal.......................<130 mg/dL  Borderline High...............130-159 mg/dL  High..........................160-189 mg/dL  Very High.....................>190 mg/dL      HDL/Cholesterol Ratio 06/14/2024 42.7  20.0 - 50.0 % Final    Total Cholesterol/HDL Ratio 06/14/2024 2.3  2.0 - 5.0 Final    Non-HDL Cholesterol 06/14/2024 86  mg/dL Final    Comment: Risk category and Non-HDL cholesterol goals:  Coronary heart disease (CHD)or equivalent (10-year risk of CHD >20%):  Non-HDL cholesterol goal     <130 mg/dL  Two or more CHD risk factors and 10-year risk of CHD <= 20%:  Non-HDL cholesterol goal     <160 mg/dL  0 to 1 CHD risk factor:  Non-HDL cholesterol goal     <190 mg/dL         Imaging  No results found.    Assessment  1. Atherosclerosis of native coronary artery of native heart without angina pectoris  Stable and free of angina    2. Hypercholesteremia  Controlled    3. Primary hypertension  Adequately controlled      Plan and Discussion    No change to present management    The ASCVD Risk score (Shantanu DK, et al., 2019) failed to calculate for the following reasons:    The 2019 ASCVD risk score is only valid for ages 40 to 79     Follow Up  Follow up in about 6 months (around 6/13/2025).      Fabrice Rodriguez MD

## 2025-01-06 DIAGNOSIS — M35.9 UNDIFFERENTIATED CONNECTIVE TISSUE DISEASE: ICD-10-CM

## 2025-01-08 RX ORDER — HYDROXYCHLOROQUINE SULFATE 200 MG/1
200 TABLET, FILM COATED ORAL
Qty: 90 TABLET | Refills: 0 | Status: SHIPPED | OUTPATIENT
Start: 2025-01-08

## 2025-01-24 ENCOUNTER — TELEPHONE (OUTPATIENT)
Dept: PRIMARY CARE CLINIC | Facility: CLINIC | Age: 80
End: 2025-01-24
Payer: MEDICARE
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
## 2025-02-10 ENCOUNTER — OFFICE VISIT (OUTPATIENT)
Dept: PRIMARY CARE CLINIC | Facility: CLINIC | Age: 80
End: 2025-02-10
Payer: MEDICARE

## 2025-02-10 VITALS
WEIGHT: 184.31 LBS | SYSTOLIC BLOOD PRESSURE: 120 MMHG | DIASTOLIC BLOOD PRESSURE: 60 MMHG | RESPIRATION RATE: 18 BRPM | OXYGEN SATURATION: 96 % | TEMPERATURE: 97 F | HEIGHT: 61 IN | HEART RATE: 95 BPM | BODY MASS INDEX: 34.8 KG/M2

## 2025-02-10 DIAGNOSIS — E03.9 ACQUIRED HYPOTHYROIDISM: Chronic | ICD-10-CM

## 2025-02-10 DIAGNOSIS — I50.32 CHRONIC DIASTOLIC HEART FAILURE: ICD-10-CM

## 2025-02-10 DIAGNOSIS — R73.03 PRE-DIABETES: Chronic | ICD-10-CM

## 2025-02-10 DIAGNOSIS — N18.31 CKD STAGE 3A, GFR 45-59 ML/MIN: ICD-10-CM

## 2025-02-10 DIAGNOSIS — M54.42 CHRONIC LEFT-SIDED LOW BACK PAIN WITH LEFT-SIDED SCIATICA: Primary | ICD-10-CM

## 2025-02-10 DIAGNOSIS — H02.9 EYELID ABNORMALITY: ICD-10-CM

## 2025-02-10 DIAGNOSIS — G89.29 CHRONIC LEFT-SIDED LOW BACK PAIN WITH LEFT-SIDED SCIATICA: Primary | ICD-10-CM

## 2025-02-10 DIAGNOSIS — B35.1 ONYCHOMYCOSIS: ICD-10-CM

## 2025-02-10 DIAGNOSIS — F51.01 PRIMARY INSOMNIA: Chronic | ICD-10-CM

## 2025-02-10 PROBLEM — E66.01 SEVERE OBESITY (BMI 35.0-39.9) WITH COMORBIDITY: Status: RESOLVED | Noted: 2023-06-16 | Resolved: 2025-02-10

## 2025-02-10 PROCEDURE — 1101F PT FALLS ASSESS-DOCD LE1/YR: CPT | Mod: CPTII,S$GLB,, | Performed by: FAMILY MEDICINE

## 2025-02-10 PROCEDURE — 1160F RVW MEDS BY RX/DR IN RCRD: CPT | Mod: CPTII,S$GLB,, | Performed by: FAMILY MEDICINE

## 2025-02-10 PROCEDURE — 99999 PR PBB SHADOW E&M-EST. PATIENT-LVL V: CPT | Mod: PBBFAC,,, | Performed by: FAMILY MEDICINE

## 2025-02-10 PROCEDURE — 3078F DIAST BP <80 MM HG: CPT | Mod: CPTII,S$GLB,, | Performed by: FAMILY MEDICINE

## 2025-02-10 PROCEDURE — 1125F AMNT PAIN NOTED PAIN PRSNT: CPT | Mod: CPTII,S$GLB,, | Performed by: FAMILY MEDICINE

## 2025-02-10 PROCEDURE — 99214 OFFICE O/P EST MOD 30 MIN: CPT | Mod: S$GLB,,, | Performed by: FAMILY MEDICINE

## 2025-02-10 PROCEDURE — 3288F FALL RISK ASSESSMENT DOCD: CPT | Mod: CPTII,S$GLB,, | Performed by: FAMILY MEDICINE

## 2025-02-10 PROCEDURE — 1159F MED LIST DOCD IN RCRD: CPT | Mod: CPTII,S$GLB,, | Performed by: FAMILY MEDICINE

## 2025-02-10 PROCEDURE — 3074F SYST BP LT 130 MM HG: CPT | Mod: CPTII,S$GLB,, | Performed by: FAMILY MEDICINE

## 2025-02-10 RX ORDER — METHOCARBAMOL 500 MG/1
500 TABLET, FILM COATED ORAL 3 TIMES DAILY PRN
Qty: 90 TABLET | Refills: 1 | Status: SHIPPED | OUTPATIENT
Start: 2025-02-10

## 2025-02-10 RX ORDER — CICLOPIROX 80 MG/ML
SOLUTION TOPICAL NIGHTLY
Qty: 6.6 ML | Refills: 11 | Status: SHIPPED | OUTPATIENT
Start: 2025-02-10

## 2025-02-10 RX ORDER — TEMAZEPAM 15 MG/1
15 CAPSULE ORAL NIGHTLY PRN
Qty: 30 CAPSULE | Refills: 1 | Status: SHIPPED | OUTPATIENT
Start: 2025-02-10

## 2025-02-10 RX ORDER — IBUPROFEN 800 MG/1
800 TABLET ORAL 3 TIMES DAILY PRN
Qty: 90 TABLET | Refills: 1 | Status: SHIPPED | OUTPATIENT
Start: 2025-02-10

## 2025-02-10 NOTE — PROGRESS NOTES
Assessment:       1. Chronic left-sided low back pain with left-sided sciatica    2. Chronic diastolic heart failure    3. CKD stage 3a, GFR 45-59 ml/min    4. Pre-diabetes    5. Acquired hypothyroidism    6. Eyelid abnormality    7. Onychomycosis    8. Primary insomnia         Plan:       Chronic left-sided low back pain with left-sided sciatica  -     X-Ray Lumbar Complete Including Flex And Ext; Future; Expected date: 02/10/2025  -     Ambulatory Referral/Consult to Physical/Occupational Therapy; Future; Expected date: 02/17/2025  -     methocarbamoL (ROBAXIN) 500 MG Tab; Take 1 tablet (500 mg total) by mouth 3 (three) times daily as needed (muscle spasm).  Dispense: 90 tablet; Refill: 1  -     ibuprofen (ADVIL,MOTRIN) 800 MG tablet; Take 1 tablet (800 mg total) by mouth 3 (three) times daily as needed for Pain.  Dispense: 90 tablet; Refill: 1    Chronic diastolic heart failure    CKD stage 3a, GFR 45-59 ml/min  -     Comprehensive Metabolic Panel; Future; Expected date: 02/10/2025    Pre-diabetes  -     Comprehensive Metabolic Panel; Future; Expected date: 02/10/2025  -     Hemoglobin A1C; Future; Expected date: 02/10/2025    Acquired hypothyroidism  -     TSH; Future; Expected date: 02/10/2025    Eyelid abnormality    Onychomycosis  -     ciclopirox (PENLAC) 8 % Soln; Apply topically nightly.  Dispense: 6.6 mL; Refill: 11    Primary insomnia  -     temazepam (RESTORIL) 15 mg Cap; Take 1 capsule (15 mg total) by mouth nightly as needed (insomnia).  Dispense: 30 capsule; Refill: 1      Assessment & Plan    - Evaluated lower back pain and left leg pain, considering history of scoliosis and rotoscoliosis  - Assessed for potential spinal stenosis based on patient's report from chiropractor  - Ruled out recent falls or injuries as cause of current pain exacerbation  - Considered conservative management approach given patient's age and preference to avoid invasive procedures    CONGENITAL SCOLIOSIS AND  ROTOSCOLIOSIS:   Noted the patient's report of congenital scoliosis and rotoscoliosis.   Documented the patient's report of condition worsening with age and weight gain.   Performed physical exam, noting pain on palpation of the left side of the lower back.   Conducted neurological exam.   Ordered x-rays of back with flexion and extension views to assess for instability.   Referred the patient to physical therapy for back pain management.   Advised the patient to continue using muscle relaxants as needed for pain management.   Instructed the patient to report any changes in pain severity or new symptoms.   Noted the patient's report of pain radiating down the left leg to the lower calf.   Inquired about numbness or weakness; patient reported only pain.   Documented that chiropractic treatment provided some relief.   Noted chiropractor's suggestion of stenosis as a possible cause.    BACK PAIN:   Refilled ibuprofen for pain management.   Refilled prescriptions for methocarbamol and temazepam.   Refilled methocarbamol (muscle relaxant) for back muscle spasms.   Instructed the patient on proper usage and potential side effects of the muscle relaxant.    TEMPORAL ARTERITIS:   Documented the patient's report of vision loss in left eye due to temporal arteritis.   Noted the patient's report of issues with spatial orientation due to vision loss.   Confirmed that the patient is under the care of an ophthalmologist for vision problem.    THYROID DISORDER:   Continued levothyroxine at current dose.   Ordered labs, including thyroid function test.   Advised the patient that refills are available for thyroid medication.    ONYCHOMYCOSIS:   Diagnosed onychomycosis (nail fungus) affecting fingernails and toenails.   Explained onychomycosis and treatment approach to the patient.   Prescribed topical antifungal medication to be applied nightly on affected nails of hands and feet for up to 6 months.   Instructed the patient on  proper application technique and duration of treatment.    SLEEP ISSUES:   Refilled temazepam (Restoril) for sleep.   Noted increased usage of sleep medication due to pain.   Advised the patient to report any changes in sleep patterns or medication effectiveness.    EYELID BUMP:   Examined the small bump on the left upper eyelid, present for 6-7 months.   Recommend follow-up with an ophthalmologist to examine the bump on left upper eyelid.   Advised the patient to report any changes in size, color, or symptoms related to the eyelid bump.       Medication List with Changes/Refills   New Medications    CICLOPIROX (PENLAC) 8 % SOLN    Apply topically nightly.   Current Medications    ASPIRIN (ECOTRIN) 81 MG EC TABLET    Take 81 mg by mouth once daily.    ATORVASTATIN (LIPITOR) 10 MG TABLET    Take 1 tablet (10 mg total) by mouth once daily.    FUROSEMIDE (LASIX) 40 MG TABLET    Take 1 tablet (40 mg total) by mouth daily as needed (shortness of breath or swelling).    HYDROXYCHLOROQUINE (PLAQUENIL) 200 MG TABLET    TAKE 1 TABLET BY MOUTH DAILY    LEVOTHYROXINE (SYNTHROID) 50 MCG TABLET    TAKE 1 TABLET BY MOUTH BEFORE  BREAKFAST    LOSARTAN (COZAAR) 50 MG TABLET    Take 1 tablet (50 mg total) by mouth 2 (two) times a day.    PREDNISOLONE ACETATE (PRED FORTE) 1 % DRPS    Place 1 drop into the left eye.    SIMBRINZA 1-0.2 % DRPS        VIT A/VIT C/VIT E/ZINC/COPPER (ICAPS AREDS ORAL)    Take 1 capsule by mouth 2 (two) times a day.    VITAMIN D2 1,250 MCG (50,000 UNIT) CAPSULE    TAKE 1 CAPSULE BY MOUTH EVERY 7  DAYS   Changed and/or Refilled Medications    Modified Medication Previous Medication    IBUPROFEN (ADVIL,MOTRIN) 800 MG TABLET ibuprofen (ADVIL,MOTRIN) 800 MG tablet       Take 1 tablet (800 mg total) by mouth 3 (three) times daily as needed for Pain.    Take 1 tablet (800 mg total) by mouth 3 (three) times daily as needed for Pain.    METHOCARBAMOL (ROBAXIN) 500 MG TAB methocarbamoL (ROBAXIN) 500 MG Tab        Take 1 tablet (500 mg total) by mouth 3 (three) times daily as needed (muscle spasm).    Take 1 tablet (500 mg total) by mouth 3 (three) times daily as needed (muscle spasm).    TEMAZEPAM (RESTORIL) 15 MG CAP temazepam (RESTORIL) 15 mg Cap       Take 1 capsule (15 mg total) by mouth nightly as needed (insomnia).    Take 1 capsule (15 mg total) by mouth nightly as needed (insomnia).         Subjective:    Patient ID: Jacqueline M Goldberg is a 79 y.o. female.  Chief Complaint: Back Pain, Leg Pain (Left/Started 4 months ago), and Mass (Left eye (eye brow)/Started 6 months ago)    HPI  History of Present Illness    CHIEF COMPLAINT:  79-year-old female presents today for lower back and leg pain.    HISTORY OF PRESENT ILLNESS:  She presents with left-sided lower back pain radiating down the left leg to the lower calf. Pain is particularly severe with standing, becoming excruciating after 30-60 seconds. Pain occurs both during day and night without clear pattern or triggers. This is her first episode with significant leg radiation and severity affecting ability to stand. She denies recent falls, injuries, associated numbness, weakness, or bowel/bladder problems. While awaiting this appointment, she received three chiropractic treatments involving manipulation and cold packs, which provided some relief. The chiropractor suggested possible spinal stenosis, describing it as narrowing in the spinal canal.    MEDICAL HISTORY:  She has congenital scoliosis and rotoscoliosis with lifelong back problems. She has previously undergone physical therapy for her back. She also has temporal arteritis resulting in vision loss in one eye with associated impairment in spatial orientation, and hypothyroidism.    CURRENT MEDICATIONS:  She takes ibuprofen for pain management, methocarbamol as muscle relaxant, temazepam for sleep (using more frequently due to pain, last filled July 2023), and thyroid medication.    DERMATOLOGIC:  She reports  "a pea-sized bump on her left upper eyelid present for 6-7 months and a bump in her right palm present for 3 months. She expresses concern about these findings due to family history of Merkel cell carcinoma. She also notes abnormal fingernail growth and brittle toenails prone to breaking.    SOCIAL HISTORY:  She lives independently.      ROS:  Musculoskeletal: +back pain  Neurological: -weakness, -numbness       Review of Systems    Objective:      Vitals:    02/10/25 1515   BP: 120/60   BP Location: Left arm   Patient Position: Sitting   Pulse: 95   Resp: 18   Temp: 97.1 °F (36.2 °C)   TempSrc: Temporal   SpO2: 96%   Weight: 83.6 kg (184 lb 4.9 oz)   Height: 5' 1" (1.549 m)     BP Readings from Last 5 Encounters:   02/10/25 120/60   12/13/24 (!) 159/69   06/14/24 130/74   06/10/24 136/62   05/28/24 (!) 149/67     Wt Readings from Last 5 Encounters:   02/10/25 83.6 kg (184 lb 4.9 oz)   12/13/24 84.5 kg (186 lb 2.9 oz)   06/14/24 81.5 kg (179 lb 12.6 oz)   06/10/24 82.4 kg (181 lb 10.5 oz)   05/28/24 82.3 kg (181 lb 8.8 oz)     Physical Exam  Physical Exam    General: Well-developed. Well-nourished. No acute distress.  Eyes: EOMI. Sclerae anicteric. Presence of a small pea-sized bump on left upper eyelid.  HENT: Normocephalic. Atraumatic. Nares patent. Moist oral mucosa.  Cardiovascular: Regular rate. Regular rhythm. No murmurs. No rubs. No gallops. Normal S1, S2.  Respiratory: Normal respiratory effort. Clear to auscultation bilaterally. No rales. No rhonchi. No wheezing.  Musculoskeletal: No  obvious deformity.  Extremities: No lower extremity edema.  Neurological: Alert & oriented x3. No slurred speech. Normal gait.  Psychiatric: Normal mood. Normal affect. Good insight. Good judgment.  Skin: Warm. Dry. No rash. Onychomycosis in fingernails and toenails.  Back: Tenderness in left lower back. No tenderness in right lower back.         Lab Results   Component Value Date    WBC 11.39 05/14/2024    HGB 12.3 05/14/2024 "    HCT 40.4 05/14/2024     05/14/2024    CHOL 150 06/14/2024    TRIG 99 06/14/2024    HDL 64 06/14/2024    ALT 15 02/10/2025    AST 16 02/10/2025     02/10/2025    K 4.5 02/10/2025     02/10/2025    CREATININE 1.1 02/10/2025    BUN 32 (H) 02/10/2025    CO2 26 02/10/2025    TSH 2.638 02/10/2025    INR 1.0 06/17/2020    HGBA1C 5.8 (H) 07/05/2023      This note was generated with the assistance of ambient listening technology. Verbal consent was obtained by the patient and accompanying visitor(s) for the recording of patient appointment to facilitate this note. I attest to having reviewed and edited the generated note for accuracy, though some syntax or spelling errors may persist. Please contact the author of this note for any clarification.

## 2025-02-11 ENCOUNTER — PATIENT MESSAGE (OUTPATIENT)
Dept: PRIMARY CARE CLINIC | Facility: CLINIC | Age: 80
End: 2025-02-11
Payer: MEDICARE

## 2025-03-09 DIAGNOSIS — N18.31 CHRONIC KIDNEY DISEASE, STAGE 3A: Primary | ICD-10-CM

## 2025-03-09 DIAGNOSIS — M35.9 UNDIFFERENTIATED CONNECTIVE TISSUE DISEASE: ICD-10-CM

## 2025-03-09 DIAGNOSIS — Z79.899 LONG-TERM USE OF PLAQUENIL: ICD-10-CM

## 2025-03-10 RX ORDER — ERGOCALCIFEROL 1.25 MG/1
50000 CAPSULE ORAL
Qty: 12 CAPSULE | Refills: 3 | Status: SHIPPED | OUTPATIENT
Start: 2025-03-10

## 2025-03-10 NOTE — TELEPHONE ENCOUNTER
Care Due:                  Date            Visit Type   Department     Provider  --------------------------------------------------------------------------------                                EP -                              PRIMARY      [facility]  Last Visit: 02-      CARE ([facility])   PRIMARY CARE   Ras Sharma  Next Visit: None Scheduled  None         None Found                                                            Last  Test          Frequency    Reason                     Performed    Due Date  --------------------------------------------------------------------------------    CBC.........  12 months..  ibuprofen................  05- 05-    Vitamin D...  12 months..  VITAMIN..................  07- 06-    [employer] Embedded Care Due Messages. Reference number: [id number].   3/09/2025 9:27:45 PM CDT

## 2025-03-10 NOTE — TELEPHONE ENCOUNTER
Refill Routing Note   Medication(s) are not appropriate for processing by [facility] for the following reason(s):        Outside of protocol    [facility] action(s):  Route     Requires labs : Yes             Appointments  past 12m or future 3m with PCP    Date Provider   Last Visit   2/10/2025 Ras Sharma MD   Next Visit   Visit date not found Ras Sharma MD   ED visits in past 90 days: 0        Note composed:7:48 AM 03/10/2025

## 2025-03-12 RX ORDER — HYDROXYCHLOROQUINE SULFATE 200 MG/1
200 TABLET, FILM COATED ORAL DAILY
Qty: 90 TABLET | Refills: 0 | Status: SHIPPED | OUTPATIENT
Start: 2025-03-12

## 2025-03-23 DIAGNOSIS — I10 PRIMARY HYPERTENSION: ICD-10-CM

## 2025-03-24 RX ORDER — FUROSEMIDE 40 MG/1
TABLET ORAL
Qty: 90 TABLET | Refills: 3 | Status: SHIPPED | OUTPATIENT
Start: 2025-03-24

## 2025-03-25 DIAGNOSIS — M54.42 CHRONIC LEFT-SIDED LOW BACK PAIN WITH LEFT-SIDED SCIATICA: ICD-10-CM

## 2025-03-25 DIAGNOSIS — G89.29 CHRONIC LEFT-SIDED LOW BACK PAIN WITH LEFT-SIDED SCIATICA: ICD-10-CM

## 2025-03-25 RX ORDER — IBUPROFEN 800 MG/1
800 TABLET ORAL
Qty: 180 TABLET | Refills: 5 | Status: SHIPPED | OUTPATIENT
Start: 2025-03-25

## 2025-03-25 NOTE — TELEPHONE ENCOUNTER
No care due was identified.  [facility] Embedded Care Due Messages. Reference number: [id number].   3/25/2025 4:39:34 AM CDT

## 2025-04-28 ENCOUNTER — TELEPHONE (OUTPATIENT)
Dept: AUDIOLOGY | Facility: CLINIC | Age: 80
End: 2025-04-28
Payer: MEDICARE

## 2025-04-28 DIAGNOSIS — H90.3 SENSORINEURAL HEARING LOSS, BILATERAL: Primary | ICD-10-CM

## 2025-05-09 ENCOUNTER — OFFICE VISIT (OUTPATIENT)
Dept: CARDIOLOGY | Facility: CLINIC | Age: 80
End: 2025-05-09
Payer: MEDICARE

## 2025-05-09 VITALS
SYSTOLIC BLOOD PRESSURE: 130 MMHG | BODY MASS INDEX: 35.21 KG/M2 | HEIGHT: 61 IN | WEIGHT: 186.5 LBS | DIASTOLIC BLOOD PRESSURE: 70 MMHG | HEART RATE: 70 BPM | OXYGEN SATURATION: 95 %

## 2025-05-09 DIAGNOSIS — E66.01 SEVERE OBESITY: ICD-10-CM

## 2025-05-09 DIAGNOSIS — I25.10 ATHEROSCLEROSIS OF NATIVE CORONARY ARTERY OF NATIVE HEART WITHOUT ANGINA PECTORIS: Primary | ICD-10-CM

## 2025-05-09 DIAGNOSIS — I10 PRIMARY HYPERTENSION: ICD-10-CM

## 2025-05-09 DIAGNOSIS — E78.00 HYPERCHOLESTEREMIA: ICD-10-CM

## 2025-05-09 PROCEDURE — 99999 PR PBB SHADOW E&M-EST. PATIENT-LVL III: CPT | Mod: PBBFAC,,, | Performed by: INTERNAL MEDICINE

## 2025-05-09 RX ORDER — ATORVASTATIN CALCIUM 10 MG/1
10 TABLET, FILM COATED ORAL DAILY
Qty: 90 TABLET | Refills: 3 | Status: SHIPPED | OUTPATIENT
Start: 2025-05-09 | End: 2026-05-09

## 2025-05-09 RX ORDER — LOSARTAN POTASSIUM 50 MG/1
50 TABLET ORAL 2 TIMES DAILY
Qty: 180 TABLET | Refills: 3 | Status: SHIPPED | OUTPATIENT
Start: 2025-05-09 | End: 2026-05-09

## 2025-05-09 NOTE — PROGRESS NOTES
[facility]    Chief Complaint  Chief Complaint   Patient presents with    Follow-up       HPI:    Continues to do well.  Traveling.  No exertional dyspnea or chest discomfort.  No syncope or presyncope.  No palpitations.  No paroxysmal nocturnal dyspnea or orthopnea.    Medications  Current Medications[1]     History  Past Medical History:   Diagnosis Date    Anemia, chronic disease 12/18/2019    Arthritis     Blind left eye     Chronic pain     Coronary atherosclerosis 06/20/2020    Disorder of kidney and ureter     Eosinophilia 12/18/2019    Glaucoma     Hyperlipidemia     Hypothyroidism     Insomnia     Leucocytosis 12/18/2019    Pneumonia     PONV (postoperative nausea and vomiting)     needs phenergan    Scoliosis     Undifferentiated connective tissue disease      Past Surgical History:   Procedure Laterality Date    ANKLE LIGAMENT RECONSTRUCTION Right     CATARACT EXTRACTION Bilateral     CHOLECYSTECTOMY      CORONARY ANGIOGRAPHY N/A 06/19/2020    Procedure: ANGIOGRAM, CORONARY ARTERY - right radial;  Surgeon: Fabrice Rodriguez MD;  [place] [facility] [place] by [facility] CATH LAB;  Service: Cardiology;  Laterality: N/A;    CORONARY STENT PLACEMENT  06/19/2020    pLAD [place] 2.75 x 18 mm    EYE SURGERY  ?    Cataract in both eyes . [facility]     Social History[2]  Family History   Problem Relation Name Age of Onset    Diabetes Mother Mae Webman Goldberg     Cancer Mother Mae Webman Goldberg     Vision loss Mother Mae Webman Goldberg     Heart disease Father Max Goldberg     Arthritis Sister      Cancer Sister Linda Goldberg Drucke     Hearing loss Sister Linda Goldberg Drucke     Lupus Neg Hx      Rheum arthritis Neg Hx      Inflammatory bowel disease Neg Hx          Allergies  Review of patient's allergies indicates:  No Known Allergies    Review of Systems   Review of Systems   Constitutional: Negative for malaise/fatigue, weight gain and weight loss.   Eyes:  Negative for visual disturbance.    Cardiovascular:  Negative for chest pain, claudication, cyanosis, dyspnea on exertion, irregular heartbeat, leg swelling, near-syncope, orthopnea, palpitations, paroxysmal nocturnal dyspnea and syncope.   Respiratory:  Negative for cough, hemoptysis, shortness of breath, sleep disturbances due to breathing and wheezing.    Hematologic/Lymphatic: Negative for bleeding problem. Does not bruise/bleed easily.   Skin:  Negative for poor wound healing.   Musculoskeletal:  Negative for muscle cramps and myalgias.   Gastrointestinal:  Negative for abdominal pain, anorexia, diarrhea, heartburn, hematemesis, hematochezia, melena, nausea and vomiting.   Genitourinary:  Negative for hematuria and nocturia.   Neurological:  Negative for excessive daytime sleepiness, dizziness, focal weakness, light-headedness and weakness.       Physical Exam  Vitals:    05/09/25 1600   BP: 130/70   Pulse: 70     Wt Readings from Last 1 Encounters:   05/09/25 84.6 kg (186 lb 8.2 oz)     Physical Exam  Constitutional:       General: She is not in acute distress.     Appearance: She is not toxic-appearing or diaphoretic.   HENT:      Head: Normocephalic and atraumatic.   Eyes:      General: No scleral icterus.     Conjunctiva/sclera: Conjunctivae normal.   Neck:      Thyroid: No thyromegaly.      Vascular: No carotid bruit, hepatojugular reflux or JVD.      Trachea: Trachea normal.   Cardiovascular:      Rate and Rhythm: Normal rate and regular rhythm. No extrasystoles are present.     Chest Wall: PMI is not displaced.      Pulses:           Carotid pulses are 2+ on the right side and 2+ on the left side.       Radial pulses are 2+ on the right side and 2+ on the left side.        Dorsalis pedis pulses are 2+ on the right side and 2+ on the left side.        Posterior tibial pulses are 2+ on the right side and 2+ on the left side.      Heart sounds: S1 normal and S2 normal. No murmur heard.     No S3 or S4 sounds.   Pulmonary:      Effort: No  accessory muscle usage or respiratory distress.      Breath sounds: No decreased breath sounds, wheezing, rhonchi or rales.   Abdominal:      General: Bowel sounds are normal. There is no abdominal bruit.      Palpations: Abdomen is soft. There is no hepatomegaly, splenomegaly or pulsatile mass.      Tenderness: There is no abdominal tenderness.   Musculoskeletal:         General: No tenderness or deformity.      Cervical back: No edema.      Right lower leg: No edema.      Left lower leg: No edema.   Skin:     General: Skin is warm and dry.      Coloration: Skin is not cyanotic or pale.      Nails: There is no clubbing.   Neurological:      General: No focal deficit present.      Mental Status: She is alert and oriented to person, place, and time.   Psychiatric:         Speech: Speech normal.         Behavior: Behavior normal. Behavior is cooperative.         Labs  Lab Visit on 02/10/2025   Component Date Value Ref Range Status    Sodium 02/10/2025 139  136 - 145 mmol/L Final    Potassium 02/10/2025 4.5  3.5 - 5.1 mmol/L Final    Chloride 02/10/2025 101  95 - 110 mmol/L Final    CO2 02/10/2025 26  23 - 29 mmol/L Final    Glucose 02/10/2025 94  70 - 110 mg/dL Final    BUN 02/10/2025 32 (H)  8 - 23 mg/dL Final    Creatinine 02/10/2025 1.1  0.5 - 1.4 mg/dL Final    Calcium 02/10/2025 9.4  8.7 - 10.5 mg/dL Final    Total Protein 02/10/2025 7.8  6.0 - 8.4 g/dL Final    Albumin 02/10/2025 4.2  3.5 - 5.2 g/dL Final    Total Bilirubin 02/10/2025 0.6  0.1 - 1.0 mg/dL Final    Comment: For infants and newborns, interpretation of results should be based  on gestational age, weight and in agreement with clinical  observations.    Premature Infant recommended reference ranges:  Up to 24 hours.............<8.0 mg/dL  Up to 48 hours............<12.0 mg/dL  3-5 days..................<15.0 mg/dL  6-29 days.................<15.0 mg/dL      Alkaline Phosphatase 02/10/2025 124  40 - 150 U/L Final    AST 02/10/2025 16  10 - 40 U/L  Final    ALT 02/10/2025 15  10 - 44 U/L Final    eGFR 02/10/2025 50.5 (A)  >60 mL/min/1.73 m^2 Final    Anion Gap 02/10/2025 12  8 - 16 mmol/L Final    Hemoglobin A1C 02/10/2025 5.7 (H)  4.0 - 5.6 % Final    Comment: ADA Screening Guidelines:  5.7-6.4%  Consistent with prediabetes  >or=6.5%  Consistent with diabetes    High levels of fetal hemoglobin interfere with the HbA1C  assay. Heterozygous hemoglobin variants (HbS, HgC, etc)do  not significantly interfere with this assay.   However, presence of multiple variants may affect accuracy.      Estimated Avg Glucose 02/10/2025 117  68 - 131 mg/dL Final    TSH 02/10/2025 2.638  0.400 - 4.000 uIU/mL Final       Imaging  No results found.    Assessment  1. Atherosclerosis of native coronary artery of native heart without angina pectoris   Stable and free of angina    2. Primary hypertension  Controlled  - losartan (COZAAR) 50 MG tablet; Take 1 tablet (50 mg total) by mouth 2 (two) times a day.  Dispense: 180 tablet; Refill: 3    3. Hypercholesteremia  Due for reassessment  - Lipid Panel; Future  - atorvastatin (LIPITOR) 10 MG tablet; Take 1 tablet (10 mg total) by mouth once daily.  Dispense: 90 tablet; Refill: 3    4. Severe obesity  Unchanged      Plan and Discussion    No change to present management.    The ASCVD Risk score ([place] DK, et al., 2019) failed to calculate for the following reasons:    The 2019 ASCVD risk score is only valid for ages 40 to 79    Risk score cannot be calculated because patient has a medical history suggesting prior/existing ASCVD     Follow Up  Follow up in about 1 year (around 5/9/2026).      Fabrice Rodriguez MD         [1]   Current Outpatient Medications   Medication Sig Dispense Refill    aspirin (ECOTRIN) 81 MG EC tablet Take 81 mg by mouth once daily.      ciclopirox (PENLAC) 8 % Soln Apply topically nightly. 6.6 mL 11    furosemide (LASIX) 40 MG tablet TAKE 1 TABLET BY MOUTH DAILY AS  NEEDED FOR SHORTNESS OF BREATH  OR SWELLING 90  tablet 3    hydroxychloroquine (PLAQUENIL) 200 mg tablet Take 1 tablet (200 mg total) by mouth once daily. 90 tablet 0    ibuprofen (ADVIL,MOTRIN) 800 MG tablet TAKE 1 TABLET BY MOUTH 3 TIMES  DAILY AS NEEDED FOR PAIN 180 tablet 5    levothyroxine (SYNTHROID) 50 MCG tablet TAKE 1 TABLET BY MOUTH BEFORE  BREAKFAST 90 tablet 3    methocarbamoL (ROBAXIN) 500 MG Tab Take 1 tablet (500 mg total) by mouth 3 (three) times daily as needed (muscle spasm). 90 tablet 1    prednisoLONE acetate (PRED FORTE) 1 % DrpS Place 1 drop into the left eye.      SIMBRINZA 1-0.2 % DrpS       temazepam (RESTORIL) 15 mg Cap Take 1 capsule (15 mg total) by mouth nightly as needed (insomnia). 30 capsule 1    vit A/vit C/vit E/zinc/copper (ICAPS AREDS ORAL) Take 1 capsule by mouth 2 (two) times a day.      VITAMIN D2 1,250 mcg (50,000 unit) capsule TAKE 1 CAPSULE BY MOUTH EVERY 7  DAYS 12 capsule 3    atorvastatin (LIPITOR) 10 MG tablet Take 1 tablet (10 mg total) by mouth once daily. 90 tablet 3    losartan (COZAAR) 50 MG tablet Take 1 tablet (50 mg total) by mouth 2 (two) times a day. 180 tablet 3     No current facility-administered medications for this visit.   [2]   Social History  Socioeconomic History    Marital status: Single   Occupational History    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   Tobacco Use    Smoking status: Former     Current packs/day: 0.00     Average packs/day: 1 pack/day for 24.0 years (24.0 ttl pk-yrs)     Types: Cigarettes     Start date: 1961     Quit date: 1985     Years since quittin.3    Smokeless tobacco: Former   Substance and Sexual Activity    Alcohol use: Not Currently    Drug use: Never    Sexual activity: Not Currently     Partners: Male     Birth control/protection: Post-menopausal, None     Social Drivers of Health     Financial Resource Strain: Low Risk  (2024)    Overall Financial Resource Strain (CARDIA)     Difficulty of Paying Living Expenses: Not hard at all    Food Insecurity: No Food Insecurity (9/13/2024)    Hunger Vital Sign     Worried About Running Out of Food in the Last Year: Never true     Ran Out of Food in the Last Year: Never true   Transportation Needs: No Transportation Needs (9/13/2024)    PRAPARE - Transportation     Lack of Transportation (Medical): No     Lack of Transportation (Non-Medical): No   Physical Activity: Insufficiently Active (9/13/2024)    Exercise Vital Sign     Days of Exercise per Week: 3 days     Minutes of Exercise per Session: 10 min   Stress: No Stress Concern Present (9/13/2024)    Stateless [place] [employer] - Occupational Stress Questionnaire     Feeling of Stress : Not at all   Housing Stability: Unknown (9/13/2024)    Housing Stability Vital Sign     Unable to Pay for Housing in the Last Year: No     Homeless in the Last Year: No

## 2025-05-12 ENCOUNTER — RESULTS FOLLOW-UP (OUTPATIENT)
Dept: CARDIOLOGY | Facility: CLINIC | Age: 80
End: 2025-05-12

## 2025-06-02 DIAGNOSIS — M35.9 UNDIFFERENTIATED CONNECTIVE TISSUE DISEASE: ICD-10-CM

## 2025-06-02 RX ORDER — HYDROXYCHLOROQUINE SULFATE 200 MG/1
200 TABLET, FILM COATED ORAL
Qty: 90 TABLET | Refills: 0 | Status: SHIPPED | OUTPATIENT
Start: 2025-06-02

## 2025-06-11 ENCOUNTER — CLINICAL SUPPORT (OUTPATIENT)
Dept: AUDIOLOGY | Facility: CLINIC | Age: 80
End: 2025-06-11
Payer: MEDICARE

## 2025-06-11 DIAGNOSIS — H90.3 SENSORINEURAL HEARING LOSS, BILATERAL: Primary | ICD-10-CM

## 2025-06-11 DIAGNOSIS — H90.3 SENSORINEURAL HEARING LOSS, BILATERAL: ICD-10-CM

## 2025-06-11 PROCEDURE — 92567 TYMPANOMETRY: CPT | Mod: S$GLB,,, | Performed by: AUDIOLOGIST

## 2025-06-11 PROCEDURE — 99999 PR PBB SHADOW E&M-EST. PATIENT-LVL I: CPT | Mod: PBBFAC,,, | Performed by: AUDIOLOGIST

## 2025-06-11 PROCEDURE — 92557 COMPREHENSIVE HEARING TEST: CPT | Mod: S$GLB,,, | Performed by: AUDIOLOGIST

## 2025-06-11 NOTE — PROGRESS NOTES
Jacqueline M Goldberg was seen today in the clinic for an annual audiologic evaluation.  Ms. Goldberg reported she wears her hearing aids for social events and outings. She denied any trouble with her hearing aids at this time. She does not suspect any change in hearing sensitivity. She denied any otalgia or aural fullness; however, she reported that her balance is off due to poor depth perception. She denied any falls and noted that she is cautious when walking on uneven ground.     Otoscopy revealed clear EAC's with visible TM's in both ears.    Tympanometry revealed Type A in the right ear and Type A in the left ear.     Audiogram results revealed a moderate to moderately severe sensorineural hearing loss (SNHL) in the right and left ear.      Speech reception thresholds were noted at 45 dB in the right ear and 45 dB in the left ear.    Speech discrimination scores were 88% in the right ear and 84% in the left ear.    Results were reviewed with patient following testing; she was seen for a hearing aid follow-up afterwards. It was recommended she return for an annual audiogram or as needed with perceived change in hearing.     Recommendations:  Annual audiogram  Hearing protection when in noise  Daily use of binaural hearing aids

## 2025-06-11 NOTE — PROGRESS NOTES
HEARING AID FOLLOW-UP    Jacqueline M Goldberg was seen today for a hearing aid follow-up visit.     Hearing aids were connected to software. New audiogram was applied to settings. Patient has an average wear time of one hour a day; she noted that she only likes to wear them when socializing/going on outings. She expressed understanding that it is recommended to wear as many waking hours as possible to receive maximum benefit from hearing aids. She noted that she enjoys wearing them when she has them on and is able to understand better. Ms. Goldberg assured me that she doesn't avoid wearing them due to a problem with function.     She was advised to call as needed for adjustments.

## 2025-07-31 ENCOUNTER — PATIENT MESSAGE (OUTPATIENT)
Dept: HEMATOLOGY/ONCOLOGY | Facility: CLINIC | Age: 80
End: 2025-07-31
Payer: MEDICARE

## 2025-08-07 DIAGNOSIS — M35.9 UNDIFFERENTIATED CONNECTIVE TISSUE DISEASE: ICD-10-CM

## 2025-08-07 RX ORDER — HYDROXYCHLOROQUINE SULFATE 200 MG/1
200 TABLET, FILM COATED ORAL DAILY
Qty: 100 TABLET | Refills: 0 | Status: SHIPPED | OUTPATIENT
Start: 2025-08-07

## 2025-08-08 ENCOUNTER — PATIENT MESSAGE (OUTPATIENT)
Dept: RHEUMATOLOGY | Facility: CLINIC | Age: 80
End: 2025-08-08
Payer: MEDICARE

## 2025-08-13 DIAGNOSIS — Z78.0 MENOPAUSE: ICD-10-CM

## 2025-08-13 PROCEDURE — 84156 ASSAY OF PROTEIN URINE: CPT | Performed by: INTERNAL MEDICINE

## 2025-08-14 ENCOUNTER — PATIENT MESSAGE (OUTPATIENT)
Dept: RHEUMATOLOGY | Facility: CLINIC | Age: 80
End: 2025-08-14
Payer: MEDICARE

## (undated) DEVICE — GLIDESHEATH RADIAL 6F 035 10CM

## (undated) DEVICE — CHLORAPREP 10.5 ML APPLICATOR

## (undated) DEVICE — Device

## (undated) DEVICE — GLOVE SURGEON SYN PF SZ 9

## (undated) DEVICE — CATH BLLN FG APEX MR 2.50X20MM

## (undated) DEVICE — GUIDEWIRE ANGIO 1.5MM .035X180

## (undated) DEVICE — CATH BLLN APEX FLEX MR 1.5X8MM

## (undated) DEVICE — GLOVE BIOGEL SKINSENSE PI 8.5

## (undated) DEVICE — TRAY CORONARY CUSTOM BAPTIST

## (undated) DEVICE — J WIRE HI TORQUE PILOT 50X014

## (undated) DEVICE — BAG DRAINAGE W/SPIKE

## (undated) DEVICE — CATH HEARTRAIL III IKARI LEFT

## (undated) DEVICE — OMNIPAQUE 350MG 150ML VIAL

## (undated) DEVICE — GLOVE BIOGEL SKINSENSE PI 7.5

## (undated) DEVICE — DRAPE RAD/FEM ANGIO 80X135IN

## (undated) DEVICE — SYR 10ML LIDOCAINE

## (undated) DEVICE — HEMOSTAT VASC BAND REG 24CM

## (undated) DEVICE — SYR ONLY LUER LOCK 20CC

## (undated) DEVICE — CATH OPTITORQUE RADIAL 5FR

## (undated) DEVICE — GLIDESHEATH SLENDER SS 5FR10CM

## (undated) DEVICE — GOWN SMART IMP BREATHABLE XXLG

## (undated) DEVICE — GUIDE LAUNCHER 6FR EBU 3.5

## (undated) DEVICE — KIT CUSTOM INFLATION DEV

## (undated) DEVICE — DRESSING TRANS 4X4 TEGADERM

## (undated) DEVICE — MANIFOLD PERCEPTOR MP 3P RH ON